# Patient Record
Sex: FEMALE | Race: OTHER | NOT HISPANIC OR LATINO | Employment: OTHER | URBAN - METROPOLITAN AREA
[De-identification: names, ages, dates, MRNs, and addresses within clinical notes are randomized per-mention and may not be internally consistent; named-entity substitution may affect disease eponyms.]

---

## 2018-06-26 ENCOUNTER — OFFICE VISIT (OUTPATIENT)
Dept: FAMILY MEDICINE | Facility: CLINIC | Age: 68
End: 2018-06-26
Payer: MEDICARE

## 2018-06-26 VITALS
HEART RATE: 79 BPM | SYSTOLIC BLOOD PRESSURE: 134 MMHG | BODY MASS INDEX: 28.07 KG/M2 | HEIGHT: 60 IN | DIASTOLIC BLOOD PRESSURE: 82 MMHG | WEIGHT: 143 LBS | OXYGEN SATURATION: 99 %

## 2018-06-26 DIAGNOSIS — Z78.0 POST-MENOPAUSAL: ICD-10-CM

## 2018-06-26 DIAGNOSIS — R53.83 FATIGUE, UNSPECIFIED TYPE: Primary | ICD-10-CM

## 2018-06-26 DIAGNOSIS — M79.10 MYALGIA: ICD-10-CM

## 2018-06-26 DIAGNOSIS — I10 ESSENTIAL HYPERTENSION: ICD-10-CM

## 2018-06-26 DIAGNOSIS — E11.9 TYPE 2 DIABETES MELLITUS WITHOUT COMPLICATION, WITHOUT LONG-TERM CURRENT USE OF INSULIN: ICD-10-CM

## 2018-06-26 DIAGNOSIS — E53.8 VITAMIN B 12 DEFICIENCY: ICD-10-CM

## 2018-06-26 DIAGNOSIS — Z12.31 SCREENING MAMMOGRAM, ENCOUNTER FOR: ICD-10-CM

## 2018-06-26 DIAGNOSIS — E78.5 HYPERLIPIDEMIA, UNSPECIFIED HYPERLIPIDEMIA TYPE: ICD-10-CM

## 2018-06-26 PROCEDURE — 96372 THER/PROPH/DIAG INJ SC/IM: CPT | Mod: ,,, | Performed by: NURSE PRACTITIONER

## 2018-06-26 PROCEDURE — 99204 OFFICE O/P NEW MOD 45 MIN: CPT | Mod: 25,,, | Performed by: NURSE PRACTITIONER

## 2018-06-26 RX ORDER — GLIPIZIDE 5 MG/1
5 TABLET, FILM COATED, EXTENDED RELEASE ORAL 2 TIMES DAILY
COMMUNITY
End: 2018-06-26 | Stop reason: SINTOL

## 2018-06-26 RX ORDER — LANOLIN ALCOHOL/MO/W.PET/CERES
100 CREAM (GRAM) TOPICAL DAILY
COMMUNITY
End: 2018-07-17

## 2018-06-26 RX ORDER — METFORMIN HYDROCHLORIDE 1000 MG/1
1000 TABLET ORAL 2 TIMES DAILY WITH MEALS
COMMUNITY
End: 2018-09-05 | Stop reason: SDUPTHER

## 2018-06-26 RX ORDER — LANCETS
1 EACH MISCELLANEOUS DAILY
Qty: 100 EACH | Refills: 0 | Status: SHIPPED | OUTPATIENT
Start: 2018-06-26 | End: 2019-02-25 | Stop reason: SDUPTHER

## 2018-06-26 RX ORDER — INSULIN PUMP SYRINGE, 3 ML
EACH MISCELLANEOUS
Qty: 1 EACH | Refills: 0 | Status: SHIPPED | OUTPATIENT
Start: 2018-06-26 | End: 2019-02-25 | Stop reason: SDUPTHER

## 2018-06-26 RX ORDER — IRBESARTAN AND HYDROCHLOROTHIAZIDE 300; 12.5 MG/1; MG/1
TABLET, FILM COATED ORAL DAILY
COMMUNITY
End: 2018-10-16 | Stop reason: SDUPTHER

## 2018-06-26 RX ORDER — ERGOCALCIFEROL 1.25 MG/1
50000 CAPSULE ORAL
COMMUNITY
End: 2019-02-25

## 2018-06-26 RX ORDER — CYANOCOBALAMIN 1000 UG/ML
1000 INJECTION, SOLUTION INTRAMUSCULAR; SUBCUTANEOUS
Status: COMPLETED | OUTPATIENT
Start: 2018-06-26 | End: 2018-06-26

## 2018-06-26 RX ORDER — IRBESARTAN 150 MG/1
TABLET ORAL NIGHTLY
COMMUNITY
End: 2018-06-26 | Stop reason: CLARIF

## 2018-06-26 RX ORDER — PRAVASTATIN SODIUM 40 MG/1
20 TABLET ORAL DAILY
COMMUNITY
End: 2018-10-16 | Stop reason: SDUPTHER

## 2018-06-26 RX ORDER — EPINEPHRINE 0.22MG
100 AEROSOL WITH ADAPTER (ML) INHALATION DAILY
Qty: 30 CAPSULE | Refills: 11 | COMMUNITY
Start: 2018-06-26 | End: 2019-02-25

## 2018-06-26 RX ADMIN — CYANOCOBALAMIN 1000 MCG: 1000 INJECTION, SOLUTION INTRAMUSCULAR; SUBCUTANEOUS at 04:06

## 2018-06-26 NOTE — PROGRESS NOTES
"     SUBJECTIVE:      Patient ID: Iesha West is a 68 y.o. female.    Chief Complaint: Fatigue and Muscle Pain    Ms West recently moved from [place] here to establish with a PCP, she is accompanied by her son today. She has a history of DM, HTN and hyperlipidemia. Here today complaining of fatigue and muscle aches. She was worked up for this by her PCP in [place], started on B12 injections. Not feeling any better. Her A1c at that time was 6.8. She says she has cut her glipizide in half a few months ago because it was making her feel "woozy" Lately when she is feeling tired she checks her blood sugar and it is 80-90.     Diabetes   She presents for her initial diabetic visit. She has type 2 diabetes mellitus. Her disease course has been stable. Pertinent negatives for hypoglycemia include no confusion, dizziness, headaches, pallor or speech difficulty. Associated symptoms include fatigue. Pertinent negatives for diabetes include no chest pain and no weakness. There are no hypoglycemic complications. Symptoms are stable. Risk factors for coronary artery disease include dyslipidemia and hypertension. Current diabetic treatment includes oral agent (dual therapy). She is compliant with treatment all of the time.   Hypertension   This is a chronic problem. The current episode started more than 1 year ago. The problem is controlled. Pertinent negatives include no chest pain, headaches, palpitations or shortness of breath. Risk factors for coronary artery disease include diabetes mellitus, dyslipidemia and post-menopausal state. Past treatments include angiotensin blockers and diuretics.   Hyperlipidemia   This is a chronic problem. The current episode started more than 1 year ago. Associated symptoms include myalgias. Pertinent negatives include no chest pain or shortness of breath. Current antihyperlipidemic treatment includes statins. Risk factors for coronary artery disease include diabetes mellitus, " dyslipidemia, hypertension and post-menopausal.   Fatigue   This is a new problem. The current episode started more than 1 month ago. The problem occurs daily. The problem has been unchanged. Associated symptoms include fatigue and myalgias. Pertinent negatives include no abdominal pain, arthralgias, change in bowel habit, chest pain, chills, congestion, diaphoresis, fever, headaches, joint swelling, rash, swollen glands, urinary symptoms, vomiting or weakness. She has tried nothing for the symptoms.       Past Surgical History:   Procedure Laterality Date    COLON SURGERY      JOINT REPLACEMENT       Family History   Problem Relation Age of Onset    Stroke Mother       Social History     Social History    Marital status:      Spouse name: N/A    Number of children: N/A    Years of education: N/A     Social History Main Topics    Smoking status: Never Smoker    Smokeless tobacco: Never Used    Alcohol use No    Drug use: No    Sexual activity: No     Other Topics Concern    None     Social History Narrative    None     Current Outpatient Prescriptions   Medication Sig Dispense Refill    cyanocobalamin (VITAMIN B-12) 1000 MCG tablet Take 100 mcg by mouth once daily.      cyanocobalamin, vitamin B-12, 1,000 mcg/mL Kit Inject 1,000 mg as directed every 30 days.      ergocalciferol (ERGOCALCIFEROL) 50,000 unit Cap Take 50,000 Units by mouth every 30 days.      irbesartan-hydrochlorothiazide (AVALIDE) 300-12.5 mg per tablet Take by mouth once daily.      metFORMIN (GLUCOPHAGE) 1000 MG tablet Take 1,000 mg by mouth 2 (two) times daily with meals.      pravastatin (PRAVACHOL) 40 MG tablet Take 20 mg by mouth once daily.      blood sugar diagnostic (BLOOD GLUCOSE TEST) Strp 1 strip by Misc.(Non-Drug; Combo Route) route once daily. 100 strip 0    blood-glucose meter kit Use as instructed 1 each 0    coenzyme Q10 (CO Q-10) 100 mg capsule Take 1 capsule (100 mg total) by mouth once daily. 30  capsule 11    lancets (LANCETS,ULTRA THIN) 26 gauge Misc 1 lancet by Misc.(Non-Drug; Combo Route) route once daily. 100 each 0     No current facility-administered medications for this visit.      Review of patient's allergies indicates:   Allergen Reactions    Aspirin Itching      Past Medical History:   Diagnosis Date    Cancer     Diabetes mellitus, type 2     Seasonal allergies      Past Surgical History:   Procedure Laterality Date    COLON SURGERY      JOINT REPLACEMENT         Review of Systems   Constitutional: Positive for fatigue. Negative for appetite change, chills, diaphoresis, fever and unexpected weight change.   HENT: Negative for congestion, ear discharge, hearing loss, trouble swallowing and voice change.    Eyes: Negative for photophobia and pain.   Respiratory: Negative for chest tightness, shortness of breath, wheezing and stridor.    Cardiovascular: Negative for chest pain and palpitations.   Gastrointestinal: Negative for abdominal pain, blood in stool, change in bowel habit and vomiting.   Endocrine: Negative for cold intolerance and heat intolerance.   Genitourinary: Negative for difficulty urinating and flank pain.   Musculoskeletal: Positive for myalgias. Negative for arthralgias, joint swelling and neck stiffness.   Skin: Negative for pallor and rash.   Neurological: Negative for dizziness, speech difficulty, weakness, light-headedness and headaches.   Hematological: Does not bruise/bleed easily.   Psychiatric/Behavioral: Negative for confusion.      OBJECTIVE:      Vitals:    06/26/18 1347 06/26/18 1516   BP: (!) 160/80 134/82   Pulse: 79    SpO2: 99%    Weight: 64.9 kg (143 lb)    Height: 5' (1.524 m)      Physical Exam   Constitutional: She is oriented to person, place, and time. She appears well-developed and well-nourished.   HENT:   Head: Atraumatic.   Nose: Nose normal.   Mouth/Throat: Oropharynx is clear and moist and mucous membranes are normal.   Eyes: Conjunctivae and  EOM are normal. Pupils are equal, round, and reactive to light.   Neck: Neck supple. No JVD present. Carotid bruit is not present. No thyromegaly present.   Cardiovascular: Normal rate, regular rhythm, normal heart sounds and intact distal pulses.    Pulmonary/Chest: Effort normal and breath sounds normal.   Abdominal: Soft. Bowel sounds are normal. She exhibits no distension. There is no hepatosplenomegaly. There is no tenderness.   Musculoskeletal: Normal range of motion.   Lymphadenopathy:     She has no cervical adenopathy.   Neurological: She is alert and oriented to person, place, and time.   Skin: Skin is warm and dry. No rash noted.   Psychiatric: She has a normal mood and affect. Her speech is normal and behavior is normal.   Nursing note and vitals reviewed.     Assessment:       1. Fatigue, unspecified type    2. Type 2 diabetes mellitus without complication, without long-term current use of insulin    3. Essential hypertension    4. Myalgia    5. Hyperlipidemia, unspecified hyperlipidemia type    6. Post-menopausal    7. Screening mammogram, encounter for    8. Vitamin B 12 deficiency        Plan:       Fatigue, unspecified type  -     CBC auto differential; Future; Expected date: 06/26/2018    Type 2 diabetes mellitus without complication, without long-term current use of insulin  -     Comprehensive metabolic panel; Future; Expected date: 06/26/2018  -     Microalbumin/creatinine urine ratio; Future; Expected date: 06/26/2018  -     blood-glucose meter kit; Use as instructed  Dispense: 1 each; Refill: 0  -     blood sugar diagnostic (BLOOD GLUCOSE TEST) Strp; 1 strip by Misc.(Non-Drug; Combo Route) route once daily.  Dispense: 100 strip; Refill: 0  -     lancets (LANCETS,ULTRA THIN) 26 gauge Misc; 1 lancet by Misc.(Non-Drug; Combo Route) route once daily.  Dispense: 100 each; Refill: 0  Stop Glipizide    Essential hypertension  -     EKG 12-lead                NSR  -     X-Ray Chest PA And  Lateral    Myalgia  -     coenzyme Q10 (CO Q-10) 100 mg capsule; Take 1 capsule (100 mg total) by mouth once daily.  Dispense: 30 capsule; Refill: 11  Muscle aches possibly related to statin. Will try Co Q10 to see if improves.     Hyperlipidemia, unspecified hyperlipidemia type  -     Lipid panel; Future; Expected date: 06/26/2018    Post-menopausal  -     DXA Bone Density Spine And Hip; Future; Expected date: 06/26/2018    Screening mammogram, encounter for  -     Mammo Digital Screening Bilat without CA    Vitamin B 12 deficiency  -     cyanocobalamin injection 1,000 mcg; Inject 1 mL (1,000 mcg total) into the muscle one time.        Follow-up in about 3 weeks (around 7/17/2018) for dm/fatigue.      6/26/2018 DAYANARA Sanabria, FNP

## 2018-06-26 NOTE — PATIENT INSTRUCTIONS
4 Steps for Eating Healthier  Changing the way you eat can improve your health. It can lower your cholesterol and blood pressure, and help you stay at a healthy weight. Your diet doesnt have to be bland and boring to be healthy. Just watch your calories and follow these steps:    1. Eat fewer unhealthy fats  · Choose more fish and lean meats instead of fatty cuts of meat.  · Skip butter and lard, and use less margarine.  · Pass on foods that have palm, coconut, or hydrogenated oils.  · Eat fewer high-fat dairy foods like cheese, ice cream, and whole milk.  · Get a heart-healthy cookbook and try some low-fat recipes.  2. Go light on salt  · Keep the saltshaker off the table.  · Limit high-salt ingredients, such as soy sauce, bouillon, and garlic salt.  · Instead of adding salt when cooking, season your food with herbs and flavorings. Try lemon, garlic, and onion.  · Limit convenience foods, such as boxed or canned foods and restaurant food.  · Read food labels and choose lower-sodium options.  3. Limit sugar  · Pause before you add sugars to pancakes, cereal, coffee, or tea. This includes white and brown table sugar, syrup, honey, and molasses. Cut your usual amount by half.  · Use non-sugar sweeteners. Stevia, aspartame, and sucralose can satisfy a sweet tooth without adding calories.  · Swap out sugar-filled soda and other drinks. Buy sugar-free or low-calorie beverages. Remember water is always the best choice.  · Read labels and choose foods with less added sugar. Keep in mind that dairy foods and foods with fruit will have some natural sugar.  · Cut the sugar in recipes by 1/3 to 1/2. Boost the flavor with extracts like almond, vanilla, or orange. Or add spices such as cinnamon or nutmeg.  4. Eat more fiber  · Eat fresh fruits and vegetables every day.  · Boost your diet with whole grains. Go for oats, whole-grain rice, and bran.  · Add beans and lentils to your meals.  · Drink more water to match your fiber  increase. This is to help prevent constipation.  Date Last Reviewed: 5/11/2015  © 4834-6640 [employer]. [address] [place] [address], [place] [address]. All rights reserved. This information is not intended as a substitute for professional medical care. Always follow your healthcare professional's instructions.

## 2018-06-27 LAB
ALBUMIN SERPL-MCNC: 4.2 G/DL (ref 3.1–4.7)
ALP SERPL-CCNC: 58 IU/L (ref 40–104)
ALT (SGPT): 22 IU/L (ref 3–33)
AST SERPL-CCNC: 22 IU/L (ref 10–40)
BASOPHILS NFR BLD: 0 K/UL (ref 0–0.2)
BASOPHILS NFR BLD: 0.3 %
BILIRUB SERPL-MCNC: 0.4 MG/DL (ref 0.3–1)
BUN SERPL-MCNC: 14 MG/DL (ref 8–20)
CALCIUM SERPL-MCNC: 9.1 MG/DL (ref 7.7–10.4)
CHLORIDE: 103 MMOL/L (ref 98–110)
CO2 SERPL-SCNC: 24 MMOL/L (ref 22.8–31.6)
CREATININE RANDOM URINE: 87 MG/DL
CREATININE: 0.87 MG/DL (ref 0.6–1.4)
EOSINOPHIL NFR BLD: 0.4 K/UL (ref 0–0.7)
EOSINOPHIL NFR BLD: 5.5 %
ERYTHROCYTE [DISTWIDTH] IN BLOOD BY AUTOMATED COUNT: 12.8 % (ref 11.7–14.9)
GLUCOSE: 162 MG/DL (ref 70–99)
GRAN #: 4.8 K/UL (ref 1.4–6.5)
GRAN%: 65.6 %
HCT VFR BLD AUTO: 36.5 % (ref 36–48)
HGB BLD-MCNC: 11.7 G/DL (ref 12–15)
IMMATURE GRANS (ABS): 0 K/UL (ref 0–1)
IMMATURE GRANULOCYTES: 0.4 %
LYMPH #: 1.6 K/UL (ref 1.2–3.4)
LYMPH%: 21.8 %
MCH RBC QN AUTO: 28.6 PG (ref 25–35)
MCHC RBC AUTO-ENTMCNC: 32.1 G/DL (ref 31–36)
MCV RBC AUTO: 89.2 FL (ref 79–98)
MICROALBUM.,U,RANDOM: 6.4 MCG/ML (ref 0–19.9)
MICROALBUMIN/CREATININE RATIO: 7 (ref 0–30)
MONO #: 0.5 K/UL (ref 0.1–0.6)
MONO%: 6.4 %
NUCLEATED RBCS: 0 %
PLATELET # BLD AUTO: 231 K/UL (ref 140–440)
PMV BLD AUTO: 10.7 FL (ref 8.8–12.7)
POTASSIUM SERPL-SCNC: 4.7 MMOL/L (ref 3.5–5)
PROT SERPL-MCNC: 7.5 G/DL (ref 6–8.2)
RBC # BLD AUTO: 4.09 M/UL (ref 3.5–5.5)
SODIUM: 133 MMOL/L (ref 134–144)
WBC # BLD AUTO: 7.3 K/UL (ref 5–10)

## 2018-06-29 ENCOUNTER — TELEPHONE (OUTPATIENT)
Dept: FAMILY MEDICINE | Facility: CLINIC | Age: 68
End: 2018-06-29

## 2018-06-29 NOTE — TELEPHONE ENCOUNTER
----- Message from Rylan Don NP sent at 6/28/2018  7:57 AM CDT -----  Will review results at upcoming OV. Please make sure her son is with her for the visit

## 2018-07-02 ENCOUNTER — TELEPHONE (OUTPATIENT)
Dept: FAMILY MEDICINE | Facility: CLINIC | Age: 68
End: 2018-07-02

## 2018-07-02 NOTE — TELEPHONE ENCOUNTER
----- Message from FALLON Del Rio MD sent at 7/2/2018 10:55 AM CDT -----  Osteopenia, stay active, calcium, magnesium, vit d

## 2018-07-17 ENCOUNTER — OFFICE VISIT (OUTPATIENT)
Dept: FAMILY MEDICINE | Facility: CLINIC | Age: 68
End: 2018-07-17
Payer: MEDICARE

## 2018-07-17 VITALS
BODY MASS INDEX: 27.68 KG/M2 | HEART RATE: 90 BPM | SYSTOLIC BLOOD PRESSURE: 120 MMHG | OXYGEN SATURATION: 99 % | HEIGHT: 60 IN | DIASTOLIC BLOOD PRESSURE: 70 MMHG | WEIGHT: 141 LBS

## 2018-07-17 DIAGNOSIS — E11.9 TYPE 2 DIABETES MELLITUS WITHOUT COMPLICATION, WITH LONG-TERM CURRENT USE OF INSULIN: Primary | ICD-10-CM

## 2018-07-17 DIAGNOSIS — Z79.4 TYPE 2 DIABETES MELLITUS WITHOUT COMPLICATION, WITH LONG-TERM CURRENT USE OF INSULIN: Primary | ICD-10-CM

## 2018-07-17 DIAGNOSIS — I10 ESSENTIAL HYPERTENSION: ICD-10-CM

## 2018-07-17 DIAGNOSIS — Z23 NEED FOR PNEUMOCOCCAL VACCINATION: ICD-10-CM

## 2018-07-17 DIAGNOSIS — E53.8 VITAMIN B 12 DEFICIENCY: ICD-10-CM

## 2018-07-17 DIAGNOSIS — M81.6 LOCALIZED OSTEOPOROSIS WITHOUT CURRENT PATHOLOGICAL FRACTURE: ICD-10-CM

## 2018-07-17 LAB — HBA1C MFR BLD: ABNORMAL %

## 2018-07-17 PROCEDURE — 83036 HEMOGLOBIN GLYCOSYLATED A1C: CPT | Mod: QW,,, | Performed by: NURSE PRACTITIONER

## 2018-07-17 PROCEDURE — 96372 THER/PROPH/DIAG INJ SC/IM: CPT | Mod: ,,, | Performed by: NURSE PRACTITIONER

## 2018-07-17 PROCEDURE — 99214 OFFICE O/P EST MOD 30 MIN: CPT | Mod: 25,,, | Performed by: NURSE PRACTITIONER

## 2018-07-17 RX ORDER — ALENDRONATE SODIUM 70 MG/1
70 TABLET ORAL
Qty: 4 TABLET | Refills: 5 | Status: SHIPPED | OUTPATIENT
Start: 2018-07-17 | End: 2018-10-16 | Stop reason: SDUPTHER

## 2018-07-17 RX ORDER — CYANOCOBALAMIN 1000 UG/ML
1000 INJECTION, SOLUTION INTRAMUSCULAR; SUBCUTANEOUS
Status: COMPLETED | OUTPATIENT
Start: 2018-07-17 | End: 2018-07-17

## 2018-07-17 RX ADMIN — CYANOCOBALAMIN 1000 MCG: 1000 INJECTION, SOLUTION INTRAMUSCULAR; SUBCUTANEOUS at 02:07

## 2018-07-17 NOTE — PROGRESS NOTES
SUBJECTIVE:      Patient ID: Iesha West is a 68 y.o. female.    Chief Complaint: Diabetes (discuss bone density test) and Fatigue    Patient here today to f/u on diabetes and change in medication. She is feeling much better since stopping her glipizide. No fatigue and no wooziness. A1c 7.2 today.   DEXA: Osteoporosis on lumbar spine and osteopenia on Hip. Will discuss treatment options with patient       Diabetes   She presents for her follow-up diabetic visit. She has type 2 diabetes mellitus. Her disease course has been stable. There are no hypoglycemic associated symptoms. Pertinent negatives for hypoglycemia include no confusion, pallor or speech difficulty. There are no diabetic associated symptoms. Pertinent negatives for diabetes include no chest pain. There are no hypoglycemic complications. Symptoms are stable. There are no diabetic complications. Risk factors for coronary artery disease include dyslipidemia, post-menopausal and hypertension. Current diabetic treatment includes oral agent (monotherapy).   Hypertension   This is a chronic problem. The current episode started more than 1 year ago. The problem is unchanged. The problem is controlled. Pertinent negatives include no chest pain, palpitations or shortness of breath. Risk factors for coronary artery disease include dyslipidemia, diabetes mellitus and post-menopausal state.       Past Surgical History:   Procedure Laterality Date    COLON SURGERY      JOINT REPLACEMENT       Family History   Problem Relation Age of Onset    Stroke Mother       Social History     Social History    Marital status:      Spouse name: N/A    Number of children: N/A    Years of education: N/A     Social History Main Topics    Smoking status: Never Smoker    Smokeless tobacco: Never Used    Alcohol use No    Drug use: No    Sexual activity: No     Other Topics Concern    None     Social History Narrative    None     Current Outpatient Prescriptions    Medication Sig Dispense Refill    blood sugar diagnostic (BLOOD GLUCOSE TEST) Strp 1 strip by Misc.(Non-Drug; Combo Route) route once daily. 100 strip 0    blood-glucose meter kit Use as instructed 1 each 0    coenzyme Q10 (CO Q-10) 100 mg capsule Take 1 capsule (100 mg total) by mouth once daily. 30 capsule 11    cyanocobalamin, vitamin B-12, 1,000 mcg/mL Kit Inject 1,000 mg as directed every 30 days.      ergocalciferol (ERGOCALCIFEROL) 50,000 unit Cap Take 50,000 Units by mouth every 30 days.      irbesartan-hydrochlorothiazide (AVALIDE) 300-12.5 mg per tablet Take by mouth once daily.      lancets (LANCETS,ULTRA THIN) 26 gauge Misc 1 lancet by Misc.(Non-Drug; Combo Route) route once daily. 100 each 0    metFORMIN (GLUCOPHAGE) 1000 MG tablet Take 1,000 mg by mouth 2 (two) times daily with meals.      pravastatin (PRAVACHOL) 40 MG tablet Take 20 mg by mouth once daily.      alendronate (FOSAMAX) 70 MG tablet Take 1 tablet (70 mg total) by mouth every 7 days. 4 tablet 5    blood sugar diagnostic Strp 1 strip by Misc.(Non-Drug; Combo Route) route once daily. 100 strip 2    linagliptin (TRADJENTA) 5 mg Tab tablet Take 1 tablet (5 mg total) by mouth once daily. 90 tablet 0    pneumoc 13-anh conj-dip cr,PF, 0.5 mL Syrg Inject 0.5 mLs into the muscle once. for 1 dose 0.5 mL 0     Current Facility-Administered Medications   Medication Dose Route Frequency Provider Last Rate Last Dose    cyanocobalamin injection 1,000 mcg  1,000 mcg Intramuscular 1 time in Clinic/HOD Rylan Don NP         Review of patient's allergies indicates:   Allergen Reactions    Aspirin Itching      Past Medical History:   Diagnosis Date    Cancer     Diabetes mellitus, type 2     Seasonal allergies      Past Surgical History:   Procedure Laterality Date    COLON SURGERY      JOINT REPLACEMENT         Review of Systems   Constitutional: Negative for appetite change, chills, diaphoresis and unexpected weight change.   HENT:  Negative for ear discharge, hearing loss, trouble swallowing and voice change.    Eyes: Negative for photophobia and pain.   Respiratory: Negative for chest tightness, shortness of breath and stridor.    Cardiovascular: Negative for chest pain and palpitations.   Gastrointestinal: Negative for abdominal pain, blood in stool and vomiting.   Endocrine: Negative for cold intolerance and heat intolerance.   Genitourinary: Negative for difficulty urinating and flank pain.   Musculoskeletal: Negative for joint swelling and neck stiffness.   Skin: Negative for pallor.   Neurological: Negative for speech difficulty.   Hematological: Does not bruise/bleed easily.   Psychiatric/Behavioral: Negative for confusion.      OBJECTIVE:      Vitals:    07/17/18 1306   BP: 120/70   Pulse: 90   SpO2: 99%   Weight: 64 kg (141 lb)   Height: 5' (1.524 m)     Physical Exam   Constitutional: She is oriented to person, place, and time. She appears well-developed and well-nourished.   HENT:   Head: Atraumatic.   Eyes: Conjunctivae are normal.   Neck: Neck supple.   Cardiovascular: Normal rate, regular rhythm, normal heart sounds and intact distal pulses.    Pulmonary/Chest: Effort normal and breath sounds normal.   Abdominal: Soft. Bowel sounds are normal. She exhibits no distension.   Musculoskeletal: Normal range of motion.        Right foot: There is no deformity.        Left foot: There is no deformity.   Feet:   Right Foot:   Protective Sensation: 10 sites tested. 10 sites sensed.   Skin Integrity: Negative for ulcer, blister or skin breakdown.   Left Foot:   Protective Sensation: 10 sites tested. 10 sites sensed.   Skin Integrity: Negative for ulcer, blister or skin breakdown.   Neurological: She is alert and oriented to person, place, and time.   Skin: Skin is warm and dry.   Psychiatric: She has a normal mood and affect.   Nursing note and vitals reviewed.     Assessment:       1. Type 2 diabetes mellitus without complication, with  long-term current use of insulin    2. Localized osteoporosis without current pathological fracture    3. Essential hypertension    4. Need for pneumococcal vaccination    5. Vitamin B 12 deficiency        Plan:       Type 2 diabetes mellitus without complication, with long-term current use of insulin  -     Hemoglobin A1C, POCT              7.2  -     blood sugar diagnostic Strp; 1 strip by Misc.(Non-Drug; Combo Route) route once daily.  Dispense: 100 strip; Refill: 2  -  start linagliptin (TRADJENTA) 5 mg Tab tablet; Take 1 tablet (5 mg total) by mouth once daily.  Dispense: 90 tablet; Refill: 0    Localized osteoporosis without current pathological fracture  -     alendronate (FOSAMAX) 70 MG tablet; Take 1 tablet (70 mg total) by mouth every 7 days.  Dispense: 4 tablet; Refill: 5  -     Ambulatory Referral to Dentistry  I recommend taking at least calcium 1200mg and Vitamin D of 2000 IU daily. In addition, I recommend light weight bearing exercises. We can repeat the bone density scan in 2 yrs.      Essential hypertension  Stable on current medication       Follow-up in about 3 months (around 10/17/2018) for dm.      7/17/2018 DAYANARA Sanabria, FNP

## 2018-07-17 NOTE — PATIENT INSTRUCTIONS
Preventing Osteoporosis: Avoiding Bone Loss  Certain factors can speed up bone loss or decrease bone growth. For example, alcohol, cigarettes, and certain medicines reduce bone mass. Some foods make it hard for your body to absorb calcium.    Things to avoid  Here are things to avoid to help prevent osteoporosis:  · Alcohol is toxic to bones. It is a major cause of bone loss. Heavy drinking can cause osteoporosis even if you have no other risk factors.  · Smoking reduces bone mass. Smoking may also interfere with estrogen levels and cause early menopause.  · Inactivity makes your bones lose strength and become thinner. Over time, thin bones may break. Women who aren't active are at a high risk for osteoporosis.  · Certain medicines, such as cortisone, increase bone loss. They also decrease bone growth. Ask your healthcare provider about any side effects of your medicines, and how to prevent them.  · Protein-rich or salty foods eaten in large amounts may deplete calcium.  · Caffeine increases calcium loss. People who drink a lot of coffee, tea, or queenie lose more calcium than those who don't.  Date Last Reviewed: 10/17/2015  © 1846-6396 [employer]. [address] [place] [address]. All rights reserved. This information is not intended as a substitute for professional medical care. Always follow your healthcare professional's instructions.

## 2018-07-26 ENCOUNTER — TELEPHONE (OUTPATIENT)
Dept: FAMILY MEDICINE | Facility: CLINIC | Age: 68
End: 2018-07-26

## 2018-07-26 NOTE — TELEPHONE ENCOUNTER
HERI Johnston LPN   Caller: Unspecified (Today,  4:11 PM)             Try to give it at least 30 days to work. Decrease sugar/carbs in the diet. Call with blood sugar >200.

## 2018-07-26 NOTE — TELEPHONE ENCOUNTER
Pt on tradjenta and blood sugars still running 150-160 in the morning. The son is asking if pt needs a medication adjustment.

## 2018-08-14 ENCOUNTER — CLINICAL SUPPORT (OUTPATIENT)
Dept: FAMILY MEDICINE | Facility: CLINIC | Age: 68
End: 2018-08-14
Payer: MEDICARE

## 2018-08-14 DIAGNOSIS — E53.8 B12 DEFICIENCY: Primary | ICD-10-CM

## 2018-08-14 DIAGNOSIS — E11.9 TYPE 2 DIABETES MELLITUS WITHOUT COMPLICATION, WITHOUT LONG-TERM CURRENT USE OF INSULIN: ICD-10-CM

## 2018-08-14 PROCEDURE — 96372 THER/PROPH/DIAG INJ SC/IM: CPT | Mod: ,,, | Performed by: NURSE PRACTITIONER

## 2018-08-14 RX ORDER — CYANOCOBALAMIN 1000 UG/ML
1000 INJECTION, SOLUTION INTRAMUSCULAR; SUBCUTANEOUS
Qty: 1 ML | Refills: 5 | Status: SHIPPED | OUTPATIENT
Start: 2018-08-14 | End: 2019-02-25 | Stop reason: SDUPTHER

## 2018-08-14 RX ORDER — CYANOCOBALAMIN 1000 UG/ML
100 INJECTION, SOLUTION INTRAMUSCULAR; SUBCUTANEOUS
Status: DISCONTINUED | OUTPATIENT
Start: 2018-08-14 | End: 2018-10-23

## 2018-08-14 RX ORDER — CYANOCOBALAMIN 1000 UG/ML
1000 INJECTION, SOLUTION INTRAMUSCULAR; SUBCUTANEOUS
Status: COMPLETED | OUTPATIENT
Start: 2018-08-14 | End: 2018-08-14

## 2018-08-14 RX ADMIN — CYANOCOBALAMIN 1000 MCG: 1000 INJECTION, SOLUTION INTRAMUSCULAR; SUBCUTANEOUS at 02:08

## 2018-08-14 NOTE — PROGRESS NOTES
Pt here for b-12 inj. Name and  verified. Med supplied by pt from pharmacy.  Inj given in left deltoid, pt raciel'd well.

## 2018-08-31 DIAGNOSIS — Z79.4 TYPE 2 DIABETES MELLITUS WITHOUT COMPLICATION, WITH LONG-TERM CURRENT USE OF INSULIN: ICD-10-CM

## 2018-08-31 DIAGNOSIS — E11.9 TYPE 2 DIABETES MELLITUS WITHOUT COMPLICATION, WITH LONG-TERM CURRENT USE OF INSULIN: ICD-10-CM

## 2018-09-04 RX ORDER — LINAGLIPTIN 5 MG/1
TABLET, FILM COATED ORAL
Qty: 90 TABLET | Refills: 0 | Status: SHIPPED | OUTPATIENT
Start: 2018-09-04 | End: 2018-09-05 | Stop reason: SDUPTHER

## 2018-09-05 DIAGNOSIS — E11.9 TYPE 2 DIABETES MELLITUS WITHOUT COMPLICATION, WITH LONG-TERM CURRENT USE OF INSULIN: ICD-10-CM

## 2018-09-05 DIAGNOSIS — Z79.4 TYPE 2 DIABETES MELLITUS WITHOUT COMPLICATION, WITH LONG-TERM CURRENT USE OF INSULIN: ICD-10-CM

## 2018-09-05 RX ORDER — METFORMIN HYDROCHLORIDE 1000 MG/1
1000 TABLET ORAL 2 TIMES DAILY WITH MEALS
Qty: 180 TABLET | Refills: 0 | Status: SHIPPED | OUTPATIENT
Start: 2018-09-05 | End: 2018-10-16 | Stop reason: SDUPTHER

## 2018-09-17 ENCOUNTER — CLINICAL SUPPORT (OUTPATIENT)
Dept: FAMILY MEDICINE | Facility: CLINIC | Age: 68
End: 2018-09-17
Payer: MEDICARE

## 2018-09-17 DIAGNOSIS — Z79.4 TYPE 2 DIABETES MELLITUS WITHOUT COMPLICATION, WITH LONG-TERM CURRENT USE OF INSULIN: ICD-10-CM

## 2018-09-17 DIAGNOSIS — E53.8 B12 DEFICIENCY: Primary | ICD-10-CM

## 2018-09-17 DIAGNOSIS — E11.9 TYPE 2 DIABETES MELLITUS WITHOUT COMPLICATION, WITH LONG-TERM CURRENT USE OF INSULIN: ICD-10-CM

## 2018-09-17 PROCEDURE — 96372 THER/PROPH/DIAG INJ SC/IM: CPT | Mod: ,,, | Performed by: NURSE PRACTITIONER

## 2018-09-17 RX ORDER — CYANOCOBALAMIN 1000 UG/ML
1000 INJECTION, SOLUTION INTRAMUSCULAR; SUBCUTANEOUS
Status: COMPLETED | OUTPATIENT
Start: 2018-09-17 | End: 2018-09-17

## 2018-09-17 RX ADMIN — CYANOCOBALAMIN 1000 MCG: 1000 INJECTION, SOLUTION INTRAMUSCULAR; SUBCUTANEOUS at 01:09

## 2018-09-17 NOTE — PROGRESS NOTES
Pt here for B12 inj. Name and  verified. Med supplied by pt from pharmacy.  Inj given in left deltoid, pt raciel'd well.

## 2018-10-16 ENCOUNTER — TELEPHONE (OUTPATIENT)
Dept: FAMILY MEDICINE | Facility: CLINIC | Age: 68
End: 2018-10-16

## 2018-10-16 ENCOUNTER — OFFICE VISIT (OUTPATIENT)
Dept: FAMILY MEDICINE | Facility: CLINIC | Age: 68
End: 2018-10-16
Payer: MEDICARE

## 2018-10-16 VITALS
WEIGHT: 137 LBS | HEART RATE: 88 BPM | HEIGHT: 60 IN | OXYGEN SATURATION: 99 % | BODY MASS INDEX: 26.9 KG/M2 | DIASTOLIC BLOOD PRESSURE: 84 MMHG | SYSTOLIC BLOOD PRESSURE: 134 MMHG

## 2018-10-16 DIAGNOSIS — E53.8 VITAMIN B 12 DEFICIENCY: ICD-10-CM

## 2018-10-16 DIAGNOSIS — E11.9 TYPE 2 DIABETES MELLITUS WITHOUT COMPLICATION, WITHOUT LONG-TERM CURRENT USE OF INSULIN: Primary | ICD-10-CM
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
## 2018-10-16 DIAGNOSIS — M81.6 LOCALIZED OSTEOPOROSIS WITHOUT CURRENT PATHOLOGICAL FRACTURE: ICD-10-CM

## 2018-10-16 DIAGNOSIS — I10 ESSENTIAL HYPERTENSION: ICD-10-CM

## 2018-10-16 DIAGNOSIS — E78.5 HYPERLIPIDEMIA, UNSPECIFIED HYPERLIPIDEMIA TYPE: ICD-10-CM

## 2018-10-16 DIAGNOSIS — M54.2 NECK PAIN: ICD-10-CM

## 2018-10-16 DIAGNOSIS — M53.3 SACRAL PAIN: ICD-10-CM

## 2018-10-16 LAB — HBA1C MFR BLD: 6.3 %

## 2018-10-16 PROCEDURE — 99214 OFFICE O/P EST MOD 30 MIN: CPT | Mod: 25,,, | Performed by: NURSE PRACTITIONER

## 2018-10-16 PROCEDURE — 96372 THER/PROPH/DIAG INJ SC/IM: CPT | Mod: ,,, | Performed by: NURSE PRACTITIONER

## 2018-10-16 PROCEDURE — 83036 HEMOGLOBIN GLYCOSYLATED A1C: CPT | Mod: QW,,, | Performed by: NURSE PRACTITIONER

## 2018-10-16 RX ORDER — ALENDRONATE SODIUM 70 MG/1
70 TABLET ORAL
Qty: 12 TABLET | Refills: 1 | Status: SHIPPED | OUTPATIENT
Start: 2018-10-16 | End: 2018-10-23 | Stop reason: SDUPTHER

## 2018-10-16 RX ORDER — PRAVASTATIN SODIUM 40 MG/1
20 TABLET ORAL DAILY
Qty: 90 TABLET | Refills: 1 | Status: SHIPPED | OUTPATIENT
Start: 2018-10-16 | End: 2019-02-25 | Stop reason: SDUPTHER

## 2018-10-16 RX ORDER — BACLOFEN 10 MG/1
10 TABLET ORAL 2 TIMES DAILY
Qty: 28 TABLET | Refills: 0 | Status: SHIPPED | OUTPATIENT
Start: 2018-10-16 | End: 2019-02-25

## 2018-10-16 RX ORDER — IRBESARTAN AND HYDROCHLOROTHIAZIDE 300; 12.5 MG/1; MG/1
1 TABLET, FILM COATED ORAL DAILY
Qty: 90 TABLET | Refills: 1 | Status: SHIPPED | OUTPATIENT
Start: 2018-10-16 | End: 2019-02-25 | Stop reason: SDUPTHER

## 2018-10-16 RX ORDER — BACLOFEN 10 MG/1
10 TABLET ORAL 2 TIMES DAILY
Qty: 90 TABLET | Refills: 0 | Status: SHIPPED | OUTPATIENT
Start: 2018-10-16 | End: 2018-10-16 | Stop reason: ALTCHOICE

## 2018-10-16 RX ORDER — METFORMIN HYDROCHLORIDE 1000 MG/1
1000 TABLET ORAL 2 TIMES DAILY WITH MEALS
Qty: 180 TABLET | Refills: 0 | Status: SHIPPED | OUTPATIENT
Start: 2018-10-16 | End: 2019-02-25 | Stop reason: SDUPTHER

## 2018-10-16 RX ORDER — CYANOCOBALAMIN 1000 UG/ML
1000 INJECTION, SOLUTION INTRAMUSCULAR; SUBCUTANEOUS
Status: COMPLETED | OUTPATIENT
Start: 2018-10-16 | End: 2018-10-16

## 2018-10-16 RX ADMIN — CYANOCOBALAMIN 1000 MCG: 1000 INJECTION, SOLUTION INTRAMUSCULAR; SUBCUTANEOUS at 02:10

## 2018-10-16 NOTE — PATIENT INSTRUCTIONS
Neck Pain    There are several possible causes of neck pain when there is no injury:  · You can get a minor ligament sprain or muscle strain from a sudden minor neck movement. Sleeping with your neck in an awkward position can also cause this.  · Some people respond to emotional stress by tensing the muscles of their neck, shoulders, and upper back. Chronic spasm in these muscles can cause neck pain and sometimes headaches.  · Gradual wear and tear of the joints in the spine can cause degenerative arthritis. This can be a source of occasional or chronic neck pain.  · The spinal disks may bulge and put pressure on a nearby spinal nerve. This can happen as a natural result of aging or repeated small injuries to the neck. The spinal disks are the cushions between each spinal bone. This causes tingling, pain, or numbness that spreads from the neck to the shoulder, arm, or hand on one side.  Acute neck pain usually gets better in 1 to 2 weeks. Neck pain related to disk disease, arthritis in the spinal joints, or spinal stenosis can become chronic and last for months or years. Spinal stenosis is narrowing of the spinal canal.  X-rays are usually not ordered for the initial evaluation of neck pain. However, X-rays may be done if you had a forceful physical injury, such as a car accident or fall. If pain continues and doesnt respond to medical treatment, X-rays and other tests may be done at a later time.  Home care  · Rest and relax the muscles. Use a comfortable pillow that supports the head. It should also help keep the spine in a neutral position. The position of the head should not be tilted forward or backward. A rolled up towel may help for a custom fit.  · Some people find relief with heat. Heat can be applied with either a warm shower or bath or a moist towel heated in the microwave and massage. Others prefer cold packs. You can make an ice pack by filling a plastic bag that seals at the top with ice cubes or  crushed ice and then wrapping it with a thin towel. Try both and use the method that feels best for 15 to 20 minutes, several times a day.  · Whether using ice or heat, be careful that you do not injure your skin. Never put ice directly on the skin. Always wrap the ice in a towel or other type of cloth.This is very important, especially in people with poor skin sensations.   · Try to reduce your stress level. Emotional stress can lead to neck muscle tension and get in the way of or delay the healing process.  · You may use over-the-counter pain medicine to control pain, unless another medicine was prescribed. If you have chronic liver or kidney disease or ever had a stomach ulcer or GI bleeding, talk with your healthcare provider before using these medicines.  Follow-up care  Follow up with your healthcare provider if your symptoms do not show signs of improvement after one week. Physical therapy or further tests may be needed.  If X-rays, CT scans, or MRI scans were taken, you will be told of any new findings that may affect your care.  Call 911  Call 911 if you have:  · Sudden weakness or numbness in one or both arms  · Neck swelling, difficulty or painful swallowing  · Difficulty breathing  · Chest pain  When to seek medical advice  Call your healthcare provider right away if any of these occur:  · Pain becomes worse or spreads into one or both arm  · Increasing headache  · Fever of 100.4°F (38°C) or above lasting for 24 to 48 hours  Date Last Reviewed: 7/1/2016  © 1565-4940 [employer]. [address] [place] [address], [place] [address]. All rights reserved. This information is not intended as a substitute for professional medical care. Always follow your healthcare professional's instructions.
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
## 2018-10-16 NOTE — TELEPHONE ENCOUNTER
MARCE Brumfield faxed over a request for pt to get a 90 day supply of alendronate 70mg to her local pharmacy

## 2018-10-23 DIAGNOSIS — E11.9 TYPE 2 DIABETES MELLITUS WITHOUT COMPLICATION, WITHOUT LONG-TERM CURRENT USE OF INSULIN: ICD-10-CM

## 2018-10-23 DIAGNOSIS — M81.6 LOCALIZED OSTEOPOROSIS WITHOUT CURRENT PATHOLOGICAL FRACTURE: ICD-10-CM

## 2018-10-23 RX ORDER — ALENDRONATE SODIUM 70 MG/1
70 TABLET ORAL
Qty: 13 TABLET | Refills: 0 | Status: SHIPPED | OUTPATIENT
Start: 2018-10-23 | End: 2019-02-18 | Stop reason: SDUPTHER

## 2018-10-23 NOTE — TELEPHONE ENCOUNTER
LM on pts son's vm notifying them of 90 days refills and that the prevnar vaccine can be done at the pharmacy.

## 2018-10-25 ENCOUNTER — TELEPHONE (OUTPATIENT)
Dept: FAMILY MEDICINE | Facility: CLINIC | Age: 68
End: 2018-10-25

## 2018-10-25 NOTE — TELEPHONE ENCOUNTER
Pt son called. Was told by pharmacy that pneumonia vaccine is not covered at pharmacy with pts insurance and she must get it in pcp's office.

## 2018-10-29 ENCOUNTER — TELEPHONE (OUTPATIENT)
Dept: FAMILY MEDICINE | Facility: CLINIC | Age: 68
End: 2018-10-29

## 2018-10-29 ENCOUNTER — CLINICAL SUPPORT (OUTPATIENT)
Dept: FAMILY MEDICINE | Facility: CLINIC | Age: 68
End: 2018-10-29
Payer: MEDICARE

## 2018-10-29 DIAGNOSIS — Z23 NEED FOR PNEUMOCOCCAL VACCINATION: Primary | ICD-10-CM

## 2018-10-29 DIAGNOSIS — M81.6 LOCALIZED OSTEOPOROSIS WITHOUT CURRENT PATHOLOGICAL FRACTURE: ICD-10-CM

## 2018-10-29 PROCEDURE — G0009 ADMIN PNEUMOCOCCAL VACCINE: HCPCS | Mod: ,,, | Performed by: NURSE PRACTITIONER

## 2018-10-29 PROCEDURE — 90670 PCV13 VACCINE IM: CPT | Mod: ,,, | Performed by: NURSE PRACTITIONER

## 2018-10-29 NOTE — PROGRESS NOTES
Pt here for prevnar vaccine. Pt identified by name and . Injection given in LD. Pt raciel'd well.

## 2018-11-06 DIAGNOSIS — E11.9 TYPE 2 DIABETES MELLITUS WITHOUT COMPLICATION, WITHOUT LONG-TERM CURRENT USE OF INSULIN: ICD-10-CM

## 2018-11-06 RX ORDER — CALCIUM CITRATE/VITAMIN D3 200MG-6.25
TABLET ORAL
Qty: 100 STRIP | Refills: 0 | Status: SHIPPED | OUTPATIENT
Start: 2018-11-06 | End: 2019-02-16 | Stop reason: SDUPTHER

## 2018-11-06 RX ORDER — METFORMIN HYDROCHLORIDE 1000 MG/1
TABLET ORAL
Qty: 180 TABLET | Refills: 0 | Status: SHIPPED | OUTPATIENT
Start: 2018-11-06 | End: 2019-02-16 | Stop reason: SDUPTHER

## 2018-11-09 ENCOUNTER — CLINICAL SUPPORT (OUTPATIENT)
Dept: FAMILY MEDICINE | Facility: CLINIC | Age: 68
End: 2018-11-09
Payer: MEDICARE

## 2018-11-09 ENCOUNTER — TELEPHONE (OUTPATIENT)
Dept: FAMILY MEDICINE | Facility: CLINIC | Age: 68
End: 2018-11-09

## 2018-11-09 DIAGNOSIS — Z23 NEED FOR INFLUENZA VACCINATION: ICD-10-CM

## 2018-11-09 DIAGNOSIS — E53.8 B12 DEFICIENCY: Primary | ICD-10-CM

## 2018-11-09 PROCEDURE — 90662 IIV NO PRSV INCREASED AG IM: CPT | Mod: ,,, | Performed by: NURSE PRACTITIONER

## 2018-11-09 PROCEDURE — 96372 THER/PROPH/DIAG INJ SC/IM: CPT | Mod: ,,, | Performed by: NURSE PRACTITIONER

## 2018-11-09 PROCEDURE — G0008 ADMIN INFLUENZA VIRUS VAC: HCPCS | Mod: 59,,, | Performed by: NURSE PRACTITIONER

## 2018-11-09 RX ORDER — CYANOCOBALAMIN 1000 UG/ML
100 INJECTION, SOLUTION INTRAMUSCULAR; SUBCUTANEOUS
Status: COMPLETED | OUTPATIENT
Start: 2018-11-09 | End: 2018-11-09

## 2018-11-09 RX ADMIN — CYANOCOBALAMIN 100 MCG: 1000 INJECTION, SOLUTION INTRAMUSCULAR; SUBCUTANEOUS at 09:11

## 2018-11-09 NOTE — PROGRESS NOTES
Pt here for Flu vaccine and B12 injection. Pt identified by name and . Injections given. Pt raciel'd well.

## 2018-11-09 NOTE — TELEPHONE ENCOUNTER
We need PA for pt to  these meds early. [place] and will needs a 90 day supply while she is gone.      alendronate 70 mg, 1 tablet po q7days    linagliptin 5mg, t tab po daily    Metformin 1000mg, 1 tab po BID with meals     If you need any more information please let me know.  Thank you

## 2018-11-12 NOTE — TELEPHONE ENCOUNTER
alendronate 70 mg, 1 tablet po q7days   Reauthorization? no  Pt currently stable on therapy? yes  Therapeutic alternatives tried and failed? Information not available, pt was stable on medications when becoming our pt.    linagliptin 5mg, t tab po daily   Reauthorization? no  Pt currently stable on therapy? yes  Therapeutic alternatives tried and failed?  Information not available, pt was stable on medications when becoming our pt.    Metformin 1000mg, 1 tab po BID with meals   Reauthorization? no  Pt currently stable on therapy? yes  Therapeutic alternatives tried and failed?  Information not available, pt was stable on medications when becoming our pt.

## 2018-12-04 ENCOUNTER — CLINICAL SUPPORT (OUTPATIENT)
Dept: FAMILY MEDICINE | Facility: CLINIC | Age: 68
End: 2018-12-04
Payer: MEDICARE

## 2018-12-04 DIAGNOSIS — E53.8 B12 DEFICIENCY: Primary | ICD-10-CM

## 2018-12-04 PROCEDURE — 96372 THER/PROPH/DIAG INJ SC/IM: CPT | Mod: ,,, | Performed by: NURSE PRACTITIONER

## 2018-12-04 RX ORDER — CYANOCOBALAMIN 1000 UG/ML
1000 INJECTION, SOLUTION INTRAMUSCULAR; SUBCUTANEOUS
Status: COMPLETED | OUTPATIENT
Start: 2018-12-04 | End: 2018-12-04

## 2018-12-04 RX ADMIN — CYANOCOBALAMIN 1000 MCG: 1000 INJECTION, SOLUTION INTRAMUSCULAR; SUBCUTANEOUS at 01:12

## 2019-02-04 ENCOUNTER — CLINICAL SUPPORT (OUTPATIENT)
Dept: FAMILY MEDICINE | Facility: CLINIC | Age: 69
End: 2019-02-04
Payer: COMMERCIAL

## 2019-02-04 DIAGNOSIS — E53.8 B12 DEFICIENCY: Primary | ICD-10-CM

## 2019-02-04 PROCEDURE — 96372 PR INJECTION,THERAP/PROPH/DIAG2ST, IM OR SUBCUT: ICD-10-PCS | Mod: ,,, | Performed by: NURSE PRACTITIONER

## 2019-02-04 PROCEDURE — 96372 THER/PROPH/DIAG INJ SC/IM: CPT | Mod: ,,, | Performed by: NURSE PRACTITIONER

## 2019-02-04 RX ORDER — CYANOCOBALAMIN 1000 UG/ML
1000 INJECTION, SOLUTION INTRAMUSCULAR; SUBCUTANEOUS
Status: COMPLETED | OUTPATIENT
Start: 2019-02-04 | End: 2019-02-04

## 2019-02-04 RX ADMIN — CYANOCOBALAMIN 1000 MCG: 1000 INJECTION, SOLUTION INTRAMUSCULAR; SUBCUTANEOUS at 01:02

## 2019-02-04 NOTE — PROGRESS NOTES
Pt here for b12 inj. Name and  verified. Med supplied by pt from pharmacy.  Inj given in R Deltoid, pt raciel'd well.

## 2019-02-16 DIAGNOSIS — E11.9 TYPE 2 DIABETES MELLITUS WITHOUT COMPLICATION, WITH LONG-TERM CURRENT USE OF INSULIN: ICD-10-CM

## 2019-02-16 DIAGNOSIS — E11.9 TYPE 2 DIABETES MELLITUS WITHOUT COMPLICATION, WITHOUT LONG-TERM CURRENT USE OF INSULIN: ICD-10-CM

## 2019-02-16 DIAGNOSIS — Z79.4 TYPE 2 DIABETES MELLITUS WITHOUT COMPLICATION, WITH LONG-TERM CURRENT USE OF INSULIN: ICD-10-CM

## 2019-02-18 DIAGNOSIS — M81.6 LOCALIZED OSTEOPOROSIS WITHOUT CURRENT PATHOLOGICAL FRACTURE: ICD-10-CM

## 2019-02-18 RX ORDER — LINAGLIPTIN 5 MG/1
TABLET, FILM COATED ORAL
Qty: 90 TABLET | Refills: 0 | Status: SHIPPED | OUTPATIENT
Start: 2019-02-18 | End: 2019-02-25 | Stop reason: SDUPTHER

## 2019-02-18 RX ORDER — CALCIUM CITRATE/VITAMIN D3 200MG-6.25
TABLET ORAL
Qty: 100 STRIP | Refills: 0 | Status: SHIPPED | OUTPATIENT
Start: 2019-02-18 | End: 2019-02-25 | Stop reason: SDUPTHER

## 2019-02-18 RX ORDER — METFORMIN HYDROCHLORIDE 1000 MG/1
TABLET ORAL
Qty: 180 TABLET | Refills: 0 | Status: SHIPPED | OUTPATIENT
Start: 2019-02-18 | End: 2019-02-25 | Stop reason: SDUPTHER

## 2019-02-18 RX ORDER — ALENDRONATE SODIUM 70 MG/1
70 TABLET ORAL
Qty: 13 TABLET | Refills: 0 | Status: SHIPPED | OUTPATIENT
Start: 2019-02-18 | End: 2019-02-25 | Stop reason: SDUPTHER

## 2019-02-25 ENCOUNTER — OFFICE VISIT (OUTPATIENT)
Dept: FAMILY MEDICINE | Facility: CLINIC | Age: 69
End: 2019-02-25
Payer: COMMERCIAL

## 2019-02-25 VITALS
SYSTOLIC BLOOD PRESSURE: 143 MMHG | BODY MASS INDEX: 26.31 KG/M2 | WEIGHT: 134 LBS | HEART RATE: 73 BPM | HEIGHT: 60 IN | DIASTOLIC BLOOD PRESSURE: 77 MMHG

## 2019-02-25 DIAGNOSIS — M81.6 LOCALIZED OSTEOPOROSIS WITHOUT CURRENT PATHOLOGICAL FRACTURE: ICD-10-CM

## 2019-02-25 DIAGNOSIS — Z79.4 TYPE 2 DIABETES MELLITUS WITHOUT COMPLICATION, WITH LONG-TERM CURRENT USE OF INSULIN: ICD-10-CM

## 2019-02-25 DIAGNOSIS — E11.9 TYPE 2 DIABETES MELLITUS WITHOUT COMPLICATION, WITHOUT LONG-TERM CURRENT USE OF INSULIN: Primary | ICD-10-CM

## 2019-02-25 DIAGNOSIS — E78.5 HYPERLIPIDEMIA, UNSPECIFIED HYPERLIPIDEMIA TYPE: ICD-10-CM

## 2019-02-25 DIAGNOSIS — E11.9 TYPE 2 DIABETES MELLITUS WITHOUT COMPLICATION, WITH LONG-TERM CURRENT USE OF INSULIN: ICD-10-CM

## 2019-02-25 DIAGNOSIS — I10 ESSENTIAL HYPERTENSION: ICD-10-CM

## 2019-02-25 DIAGNOSIS — M81.0 AGE-RELATED OSTEOPOROSIS WITHOUT CURRENT PATHOLOGICAL FRACTURE: ICD-10-CM

## 2019-02-25 DIAGNOSIS — Z85.038 H/O COLON CANCER, STAGE III: ICD-10-CM

## 2019-02-25 PROCEDURE — 99214 OFFICE O/P EST MOD 30 MIN: CPT | Mod: ,,, | Performed by: INTERNAL MEDICINE

## 2019-02-25 PROCEDURE — 99214 PR OFFICE/OUTPT VISIT, EST, LEVL IV, 30-39 MIN: ICD-10-PCS | Mod: ,,, | Performed by: INTERNAL MEDICINE

## 2019-02-25 RX ORDER — ALENDRONATE SODIUM 70 MG/1
70 TABLET ORAL
Qty: 13 TABLET | Refills: 0 | Status: SHIPPED | OUTPATIENT
Start: 2019-02-25 | End: 2019-04-08 | Stop reason: SDUPTHER

## 2019-02-25 RX ORDER — LANCETS
1 EACH MISCELLANEOUS DAILY
Qty: 100 EACH | Refills: 0 | Status: SHIPPED | OUTPATIENT
Start: 2019-02-25 | End: 2024-03-05 | Stop reason: SDUPTHER

## 2019-02-25 RX ORDER — METFORMIN HYDROCHLORIDE 1000 MG/1
1000 TABLET ORAL 2 TIMES DAILY WITH MEALS
Qty: 180 TABLET | Refills: 2 | Status: SHIPPED | OUTPATIENT
Start: 2019-02-25 | End: 2019-05-13 | Stop reason: SDUPTHER

## 2019-02-25 RX ORDER — IRBESARTAN AND HYDROCHLOROTHIAZIDE 300; 12.5 MG/1; MG/1
1 TABLET, FILM COATED ORAL DAILY
Qty: 90 TABLET | Refills: 1 | Status: SHIPPED | OUTPATIENT
Start: 2019-02-25 | End: 2019-08-09

## 2019-02-25 RX ORDER — PRAVASTATIN SODIUM 20 MG/1
20 TABLET ORAL NIGHTLY
Qty: 90 TABLET | Refills: 2 | Status: SHIPPED | OUTPATIENT
Start: 2019-02-25 | End: 2019-05-27 | Stop reason: SINTOL

## 2019-02-25 RX ORDER — INSULIN PUMP SYRINGE, 3 ML
EACH MISCELLANEOUS
Qty: 1 EACH | Refills: 0 | Status: SHIPPED | OUTPATIENT
Start: 2019-02-25 | End: 2023-11-01

## 2019-02-25 NOTE — PROGRESS NOTES
Subjective:       Patient ID: Preet West is a 69 y.o. female.    Chief Complaint: Diabetes (f/u); Hyperlipidemia; Osteoporosis; and Hypertension    Ms. Leta West changes her provider at this point. Underlying issues of diabetes mellitus type 2, hypertension or dyslipidemia have been noted. History of colon cancer status post surgical removal of colon and chemotherapy also has been noted.    Degree of blood pressure and blood sugar control is uncertain. Previous hemoglobin A1c's were within normal range. She also has osteoporosis and takes alendronate for the same. She requests refill on all medications and supplies.          Diabetes   She presents for her follow-up diabetic visit. She has type 2 diabetes mellitus. Her disease course has been stable. Pertinent negatives for hypoglycemia include no dizziness, headaches, mood changes or nervousness/anxiousness. Pertinent negatives for diabetes include no chest pain, no foot ulcerations, no polyphagia, no visual change and no weakness. Pertinent negatives for hypoglycemia complications include no required assistance. Symptoms are stable. Risk factors for coronary artery disease include sedentary lifestyle, hypertension, dyslipidemia and diabetes mellitus. Current diabetic treatment includes oral agent (dual therapy). She is compliant with treatment some of the time. Her weight is stable. She has not had a previous visit with a dietitian. She rarely participates in exercise. Her home blood glucose trend is fluctuating minimally. An ACE inhibitor/angiotensin II receptor blocker is being taken. She does not see a podiatrist.Eye exam is not current.   Hypertension   This is a chronic problem. The current episode started more than 1 year ago. The problem has been waxing and waning since onset. Pertinent negatives include no chest pain or headaches. Risk factors for coronary artery disease include sedentary lifestyle, dyslipidemia, diabetes mellitus and  post-menopausal state. Past treatments include alpha 1 blockers and diuretics. The current treatment provides mild improvement. Compliance problems include psychosocial issues.  There is no history of coarctation of the aorta, hyperaldosteronism, pheochromocytoma or renovascular disease.   Hyperlipidemia   This is a chronic problem. The current episode started more than 1 year ago. The problem is controlled. She has no history of obesity. Pertinent negatives include no chest pain. Risk factors for coronary artery disease include a sedentary lifestyle, hypertension, diabetes mellitus and dyslipidemia.     History of osteoporosis has been noted. This is since several years. She is taking alendronate. She will be also advised to take some calcium and vitamin D3 supplements. No fractures thus far.  Past Medical History:   Diagnosis Date    Cancer     Diabetes mellitus, type 2     H/O colon cancer, stage III 1/1/2013    Treated in [facility]    Osteoporosis     Seasonal allergies      Social History     Socioeconomic History    Marital status:      Spouse name: Not on file    Number of children: Not on file    Years of education: Not on file    Highest education level: Not on file   Social Needs    Financial resource strain: Not on file    Food insecurity - worry: Not on file    Food insecurity - inability: Not on file    Transportation needs - medical: Not on file    Transportation needs - non-medical: Not on file   Occupational History    Not on file   Tobacco Use    Smoking status: Never Smoker    Smokeless tobacco: Never Used   Substance and Sexual Activity    Alcohol use: No    Drug use: No    Sexual activity: No   Other Topics Concern    Not on file   Social History Narrative    Not on file     Past Surgical History:   Procedure Laterality Date    COLON SURGERY      JOINT REPLACEMENT       Family History   Problem Relation Age of Onset    Stroke Mother        Review of Systems    Cardiovascular: Negative for chest pain.   Endocrine: Negative for polyphagia.   Neurological: Negative for dizziness, weakness and headaches.   Psychiatric/Behavioral: The patient is not nervous/anxious.          Objective:      Blood pressure (!) 143/77, pulse 73, height 5' (1.524 m), weight 60.8 kg (134 lb). Body mass index is 26.17 kg/m².  Physical Exam      Assessment:       1. Type 2 diabetes mellitus without complication, without long-term current use of insulin    2. Hyperlipidemia, unspecified hyperlipidemia type    3. Essential hypertension    4. H/O colon cancer, stage III    5. Age-related osteoporosis without current pathological fracture    6. Localized osteoporosis without current pathological fracture    7. Type 2 diabetes mellitus without complication, with long-term current use of insulin           No visits with results within 3 Month(s) from this visit.   Latest known visit with results is:   Office Visit on 10/16/2018   Component Date Value Ref Range Status    Hemoglobin A1C 10/16/2018 6.3   Final         Plan:           Type 2 diabetes mellitus without complication, without long-term current use of insulin  -     Hemoglobin A1c; Future; Expected date: 02/25/2019  -     Microalbumin/creatinine urine ratio; Future; Expected date: 02/25/2019  -     blood-glucose meter kit; One touch meter and matching lancets and strips.  Dispense: 1 each; Refill: 0  -     lancets (LANCETS,ULTRA THIN) 26 gauge Misc; 1 lancet by Misc.(Non-Drug; Combo Route) route once daily.  Dispense: 100 each; Refill: 0  -     linagliptin (TRADJENTA) 5 mg Tab tablet; Take 1 tablet (5 mg total) by mouth once daily.  Dispense: 90 tablet; Refill: 0  -     metFORMIN (GLUCOPHAGE) 1000 MG tablet; Take 1 tablet (1,000 mg total) by mouth 2 (two) times daily with meals.  Dispense: 180 tablet; Refill: 2    Hyperlipidemia, unspecified hyperlipidemia type  -     Lipid panel; Future; Expected date: 02/25/2019  -     pravastatin (PRAVACHOL)  20 MG tablet; Take 1 tablet (20 mg total) by mouth every evening.  Dispense: 90 tablet; Refill: 2    Essential hypertension  -     Comprehensive metabolic panel; Future; Expected date: 02/25/2019  -     irbesartan-hydrochlorothiazide (AVALIDE) 300-12.5 mg per tablet; Take 1 tablet by mouth once daily.  Dispense: 90 tablet; Refill: 1    H/O colon cancer, stage III  -     Ambulatory referral to Oncology    Age-related osteoporosis without current pathological fracture    Localized osteoporosis without current pathological fracture  -     alendronate (FOSAMAX) 70 MG tablet; Take 1 tablet (70 mg total) by mouth every 7 days.  Dispense: 13 tablet; Refill: 0    Type 2 diabetes mellitus without complication, with long-term current use of insulin  -     blood sugar diagnostic Strp; 1 strip by Misc.(Non-Drug; Combo Route) route once daily.  Dispense: 100 strip; Refill: 2      Advised Ms. West to monitor Blood sugars at home and record them.  Exercise, watch diet and loose weight.  keep a close eye on feet and keep them clean. Annual eye examination. Annual influenza vaccine.  Monitor HgbA1c every 3 to 6 months. Monitor urine microalbumin every year.keep LDL less than 100. Monitor blood pressure and target blood pressure 120/70.        Patient has been advised to watch diet and exercise. Avoid fried and fatty food. Compliance to medications and follow up urged.     Fup 6 weeks LAbs    Refer to oncology          Current Outpatient Medications:     alendronate (FOSAMAX) 70 MG tablet, Take 1 tablet (70 mg total) by mouth every 7 days., Disp: 13 tablet, Rfl: 0    blood sugar diagnostic Strp, 1 strip by Misc.(Non-Drug; Combo Route) route once daily., Disp: 100 strip, Rfl: 2    blood-glucose meter kit, One touch meter and matching lancets and strips., Disp: 1 each, Rfl: 0    cyanocobalamin, vitamin B-12, 1,000 mcg/mL Kit, Inject 1,000 mg as directed every 30 days., Disp: 3 kit, Rfl: 0    irbesartan-hydrochlorothiazide  (AVALIDE) 300-12.5 mg per tablet, Take 1 tablet by mouth once daily., Disp: 90 tablet, Rfl: 1    lancets (LANCETS,ULTRA THIN) 26 gauge Misc, 1 lancet by Misc.(Non-Drug; Combo Route) route once daily., Disp: 100 each, Rfl: 0    linagliptin (TRADJENTA) 5 mg Tab tablet, Take 1 tablet (5 mg total) by mouth once daily., Disp: 90 tablet, Rfl: 0    metFORMIN (GLUCOPHAGE) 1000 MG tablet, Take 1 tablet (1,000 mg total) by mouth 2 (two) times daily with meals., Disp: 180 tablet, Rfl: 2    pravastatin (PRAVACHOL) 20 MG tablet, Take 1 tablet (20 mg total) by mouth every evening., Disp: 90 tablet, Rfl: 2

## 2019-02-25 NOTE — PATIENT INSTRUCTIONS
Using a Blood Sugar Log    You have diabetes. This means your body has trouble regulating a sugar called glucose. To help manage your diabetes, youll need to check your blood sugar level as directed by your healthcare provider. Keeping a log of your blood sugar levels will help you track your blood sugar readings. Its a simple and easy way to see how well you are controlling your diabetes.  Checking your blood sugar level  You can check your blood sugar level with a blood glucose meter. Youll first prick the side of your finger with a tiny lancet to draw a tiny drop of blood onto the test strip. Some glucose meters let you use another place on your body to test. But these other places should not be used in some cases as they may be inaccurate. Follow the instructions for your glucose meter. And talk with your healthcare provider before doing the test on other places.  The strip goes into the meter first, then a drop of blood is placed on the tip of the strip. The meter then shows a reading that tells you the level of your blood sugar. Your readings should be in your target range as often as possible. This means not too high or too low. Staying in this range helps lower your risk for complications. Your healthcare provider will help you figure out the target range that is best for you.  Tracking your readings  Every time you check your blood sugar, use your log to keep track of your readings. Your meter will also probably have a memory feature that your healthcare provider can check at your next visit. You may be advised by your healthcare provider to check your blood sugar in the morning, at bedtime, and before and after meals. Be sure to write down all of your numbers. Also use your log to record things that might have affected your blood sugar. Some examples include being sick, certain medicines, being physically active, feeling stressed, or skipping meals.   Lessons learned from your readings  Tracking your  blood sugar readings helps you see patterns. These patterns tell you how your actions affect your blood sugar. For instance, you may have higher numbers after eating certain foods or lower numbers after exercise. They just help you understand how to stay in your target range more often, so that your diabetes remains in good control.  Sharing your log with your healthcare team  Bring your blood sugar log and glucose meter with you to all of your healthcare appointments. This can help your healthcare team make changes to your treatment plan, if needed. This may involve making changes in what you eat, what medicines you take, or how much you exercise.  To learn more  The resources below can help you learn more:  · American Diabetes Association 610-714-2369 www.diabetes.org  · [employer] 205-189-5166 www.lighthouse.org  · [employer] [place] 592-926-7783 www.nei.nih.gov  · [facility] [facility] 125-702-4378 www.hormone.org  Date Last Reviewed: 5/1/2016  © 9325-0479 [employer] [place]. [address] [place] [address], [place] [address]. All rights reserved. This information is not intended as a substitute for professional medical care. Always follow your healthcare professional's instructions.

## 2019-03-12 ENCOUNTER — CLINICAL SUPPORT (OUTPATIENT)
Dept: FAMILY MEDICINE | Facility: CLINIC | Age: 69
End: 2019-03-12
Payer: COMMERCIAL

## 2019-03-12 DIAGNOSIS — E53.8 VITAMIN B12 DEFICIENCY: Primary | ICD-10-CM

## 2019-03-12 PROCEDURE — 96372 THER/PROPH/DIAG INJ SC/IM: CPT | Mod: ,,, | Performed by: NURSE PRACTITIONER

## 2019-03-12 PROCEDURE — 96372 PR INJECTION,THERAP/PROPH/DIAG2ST, IM OR SUBCUT: ICD-10-PCS | Mod: ,,, | Performed by: NURSE PRACTITIONER

## 2019-03-12 RX ORDER — CYANOCOBALAMIN 1000 UG/ML
1000 INJECTION, SOLUTION INTRAMUSCULAR; SUBCUTANEOUS
Status: COMPLETED | OUTPATIENT
Start: 2019-03-12 | End: 2019-03-12

## 2019-03-12 RX ADMIN — CYANOCOBALAMIN 1000 MCG: 1000 INJECTION, SOLUTION INTRAMUSCULAR; SUBCUTANEOUS at 10:03

## 2019-03-12 NOTE — PROGRESS NOTES
Patient came in today for a B12 injection.  Right deltoid.  Informed patient to keep appointment with Dr. Fong in April.  Pt verbalized understanding.

## 2019-04-04 PROBLEM — C18.4 MALIGNANT NEOPLASM OF TRANSVERSE COLON: Status: ACTIVE | Noted: 2019-04-04

## 2019-04-04 LAB
ALBUMIN SERPL-MCNC: 3.8 G/DL (ref 3.1–4.7)
ALP SERPL-CCNC: 62 IU/L (ref 40–104)
ALT (SGPT): 16 IU/L (ref 3–33)
AST SERPL-CCNC: 16 IU/L (ref 10–40)
BILIRUB SERPL-MCNC: 0.5 MG/DL (ref 0.3–1)
BUN SERPL-MCNC: 22 MG/DL (ref 8–20)
CALCIUM SERPL-MCNC: 8.7 MG/DL (ref 7.7–10.4)
CHLORIDE: 106 MMOL/L (ref 98–110)
CO2 SERPL-SCNC: 23.2 MMOL/L (ref 22.8–31.6)
CREATININE RANDOM URINE: 82 MG/DL
CREATININE: 1.11 MG/DL (ref 0.6–1.4)
GLUCOSE: 140 MG/DL (ref 70–99)
HBA1C MFR BLD: 6.9 % (ref 3.1–6.5)
MICROALBUM.,U,RANDOM: 6.3 MCG/ML (ref 0–19.9)
MICROALBUMIN/CREATININE RATIO: 8 (ref 0–30)
POTASSIUM SERPL-SCNC: 5 MMOL/L (ref 3.5–5)
PROT SERPL-MCNC: 6.9 G/DL (ref 6–8.2)
SODIUM: 136 MMOL/L (ref 134–144)

## 2019-04-05 ENCOUNTER — TELEPHONE (OUTPATIENT)
Dept: FAMILY MEDICINE | Facility: CLINIC | Age: 69
End: 2019-04-05

## 2019-04-05 ENCOUNTER — OFFICE VISIT (OUTPATIENT)
Dept: HEMATOLOGY/ONCOLOGY | Facility: CLINIC | Age: 69
End: 2019-04-05
Payer: COMMERCIAL

## 2019-04-05 VITALS
DIASTOLIC BLOOD PRESSURE: 81 MMHG | HEART RATE: 74 BPM | TEMPERATURE: 98 F | BODY MASS INDEX: 29.12 KG/M2 | SYSTOLIC BLOOD PRESSURE: 145 MMHG | HEIGHT: 57 IN | WEIGHT: 135 LBS | RESPIRATION RATE: 20 BRPM

## 2019-04-05 DIAGNOSIS — C18.4 MALIGNANT NEOPLASM OF TRANSVERSE COLON: ICD-10-CM

## 2019-04-05 DIAGNOSIS — Z90.49 S/P LAPAROSCOPIC COLECTOMY: ICD-10-CM

## 2019-04-05 DIAGNOSIS — Z85.038 H/O COLON CANCER, STAGE III: Primary | ICD-10-CM

## 2019-04-05 DIAGNOSIS — D64.9 NORMOCYTIC NORMOCHROMIC ANEMIA: ICD-10-CM

## 2019-04-05 PROCEDURE — 99203 OFFICE O/P NEW LOW 30 MIN: CPT | Mod: ,,, | Performed by: INTERNAL MEDICINE

## 2019-04-05 PROCEDURE — 99203 PR OFFICE/OUTPT VISIT, NEW, LEVL III, 30-44 MIN: ICD-10-PCS | Mod: ,,, | Performed by: INTERNAL MEDICINE

## 2019-04-05 NOTE — LETTER
April 5, 2019      Pal Fong MD  [address]  [address]  [place] [address]           [facility]  [address]  [address]  [place] [address]  Phone: [phone]  Fax: [phone]          Patient: Preet West   MR Number: [id number]   YOB: 1950   Date of Visit: 4/5/2019       Dear Dr. Pal Fong:    Thank you for referring Preet West to me for evaluation. Attached you will find relevant portions of my assessment and plan of care.    If you have questions, please do not hesitate to call me. I look forward to following Preet West along with you.    Sincerely,    Clemente Mi MD    Enclosure  CC:  No Recipients    If you would like to receive this communication electronically, please contact [email or web address] or [phone] to request more information on [employer] Link access.    For providers and/or their staff who would like to refer a patient to [facility], please contact us through our one-stop-shop provider referral line, [facility], at [phone].    If you feel you have received this communication in error or would no longer like to receive these types of communications, please e-mail [email or web address]

## 2019-04-05 NOTE — TELEPHONE ENCOUNTER
----- Message from Pal Fong MD sent at 4/5/2019  9:26 AM CDT -----  Results are somewhat abnormal. Please keep regular follow up.

## 2019-04-08 ENCOUNTER — OFFICE VISIT (OUTPATIENT)
Dept: FAMILY MEDICINE | Facility: CLINIC | Age: 69
End: 2019-04-08
Payer: COMMERCIAL

## 2019-04-08 VITALS
HEIGHT: 57 IN | WEIGHT: 134 LBS | TEMPERATURE: 98 F | HEART RATE: 79 BPM | SYSTOLIC BLOOD PRESSURE: 138 MMHG | DIASTOLIC BLOOD PRESSURE: 85 MMHG | BODY MASS INDEX: 28.91 KG/M2

## 2019-04-08 DIAGNOSIS — I10 ESSENTIAL HYPERTENSION: ICD-10-CM

## 2019-04-08 DIAGNOSIS — M81.6 LOCALIZED OSTEOPOROSIS WITHOUT CURRENT PATHOLOGICAL FRACTURE: ICD-10-CM

## 2019-04-08 DIAGNOSIS — Z79.4 TYPE 2 DIABETES MELLITUS WITHOUT COMPLICATION, WITH LONG-TERM CURRENT USE OF INSULIN: ICD-10-CM

## 2019-04-08 DIAGNOSIS — M79.604 LEG PAIN, POSTERIOR, RIGHT: ICD-10-CM

## 2019-04-08 DIAGNOSIS — R09.81 CONGESTION OF NASAL SINUS: Primary | ICD-10-CM

## 2019-04-08 DIAGNOSIS — E78.5 HYPERLIPIDEMIA, UNSPECIFIED HYPERLIPIDEMIA TYPE: ICD-10-CM

## 2019-04-08 DIAGNOSIS — E53.8 B12 DEFICIENCY: ICD-10-CM

## 2019-04-08 DIAGNOSIS — E11.9 TYPE 2 DIABETES MELLITUS WITHOUT COMPLICATION, WITH LONG-TERM CURRENT USE OF INSULIN: ICD-10-CM

## 2019-04-08 DIAGNOSIS — E11.9 TYPE 2 DIABETES MELLITUS WITHOUT COMPLICATION, WITHOUT LONG-TERM CURRENT USE OF INSULIN: ICD-10-CM

## 2019-04-08 PROCEDURE — 99214 OFFICE O/P EST MOD 30 MIN: CPT | Mod: ,,, | Performed by: INTERNAL MEDICINE

## 2019-04-08 PROCEDURE — 99214 PR OFFICE/OUTPT VISIT, EST, LEVL IV, 30-39 MIN: ICD-10-PCS | Mod: ,,, | Performed by: INTERNAL MEDICINE

## 2019-04-08 RX ORDER — ALENDRONATE SODIUM 70 MG/1
70 TABLET ORAL
Qty: 13 TABLET | Refills: 0 | Status: SHIPPED | OUTPATIENT
Start: 2019-04-08 | End: 2019-05-13 | Stop reason: SDUPTHER

## 2019-04-08 RX ORDER — FLUTICASONE PROPIONATE 50 MCG
1 SPRAY, SUSPENSION (ML) NASAL DAILY
Qty: 1 BOTTLE | Refills: 4 | Status: SHIPPED | OUTPATIENT
Start: 2019-04-08 | End: 2021-04-21

## 2019-04-08 NOTE — PATIENT INSTRUCTIONS
Understanding Chronic Venous Insufficiency  Problems with the veins in the legs may lead to chronic venous insufficiency (CVI). CVI means that there is a long-term problem with the veins not being able to pump blood back to your heart. When this happens, blood stays in the legs and causes swelling and aching.   Two problems that may lead to chronic venous insufficiency are:  · Damaged valves. Valves keep blood flowing from the legs through the blood vessels and back to the heart. When the valves are damaged, blood does not flow as well.   · Deep vein thrombosis (DVT). Blood clots may form in the deep veins of the legs. This may cause pain, redness, and swelling in the legs. It may also block the flow of blood back to the heart. Seek immediate medical care if you have these symptoms.  · A blood clot in the leg can also break off and travel to the lungs. This is called pulmonary embolism (PE). In the lungs, the clot can cut off the flow of blood. This may cause chest pain, trouble breathing, sweating, a fast heartbeat, coughing (may cough up blood), and fainting. It is a medical emergency and may cause death. Call 911 if you have these symptoms.  · Healthcare providers call the two conditions, DVT and PE, venous thromboembolism (VTE).  CVI cant be cured, but you can control leg swelling to reduce the likelihood of ulcers (sores).  Recognizing the symptoms  Be aware of the following:  · If you stand or sit with your feet down for long periods, your legs may ache or feel heavy.  · Swollen ankles are possibly the most common symptom of CVI.  · As swelling increases, the skin over your ankles may show red spots or a brownish tinge. The skin may feel leathery or scaly, and may start to itch.  · If swelling is not controlled, an ulcer (open wound) may form.  What you can do  Reduce your risk of developing ulcers by doing the following:  · Increase blood flow back to your heart by elevating your legs, exercising daily,  and wearing elastic stockings.  · Boost blood flow in your legs by losing excess weight.  · If you must stand or sit in one place for a period of time, keep your blood moving by wiggling your toes, shifting your body position, and rising up on the balls of your feet.    Date Last Reviewed: 5/1/2016  © 5480-0958 [employer]. [address] [place] [address], [place] [address]. All rights reserved. This information is not intended as a substitute for professional medical care. Always follow your healthcare professional's instructions.

## 2019-04-08 NOTE — PROGRESS NOTES
Subjective:       Patient ID: Preet West is a 69 y.o. female.    Chief Complaint: Sinus Problem; Leg Pain; Hypertension (lab review ); Hyperlipidemia; Diabetes; and Osteoporosis    Ms. Preet West is a 69-year-old [place] American female who comes for follow-up. She has underlying diabetes mellitus type 2, hypertension and dyslipidemia. She has been reporting sinus congestion symptoms and sneezing. She has pressure in between her eyes. Several years ago she had a sinus surgery which might have given her relief. Symptoms seem to get worse at night.    Family history has been reviewed again today.    She also complains of her calf and leg cramp and discomfort for approximately 1-1/2 year. She had a knee surgery on the right side with careful relief from chronic knee pain but she started having cramping and discomfort in the right calf muscles. There is no history of DVT or blood clots.    Patient also has osteoporosis for which she is taking alendronate.    Sinus Problem   This is a recurrent problem. The current episode started more than 1 month ago. The problem has been waxing and waning since onset. There has been no fever. The pain is mild. Associated symptoms include headaches, sinus pressure and sneezing. Pertinent negatives include no chills, congestion, coughing or shortness of breath. The treatment provided moderate relief.   Leg Pain    The incident occurred more than 1 week ago. There was no injury mechanism. The pain is present in the right leg. The quality of the pain is described as cramping and aching. The pain is at a severity of 4/10. The pain is moderate. The pain has been fluctuating since onset. Pertinent negatives include no inability to bear weight, loss of motion, loss of sensation, muscle weakness, numbness or tingling. She reports no foreign bodies present. The symptoms are aggravated by weight bearing.   Hypertension   This is a chronic problem. The current episode started more  than 1 year ago. The problem is controlled. Associated symptoms include headaches. Pertinent negatives include no chest pain, palpitations or shortness of breath. Risk factors for coronary artery disease include sedentary lifestyle, dyslipidemia, diabetes mellitus and post-menopausal state. Past treatments include angiotensin blockers and diuretics. There is no history of pheochromocytoma.   Hyperlipidemia   This is a chronic problem. The current episode started more than 1 year ago. The problem is controlled. She has no history of obesity. Associated symptoms include leg pain. Pertinent negatives include no chest pain or shortness of breath. Current antihyperlipidemic treatment includes statins. The current treatment provides moderate improvement of lipids. Risk factors for coronary artery disease include a sedentary lifestyle, hypertension, dyslipidemia, diabetes mellitus and post-menopausal.   Diabetes   She presents for her follow-up diabetic visit. She has type 2 diabetes mellitus. Her disease course has been stable. Hypoglycemia symptoms include headaches. Pertinent negatives for hypoglycemia include no confusion, dizziness, nervousness/anxiousness, pallor, seizures or tremors. Pertinent negatives for diabetes include no chest pain, no fatigue, no foot ulcerations, no polydipsia, no polyphagia and no weakness. Symptoms are stable. Risk factors for coronary artery disease include sedentary lifestyle, hypertension, diabetes mellitus and dyslipidemia. Current diabetic treatment includes oral agent (dual therapy). She is compliant with treatment most of the time. Meal planning includes avoidance of concentrated sweets. She has not had a previous visit with a dietitian. An ACE inhibitor/angiotensin II receptor blocker is being taken.       Past Medical History:   Diagnosis Date    Cancer     Diabetes mellitus, type 2     H/O colon cancer, stage III 1/1/2013    Treated in [facility]    Malignant neoplasm of  transverse colon (2013) - Stage III 4/4/2019    Osteoporosis     S/P laparoscopic colectomy (3/2013) 4/5/2019    Seasonal allergies      Social History     Socioeconomic History    Marital status:      Spouse name: Not on file    Number of children: 3    Years of education: Not on file    Highest education level: Not on file   Occupational History    Occupation:    Social Needs    Financial resource strain: Not on file    Food insecurity:     Worry: Not on file     Inability: Not on file    Transportation needs:     Medical: Not on file     Non-medical: Not on file   Tobacco Use    Smoking status: Never Smoker    Smokeless tobacco: Never Used   Substance and Sexual Activity    Alcohol use: No    Drug use: No    Sexual activity: Not Currently   Lifestyle    Physical activity:     Days per week: Not on file     Minutes per session: Not on file    Stress: Not on file   Relationships    Social connections:     Talks on phone: Not on file     Gets together: Not on file     Attends Sabianist service: Not on file     Active member of club or organization: Not on file     Attends meetings of clubs or organizations: Not on file     Relationship status: Not on file   Other Topics Concern    Not on file   Social History Narrative    Not on file     Past Surgical History:   Procedure Laterality Date    COLON SURGERY      JOINT REPLACEMENT       Family History   Problem Relation Age of Onset    Stroke Mother     Heart disease Mother     Cancer Father         [place]       Review of Systems   Constitutional: Negative for activity change, chills, fatigue, fever and unexpected weight change.   HENT: Positive for sinus pressure and sneezing. Negative for congestion and postnasal drip.    Eyes: Negative for pain, discharge and visual disturbance.   Respiratory: Negative for cough, chest tightness and shortness of breath.    Cardiovascular: Negative for chest pain, palpitations and leg  "swelling.   Gastrointestinal: Negative for abdominal distention, anal bleeding, constipation and diarrhea.   Endocrine: Negative for polydipsia and polyphagia.   Genitourinary: Negative for difficulty urinating, dysuria, flank pain and frequency.   Musculoskeletal: Negative for arthralgias and joint swelling.        Right calf and muscle ache. History of right knee surgery in past.   Skin: Negative for color change, pallor and rash.   Allergic/Immunologic: Negative for environmental allergies, food allergies and immunocompromised state.   Neurological: Positive for headaches. Negative for dizziness, tingling, tremors, seizures, syncope, weakness, light-headedness and numbness.   Hematological: Negative for adenopathy. Does not bruise/bleed easily.   Psychiatric/Behavioral: Negative for agitation, confusion and dysphoric mood. The patient is not nervous/anxious.          Objective:      Blood pressure 138/85, pulse 79, temperature 97.8 °F (36.6 °C), height 4' 9" (1.448 m), weight 60.8 kg (134 lb). Body mass index is 29 kg/m².  Physical Exam   Constitutional: She appears well-developed and well-nourished. She is cooperative. No distress.   HENT:   Head: Normocephalic and atraumatic.   Nose: No mucosal edema, sinus tenderness or nasal deformity. Right sinus exhibits no maxillary sinus tenderness and no frontal sinus tenderness. Left sinus exhibits no maxillary sinus tenderness and no frontal sinus tenderness.   Eyes: Pupils are equal, round, and reactive to light. Conjunctivae, EOM and lids are normal. Lids are everted and swept, no foreign bodies found. Right pupil is round and reactive. Left pupil is round and reactive.   Neck: Trachea normal and normal range of motion. Neck supple.   Cardiovascular: Normal rate, regular rhythm, S1 normal, S2 normal and normal heart sounds.   Pulmonary/Chest: Breath sounds normal.   Abdominal: Soft. Bowel sounds are normal. There is no rigidity and no guarding.   Musculoskeletal: She " exhibits tenderness. She exhibits no edema or deformity.        Left lower leg: She exhibits no tenderness.        Legs:  Lymphadenopathy:     She has no cervical adenopathy.     She has no axillary adenopathy.   Neurological: She is alert. Coordination abnormal.   Skin: Skin is warm and dry.   Psychiatric: Her behavior is normal.   Nursing note and vitals reviewed.        Assessment:       1. Congestion of nasal sinus    2. Hyperlipidemia, unspecified hyperlipidemia type    3. Essential hypertension    4. Leg pain, posterior, right    5. B12 deficiency    6. Localized osteoporosis without current pathological fracture    7. Type 2 diabetes mellitus without complication, with long-term current use of insulin    8. Type 2 diabetes mellitus without complication, without long-term current use of insulin           Orders Only on 04/04/2019   Component Date Value Ref Range Status    Microalbum.,U,Random 04/04/2019 6.3  0.0 - 19.9 mcg/ml Final    Creatinine, Random Ur 04/04/2019 82.00  mg/dl Final    Microalb Creat Ratio 04/04/2019 8  0 - 30 Final   Orders Only on 04/04/2019   Component Date Value Ref Range Status    Glucose 04/04/2019 140* 70 - 99 mg/dL Final    BUN, Bld 04/04/2019 22* 8 - 20 mg/dL Final    Creatinine 04/04/2019 1.11  0.60 - 1.40 mg/dL Final    Calcium 04/04/2019 8.7  7.7 - 10.4 mg/dL Final    Sodium 04/04/2019 136  134 - 144 mmol/L Final    Potassium 04/04/2019 5.0  3.5 - 5.0 mmol/L Final    Chloride 04/04/2019 106  98 - 110 mmol/L Final    CO2 04/04/2019 23.2  22.8 - 31.6 mmol/L Final    Albumin 04/04/2019 3.8  3.1 - 4.7 g/dL Final    Total Bilirubin 04/04/2019 0.5  0.3 - 1.0 mg/dL Final    Alkaline Phosphatase 04/04/2019 62  40 - 104 IU/L Final    Total Protein 04/04/2019 6.9  6.0 - 8.2 g/dL Final    ALT (SGPT) 04/04/2019 16  3 - 33 IU/L Final    AST 04/04/2019 16  10 - 40 IU/L Final    Hemoglobin A1C 04/04/2019 6.9* 3.1 - 6.5 % Final         Plan:           Congestion of nasal  sinus  -     fluticasone (FLONASE) 50 mcg/actuation nasal spray; 1 spray (50 mcg total) by Each Nare route once daily.  Dispense: 1 Bottle; Refill: 4    Hyperlipidemia, unspecified hyperlipidemia type    Essential hypertension    Leg pain, posterior, right    B12 deficiency  -     cyanocobalamin, vitamin B-12, 1,000 mcg/mL Kit; Inject 1,000 mg as directed every 30 days.  Dispense: 3 kit; Refill: 3    Localized osteoporosis without current pathological fracture  -     alendronate (FOSAMAX) 70 MG tablet; Take 1 tablet (70 mg total) by mouth every 7 days.  Dispense: 13 tablet; Refill: 0    Type 2 diabetes mellitus without complication, with long-term current use of insulin  -     blood sugar diagnostic Strp; 1 strip by Misc.(Non-Drug; Combo Route) route once daily.  Dispense: 100 strip; Refill: 2    Type 2 diabetes mellitus without complication, without long-term current use of insulin  -     linagliptin (TRADJENTA) 5 mg Tab tablet; Take 1 tablet (5 mg total) by mouth once daily.  Dispense: 90 tablet; Refill: 2      Patient does have some sinus congestion symptoms seems to be seasonal. She also reports some sneezing. She'll be given a trial of fluticasone nasal spray.    Advised Ms. West to monitor Blood sugars at home and record them.  Exercise, watch diet and loose weight.  keep a close eye on feet and keep them clean. Annual eye examination. Annual influenza vaccine.  Monitor HgbA1c every 3 to 6 months. Monitor urine microalbumin every year.keep LDL less than 100. Monitor blood pressure and target blood pressure 120/70.            Patient blood pressures are fairly under control. She'll continue with her medications. She'll continue to watch her diet.    She does have some posterior right calf pain for last 1-1/2-2 years. She is also on statin medications. She has a history of right knee surgery and after that her pain started for approximately 1 1/2 years.    Knee has been doing well after surgery.    Trial of  stopping pravastatin. Follow-up in 4-6 weeks. If no better try compression stockings.    Patient has been advised to watch diet and exercise. Avoid fried and fatty food. Compliance to medications and follow up urged.    Advised Ms. West about age and season appropriate immunizations/ cancer screenings.  Also seasonal influenza vaccine, update on tetanus diphtheria vaccination every 10 years.      Fup 6 weeks- Check leg pain and sinus status                Current Outpatient Medications:     alendronate (FOSAMAX) 70 MG tablet, Take 1 tablet (70 mg total) by mouth every 7 days., Disp: 13 tablet, Rfl: 0    blood sugar diagnostic Strp, 1 strip by Misc.(Non-Drug; Combo Route) route once daily., Disp: 100 strip, Rfl: 2    blood-glucose meter kit, One touch meter and matching lancets and strips., Disp: 1 each, Rfl: 0    cyanocobalamin, vitamin B-12, 1,000 mcg/mL Kit, Inject 1,000 mg as directed every 30 days., Disp: 3 kit, Rfl: 3    irbesartan-hydrochlorothiazide (AVALIDE) 300-12.5 mg per tablet, Take 1 tablet by mouth once daily., Disp: 90 tablet, Rfl: 1    lancets (LANCETS,ULTRA THIN) 26 gauge Misc, 1 lancet by Misc.(Non-Drug; Combo Route) route once daily., Disp: 100 each, Rfl: 0    linagliptin (TRADJENTA) 5 mg Tab tablet, Take 1 tablet (5 mg total) by mouth once daily., Disp: 90 tablet, Rfl: 2    metFORMIN (GLUCOPHAGE) 1000 MG tablet, Take 1 tablet (1,000 mg total) by mouth 2 (two) times daily with meals., Disp: 180 tablet, Rfl: 2    pravastatin (PRAVACHOL) 20 MG tablet, Take 1 tablet (20 mg total) by mouth every evening., Disp: 90 tablet, Rfl: 2    fluticasone (FLONASE) 50 mcg/actuation nasal spray, 1 spray (50 mcg total) by Each Nare route once daily., Disp: 1 Bottle, Rfl: 4

## 2019-04-11 ENCOUNTER — CLINICAL SUPPORT (OUTPATIENT)
Dept: FAMILY MEDICINE | Facility: CLINIC | Age: 69
End: 2019-04-11
Payer: COMMERCIAL

## 2019-04-11 DIAGNOSIS — E53.8 B12 DEFICIENCY: Primary | ICD-10-CM

## 2019-04-11 PROCEDURE — 96372 PR INJECTION,THERAP/PROPH/DIAG2ST, IM OR SUBCUT: ICD-10-PCS | Mod: ,,, | Performed by: INTERNAL MEDICINE

## 2019-04-11 PROCEDURE — 96372 THER/PROPH/DIAG INJ SC/IM: CPT | Mod: ,,, | Performed by: INTERNAL MEDICINE

## 2019-04-11 PROCEDURE — 99499 NO LOS: ICD-10-PCS | Mod: ,,, | Performed by: INTERNAL MEDICINE

## 2019-04-11 PROCEDURE — 99499 UNLISTED E&M SERVICE: CPT | Mod: ,,, | Performed by: INTERNAL MEDICINE

## 2019-04-11 RX ORDER — CYANOCOBALAMIN 1000 UG/ML
1000 INJECTION, SOLUTION INTRAMUSCULAR; SUBCUTANEOUS
Status: DISCONTINUED | OUTPATIENT
Start: 2019-04-11 | End: 2019-08-01

## 2019-04-11 RX ORDER — CYANOCOBALAMIN 1000 UG/ML
100 INJECTION, SOLUTION INTRAMUSCULAR; SUBCUTANEOUS
Status: DISCONTINUED | OUTPATIENT
Start: 2019-04-11 | End: 2019-04-11

## 2019-04-11 RX ADMIN — CYANOCOBALAMIN 1000 MCG: 1000 INJECTION, SOLUTION INTRAMUSCULAR; SUBCUTANEOUS at 10:04

## 2019-04-13 NOTE — PATIENT INSTRUCTIONS
Vitamin B-12  Does this test have other names?  VB12, serum cobalamin  What is this test?  This test measures the level of vitamin B-12 in your blood. You need this vitamin to make red blood cells and for your nervous system to function as it should.  You get vitamin B-12 from eating foods that come from animals, such as meat, eggs, and dairy products. Vitamin B-12 is also added to some cereals. You can also take this vitamin as a supplement in pill form.  Why do I need this test?  You may need this test if your healthcare provider suspects that your vitamin B-12 level is low. A low level of vitamin B-12 is called vitamin B-12 deficiency. You are more likely to have vitamin deficiency if you are an older adult, have a digestive disorder called malabsorption, have had gastrointestinal surgery, or eat a vegan diet. Women who are pregnant or breastfeeding and eat a vegetarian-type diet are at high risk for this deficiency.  You may also need this test if you've been diagnosed with or your healthcare provider suspects a disease called pernicious anemia. Pernicious anemia affects the lining of your stomach and makes it hard to absorb vitamin B-12.  These are common symptoms of vitamin B-12 deficiency:  · Fatigue  · Weakness  · Weight loss  · Tingling or numbness of the hands and feet  · Constipation  · Poor balance  · Confusion  · Depression  · Memory loss  · Soreness of the mouth or tongue  What other tests might I have along with this test?  Your healthcare provider may order other tests to help find out the cause of your vitamin B-12 deficiency. These tests may include:  · Complete blood count  · Peripheral blood smear, which involves looking at your blood cells under a microscope  · Folic acid level. This vitamin is also important for red blood cell production.  What do my test results mean?  Many things may affect your lab test results. These include the method each lab uses to do the test. Even if your test  results are different from the normal value, you may not have a problem. To learn what the results mean for you, talk with your healthcare provider.  Vitamin B-12 is measured in picograms per milliliter (pg/mL). Normal results are:  · 200 to 835 pg/mL for adults  · 160 to 1,300 pg/mL for newborns  If your results are low, you may have:  · Pernicious anemia  · Malabsorption from inflammatory bowel disease or other causes  · Poor absorption because of surgery  · Tapeworm infection  · Too little intake of animal protein  · Folic acid deficiency  · Iron deficiency  If your levels are high, you may have:  · Liver or kidney disease  · Diabetes  · Obesity  · White blood cell cancer  High levels may also mean that you have chronic obstructive pulmonary disease , congestive heart failure, or a thickening of the blood called polycythemia vera.  How is this test done?  The test requires a blood sample, which is drawn through a needle from a vein in your arm.  Does this test pose any risks?  Taking a blood sample with a needle carries risks that include bleeding, infection, bruising, or feeling dizzy. When the needle pricks your arm, you may feel a slight stinging sensation or pain. Afterward, the site may be slightly sore.  What might affect my test results?  Certain conditions may affect your test results. These include:  · Pregnancy  · Recent blood transfusions  · Smoking  Medicines in general may also affect your results. Specific medicines include supplements of vitamin A or C and birth control pills.  How do I get ready for this test?  You must fast before this test. You may be able to drink water, but you should not eat or drink anything else after midnight on the night before the test. If you are not having the test in the morning, ask your healthcare provider how long you need to fast before the test. You should not have a vitamin B-12 injection before the test. Also be sure your provider knows about all medicines,  herbs, vitamins, and supplements you are taking. This includes medicines that don't need a prescription and any illicit drugs you may use.  © 7868-3302 [employer], [email or web address]. [address] [place] [address], [place] [address]. All rights reserved. This information is not intended as a substitute for professional medical care. Always follow your healthcare professional's instructions.

## 2019-04-25 ENCOUNTER — TELEPHONE (OUTPATIENT)
Dept: HEMATOLOGY/ONCOLOGY | Facility: CLINIC | Age: 69
End: 2019-04-25

## 2019-05-09 LAB
% SATURATION: 15 %
CEA: 2.4 NG/ML
CREATININE: 0.93 MG/DL (ref 0.6–1.4)
FERRITIN SERPL-MCNC: 170 NG/ML (ref 24–162)
IRON: 45 MCG/DL (ref 24–162)
ISTAT CREATININE: 1 MG/DL (ref 0.6–1.4)
TOTAL IRON BINDING CAPACITY: 299 MCG/DL (ref 177–435)

## 2019-05-13 ENCOUNTER — OFFICE VISIT (OUTPATIENT)
Dept: HEMATOLOGY/ONCOLOGY | Facility: CLINIC | Age: 69
End: 2019-05-13
Payer: MEDICARE

## 2019-05-13 ENCOUNTER — CLINICAL SUPPORT (OUTPATIENT)
Dept: FAMILY MEDICINE | Facility: CLINIC | Age: 69
End: 2019-05-13
Payer: MEDICARE

## 2019-05-13 VITALS
SYSTOLIC BLOOD PRESSURE: 130 MMHG | BODY MASS INDEX: 29.75 KG/M2 | TEMPERATURE: 98 F | DIASTOLIC BLOOD PRESSURE: 77 MMHG | RESPIRATION RATE: 20 BRPM | HEART RATE: 74 BPM | WEIGHT: 137.5 LBS

## 2019-05-13 DIAGNOSIS — C18.4 MALIGNANT NEOPLASM OF TRANSVERSE COLON: Primary | ICD-10-CM

## 2019-05-13 DIAGNOSIS — E53.8 B12 DEFICIENCY: ICD-10-CM

## 2019-05-13 DIAGNOSIS — M81.6 LOCALIZED OSTEOPOROSIS WITHOUT CURRENT PATHOLOGICAL FRACTURE: ICD-10-CM

## 2019-05-13 DIAGNOSIS — Z85.038 H/O COLON CANCER, STAGE III: ICD-10-CM

## 2019-05-13 DIAGNOSIS — E53.8 B12 DEFICIENCY: Primary | ICD-10-CM

## 2019-05-13 DIAGNOSIS — E11.9 TYPE 2 DIABETES MELLITUS WITHOUT COMPLICATION, WITH LONG-TERM CURRENT USE OF INSULIN: ICD-10-CM

## 2019-05-13 DIAGNOSIS — Z90.49 S/P LAPAROSCOPIC COLECTOMY: ICD-10-CM

## 2019-05-13 DIAGNOSIS — Z79.4 TYPE 2 DIABETES MELLITUS WITHOUT COMPLICATION, WITH LONG-TERM CURRENT USE OF INSULIN: ICD-10-CM

## 2019-05-13 PROCEDURE — 99215 OFFICE O/P EST HI 40 MIN: CPT | Mod: ,,, | Performed by: INTERNAL MEDICINE

## 2019-05-13 PROCEDURE — 96372 PR INJECTION,THERAP/PROPH/DIAG2ST, IM OR SUBCUT: ICD-10-PCS | Mod: ,,, | Performed by: INTERNAL MEDICINE

## 2019-05-13 PROCEDURE — 3075F SYST BP GE 130 - 139MM HG: CPT | Mod: ,,, | Performed by: INTERNAL MEDICINE

## 2019-05-13 PROCEDURE — 3075F PR MOST RECENT SYSTOLIC BLOOD PRESS GE 130-139MM HG: ICD-10-PCS | Mod: ,,, | Performed by: INTERNAL MEDICINE

## 2019-05-13 PROCEDURE — 96372 THER/PROPH/DIAG INJ SC/IM: CPT | Mod: ,,, | Performed by: INTERNAL MEDICINE

## 2019-05-13 PROCEDURE — 99215 PR OFFICE/OUTPT VISIT, EST, LEVL V, 40-54 MIN: ICD-10-PCS | Mod: ,,, | Performed by: INTERNAL MEDICINE

## 2019-05-13 PROCEDURE — 1101F PR PT FALLS ASSESS DOC 0-1 FALLS W/OUT INJ PAST YR: ICD-10-PCS | Mod: ,,, | Performed by: INTERNAL MEDICINE

## 2019-05-13 PROCEDURE — 3078F PR MOST RECENT DIASTOLIC BLOOD PRESSURE < 80 MM HG: ICD-10-PCS | Mod: ,,, | Performed by: INTERNAL MEDICINE

## 2019-05-13 PROCEDURE — 3078F DIAST BP <80 MM HG: CPT | Mod: ,,, | Performed by: INTERNAL MEDICINE

## 2019-05-13 PROCEDURE — 1101F PT FALLS ASSESS-DOCD LE1/YR: CPT | Mod: ,,, | Performed by: INTERNAL MEDICINE

## 2019-05-13 RX ORDER — METFORMIN HYDROCHLORIDE 1000 MG/1
TABLET ORAL
Qty: 180 TABLET | Refills: 2 | Status: SHIPPED | OUTPATIENT
Start: 2019-05-13 | End: 2019-11-20 | Stop reason: SDUPTHER

## 2019-05-13 RX ORDER — LINAGLIPTIN 5 MG/1
TABLET, FILM COATED ORAL
Qty: 90 TABLET | Refills: 2 | Status: SHIPPED | OUTPATIENT
Start: 2019-05-13 | End: 2019-11-20 | Stop reason: SDUPTHER

## 2019-05-13 RX ORDER — CYANOCOBALAMIN 1000 UG/ML
100 INJECTION, SOLUTION INTRAMUSCULAR; SUBCUTANEOUS
Status: DISCONTINUED | OUTPATIENT
Start: 2019-05-13 | End: 2019-08-01

## 2019-05-13 RX ORDER — ALENDRONATE SODIUM 70 MG/1
TABLET ORAL
Qty: 13 TABLET | Refills: 2 | Status: SHIPPED | OUTPATIENT
Start: 2019-05-13 | End: 2019-11-20 | Stop reason: SDUPTHER

## 2019-05-13 RX ADMIN — CYANOCOBALAMIN 100 MCG: 1000 INJECTION, SOLUTION INTRAMUSCULAR; SUBCUTANEOUS at 10:05

## 2019-05-13 NOTE — PROGRESS NOTES
[facility]  PROGRESS NOTE - 2nd Follow-up Visit      Subjective:       Patient ID:   NAME: Preet West : 1950     69 y.o. female    Referring Doc: Hetal  Other Physicians:    Chief Complaint:  Colon ca f/u    History of Present Illness:     Patient returns today for a 2nd regularly scheduled follow-up visit.  The patient is here today to go over the results of the recently ordered labs, tests and studies. She is here with her  and son. She was referred to GI but has not gone to see them. She cancelled her CT scans twice and just had it done last Thursday and the report is still pending. She denies any CP, SOB, HA's ir N/V. Her bowels are moving adequately.             ROS:   GEN: normal without any fever, night sweats or weight loss  HEENT: normal with no HA's, sore throat, stiff neck, changes in vision  CV: normal with no CP, SOB, PND, FISHER or orthopnea  PULM: normal with no SOB, cough, hemoptysis, sputum or pleuritic pain  GI: normal with no abdominal pain, nausea, vomiting, constipation, diarrhea, melanotic stools, BRBPR, or hematemesis  : normal with no hematuria, dysuria  BREAST: normal with no mass, discharge, pain  SKIN: normal with no rash, erythema, bruising, or swelling    Allergies:  Review of patient's allergies indicates:   Allergen Reactions    Aspirin Itching       Medications:    Current Outpatient Medications:     alendronate (FOSAMAX) 70 MG tablet, Take 1 tablet (70 mg total) by mouth every 7 days., Disp: 13 tablet, Rfl: 0    blood sugar diagnostic Strp, 1 strip by Misc.(Non-Drug; Combo Route) route once daily., Disp: 100 strip, Rfl: 2    blood-glucose meter kit, One touch meter and matching lancets and strips., Disp: 1 each, Rfl: 0    cyanocobalamin, vitamin B-12, 1,000 mcg/mL Kit, Inject 1,000 mg as directed every 30 days., Disp: 3 kit, Rfl: 3    fluticasone (FLONASE) 50 mcg/actuation nasal spray, 1 spray (50 mcg total) by Each Nare route once daily.,  Disp: 1 Bottle, Rfl: 4    irbesartan-hydrochlorothiazide (AVALIDE) 300-12.5 mg per tablet, Take 1 tablet by mouth once daily., Disp: 90 tablet, Rfl: 1    lancets (LANCETS,ULTRA THIN) 26 gauge Misc, 1 lancet by Misc.(Non-Drug; Combo Route) route once daily., Disp: 100 each, Rfl: 0    linagliptin (TRADJENTA) 5 mg Tab tablet, Take 1 tablet (5 mg total) by mouth once daily., Disp: 90 tablet, Rfl: 2    metFORMIN (GLUCOPHAGE) 1000 MG tablet, Take 1 tablet (1,000 mg total) by mouth 2 (two) times daily with meals., Disp: 180 tablet, Rfl: 2    pravastatin (PRAVACHOL) 20 MG tablet, Take 1 tablet (20 mg total) by mouth every evening., Disp: 90 tablet, Rfl: 2    Current Facility-Administered Medications:     cyanocobalamin injection 1,000 mcg, 1,000 mcg, Intramuscular, Q30 Days, Pal Fong MD, 1,000 mcg at 04/11/19 1054    PMHx/PSHx Updates:  See patient's last visit with me on 4/5/2019.  See H&P on         Pathology:  Cancer Staging    Colectomy -  3/12/2013:  - low grade adenocarcinoma with invasion of the pericolonic adipose tissue and johnny metastasis  - 5.3cm size tumor  - T3 N1a (1 out of 12 LN's were positive)  - histologic freatures of MSI present                Objective:     Vitals:  Blood pressure 130/77, pulse 74, temperature 98 °F (36.7 °C), temperature source Oral, resp. rate 20, weight 62.4 kg (137 lb 8 oz).    Physical Examination:   GEN: no apparent distress, comfortable; AAOx3  HEAD: atraumatic and normocephalic  EYES: no pallor, no icterus, PERRLA  ENT: OMM, no pharyngeal erythema, external ears WNL; no nasal discharge; no thrush  NECK: no masses, thyroid normal, trachea midline, no LAD/LN's, supple  CV: RRR with no murmur; normal pulse; normal S1 and S2; no pedal edema  CHEST: Normal respiratory effort; CTAB; normal breath sounds; no wheeze or crackles  ABDOM: nontender and nondistended; soft; normal bowel sounds; no rebound/guarding  MUSC/Skeletal:  prior right knee surgery; arthropathy  EXTREM: no  clubbing, cyanosis, inflammation or swelling  SKIN: no rashes, lesions, ulcers, petechiae or subcutaneous nodules  : no rocha  NEURO: grossly intact; motor/sensory WNL; AAOx3; no tremors  PSYCH: normal mood, affect and behavior  LYMPH: normal cervical, supraclavicular, axillary and groin LN's            Labs:     5/9/2019:  Lab Results   Component Value Date    IRON 45 05/09/2019    TIBC 299 05/09/2019    FERRITIN 170 (H) 05/09/2019       Lab Results   Component Value Date    CEA 2.4 05/09/2019           Radiology/Diagnostic Studies:    CT Abdom/pelvis: (ordered)        X-ray Chest Pa And Lateral    Result Date: 5/9/2019  MDI CHEST, 2 VIEWS XRAY Indication: Personal history of other malignant neoplasm of large intestine. Comparison: June 27, 2018 Findings: Cardiomediastinal silhouette is within normal limits. There is no confluent airspace disease. There is no pleural effusion or pneumothorax. No acute osseous abnormality.     IMPRESSION: No acute pulmonary process. Read and electronically signed by: Richard Frost MD on 5/9/2019 9:23 AM CDT RICHARD FROST MD      I have reviewed all available lab results and radiology reports.    Assessment/Plan:   (1) 69 y.o. female with diagnosis of colon cancer who has been referred by Pal Fong MD for evaluation by medical hematology/oncology.   - diagnosed in Jan 2013  - s/p transverse colon resection 3/12/2013 followed by adjuvant chemotherapy with FOLFOX for 6 months in [place], [place]  - T3 N1a - 1 LN was positive  - Dr Sofia West was her oncologist in [place]  - CEA latest was 2.4  - she had a follow-up colonoscopy in Apr 2017  - latest CXR was negative; she was supposed to have Ct scan of abdom and pelvis but cancelled it twice and finally had it this past Thursday but the report is still unavalaible  - she has yet to see GI about getting a follow-up colonoscopy     (2) HTN and hypercholesterolemia     (3) DM II     (4) Osteoporosis     (5) OA          VISIT  DIAGNOSES:      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 Await CT reports  2. Encouraged compliance with referrals, studies and labs  3. Proceed with GI evaluation and colonoscopy  4. Check labs incl CEA again in 6 months  RTC in 6 months  Fax note to   Pal Fong MD,      Discussion:       I spent over 25 mins of time with the patient. Reviewed results of the recently ordered labs, tests and studies; made directives with regards to the results. Over half of this time was spent couseling and coordinating care.    I have explained all of the above in detail and the patient understands all of the current recommendation(s). I have answered all of their questions to the best of my ability and to their complete satisfaction.   The patient is to continue with the current management plan.            Electronically signed by Clemente Mi MD

## 2019-05-27 ENCOUNTER — OFFICE VISIT (OUTPATIENT)
Dept: FAMILY MEDICINE | Facility: CLINIC | Age: 69
End: 2019-05-27
Payer: MEDICARE

## 2019-05-27 DIAGNOSIS — E78.5 HYPERLIPIDEMIA, UNSPECIFIED HYPERLIPIDEMIA TYPE: Primary | ICD-10-CM

## 2019-05-27 DIAGNOSIS — Z11.59 NEED FOR HEPATITIS C SCREENING TEST: ICD-10-CM

## 2019-05-27 DIAGNOSIS — M79.671 RIGHT FOOT PAIN: ICD-10-CM

## 2019-05-27 DIAGNOSIS — K59.01 SLOW TRANSIT CONSTIPATION: ICD-10-CM

## 2019-05-27 DIAGNOSIS — E11.9 TYPE 2 DIABETES MELLITUS WITHOUT COMPLICATION, WITHOUT LONG-TERM CURRENT USE OF INSULIN: ICD-10-CM

## 2019-05-27 PROCEDURE — 1170F FXNL STATUS ASSESSED: CPT | Mod: ,,, | Performed by: INTERNAL MEDICINE

## 2019-05-27 PROCEDURE — 3044F PR MOST RECENT HEMOGLOBIN A1C LEVEL <7.0%: ICD-10-PCS | Mod: ,,, | Performed by: INTERNAL MEDICINE

## 2019-05-27 PROCEDURE — 1159F PR MEDICATION LIST DOCUMENTED IN MEDICAL RECORD: ICD-10-PCS | Mod: ,,, | Performed by: INTERNAL MEDICINE

## 2019-05-27 PROCEDURE — 1125F PR PAIN SEVERITY QUANTIFIED, PAIN PRESENT: ICD-10-PCS | Mod: ,,, | Performed by: INTERNAL MEDICINE

## 2019-05-27 PROCEDURE — 1160F PR REVIEW ALL MEDS BY PRESCRIBER/CLIN PHARMACIST DOCUMENTED: ICD-10-PCS | Mod: ,,, | Performed by: INTERNAL MEDICINE

## 2019-05-27 PROCEDURE — 3044F HG A1C LEVEL LT 7.0%: CPT | Mod: ,,, | Performed by: INTERNAL MEDICINE

## 2019-05-27 PROCEDURE — 3078F PR MOST RECENT DIASTOLIC BLOOD PRESSURE < 80 MM HG: ICD-10-PCS | Mod: ,,, | Performed by: INTERNAL MEDICINE

## 2019-05-27 PROCEDURE — 3074F SYST BP LT 130 MM HG: CPT | Mod: ,,, | Performed by: INTERNAL MEDICINE

## 2019-05-27 PROCEDURE — 99214 OFFICE O/P EST MOD 30 MIN: CPT | Mod: ,,, | Performed by: INTERNAL MEDICINE

## 2019-05-27 PROCEDURE — 1160F RVW MEDS BY RX/DR IN RCRD: CPT | Mod: ,,, | Performed by: INTERNAL MEDICINE

## 2019-05-27 PROCEDURE — 99214 PR OFFICE/OUTPT VISIT, EST, LEVL IV, 30-39 MIN: ICD-10-PCS | Mod: ,,, | Performed by: INTERNAL MEDICINE

## 2019-05-27 PROCEDURE — 1159F MED LIST DOCD IN RCRD: CPT | Mod: ,,, | Performed by: INTERNAL MEDICINE

## 2019-05-27 PROCEDURE — 1101F PT FALLS ASSESS-DOCD LE1/YR: CPT | Mod: ,,, | Performed by: INTERNAL MEDICINE

## 2019-05-27 PROCEDURE — 3078F DIAST BP <80 MM HG: CPT | Mod: ,,, | Performed by: INTERNAL MEDICINE

## 2019-05-27 PROCEDURE — 1101F PR PT FALLS ASSESS DOC 0-1 FALLS W/OUT INJ PAST YR: ICD-10-PCS | Mod: ,,, | Performed by: INTERNAL MEDICINE

## 2019-05-27 PROCEDURE — 3074F PR MOST RECENT SYSTOLIC BLOOD PRESSURE < 130 MM HG: ICD-10-PCS | Mod: ,,, | Performed by: INTERNAL MEDICINE

## 2019-05-27 PROCEDURE — 1125F AMNT PAIN NOTED PAIN PRSNT: CPT | Mod: ,,, | Performed by: INTERNAL MEDICINE

## 2019-05-27 PROCEDURE — 1170F PR FUNCTIONAL STATUS ASSESSED: ICD-10-PCS | Mod: ,,, | Performed by: INTERNAL MEDICINE

## 2019-05-27 RX ORDER — POLYETHYLENE GLYCOL 3350 17 G/17G
17 POWDER, FOR SOLUTION ORAL DAILY
Qty: 510 G | Refills: 5 | Status: SHIPPED | OUTPATIENT
Start: 2019-05-27 | End: 2019-11-20

## 2019-05-27 RX ORDER — ROSUVASTATIN CALCIUM 5 MG/1
5 TABLET, COATED ORAL
Qty: 36 TABLET | Refills: 3 | Status: SHIPPED | OUTPATIENT
Start: 2019-05-27 | End: 2020-04-26

## 2019-05-27 NOTE — PROGRESS NOTES
Subjective:       Patient ID: Arlethmicassy West is a 69 y.o. female.    Chief Complaint: Leg Pain; Diabetes; Hyperlipidemia; and Constipation    Brett is a 69-year-old   female who comes for follow-up on her medical conditions and is accompanied with her  who also acts partly is an . (Effectiveness of interpretation is fair)    Patient comes here today for multiple medical issues and usually non specific complaints. Underlying medical issues include type 2 diabetes mellitus currently on metformin and Tradgenta. Is also on irbesartan and hydrochlorothiazide for hypertension and Pravastatin 20 mg for dyslipidemia. She complains of muscle aches and leg pains for which there is a attribution to statin medications. Stopping the statin medications has helped her with her discomfort.    Hyperlipidemia   This is a chronic problem. The current episode started more than 1 year ago. The problem is controlled. Exacerbating diseases include diabetes. She has no history of hypothyroidism, liver disease, obesity or nephrotic syndrome. Associated symptoms include leg pain. Pertinent negatives include no chest pain or shortness of breath. Current antihyperlipidemic treatment includes statins. The current treatment provides moderate improvement of lipids. Compliance problems include medication side effects and psychosocial issues (Communication and other social barriers.).  Risk factors for coronary artery disease include post-menopausal, a sedentary lifestyle, dyslipidemia, diabetes mellitus and hypertension.   Diabetes   She presents for her follow-up diabetic visit. She has type 2 diabetes mellitus. Her disease course has been stable. Pertinent negatives for hypoglycemia include no confusion, dizziness, headaches, nervousness/anxiousness, pallor, seizures, speech difficulty or tremors. Associated symptoms include foot paresthesias. Pertinent negatives for diabetes include no chest pain, no fatigue,  no foot ulcerations, no polydipsia, no polyphagia, no polyuria, no visual change, no weakness and no weight loss. Symptoms are stable. There are no diabetic complications. Pertinent negatives for diabetic complications include no CVA, PVD or retinopathy. Risk factors for coronary artery disease include hypertension, sedentary lifestyle, post-menopausal, dyslipidemia and diabetes mellitus. Current diabetic treatment includes oral agent (dual therapy). She is compliant with treatment some of the time. She has not had a previous visit with a dietitian. She rarely participates in exercise. Her home blood glucose trend is fluctuating minimally. An ACE inhibitor/angiotensin II receptor blocker is being taken. She does not see a podiatrist.Eye exam is current.   Leg Pain    The incident occurred more than 1 week ago. There was no injury mechanism. The pain is present in the right leg and left leg. The pain has been fluctuating since onset. Pertinent negatives include no inability to bear weight, loss of sensation, muscle weakness or numbness.   Constipation   This is a chronic problem. The current episode started more than 1 year ago. The problem has been waxing and waning since onset. Pertinent negatives include no abdominal pain, back pain, diarrhea, difficulty urinating, fever, hematochezia, hemorrhoids or weight loss. She has tried laxatives (Miralax) for the symptoms. Her past medical history is significant for endocrine disease (DM). There is no history of abdominal surgery.   Hypertension   This is a chronic problem. The current episode started more than 1 year ago. The problem is controlled. Pertinent negatives include no chest pain, headaches, neck pain, palpitations, peripheral edema or shortness of breath. There are no associated agents to hypertension. Risk factors for coronary artery disease include diabetes mellitus, dyslipidemia and sedentary lifestyle. Past treatments include angiotensin blockers and  diuretics. The current treatment provides moderate improvement. There is no history of angina, kidney disease, CVA, heart failure, PVD or retinopathy. There is no history of hyperaldosteronism, hypercortisolism or pheochromocytoma.       Past Medical History:   Diagnosis Date    Cancer     Diabetes mellitus, type 2     H/O colon cancer, stage III 1/1/2013    Treated in [facility]    Malignant neoplasm of transverse colon (2013) - Stage III 4/4/2019    Osteoporosis     S/P laparoscopic colectomy (3/2013) 4/5/2019    Seasonal allergies      Social History     Socioeconomic History    Marital status:      Spouse name: Not on file    Number of children: 3    Years of education: Not on file    Highest education level: Not on file   Occupational History    Occupation:    Social Needs    Financial resource strain: Not on file    Food insecurity:     Worry: Not on file     Inability: Not on file    Transportation needs:     Medical: Not on file     Non-medical: Not on file   Tobacco Use    Smoking status: Never Smoker    Smokeless tobacco: Never Used   Substance and Sexual Activity    Alcohol use: No    Drug use: No    Sexual activity: Not Currently   Lifestyle    Physical activity:     Days per week: Not on file     Minutes per session: Not on file    Stress: Not on file   Relationships    Social connections:     Talks on phone: Not on file     Gets together: Not on file     Attends Restorationism service: Not on file     Active member of club or organization: Not on file     Attends meetings of clubs or organizations: Not on file     Relationship status: Not on file   Other Topics Concern    Not on file   Social History Narrative    Not on file     Past Surgical History:   Procedure Laterality Date    COLON SURGERY      JOINT REPLACEMENT       Family History   Problem Relation Age of Onset    Stroke Mother     Heart disease Mother     Cancer Father         Soraya       Review of  "Systems   Constitutional: Negative for activity change, chills, fatigue, fever, unexpected weight change and weight loss.   HENT: Negative for congestion, postnasal drip, sinus pressure and sneezing.    Eyes: Negative for pain, discharge and visual disturbance.   Respiratory: Negative for cough, chest tightness and shortness of breath.    Cardiovascular: Negative for chest pain, palpitations and leg swelling.   Gastrointestinal: Positive for constipation. Negative for abdominal distention, abdominal pain, anal bleeding, diarrhea, hematochezia and hemorrhoids.   Endocrine: Negative for polydipsia, polyphagia and polyuria.        Type 2 diabetes mellitus on combination of metformin and Tradjenta. Also has osteoporosis.   Genitourinary: Negative for difficulty urinating, dysuria, flank pain and frequency.   Musculoskeletal: Negative for arthralgias, back pain, joint swelling and neck pain.        Right calf and muscle ache. History of right knee surgery in past.   Skin: Negative for color change, pallor and rash.   Allergic/Immunologic: Negative for environmental allergies, food allergies and immunocompromised state.   Neurological: Negative for dizziness, tremors, seizures, syncope, speech difficulty, weakness, light-headedness, numbness and headaches.   Hematological: Negative for adenopathy. Does not bruise/bleed easily.   Psychiatric/Behavioral: Negative for agitation, confusion and dysphoric mood. The patient is not nervous/anxious.         Somewhat anxious about her health issues and tends to over utilize. (Every ill needs a pill)         Objective:      Blood pressure 116/72, pulse 83, height 4' 9" (1.448 m), weight 61.7 kg (136 lb). Body mass index is 29.43 kg/m².  Physical Exam   Constitutional: She appears well-developed and well-nourished. She is cooperative. No distress.   HENT:   Head: Normocephalic and atraumatic.   Nose: No mucosal edema, sinus tenderness or nasal deformity. Right sinus exhibits no " maxillary sinus tenderness and no frontal sinus tenderness. Left sinus exhibits no maxillary sinus tenderness and no frontal sinus tenderness.   Eyes: Pupils are equal, round, and reactive to light. Conjunctivae, EOM and lids are normal. Lids are everted and swept, no foreign bodies found. Right pupil is round and reactive. Left pupil is round and reactive.   Neck: Trachea normal and normal range of motion. Neck supple.   Cardiovascular: Normal rate, regular rhythm, S1 normal, S2 normal and normal heart sounds.   Pulmonary/Chest: Breath sounds normal.   Abdominal: Soft. Bowel sounds are normal. There is no rigidity and no guarding.   Musculoskeletal: She exhibits tenderness. She exhibits no edema or deformity.        Left lower leg: She exhibits no tenderness.        Legs:  Lymphadenopathy:     She has no cervical adenopathy.     She has no axillary adenopathy.   Neurological: She is alert. Coordination abnormal.   Skin: Skin is warm and dry.   Psychiatric: Her behavior is normal.   Nursing note and vitals reviewed.        Assessment:       1. Hyperlipidemia, unspecified hyperlipidemia type    2. Type 2 diabetes mellitus without complication, without long-term current use of insulin    3. Slow transit constipation    4. Right foot pain    5. Need for hepatitis C screening test           Orders Only on 05/09/2019   Component Date Value Ref Range Status    Iron 05/09/2019 45  24 - 162 mcg/dL Final    TIBC 05/09/2019 299  177 - 435 mcg/dL Final    % Saturation 05/09/2019 15  % Final    Creatinine 05/09/2019 0.93  0.60 - 1.40 mg/dL Final    CEA 05/09/2019 2.4  ng/mL Final    Ferritin 05/09/2019 170* 24 - 162 ng/mL Final   Orders Only on 05/09/2019   Component Date Value Ref Range Status    ISTAT CREATININE 05/09/2019 1.00  0.60 - 1.40 mg/dL Final   Orders Only on 04/04/2019   Component Date Value Ref Range Status    Microalbum.,U,Random 04/04/2019 6.3  0.0 - 19.9 mcg/ml Final    Creatinine, Random Ur 04/04/2019  "82.00  mg/dl Final    Microalb Creat Ratio 04/04/2019 8  0 - 30 Final   Orders Only on 04/04/2019   Component Date Value Ref Range Status    Glucose 04/04/2019 140* 70 - 99 mg/dL Final    BUN, Bld 04/04/2019 22* 8 - 20 mg/dL Final    Creatinine 04/04/2019 1.11  0.60 - 1.40 mg/dL Final    Calcium 04/04/2019 8.7  7.7 - 10.4 mg/dL Final    Sodium 04/04/2019 136  134 - 144 mmol/L Final    Potassium 04/04/2019 5.0  3.5 - 5.0 mmol/L Final    Chloride 04/04/2019 106  98 - 110 mmol/L Final    CO2 04/04/2019 23.2  22.8 - 31.6 mmol/L Final    Albumin 04/04/2019 3.8  3.1 - 4.7 g/dL Final    Total Bilirubin 04/04/2019 0.5  0.3 - 1.0 mg/dL Final    Alkaline Phosphatase 04/04/2019 62  40 - 104 IU/L Final    Total Protein 04/04/2019 6.9  6.0 - 8.2 g/dL Final    ALT (SGPT) 04/04/2019 16  3 - 33 IU/L Final    AST 04/04/2019 16  10 - 40 IU/L Final    Hemoglobin A1C 04/04/2019 6.9* 3.1 - 6.5 % Final         Plan:           Hyperlipidemia, unspecified hyperlipidemia type  -     rosuvastatin (CRESTOR) 5 MG tablet; Take 1 tablet (5 mg total) by mouth every Mon, Wed, Fri.  Dispense: 36 tablet; Refill: 3  -     Lipid panel; Future; Expected date: 05/27/2019  -     Basic metabolic panel; Future; Expected date: 05/27/2019  -     ALT (SGPT); Future; Expected date: 05/27/2019    Type 2 diabetes mellitus without complication, without long-term current use of insulin  -     Hemoglobin A1c; Future; Expected date: 05/27/2019  -     Ambulatory referral to Ophthalmology    Slow transit constipation  -     polyethylene glycol (GLYCOLAX) 17 gram/dose powder; Take 17 g by mouth once daily.  Dispense: 510 g; Refill: 5    Right foot pain    Need for hepatitis C screening test  -     Hepatitis C antibody; Future; Expected date: 06/27/2019    Patient tends to be over utilizer for minor health issues also.    She needs "a pill for every ill". The fact that sometimes constipation can be treated with natural methods and dietary management " seems to be a very foreign and difficult to appreciate concept for her and her family.    New prescription has been given for MiraLAX.    I will give a trial for Crestor 5 mg and 3 times a week instead of pravastatin 20 mg daily which seemed to cause her leg aches and pains.    Labs have been  ordered for future.    Patient is independent with activities of daily living but needs help from her  for negotiations and understanding of her health issues.    Patient's minimal pain and cramp has been noted in the right lower extremity which is a residual of her right knee surgery and to be some contribution from statins which has improved thus far.    Patient's medication list has been reviewed.    Follow-up in 3 months.      Current Outpatient Medications:     alendronate (FOSAMAX) 70 MG tablet, TAKE 1 TABLET BY MOUTH EVERY 7 DAYS, Disp: 13 tablet, Rfl: 2    blood sugar diagnostic Strp, 1 strip by Misc.(Non-Drug; Combo Route) route once daily., Disp: 100 strip, Rfl: 2    blood-glucose meter kit, One touch meter and matching lancets and strips., Disp: 1 each, Rfl: 0    cyanocobalamin, vitamin B-12, 1,000 mcg/mL Kit, Inject 1,000 mg as directed every 30 days., Disp: 3 kit, Rfl: 3    fluticasone (FLONASE) 50 mcg/actuation nasal spray, 1 spray (50 mcg total) by Each Nare route once daily., Disp: 1 Bottle, Rfl: 4    irbesartan-hydrochlorothiazide (AVALIDE) 300-12.5 mg per tablet, Take 1 tablet by mouth once daily., Disp: 90 tablet, Rfl: 1    lancets (LANCETS,ULTRA THIN) 26 gauge Misc, 1 lancet by Misc.(Non-Drug; Combo Route) route once daily., Disp: 100 each, Rfl: 0    metFORMIN (GLUCOPHAGE) 1000 MG tablet, TAKE 1 TABLET BY MOUTH TWICE DAILY WITH MEALS, Disp: 180 tablet, Rfl: 2    TRADJENTA 5 mg Tab tablet, TAKE 1 TABLET BY MOUTH ONCE DAILY, Disp: 90 tablet, Rfl: 2    polyethylene glycol (GLYCOLAX) 17 gram/dose powder, Take 17 g by mouth once daily., Disp: 510 g, Rfl: 5    rosuvastatin (CRESTOR) 5 MG tablet,  Take 1 tablet (5 mg total) by mouth every Mon, Wed, Fri., Disp: 36 tablet, Rfl: 3    Current Facility-Administered Medications:     cyanocobalamin injection 1,000 mcg, 1,000 mcg, Intramuscular, Q30 Days, Pal Fong MD, 1,000 mcg at 04/11/19 1054    cyanocobalamin injection 100 mcg, 100 mcg, Intramuscular, Q30 Days, Pal Fong MD, 100 mcg at 05/13/19 1026

## 2019-05-28 VITALS
SYSTOLIC BLOOD PRESSURE: 116 MMHG | HEART RATE: 83 BPM | DIASTOLIC BLOOD PRESSURE: 72 MMHG | WEIGHT: 136 LBS | HEIGHT: 57 IN | BODY MASS INDEX: 29.34 KG/M2

## 2019-05-28 NOTE — PATIENT INSTRUCTIONS
Using a Blood Sugar Log    You have diabetes. This means your body has trouble regulating a sugar called glucose. To help manage your diabetes, youll need to check your blood sugar level as directed by your healthcare provider. Keeping a log of your blood sugar levels will help you track your blood sugar readings. Its a simple and easy way to see how well you are controlling your diabetes.  Checking your blood sugar level  You can check your blood sugar level with a blood glucose meter. Youll first prick the side of your finger with a tiny lancet to draw a tiny drop of blood onto the test strip. Some glucose meters let you use another place on your body to test. But these other places should not be used in some cases as they may be inaccurate. Follow the instructions for your glucose meter. And talk with your healthcare provider before doing the test on other places.  The strip goes into the meter first, then a drop of blood is placed on the tip of the strip. The meter then shows a reading that tells you the level of your blood sugar. Your readings should be in your target range as often as possible. This means not too high or too low. Staying in this range helps lower your risk for complications. Your healthcare provider will help you figure out the target range that is best for you.  Tracking your readings  Every time you check your blood sugar, use your log to keep track of your readings. Your meter will also probably have a memory feature that your healthcare provider can check at your next visit. You may be advised by your healthcare provider to check your blood sugar in the morning, at bedtime, and before and after meals. Be sure to write down all of your numbers. Also use your log to record things that might have affected your blood sugar. Some examples include being sick, certain medicines, being physically active, feeling stressed, or skipping meals.   Lessons learned from your readings  Tracking your  "blood sugar readings helps you see patterns. These patterns tell you how your actions affect your blood sugar. For instance, you may have higher numbers after eating certain foods or lower numbers after exercise. They just help you understand how to stay in your target range more often, so that your diabetes remains in good control.  Sharing your log with your healthcare team  Bring your blood sugar log and glucose meter with you to all of your healthcare appointments. This can help your healthcare team make changes to your treatment plan, if needed. This may involve making changes in what you eat, what medicines you take, or how much you exercise.  To learn more  The resources below can help you learn more:  · American Diabetes Association 915-755-0598 www.diabetes.org  · [employer] 880-646-8480 www.lighthouse.org  · [employer] [place] 834-187-9816 www.nei.nih.gov  · [employer] 427-680-8800 www.hormone.org  Date Last Reviewed: 5/1/2016 © 2000-2017 [employer]. [address] [place] [address]. All rights reserved. This information is not intended as a substitute for professional medical care. Always follow your healthcare professional's instructions.        Controlling Your Cholesterol  Cholesterol is a waxy substance. It travels in your blood through the blood vessels. When you have high cholesterol, it builds up in the walls of the blood vessels. This makes the vessels narrower. Blood flow decreases. You are then at greater risk for having a heart attack or a stroke.  Good and bad cholesterol  Lipids are fats. Blood is mostly water. Fat and water don't mix. So our bodies need lipoproteins (lipids inside a protein shell) to carry the lipids. The protein shell carries its lipids through the bloodstream. There are two main kinds of lipoproteins:  · LDL (low-density lipoprotein) is known as "bad cholesterol." It mainly carries cholesterol. It delivers this " "cholesterol to body cells. Excess LDL cholesterol will build up in artery walls. This increases your risk for heart disease and stroke.  · HDL (high-density lipoprotein) is known as "good cholesterol." This protein shell collects excess cholesterol that LDLs have left behind on blood vessel walls. That's why high levels of HDL cholesterol can decrease your risk of heart disease and stroke.  Controlling cholesterol levels  Total cholesterol includes LDL and HDL cholesterol, as well as other fats in the bloodstream. If your total cholesterol is high, follow the steps below to help lower your total cholesterol level:  · Eat less unhealthy fat:  ¨ Cut back on saturated fats and trans (also called hydrogenated) fats by selecting lean cuts of meat, low-fat dairy, and using oils instead of solid fats. Limit baked goods, processed meats, and fried foods. A diet thats high in these fats increases your bad cholesterol. It's not enough to just cut back on foods containing cholesterol.  ¨ Eat about 2 servings of fish per week. Most fish contain omega-3 fatty acids. These help lower blood cholesterol.  ¨ Eat more whole grains and soluble fiber (such as oat bran). These lower overall cholesterol.  · Be active:  ¨ Choose an activity you enjoy. Walking, swimming, and riding a bike are some good ways to be active.  ¨ Start at a level where you feel comfortable. Increase your time and pace a little each week.  ¨ Work up to 40 minutes of moderate to high intensity physical activity at least 3 to 4 days per week.  ¨ Remember, some activity is better than none.  ¨ If you haven't been exercising regularly, start slowly. Check with your doctor to make sure the exercise plan is right for you.  · Quit smoking. Quitting smoking can improve your lipid levels. It also lowers your risk for heart disease and stroke.  · Weight management. If you are overweight or obese, your health care provider will work with you to lose weight and lower your " BMI (body mass index) to a normal or near-normal level. Making diet changes and increasing physical activity can help.  · Take medication as directed. Many people need medication to get their LDL levels to a safe level. Medication to lower cholesterol levels is effective and safe. (But taking medication is not a substitute for exercise or watching your diet!) Your doctor can tell you whether you might benefit from a cholesterol-lowering medication.  Date Last Reviewed: 5/11/2015  © 9015-1977 [employer]. [address] [place] [address], [place] [address]. All rights reserved. This information is not intended as a substitute for professional medical care. Always follow your healthcare professional's instructions.

## 2019-06-07 DIAGNOSIS — E11.9 TYPE 2 DIABETES MELLITUS WITHOUT COMPLICATION, WITHOUT LONG-TERM CURRENT USE OF INSULIN: ICD-10-CM

## 2019-06-10 RX ORDER — CALCIUM CITRATE/VITAMIN D3 200MG-6.25
TABLET ORAL
Qty: 100 STRIP | Refills: 0 | Status: SHIPPED | OUTPATIENT
Start: 2019-06-10 | End: 2019-09-20 | Stop reason: SDUPTHER

## 2019-06-12 ENCOUNTER — OFFICE VISIT (OUTPATIENT)
Dept: FAMILY MEDICINE | Facility: CLINIC | Age: 69
End: 2019-06-12
Payer: MEDICARE

## 2019-06-12 VITALS
DIASTOLIC BLOOD PRESSURE: 78 MMHG | HEIGHT: 57 IN | HEART RATE: 75 BPM | SYSTOLIC BLOOD PRESSURE: 139 MMHG | WEIGHT: 137 LBS | BODY MASS INDEX: 29.56 KG/M2

## 2019-06-12 DIAGNOSIS — E53.8 B12 DEFICIENCY: ICD-10-CM

## 2019-06-12 DIAGNOSIS — D64.9 MILD ANEMIA: ICD-10-CM

## 2019-06-12 DIAGNOSIS — I10 ESSENTIAL HYPERTENSION: ICD-10-CM

## 2019-06-12 DIAGNOSIS — Z01.818 PREOP GENERAL PHYSICAL EXAM: Primary | ICD-10-CM

## 2019-06-12 DIAGNOSIS — E11.9 TYPE 2 DIABETES MELLITUS WITHOUT COMPLICATION, WITHOUT LONG-TERM CURRENT USE OF INSULIN: ICD-10-CM

## 2019-06-12 DIAGNOSIS — E78.2 MIXED HYPERLIPIDEMIA: ICD-10-CM

## 2019-06-12 LAB
BASOPHILS NFR BLD: 0 K/UL (ref 0–0.2)
BASOPHILS NFR BLD: 0.6 %
BUN SERPL-MCNC: 19 MG/DL (ref 8–20)
CALCIUM SERPL-MCNC: 8.6 MG/DL (ref 7.7–10.4)
CHLORIDE: 103 MMOL/L (ref 98–110)
CO2 SERPL-SCNC: 22.7 MMOL/L (ref 22.8–31.6)
CREATININE: 0.95 MG/DL (ref 0.6–1.4)
EOSINOPHIL NFR BLD: 0.2 K/UL (ref 0–0.7)
EOSINOPHIL NFR BLD: 2.6 %
ERYTHROCYTE [DISTWIDTH] IN BLOOD BY AUTOMATED COUNT: 13.2 % (ref 11.7–14.9)
GLUCOSE: 171 MG/DL (ref 70–99)
GRAN #: 4.7 K/UL (ref 1.4–6.5)
GRAN%: 65.1 %
HCT VFR BLD AUTO: 33.2 % (ref 36–48)
HGB BLD-MCNC: 10.3 G/DL (ref 12–15)
IMMATURE GRANS (ABS): 0 K/UL (ref 0–1)
IMMATURE GRANULOCYTES: 0.6 %
LYMPH #: 1.7 K/UL (ref 1.2–3.4)
LYMPH%: 23.8 %
MCH RBC QN AUTO: 27.9 PG (ref 25–35)
MCHC RBC AUTO-ENTMCNC: 31 G/DL (ref 31–36)
MCV RBC AUTO: 90 FL (ref 79–98)
MONO #: 0.5 K/UL (ref 0.1–0.6)
MONO%: 7.3 %
NUCLEATED RBCS: 0 %
PLATELET # BLD AUTO: 214 K/UL (ref 140–440)
PMV BLD AUTO: 11 FL (ref 8.8–12.7)
POTASSIUM SERPL-SCNC: 4.7 MMOL/L (ref 3.5–5)
RBC # BLD AUTO: 3.69 M/UL (ref 3.5–5.5)
SODIUM: 133 MMOL/L (ref 134–144)
WBC # BLD AUTO: 7.2 K/UL (ref 5–10)

## 2019-06-12 PROCEDURE — 3044F PR MOST RECENT HEMOGLOBIN A1C LEVEL <7.0%: ICD-10-PCS | Mod: ,,, | Performed by: INTERNAL MEDICINE

## 2019-06-12 PROCEDURE — 3078F DIAST BP <80 MM HG: CPT | Mod: ,,, | Performed by: INTERNAL MEDICINE

## 2019-06-12 PROCEDURE — 99214 PR OFFICE/OUTPT VISIT, EST, LEVL IV, 30-39 MIN: ICD-10-PCS | Mod: 25,,, | Performed by: INTERNAL MEDICINE

## 2019-06-12 PROCEDURE — 3075F PR MOST RECENT SYSTOLIC BLOOD PRESS GE 130-139MM HG: ICD-10-PCS | Mod: ,,, | Performed by: INTERNAL MEDICINE

## 2019-06-12 PROCEDURE — 3078F PR MOST RECENT DIASTOLIC BLOOD PRESSURE < 80 MM HG: ICD-10-PCS | Mod: ,,, | Performed by: INTERNAL MEDICINE

## 2019-06-12 PROCEDURE — 96372 PR INJECTION,THERAP/PROPH/DIAG2ST, IM OR SUBCUT: ICD-10-PCS | Mod: ,,, | Performed by: INTERNAL MEDICINE

## 2019-06-12 PROCEDURE — 3075F SYST BP GE 130 - 139MM HG: CPT | Mod: ,,, | Performed by: INTERNAL MEDICINE

## 2019-06-12 PROCEDURE — 1101F PT FALLS ASSESS-DOCD LE1/YR: CPT | Mod: ,,, | Performed by: INTERNAL MEDICINE

## 2019-06-12 PROCEDURE — 3044F HG A1C LEVEL LT 7.0%: CPT | Mod: ,,, | Performed by: INTERNAL MEDICINE

## 2019-06-12 PROCEDURE — 1101F PR PT FALLS ASSESS DOC 0-1 FALLS W/OUT INJ PAST YR: ICD-10-PCS | Mod: ,,, | Performed by: INTERNAL MEDICINE

## 2019-06-12 PROCEDURE — 96372 THER/PROPH/DIAG INJ SC/IM: CPT | Mod: ,,, | Performed by: INTERNAL MEDICINE

## 2019-06-12 PROCEDURE — 99214 OFFICE O/P EST MOD 30 MIN: CPT | Mod: 25,,, | Performed by: INTERNAL MEDICINE

## 2019-06-12 RX ORDER — CYANOCOBALAMIN 1000 UG/ML
1000 INJECTION, SOLUTION INTRAMUSCULAR; SUBCUTANEOUS
Status: DISCONTINUED | OUTPATIENT
Start: 2019-06-12 | End: 2019-08-01

## 2019-06-12 RX ADMIN — CYANOCOBALAMIN 1000 MCG: 1000 INJECTION, SOLUTION INTRAMUSCULAR; SUBCUTANEOUS at 09:06

## 2019-06-12 NOTE — Clinical Note
Patient is medically clear for proposed cataract surgery. This clearance is valid for approximately one month. We will have to repeat labs after that.Dr. Hetal SON.

## 2019-06-12 NOTE — PATIENT INSTRUCTIONS
After Cataract Surgery: Long-Term Eye Care    After cataract surgery, it is important to have regular eye exams. This is the best way to check the health of your eyes. It will help you maintain good vision. Schedule an eye exam at least once a year or as directed by your eye care professional.  New eyeglasses  Your vision will be better after surgery. But you may need new eyeglasses to fine-tune your vision. Your eye doctor will test your vision while youre healing. When youre fully healed, you will get a new eyeglass prescription if needed.  Treating a secondary cataract  Months or years after surgery, a secondary cataract may form. It is caused by cells that grow between your new lens and the capsule that holds it. This cataract is not painful. But it may cause vision problems similar to the first cataract. In most cases, this cataract can be treated in your eye doctors office or an outpatient surgical center.  Laser treatment (YAG capsulotomy) can be used for this. It is a painless procedure. It only takes a few minutes. A laser beam is used to make a small opening in the eyes capsule. This allows more light to enter. It will improve your vision right away.  Date Last Reviewed: 6/14/2015  © 3393-3382 [employer]. [address] [place] [address], [place] [address]. All rights reserved. This information is not intended as a substitute for professional medical care. Always follow your healthcare professional's instructions.

## 2019-06-12 NOTE — LETTER
June 12, 2019        Oleksandr VALDOVINOS MD  [address] [facility] [address]             [facility] / Internal Medicine  [address] [place] [id number]  Phone: [phone]  Fax: [phone]   Patient: Preet West   MR Number: [id number]   YOB: 1950   Date of Visit: 6/12/2019     Dear Dr. Swan,     I have evaluated Ms. Preet West for medical clearance for proposed cataract surgery. She has underlying hypertension, mild dyslipidemia, mild anemia and type 2 diabetes mellitus. Her vital conditions and examination are generally stable. Mild hyponatremia has been noted in the count and mild anemia also has been noted. The should not preclude her surgery.    I've advised her to hold off the diabetes medications on the day of surgery. She should take her blood pressure medications with a sip of water on the day of surgery. She can resume her medications once she starts feeding.    Kind regards    Sincerely,      Pal Fong MD            CC  No Recipients    Enclosure

## 2019-06-12 NOTE — PROGRESS NOTES
Subjective:       Patient ID: Preet West is a 69 y.o. female.    Chief Complaint: Pre-op Exam (cateract surg  needs b12 shot ); Diabetes; Hypertension; and Hyperlipidemia    Diabetes   She presents for her follow-up diabetic visit. She has type 2 diabetes mellitus. No MedicAlert identification noted. Her disease course has been stable. Pertinent negatives for hypoglycemia include no confusion, dizziness, headaches, nervousness/anxiousness, pallor, seizures, speech difficulty or tremors. Pertinent negatives for diabetes include no chest pain, no fatigue, no foot ulcerations, no polydipsia, no polyphagia, no polyuria and no weakness. Symptoms are stable. Risk factors for coronary artery disease include sedentary lifestyle, hypertension, dyslipidemia and diabetes mellitus. Current diabetic treatment includes oral agent (dual therapy) (Tradjenta Metformin). She is compliant with treatment most of the time. Her weight is stable. Meal planning includes avoidance of concentrated sweets. She has not had a previous visit with a dietitian. She never participates in exercise. Her home blood glucose trend is fluctuating minimally. Her breakfast blood glucose range is generally 110-130 mg/dl. Her dinner blood glucose range is generally 130-140 mg/dl. An ACE inhibitor/angiotensin II receptor blocker is being taken. She does not see a podiatrist.Eye exam is current.   Hypertension   This is a chronic problem. The current episode started more than 1 year ago. The problem is unchanged. The problem is controlled. Associated symptoms include malaise/fatigue. Pertinent negatives include no chest pain, headaches, neck pain, palpitations or shortness of breath. Risk factors for coronary artery disease include sedentary lifestyle, dyslipidemia, diabetes mellitus and post-menopausal state. Past treatments include angiotensin blockers and diuretics. The current treatment provides moderate improvement. Compliance problems include  psychosocial issues.  There is no history of hyperaldosteronism, hypercortisolism or sleep apnea.   Hyperlipidemia   This is a chronic problem. The current episode started more than 1 year ago. The problem is controlled. She has no history of hypothyroidism, liver disease or obesity. Pertinent negatives include no chest pain or shortness of breath. Current antihyperlipidemic treatment includes statins. Risk factors for coronary artery disease include hypertension, dyslipidemia, diabetes mellitus, post-menopausal and a sedentary lifestyle.       Past Medical History:   Diagnosis Date    Cancer     Diabetes mellitus, type 2     H/O colon cancer, stage III 1/1/2013    Treated in [facility]    Malignant neoplasm of transverse colon (2013) - Stage III 4/4/2019    Osteoporosis     S/P laparoscopic colectomy (3/2013) 4/5/2019    Seasonal allergies      Social History     Socioeconomic History    Marital status:      Spouse name: Not on file    Number of children: 3    Years of education: Not on file    Highest education level: Not on file   Occupational History    Occupation:    Social Needs    Financial resource strain: Not on file    Food insecurity:     Worry: Not on file     Inability: Not on file    Transportation needs:     Medical: Not on file     Non-medical: Not on file   Tobacco Use    Smoking status: Never Smoker    Smokeless tobacco: Never Used   Substance and Sexual Activity    Alcohol use: No    Drug use: No    Sexual activity: Not Currently   Lifestyle    Physical activity:     Days per week: Not on file     Minutes per session: Not on file    Stress: Not at all   Relationships    Social connections:     Talks on phone: Not on file     Gets together: Not on file     Attends [employer]orationist service: Not on file     Active member of club or organization: Not on file     Attends meetings of clubs or organizations: Not on file     Relationship status: Not on file   Other Topics  "Concern    Not on file   Social History Narrative    Speaks [place] only     Past Surgical History:   Procedure Laterality Date    COLON SURGERY      JOINT REPLACEMENT       Family History   Problem Relation Age of Onset    Stroke Mother     Heart disease Mother     Cancer Father         [place]       Review of Systems   Constitutional: Positive for malaise/fatigue. Negative for activity change, chills, fatigue, fever and unexpected weight change.   HENT: Negative for congestion, postnasal drip, sinus pressure and sneezing.    Eyes: Negative for pain, discharge and visual disturbance.   Respiratory: Negative for cough, chest tightness and shortness of breath.    Cardiovascular: Negative for chest pain, palpitations and leg swelling.   Gastrointestinal: Negative for abdominal distention, abdominal pain, anal bleeding, constipation and diarrhea.   Endocrine: Negative for polydipsia, polyphagia and polyuria.        Type 2 diabetes mellitus on combination of metformin and Tradjenta. Also has osteoporosis.   Genitourinary: Negative for difficulty urinating, dysuria, flank pain, frequency and vaginal pain.   Musculoskeletal: Negative for arthralgias, back pain, joint swelling and neck pain.   Skin: Negative for color change, pallor and rash.   Allergic/Immunologic: Negative for environmental allergies, food allergies and immunocompromised state.   Neurological: Negative for dizziness, tremors, seizures, syncope, speech difficulty, weakness, light-headedness, numbness and headaches.   Hematological: Negative for adenopathy. Does not bruise/bleed easily.   Psychiatric/Behavioral: Negative for agitation, confusion and dysphoric mood. The patient is not nervous/anxious.         Somewhat anxious about her health issues and tends to over utilize. (Every ill needs a pill)         Objective:      Blood pressure 139/78, pulse 75, height 4' 9" (1.448 m), weight 62.1 kg (137 lb). Body mass index is 29.65 kg/m².  Physical " Exam   Constitutional: She appears well-developed and well-nourished. She is cooperative. No distress.   HENT:   Head: Normocephalic and atraumatic.   Nose: No mucosal edema, sinus tenderness or nasal deformity. Right sinus exhibits no maxillary sinus tenderness and no frontal sinus tenderness. Left sinus exhibits no maxillary sinus tenderness and no frontal sinus tenderness.   Eyes: Pupils are equal, round, and reactive to light. Conjunctivae, EOM and lids are normal. Lids are everted and swept, no foreign bodies found. Right pupil is round and reactive. Left pupil is round and reactive.   Neck: Trachea normal and normal range of motion. Neck supple.   Cardiovascular: Normal rate, regular rhythm, S1 normal, S2 normal and normal heart sounds.   Pulmonary/Chest: Breath sounds normal.   Abdominal: Soft. Bowel sounds are normal. There is no rigidity and no guarding.   Musculoskeletal: She exhibits no edema, tenderness or deformity.   Lymphadenopathy:     She has no cervical adenopathy.     She has no axillary adenopathy.   Neurological: She is alert. Coordination normal.   Skin: Skin is warm and dry.   Psychiatric: Her behavior is normal.   Nursing note and vitals reviewed.        Assessment:       1. Preop general physical exam    2. Type 2 diabetes mellitus without complication, without long-term current use of insulin    3. Essential hypertension    4. Mixed hyperlipidemia    5. Mild anemia    6. B12 deficiency           K/uL 7.2     RBC 3.50 - 5.50 M/uL 3.69    Hemoglobin 12.0 - 15.0 g/dL 10.3Low     Hematocrit 36.0 - 48.0 % 33.2Low     Mean Corpuscular Volume 79.0 - 98.0 fL 90.0      Glucose 70 - 99 mg/dL 171High     BUN, Bld 8 - 20 mg/dL 19    Creatinine 0.60 - 1.40 mg/dL 0.95    Calcium 7.7 - 10.4 mg/dL 8.6    Sodium 134 - 144 mmol/L 133Low     Potassium 3.5 - 5.0 mmol/L 4.7    Chloride 98 - 110 mmol/L 103    CO2 22.8 - 31.6 mmol/L 22.7Low             Plan:           Preop general physical exam    Type 2  diabetes mellitus without complication, without long-term current use of insulin    Essential hypertension  -     Basic metabolic panel; Future; Expected date: 06/12/2019    Mixed hyperlipidemia    Mild anemia  -     CBC auto differential; Future; Expected date: 06/12/2019    B12 deficiency  -     cyanocobalamin injection 1,000 mcg    Other orders  -     Estimated Glomerular Filtration Rate    Patient is medically clear for proposed cataract surgery for the right eye under monitored anesthesia care.    I will order a BMP and CBC today.    She has been advised to take her blood pressure medication irbesartan/hydrochlorothiazide with a sip of water on the day of surgery.    She has been advised not to take her diabetes medication which include metformin and Tradjenta in the morning of surgery. She can start taking these medications once she starts feeding of the surgery.    She'll keep her regular appointment with me. I will update this progress note and medical clearance after I get the labs.        Current Outpatient Medications:     alendronate (FOSAMAX) 70 MG tablet, TAKE 1 TABLET BY MOUTH EVERY 7 DAYS, Disp: 13 tablet, Rfl: 2    blood sugar diagnostic Strp, 1 strip by Misc.(Non-Drug; Combo Route) route once daily., Disp: 100 strip, Rfl: 2    blood-glucose meter kit, One touch meter and matching lancets and strips., Disp: 1 each, Rfl: 0    cyanocobalamin, vitamin B-12, 1,000 mcg/mL Kit, Inject 1,000 mg as directed every 30 days., Disp: 3 kit, Rfl: 3    fluticasone (FLONASE) 50 mcg/actuation nasal spray, 1 spray (50 mcg total) by Each Nare route once daily., Disp: 1 Bottle, Rfl: 4    irbesartan-hydrochlorothiazide (AVALIDE) 300-12.5 mg per tablet, Take 1 tablet by mouth once daily., Disp: 90 tablet, Rfl: 1    lancets (LANCETS,ULTRA THIN) 26 gauge Misc, 1 lancet by Misc.(Non-Drug; Combo Route) route once daily., Disp: 100 each, Rfl: 0    metFORMIN (GLUCOPHAGE) 1000 MG tablet, TAKE 1 TABLET BY MOUTH TWICE  DAILY WITH MEALS, Disp: 180 tablet, Rfl: 2    polyethylene glycol (GLYCOLAX) 17 gram/dose powder, Take 17 g by mouth once daily., Disp: 510 g, Rfl: 5    rosuvastatin (CRESTOR) 5 MG tablet, Take 1 tablet (5 mg total) by mouth every Mon, Wed, Fri., Disp: 36 tablet, Rfl: 3    TRADJENTA 5 mg Tab tablet, TAKE 1 TABLET BY MOUTH ONCE DAILY, Disp: 90 tablet, Rfl: 2    TRUE METRIX GLUCOSE TEST STRIP Strp, USE 1 STRIP TO CHECK GLUCOSE TWICE DAILY, Disp: 100 strip, Rfl: 0    Current Facility-Administered Medications:     cyanocobalamin injection 1,000 mcg, 1,000 mcg, Intramuscular, Q30 Days, Pal Fong MD, 1,000 mcg at 04/11/19 1054    cyanocobalamin injection 1,000 mcg, 1,000 mcg, Intramuscular, Q30 Days, Pal Fong MD, 1,000 mcg at 06/12/19 0945    cyanocobalamin injection 100 mcg, 100 mcg, Intramuscular, Q30 Days, Pal Fong MD, 100 mcg at 05/13/19 1026

## 2019-07-11 ENCOUNTER — CLINICAL SUPPORT (OUTPATIENT)
Dept: FAMILY MEDICINE | Facility: CLINIC | Age: 69
End: 2019-07-11
Payer: MEDICARE

## 2019-07-11 DIAGNOSIS — E53.8 B12 DEFICIENCY: Primary | ICD-10-CM

## 2019-07-11 PROCEDURE — 96372 PR INJECTION,THERAP/PROPH/DIAG2ST, IM OR SUBCUT: ICD-10-PCS | Mod: ,,, | Performed by: NURSE PRACTITIONER

## 2019-07-11 PROCEDURE — 96372 THER/PROPH/DIAG INJ SC/IM: CPT | Mod: ,,, | Performed by: NURSE PRACTITIONER

## 2019-07-11 RX ORDER — CYANOCOBALAMIN 1000 UG/ML
100 INJECTION, SOLUTION INTRAMUSCULAR; SUBCUTANEOUS
Status: CANCELLED | OUTPATIENT
Start: 2019-07-11

## 2019-07-11 RX ORDER — CYANOCOBALAMIN 1000 UG/ML
1000 INJECTION, SOLUTION INTRAMUSCULAR; SUBCUTANEOUS
Status: DISCONTINUED | OUTPATIENT
Start: 2019-07-11 | End: 2019-08-01

## 2019-07-11 RX ADMIN — CYANOCOBALAMIN 1000 MCG: 1000 INJECTION, SOLUTION INTRAMUSCULAR; SUBCUTANEOUS at 12:07

## 2019-07-26 ENCOUNTER — TELEPHONE (OUTPATIENT)
Dept: FAMILY MEDICINE | Facility: CLINIC | Age: 69
End: 2019-07-26

## 2019-07-26 NOTE — TELEPHONE ENCOUNTER
----- Message from Pal Fong MD sent at 7/25/2019  6:01 PM CDT -----  The results are within acceptable range.  Please keep regular follow up.

## 2019-08-01 ENCOUNTER — OFFICE VISIT (OUTPATIENT)
Dept: FAMILY MEDICINE | Facility: CLINIC | Age: 69
End: 2019-08-01
Payer: MEDICARE

## 2019-08-01 VITALS
BODY MASS INDEX: 29.99 KG/M2 | WEIGHT: 139 LBS | HEART RATE: 73 BPM | DIASTOLIC BLOOD PRESSURE: 88 MMHG | SYSTOLIC BLOOD PRESSURE: 139 MMHG | HEIGHT: 57 IN

## 2019-08-01 DIAGNOSIS — E78.2 MIXED HYPERLIPIDEMIA: ICD-10-CM

## 2019-08-01 DIAGNOSIS — E11.9 TYPE 2 DIABETES MELLITUS WITHOUT COMPLICATION, WITHOUT LONG-TERM CURRENT USE OF INSULIN: Chronic | ICD-10-CM

## 2019-08-01 DIAGNOSIS — R53.83 FATIGUE, UNSPECIFIED TYPE: Chronic | ICD-10-CM

## 2019-08-01 DIAGNOSIS — Z01.818 PREOP GENERAL PHYSICAL EXAM: Primary | ICD-10-CM

## 2019-08-01 DIAGNOSIS — I10 ESSENTIAL HYPERTENSION: Chronic | ICD-10-CM

## 2019-08-01 PROCEDURE — 99214 PR OFFICE/OUTPT VISIT, EST, LEVL IV, 30-39 MIN: ICD-10-PCS | Mod: 25,S$GLB,, | Performed by: INTERNAL MEDICINE

## 2019-08-01 PROCEDURE — 96372 PR INJECTION,THERAP/PROPH/DIAG2ST, IM OR SUBCUT: ICD-10-PCS | Mod: S$GLB,,, | Performed by: INTERNAL MEDICINE

## 2019-08-01 PROCEDURE — 3044F PR MOST RECENT HEMOGLOBIN A1C LEVEL <7.0%: ICD-10-PCS | Mod: S$GLB,,, | Performed by: INTERNAL MEDICINE

## 2019-08-01 PROCEDURE — 99214 OFFICE O/P EST MOD 30 MIN: CPT | Mod: 25,S$GLB,, | Performed by: INTERNAL MEDICINE

## 2019-08-01 PROCEDURE — 1101F PT FALLS ASSESS-DOCD LE1/YR: CPT | Mod: S$GLB,,, | Performed by: INTERNAL MEDICINE

## 2019-08-01 PROCEDURE — 3079F PR MOST RECENT DIASTOLIC BLOOD PRESSURE 80-89 MM HG: ICD-10-PCS | Mod: S$GLB,,, | Performed by: INTERNAL MEDICINE

## 2019-08-01 PROCEDURE — 3075F SYST BP GE 130 - 139MM HG: CPT | Mod: S$GLB,,, | Performed by: INTERNAL MEDICINE

## 2019-08-01 PROCEDURE — 3044F HG A1C LEVEL LT 7.0%: CPT | Mod: S$GLB,,, | Performed by: INTERNAL MEDICINE

## 2019-08-01 PROCEDURE — 1101F PR PT FALLS ASSESS DOC 0-1 FALLS W/OUT INJ PAST YR: ICD-10-PCS | Mod: S$GLB,,, | Performed by: INTERNAL MEDICINE

## 2019-08-01 PROCEDURE — 3075F PR MOST RECENT SYSTOLIC BLOOD PRESS GE 130-139MM HG: ICD-10-PCS | Mod: S$GLB,,, | Performed by: INTERNAL MEDICINE

## 2019-08-01 PROCEDURE — 3079F DIAST BP 80-89 MM HG: CPT | Mod: S$GLB,,, | Performed by: INTERNAL MEDICINE

## 2019-08-01 PROCEDURE — 96372 THER/PROPH/DIAG INJ SC/IM: CPT | Mod: S$GLB,,, | Performed by: INTERNAL MEDICINE

## 2019-08-01 PROCEDURE — 99999 PR PBB SHADOW E&M-EST. PATIENT-LVL IV: CPT | Mod: PBBFAC,,, | Performed by: INTERNAL MEDICINE

## 2019-08-01 PROCEDURE — 99999 PR PBB SHADOW E&M-EST. PATIENT-LVL IV: ICD-10-PCS | Mod: PBBFAC,,, | Performed by: INTERNAL MEDICINE

## 2019-08-01 RX ORDER — CYANOCOBALAMIN 1000 UG/ML
1000 INJECTION, SOLUTION INTRAMUSCULAR; SUBCUTANEOUS
Status: COMPLETED | OUTPATIENT
Start: 2019-08-01 | End: 2019-08-01

## 2019-08-01 RX ORDER — CYANOCOBALAMIN 1000 UG/ML
100 INJECTION, SOLUTION INTRAMUSCULAR; SUBCUTANEOUS ONCE
Status: DISCONTINUED | OUTPATIENT
Start: 2019-08-01 | End: 2019-08-01

## 2019-08-01 RX ADMIN — CYANOCOBALAMIN 1000 MCG: 1000 INJECTION, SOLUTION INTRAMUSCULAR; SUBCUTANEOUS at 04:08

## 2019-08-01 NOTE — LETTER
August 1, 2019        Oleksandr VALDOVINOS MD  [address] [facility] [address]             [facility] Family / Internal Medicine  [address] [place] [id number]  Phone: [phone]  Fax: [phone]   Patient: Preet West   MR Number: [id number]   YOB: 1950   Date of Visit: 8/1/2019     Dear Dr. Swan,     Patient is medically cleared for the proposed cataract surgery under monitored anesthesia care.  Patient has been advised to avoid taking diabetic medications on the day of surgery.  She should take her blood pressure medications as prescribed on the day of surgery with a sip of water.  I do not find need to do any new labs and please forward me any new labs if you consider so.    Sincerely,      Pal Fong MD            CC  No Recipients    Enclosure

## 2019-08-01 NOTE — PATIENT INSTRUCTIONS
Using a Blood Sugar Log    You have diabetes. This means your body has trouble regulating a sugar called glucose. To help manage your diabetes, youll need to check your blood sugar level as directed by your healthcare provider. Keeping a log of your blood sugar levels will help you track your blood sugar readings. Its a simple and easy way to see how well you are controlling your diabetes.  Checking your blood sugar level  You can check your blood sugar level with a blood glucose meter. Youll first prick the side of your finger with a tiny lancet to draw a tiny drop of blood onto the test strip. Some glucose meters let you use another place on your body to test. But these other places should not be used in some cases as they may be inaccurate. Follow the instructions for your glucose meter. And talk with your healthcare provider before doing the test on other places.  The strip goes into the meter first, then a drop of blood is placed on the tip of the strip. The meter then shows a reading that tells you the level of your blood sugar. Your readings should be in your target range as often as possible. This means not too high or too low. Staying in this range helps lower your risk for complications. Your healthcare provider will help you figure out the target range that is best for you.  Tracking your readings  Every time you check your blood sugar, use your log to keep track of your readings. Your meter will also probably have a memory feature that your healthcare provider can check at your next visit. You may be advised by your healthcare provider to check your blood sugar in the morning, at bedtime, and before and after meals. Be sure to write down all of your numbers. Also use your log to record things that might have affected your blood sugar. Some examples include being sick, certain medicines, being physically active, feeling stressed, or skipping meals.   Lessons learned from your readings  Tracking your  blood sugar readings helps you see patterns. These patterns tell you how your actions affect your blood sugar. For instance, you may have higher numbers after eating certain foods or lower numbers after exercise. They just help you understand how to stay in your target range more often, so that your diabetes remains in good control.  Sharing your log with your healthcare team  Bring your blood sugar log and glucose meter with you to all of your healthcare appointments. This can help your healthcare team make changes to your treatment plan, if needed. This may involve making changes in what you eat, what medicines you take, or how much you exercise.  To learn more  The resources below can help you learn more:  · American Diabetes Association 524-600-1528 www.diabetes.org  · [employer] 617-339-3664 www.lighthouse.org  · National [facility] [place] 307-215-9692 www.nei.nih.gov  · [employer] 398-576-2352 www.hormone.org  Date Last Reviewed: 5/1/2016  © 6021-0074 [employer]. [address] [place] [address]. All rights reserved. This information is not intended as a substitute for professional medical care. Always follow your healthcare professional's instructions.        Diabetes: Activity Tips    Being more active can help you manage your diabetes. The tips on this sheet can help you get the most from your exercise. They can also help you stay safe.  Staying Active  Its important for adults to spend less time sitting and being inactive. This is especially true if you have type 2 diabetes. When you are sitting for long periods of time, get up for short sessions of light activity every 30 minutes.  You should aim for at least 150 minutes a week of exercise or physical activity. Dont let more than 2 days go by without being active.  Benefit from briskness  Brisk activity gets your heart beating faster. This can help you increase your fitness, lose extra weight, and manage  your blood sugar level. Try brisk walking. Or, if you have foot or leg problems, you can try swimming or bike riding. You can break up your exercise into chunks throughout the day. Work up to at least 30 minutes of steady, brisk exercise on most days.  Warm up and cool down  Warming up and cooling down reduce your risk of injury. They also help limit muscle soreness. Do a mild version of your activity for 5 minutes before and after your routine. You can also learn stretches that will help keep your muscles loose. Your healthcare provider may show you good ways to warm up and stretch.  Do the talk-sing test  The talk-sing test is a simple way to tell how hard youre exercising. If you can talk while exercising, youre in a safe range. If youre out of breath, slow down. If you can carry a tune, its time to  the pace. Walk up a hill. Increase the resistance on your stationary bike. Or swim faster.  What about eating?  You may be told to plan your exercise for 1 to 2 hours after a meal. In most cases, you dont need to eat while being active. If you take insulin or medicine that can cause low blood sugar, test your blood sugar before exercising. And carry a fast-acting sugar that will raise your blood sugar level quickly. This includes glucose tablets or hard candy. Use it if you feel low blood sugar symptoms.  Safety tips  These tips can help you stay safe as you become fit:  · Exercise with a friend or carry a cell phone if you have one.  · Carry or wear identification, such as a necklace or bracelet, that says you have diabetes.  · Use the proper footwear and safety equipment for your activity.  · Drink water before, during, and after exercise.  · Dress properly for the weather.  · Dont exercise in very hot or very cold weather.  · Dont exercise if you are sick.  · If you are instructed to do so, test your blood sugar before and after you exercise. Have a small carbohydrate snack if your blood sugar is low  before you start exercising.   When to stop exercising and call your healthcare provider  Stop exercising and call your healthcare provider right away if you notice any of the following:  · Pain, pressure, tightness, or heaviness in the chest  · Pain or heaviness in the neck, shoulders, back, arms, legs, or feet  · Unusual shortness of breath  · Dizziness or lightheadedness  · Unusually rapid or slow pulse  · Increased joint or muscle pain  · Nausea or vomiting  Date Last Reviewed: 5/1/2016 © 2000-2017 [employer]. [address] [place] [address]. All rights reserved. This information is not intended as a substitute for professional medical care. Always follow your healthcare professional's instructions.

## 2019-08-01 NOTE — Clinical Note
Dear Dr Espino you consider doing a Pap smear for this patient of mine.  I have scheduled her in about 2 months with you for the same.  If any problems or issues let me know so that I may reassign her.Kind regardsS Hetal MEJIA

## 2019-08-01 NOTE — PROGRESS NOTES
Subjective:       Patient ID: Preet Perez is a 69 y.o. female.    Chief Complaint: Pre-op Exam (eye surgery); Hypertension; Hyperlipidemia; Diabetes; and Fatigue    Ms.Laxmi Christian perez is a 69-year-old [place] American female who needs cataract surgery for her left eye.  She recently had a cataract surgery for the right eye and the postop care was essentially unremarkable except for minor complications.  Surgeon is Dr. Swan.    Underlying medical issues of type 2 diabetes mellitus, hypertension and dyslipidemia have been noted. Blood sugars are mild-to-moderately elevated.  Patient is not a very astute  of her healthcare.    She also requests a premature injection of vitamin B12.  She has nonspecific fatigue and possibly due to some placebo effect she finds benefit and strength from getting injection B12.    Hypertension   This is a chronic problem. The current episode started more than 1 year ago. The problem is controlled (Borderline control). Pertinent negatives include no chest pain, headaches, neck pain, palpitations or shortness of breath. Risk factors for coronary artery disease include sedentary lifestyle. Past treatments include angiotensin blockers and diuretics. The current treatment provides moderate improvement. There is no history of heart failure. There is no history of coarctation of the aorta, hyperaldosteronism, pheochromocytoma or renovascular disease.   Hyperlipidemia   This is a chronic problem. The current episode started more than 1 year ago. She has no history of hypothyroidism, liver disease or obesity. Pertinent negatives include no chest pain or shortness of breath. Current antihyperlipidemic treatment includes statins. The current treatment provides moderate improvement of lipids. Compliance problems include psychosocial issues.  Risk factors for coronary artery disease include hypertension, obesity, dyslipidemia, diabetes mellitus, a sedentary lifestyle and  post-menopausal.   Diabetes   She presents for her follow-up diabetic visit. She has type 2 diabetes mellitus. Her disease course has been stable. Pertinent negatives for hypoglycemia include no confusion, dizziness, headaches, pallor, seizures or tremors. Associated symptoms include fatigue and visual change (Bilateral cataracts). Pertinent negatives for diabetes include no chest pain, no polydipsia, no polyphagia, no polyuria and no weight loss. Symptoms are stable. She is compliant with treatment most of the time. She rarely participates in exercise. Her home blood glucose trend is fluctuating minimally. An ACE inhibitor/angiotensin II receptor blocker is being taken. She does not see a podiatrist.Eye exam is current.   Fatigue   This is a chronic problem. The current episode started more than 1 year ago (History of colon cancer treated in past.). The problem occurs intermittently. The problem has been waxing and waning. Associated symptoms include fatigue and a visual change (Bilateral cataracts). Pertinent negatives include no abdominal pain, arthralgias, chest pain, chills, congestion, coughing, fever, headaches, joint swelling, neck pain or rash. The treatment provided moderate (Empirical treatment with vitamin B12.) relief.       Past Medical History:   Diagnosis Date    Cancer     Diabetes mellitus, type 2     H/O colon cancer, stage III 1/1/2013    Treated in [facility]    Malignant neoplasm of transverse colon (2013) - Stage III 4/4/2019    Osteoporosis     S/P laparoscopic colectomy (3/2013) 4/5/2019    Seasonal allergies      Social History     Socioeconomic History    Marital status:      Spouse name: Not on file    Number of children: 3    Years of education: Not on file    Highest education level: Not on file   Occupational History    Occupation:    Social Needs    Financial resource strain: Not on file    Food insecurity:     Worry: Not on file     Inability: Not on  file    Transportation needs:     Medical: Not on file     Non-medical: Not on file   Tobacco Use    Smoking status: Never Smoker    Smokeless tobacco: Never Used   Substance and Sexual Activity    Alcohol use: No    Drug use: No    Sexual activity: Not Currently   Lifestyle    Physical activity:     Days per week: Not on file     Minutes per session: Not on file    Stress: Not at all   Relationships    Social connections:     Talks on phone: Not on file     Gets together: Not on file     Attends [employer] service: Not on file     Active member of club or organization: Not on file     Attends meetings of clubs or organizations: Not on file     Relationship status: Not on file   Other Topics Concern    Not on file   Social History Narrative    Speaks Vianney only     Past Surgical History:   Procedure Laterality Date    COLON SURGERY      JOINT REPLACEMENT       Family History   Problem Relation Age of Onset    Stroke Mother     Heart disease Mother     Cancer Father         [place]       Review of Systems   Constitutional: Positive for fatigue. Negative for activity change, chills, fever, unexpected weight change and weight loss.   HENT: Negative for congestion, postnasal drip, sinus pressure and sneezing.    Eyes: Negative for pain, discharge and visual disturbance (Cataract).   Respiratory: Negative for cough, chest tightness and shortness of breath.    Cardiovascular: Negative for chest pain, palpitations and leg swelling.   Gastrointestinal: Negative for abdominal distention, abdominal pain, anal bleeding, constipation and diarrhea.   Endocrine: Negative for polydipsia, polyphagia and polyuria.        Type 2 diabetes mellitus on combination of metformin and Tradjenta. Also has osteoporosis.   Genitourinary: Negative for difficulty urinating, dysuria, flank pain, frequency and vaginal pain.   Musculoskeletal: Negative for arthralgias, back pain, joint swelling and neck pain.   Skin: Negative for  "color change, pallor and rash.   Allergic/Immunologic: Negative for environmental allergies, food allergies and immunocompromised state.   Neurological: Negative for dizziness, tremors, seizures, syncope, light-headedness and headaches.   Hematological: Negative for adenopathy. Does not bruise/bleed easily.   Psychiatric/Behavioral: Negative for agitation, confusion and dysphoric mood.        Somewhat anxious about her health issues and tends to over utilize. (Every ill needs a pill)         Objective:      Blood pressure 139/88, pulse 73, height 4' 9" (1.448 m), weight 63 kg (139 lb). Body mass index is 30.08 kg/m².  Physical Exam   Constitutional: She appears well-developed and well-nourished. She is cooperative. No distress.   HENT:   Head: Normocephalic and atraumatic.   Nose: No mucosal edema, sinus tenderness or nasal deformity. Right sinus exhibits no maxillary sinus tenderness and no frontal sinus tenderness. Left sinus exhibits no maxillary sinus tenderness and no frontal sinus tenderness.   Eyes: Pupils are equal, round, and reactive to light. Conjunctivae, EOM and lids are normal. Lids are everted and swept, no foreign bodies found. Right pupil is round and reactive. Left pupil is round and reactive.   Neck: Trachea normal and normal range of motion. Neck supple.   Cardiovascular: Normal rate, regular rhythm, S1 normal, S2 normal and normal heart sounds.   Pulmonary/Chest: Breath sounds normal.   Abdominal: Soft. Bowel sounds are normal. There is no rigidity and no guarding.   Musculoskeletal: She exhibits no edema, tenderness or deformity.        Right foot: There is normal range of motion and no deformity.        Left foot: There is normal range of motion and no deformity.   Feet:   Right Foot:   Protective Sensation: 4 sites tested. 4 sites sensed.   Skin Integrity: Negative for ulcer, blister or skin breakdown.   Left Foot:   Protective Sensation: 4 sites tested. 4 sites sensed.   Skin Integrity: " Negative for ulcer, blister or skin breakdown.   Lymphadenopathy:     She has no cervical adenopathy.     She has no axillary adenopathy.   Neurological: She is alert. Coordination normal.   Skin: Skin is warm and dry.   Psychiatric: Her behavior is normal.   Nursing note and vitals reviewed.        Assessment:       1. Preop general physical exam    2. Fatigue, unspecified type    3. Type 2 diabetes mellitus without complication, without long-term current use of insulin    4. Essential hypertension    5. Mixed hyperlipidemia           Office Visit on 06/12/2019   Component Date Value Ref Range Status    Glucose 06/12/2019 171* 70 - 99 mg/dL Final    BUN, Bld 06/12/2019 19  8 - 20 mg/dL Final    Creatinine 06/12/2019 0.95  0.60 - 1.40 mg/dL Final    Calcium 06/12/2019 8.6  7.7 - 10.4 mg/dL Final    Sodium 06/12/2019 133* 134 - 144 mmol/L Final    Potassium 06/12/2019 4.7  3.5 - 5.0 mmol/L Final    Chloride 06/12/2019 103  98 - 110 mmol/L Final    CO2 06/12/2019 22.7* 22.8 - 31.6 mmol/L Final    WBC 06/12/2019 7.2  5.0 - 10.0 K/uL Final    RBC 06/12/2019 3.69  3.50 - 5.50 M/uL Final    Hemoglobin 06/12/2019 10.3* 12.0 - 15.0 g/dL Final    Hematocrit 06/12/2019 33.2* 36.0 - 48.0 % Final    Mean Corpuscular Volume 06/12/2019 90.0  79.0 - 98.0 fL Final    Mean Corpuscular Hemoglobin 06/12/2019 27.9  25.0 - 35.0 pg Final    Mean Corpuscular Hemoglobin Conc 06/12/2019 31.0  31.0 - 36.0 g/dL Final    RDW 06/12/2019 13.2  11.7 - 14.9 % Final    Platelets 06/12/2019 214  140 - 440 K/uL Final    MPV 06/12/2019 11.0  8.8 - 12.7 fL Final    Gran% 06/12/2019 65.1  % Final    Lymph% 06/12/2019 23.8  % Final    Mono% 06/12/2019 7.3  % Final    Eosinophil% 06/12/2019 2.6  % Final    Basophil% 06/12/2019 0.6  % Final    Gran # (ANC) 06/12/2019 4.7  1.4 - 6.5 K/uL Final    Lymph # 06/12/2019 1.7  1.2 - 3.4 K/uL Final    Mono # 06/12/2019 0.5  0.1 - 0.6 K/uL Final    Eos # 06/12/2019 0.2  0.0 - 0.7 K/uL  Final    Baso # 06/12/2019 0.0  0.0 - 0.2 K/uL Final    Immature Grans (Abs) 06/12/2019 0.0  0.0 - 1.0 K/uL Final    Immature Granulocytes 06/12/2019 0.6  % Final    nRBC% 06/12/2019 0  % Final   Orders Only on 05/09/2019   Component Date Value Ref Range Status    Iron 05/09/2019 45  24 - 162 mcg/dL Final    TIBC 05/09/2019 299  177 - 435 mcg/dL Final    % Saturation 05/09/2019 15  % Final    Creatinine 05/09/2019 0.93  0.60 - 1.40 mg/dL Final    CEA 05/09/2019 2.4  ng/mL Final    Ferritin 05/09/2019 170* 24 - 162 ng/mL Final   Orders Only on 05/09/2019   Component Date Value Ref Range Status    ISTAT CREATININE 05/09/2019 1.00  0.60 - 1.40 mg/dL Final         Plan:           Preop general physical exam  Comments:  Patient is medically cleared for proposed cataract surgery under monitored anesthesia care.  Avoid taking diabetes medication on the day of surgery.    Fatigue, unspecified type  Comments:  Patient will get her injection vitamin B12.  Cause of fatigue is multifactorial.  Orders:  -     Discontinue: cyanocobalamin injection 100 mcg  -     cyanocobalamin injection 1,000 mcg    Type 2 diabetes mellitus without complication, without long-term current use of insulin  Comments:  Diabetes control is fair.  Avoid taking diabetes medication on the day of surgery.    Essential hypertension  Comments:  Blood pressures are borderline controlled.    Mixed hyperlipidemia     Patient is medically cleared for proposed cataract surgery left eye under anesthesia monitor care.  She should avoid taking diabetes medication on the day of surgery.  She should take her blood pressure medication with a sip of water.  After resumption of normal diet she can go back to her diabetic medication.    She has been given injection B12 as desired by her earlier.  Probably placebo effect from this medication helps her with fatigue.    After conversing with her daughter Ms January West on telephone, I could understand finally  that she requests a Pap smear and I will arrange this with Dr. Laura Estrada    - Diet and exercise education.  - Serial glucose monitoring  - Continue current therapy    Patient has been advised to watch diet and exercise. Avoid fried and fatty food. Compliance to medications and follow up urged.              Current Outpatient Medications:     alendronate (FOSAMAX) 70 MG tablet, TAKE 1 TABLET BY MOUTH EVERY 7 DAYS, Disp: 13 tablet, Rfl: 2    blood sugar diagnostic Strp, 1 strip by Misc.(Non-Drug; Combo Route) route once daily., Disp: 100 strip, Rfl: 2    blood-glucose meter kit, One touch meter and matching lancets and strips., Disp: 1 each, Rfl: 0    fluticasone (FLONASE) 50 mcg/actuation nasal spray, 1 spray (50 mcg total) by Each Nare route once daily., Disp: 1 Bottle, Rfl: 4    irbesartan-hydrochlorothiazide (AVALIDE) 300-12.5 mg per tablet, Take 1 tablet by mouth once daily., Disp: 90 tablet, Rfl: 1    lancets (LANCETS,ULTRA THIN) 26 gauge Misc, 1 lancet by Misc.(Non-Drug; Combo Route) route once daily., Disp: 100 each, Rfl: 0    metFORMIN (GLUCOPHAGE) 1000 MG tablet, TAKE 1 TABLET BY MOUTH TWICE DAILY WITH MEALS, Disp: 180 tablet, Rfl: 2    polyethylene glycol (GLYCOLAX) 17 gram/dose powder, Take 17 g by mouth once daily., Disp: 510 g, Rfl: 5    rosuvastatin (CRESTOR) 5 MG tablet, Take 1 tablet (5 mg total) by mouth every Mon, Wed, Fri., Disp: 36 tablet, Rfl: 3    TRADJENTA 5 mg Tab tablet, TAKE 1 TABLET BY MOUTH ONCE DAILY, Disp: 90 tablet, Rfl: 2    TRUE METRIX GLUCOSE TEST STRIP Strp, USE 1 STRIP TO CHECK GLUCOSE TWICE DAILY, Disp: 100 strip, Rfl: 0  No current facility-administered medications for this visit.

## 2019-08-09 ENCOUNTER — OFFICE VISIT (OUTPATIENT)
Dept: FAMILY MEDICINE | Facility: CLINIC | Age: 69
End: 2019-08-09
Payer: MEDICARE

## 2019-08-09 VITALS
DIASTOLIC BLOOD PRESSURE: 70 MMHG | SYSTOLIC BLOOD PRESSURE: 110 MMHG | HEIGHT: 57 IN | OXYGEN SATURATION: 99 % | WEIGHT: 136.38 LBS | TEMPERATURE: 98 F | HEART RATE: 88 BPM | RESPIRATION RATE: 18 BRPM | BODY MASS INDEX: 29.42 KG/M2

## 2019-08-09 DIAGNOSIS — J06.9 UPPER RESPIRATORY TRACT INFECTION, UNSPECIFIED TYPE: Primary | ICD-10-CM

## 2019-08-09 PROCEDURE — 3078F PR MOST RECENT DIASTOLIC BLOOD PRESSURE < 80 MM HG: ICD-10-PCS | Mod: S$GLB,,, | Performed by: FAMILY MEDICINE

## 2019-08-09 PROCEDURE — 3074F SYST BP LT 130 MM HG: CPT | Mod: S$GLB,,, | Performed by: FAMILY MEDICINE

## 2019-08-09 PROCEDURE — 3078F DIAST BP <80 MM HG: CPT | Mod: S$GLB,,, | Performed by: FAMILY MEDICINE

## 2019-08-09 PROCEDURE — 99213 OFFICE O/P EST LOW 20 MIN: CPT | Mod: S$GLB,,, | Performed by: FAMILY MEDICINE

## 2019-08-09 PROCEDURE — 99213 PR OFFICE/OUTPT VISIT, EST, LEVL III, 20-29 MIN: ICD-10-PCS | Mod: S$GLB,,, | Performed by: FAMILY MEDICINE

## 2019-08-09 PROCEDURE — 1101F PR PT FALLS ASSESS DOC 0-1 FALLS W/OUT INJ PAST YR: ICD-10-PCS | Mod: S$GLB,,, | Performed by: FAMILY MEDICINE

## 2019-08-09 PROCEDURE — 99999 PR PBB SHADOW E&M-EST. PATIENT-LVL IV: CPT | Mod: PBBFAC,,, | Performed by: FAMILY MEDICINE

## 2019-08-09 PROCEDURE — 1101F PT FALLS ASSESS-DOCD LE1/YR: CPT | Mod: S$GLB,,, | Performed by: FAMILY MEDICINE

## 2019-08-09 PROCEDURE — 3074F PR MOST RECENT SYSTOLIC BLOOD PRESSURE < 130 MM HG: ICD-10-PCS | Mod: S$GLB,,, | Performed by: FAMILY MEDICINE

## 2019-08-09 PROCEDURE — 99999 PR PBB SHADOW E&M-EST. PATIENT-LVL IV: ICD-10-PCS | Mod: PBBFAC,,, | Performed by: FAMILY MEDICINE

## 2019-08-09 RX ORDER — BENZONATATE 100 MG/1
100 CAPSULE ORAL 3 TIMES DAILY PRN
Qty: 21 CAPSULE | Refills: 0 | Status: SHIPPED | OUTPATIENT
Start: 2019-08-09 | End: 2019-08-19

## 2019-08-09 NOTE — PROGRESS NOTES
SUBJECTIVE:    Patient ID: Preet West is a 69 y.o. female.    Chief Complaint: Cough and Fever    70 yo female patient of Dr Fong pt presents today complaining of 2 days of runny nose and dry cough, pt denies fever any N/V or diarrhea. No sinus pain no purulent nasal discharge. Her  and  has accompanied her is also sick. She has only taken robitussen for her symptoms, she is afebrile in clinic    URI    This is a new problem. Episode onset: 2 days ago. The problem has been unchanged. There has been no fever. Associated symptoms include congestion, coughing and rhinorrhea. Pertinent negatives include no diarrhea, ear pain, headaches, nausea, sinus pain, sore throat, swollen glands, vomiting or wheezing. Treatments tried: guaifenasin. The treatment provided mild relief.       Past Medical History:   Diagnosis Date    Cancer     Diabetes mellitus, type 2     H/O colon cancer, stage III 1/1/2013    Treated in [facility]    Malignant neoplasm of transverse colon (2013) - Stage III 4/4/2019    Osteoporosis     S/P laparoscopic colectomy (3/2013) 4/5/2019    Seasonal allergies      Social History     Socioeconomic History    Marital status:      Spouse name: Luis West    Number of children: 3    Years of education: Not on file    Highest education level: Not on file   Occupational History    Occupation:    Social Needs    Financial resource strain: Not on file    Food insecurity:     Worry: Not on file     Inability: Not on file    Transportation needs:     Medical: Not on file     Non-medical: Not on file   Tobacco Use    Smoking status: Never Smoker    Smokeless tobacco: Never Used   Substance and Sexual Activity    Alcohol use: No    Drug use: No    Sexual activity: Not Currently   Lifestyle    Physical activity:     Days per week: Not on file     Minutes per session: Not on file    Stress: Not at all   Relationships    Social connections:      Talks on phone: Not on file     Gets together: Not on file     Attends [employer] service: Not on file     Active member of club or organization: Not on file     Attends meetings of clubs or organizations: Not on file     Relationship status: Not on file   Other Topics Concern    Not on file   Social History Narrative    Speaks Vianney only     Past Surgical History:   Procedure Laterality Date    COLON SURGERY      JOINT REPLACEMENT       Family History   Problem Relation Age of Onset    Stroke Mother     Heart disease Mother     Cancer Father         [place]     Current Outpatient Medications   Medication Sig Dispense Refill    alendronate (FOSAMAX) 70 MG tablet TAKE 1 TABLET BY MOUTH EVERY 7 DAYS 13 tablet 2    blood sugar diagnostic Strp 1 strip by Misc.(Non-Drug; Combo Route) route once daily. 100 strip 2    blood-glucose meter kit One touch meter and matching lancets and strips. 1 each 0    fluticasone (FLONASE) 50 mcg/actuation nasal spray 1 spray (50 mcg total) by Each Nare route once daily. 1 Bottle 4    lancets (LANCETS,ULTRA THIN) 26 gauge Misc 1 lancet by Misc.(Non-Drug; Combo Route) route once daily. 100 each 0    metFORMIN (GLUCOPHAGE) 1000 MG tablet TAKE 1 TABLET BY MOUTH TWICE DAILY WITH MEALS 180 tablet 2    polyethylene glycol (GLYCOLAX) 17 gram/dose powder Take 17 g by mouth once daily. 510 g 5    rosuvastatin (CRESTOR) 5 MG tablet Take 1 tablet (5 mg total) by mouth every Mon, Wed, Fri. 36 tablet 3    TRADJENTA 5 mg Tab tablet TAKE 1 TABLET BY MOUTH ONCE DAILY 90 tablet 2    TRUE METRIX GLUCOSE TEST STRIP Strp USE 1 STRIP TO CHECK GLUCOSE TWICE DAILY 100 strip 0    benzonatate (TESSALON) 100 MG capsule Take 1 capsule (100 mg total) by mouth 3 (three) times daily as needed for Cough. 21 capsule 0     No current facility-administered medications for this visit.      Review of patient's allergies indicates:   Allergen Reactions    Aspirin Itching       Review of Systems  "  Constitutional: Negative for activity change, diaphoresis and fatigue.   HENT: Positive for congestion and rhinorrhea. Negative for ear discharge, ear pain, facial swelling, sinus pressure, sinus pain and sore throat.    Respiratory: Positive for cough. Negative for wheezing.    Gastrointestinal: Negative for constipation, diarrhea, nausea and vomiting.   Neurological: Negative for headaches.          Blood pressure 110/70, pulse 88, temperature 98.2 °F (36.8 °C), temperature source Oral, resp. rate 18, height 4' 9" (1.448 m), weight 61.9 kg (136 lb 6.4 oz), SpO2 99 %. Body mass index is 29.52 kg/m².   Objective:      Physical Exam   Constitutional: She appears well-developed and well-nourished. No distress.   HENT:   Head: Normocephalic and atraumatic.   Right Ear: External ear normal.   Left Ear: External ear normal.   Eyes: Pupils are equal, round, and reactive to light. Conjunctivae and EOM are normal. Right eye exhibits no discharge. Left eye exhibits no discharge.   Cardiovascular: Normal rate, regular rhythm and normal heart sounds.   No murmur heard.  Pulmonary/Chest: Effort normal and breath sounds normal. No respiratory distress. She has no wheezes.   Skin: Skin is warm and dry. Capillary refill takes less than 2 seconds. No rash noted. She is not diaphoretic.   Vitals reviewed.          Assessment:       1. Upper respiratory tract infection, unspecified type         Plan:           Upper respiratory tract infection, unspecified type  -     benzonatate (TESSALON) 100 MG capsule; Take 1 capsule (100 mg total) by mouth 3 (three) times daily as needed for Cough.  Dispense: 21 capsule; Refill: 0    Pt currently afebrile not on any antipyretics, I suspect that her and her  both have a viral URI, will have pt use the corcidin HBP because of her hx of HTN, and continue using robitussin for the cough along with the tessalon, will have the drink plenty of water, use tylenol for pain or fever, and salt " water gargles if she develops a sore throat. She should return to clinic if her symptoms are not resolving in 5-7 days or if her symptoms worsen.

## 2019-09-05 ENCOUNTER — CLINICAL SUPPORT (OUTPATIENT)
Dept: FAMILY MEDICINE | Facility: CLINIC | Age: 69
End: 2019-09-05
Payer: MEDICARE

## 2019-09-05 DIAGNOSIS — E53.8 B12 DEFICIENCY: Primary | ICD-10-CM

## 2019-09-05 PROCEDURE — 96372 PR INJECTION,THERAP/PROPH/DIAG2ST, IM OR SUBCUT: ICD-10-PCS | Mod: S$GLB,,, | Performed by: INTERNAL MEDICINE

## 2019-09-05 PROCEDURE — 99999 PR PBB SHADOW E&M-EST. PATIENT-LVL I: ICD-10-PCS | Mod: PBBFAC,,,

## 2019-09-05 PROCEDURE — 96372 THER/PROPH/DIAG INJ SC/IM: CPT | Mod: S$GLB,,, | Performed by: INTERNAL MEDICINE

## 2019-09-05 PROCEDURE — 99999 PR PBB SHADOW E&M-EST. PATIENT-LVL I: CPT | Mod: PBBFAC,,,

## 2019-09-05 RX ORDER — CYANOCOBALAMIN 1000 UG/ML
100 INJECTION, SOLUTION INTRAMUSCULAR; SUBCUTANEOUS
Status: COMPLETED | OUTPATIENT
Start: 2019-09-05 | End: 2019-09-05

## 2019-09-05 RX ADMIN — CYANOCOBALAMIN 100 MCG: 1000 INJECTION, SOLUTION INTRAMUSCULAR; SUBCUTANEOUS at 01:09

## 2019-09-20 DIAGNOSIS — E11.9 TYPE 2 DIABETES MELLITUS WITHOUT COMPLICATION, WITHOUT LONG-TERM CURRENT USE OF INSULIN: ICD-10-CM

## 2019-09-20 RX ORDER — CALCIUM CITRATE/VITAMIN D3 200MG-6.25
TABLET ORAL
Qty: 100 STRIP | Refills: 0 | Status: SHIPPED | OUTPATIENT
Start: 2019-09-20 | End: 2019-11-20 | Stop reason: SDUPTHER

## 2019-09-24 NOTE — PROGRESS NOTES
Well Woman Pap Smear    Chief Complaint   Patient presents with    Gynecologic Exam     pap       69-year-old woman here for routine Pap.  She has no complaints today.  She reports her last Pap smear was done 4-5 years ago and was within normal limits.  She has no vaginal bleeding, discharge, pelvic pain.    Gynecologic Exam   The patient's pertinent negatives include no genital itching, genital lesions, genital odor, genital rash, missed menses, pelvic pain, vaginal bleeding or vaginal discharge. Pertinent negatives include no abdominal pain, anorexia, back pain, chills, discolored urine, dysuria, fever, frequency, hematuria, joint swelling, rash or urgency.       Patient denies significant menstrual problems., There is no history of significant gyn problems or procedures., Denies, dysuria, hematuria, urinary incontinence, vaginal discharge, abnormal vaginal bleeding, pelvic pain, dyspareunia, any genitourinary problems    Past medical, social and family history reviewed and there are no pertinent changes.       Current Outpatient Medications:     alendronate (FOSAMAX) 70 MG tablet, TAKE 1 TABLET BY MOUTH EVERY 7 DAYS, Disp: 13 tablet, Rfl: 2    blood-glucose meter kit, One touch meter and matching lancets and strips., Disp: 1 each, Rfl: 0    cyanocobalamin, vitamin B-12, (B-12 COMPLIANCE) 1,000 mcg/mL Kit, Inject as directed., Disp: , Rfl:     fluticasone (FLONASE) 50 mcg/actuation nasal spray, 1 spray (50 mcg total) by Each Nare route once daily., Disp: 1 Bottle, Rfl: 4    lancets (LANCETS,ULTRA THIN) 26 gauge Misc, 1 lancet by Misc.(Non-Drug; Combo Route) route once daily., Disp: 100 each, Rfl: 0    metFORMIN (GLUCOPHAGE) 1000 MG tablet, TAKE 1 TABLET BY MOUTH TWICE DAILY WITH MEALS, Disp: 180 tablet, Rfl: 2    rosuvastatin (CRESTOR) 5 MG tablet, Take 1 tablet (5 mg total) by mouth every Mon, Wed, Fri., Disp: 36 tablet, Rfl: 3    TRADJENTA 5 mg Tab tablet, TAKE 1 TABLET BY MOUTH ONCE  "DAILY, Disp: 90 tablet, Rfl: 2    TRUE METRIX GLUCOSE TEST STRIP Strp, USE 1 STRIP TO CHECK GLUCOSE TWICE DAILY, Disp: 100 strip, Rfl: 0    blood sugar diagnostic, disc Strp, blood sugar diagnostic strips, Disp: , Rfl:     polyethylene glycol (GLYCOLAX) 17 gram/dose powder, Take 17 g by mouth once daily., Disp: 510 g, Rfl: 5    Vitals:    10/01/19 0951   BP: 129/87   Pulse: 81   Resp: 16   Temp: 98.1 °F (36.7 °C)   TempSrc: Oral   SpO2: 99%   Weight: 62.6 kg (138 lb 1.6 oz)   Height: 4' 9" (1.448 m)       Physical Exam   Constitutional: She appears well-developed and well-nourished. No distress.   Cardiovascular: Normal rate, regular rhythm and normal heart sounds.   Pulmonary/Chest: Effort normal and breath sounds normal. No respiratory distress.   Genitourinary: Rectum normal and uterus normal. There is no rash, tenderness, lesion or injury on the right labia. There is no rash, tenderness, lesion or injury on the left labia. Cervix exhibits friability. Cervix exhibits no motion tenderness and no discharge. Right adnexum displays no mass, no tenderness and no fullness. Left adnexum displays no mass, no tenderness and no fullness. There is erythema in the vagina.       Asessment/Plan:  Iesha is a 69 y.o. female here for pap smear.     Problem List Items Addressed This Visit     None      Visit Diagnoses     Pap smear for cervical cancer screening    -  Primary    Relevant Orders    Pap Test - Chlamydia/Gonococcus/Trichomonas, MILAN & HPV          "

## 2019-10-01 ENCOUNTER — OFFICE VISIT (OUTPATIENT)
Dept: FAMILY MEDICINE | Facility: CLINIC | Age: 69
End: 2019-10-01
Payer: MEDICARE

## 2019-10-01 VITALS
RESPIRATION RATE: 16 BRPM | TEMPERATURE: 98 F | BODY MASS INDEX: 29.8 KG/M2 | HEART RATE: 81 BPM | SYSTOLIC BLOOD PRESSURE: 129 MMHG | DIASTOLIC BLOOD PRESSURE: 87 MMHG | WEIGHT: 138.13 LBS | HEIGHT: 57 IN | OXYGEN SATURATION: 99 %

## 2019-10-01 DIAGNOSIS — Z12.4 PAP SMEAR FOR CERVICAL CANCER SCREENING: Primary | ICD-10-CM

## 2019-10-01 PROCEDURE — 3079F PR MOST RECENT DIASTOLIC BLOOD PRESSURE 80-89 MM HG: ICD-10-PCS | Mod: S$GLB,,, | Performed by: INTERNAL MEDICINE

## 2019-10-01 PROCEDURE — 3074F SYST BP LT 130 MM HG: CPT | Mod: S$GLB,,, | Performed by: INTERNAL MEDICINE

## 2019-10-01 PROCEDURE — 1101F PR PT FALLS ASSESS DOC 0-1 FALLS W/OUT INJ PAST YR: ICD-10-PCS | Mod: S$GLB,,, | Performed by: INTERNAL MEDICINE

## 2019-10-01 PROCEDURE — 3079F DIAST BP 80-89 MM HG: CPT | Mod: S$GLB,,, | Performed by: INTERNAL MEDICINE

## 2019-10-01 PROCEDURE — 1101F PT FALLS ASSESS-DOCD LE1/YR: CPT | Mod: S$GLB,,, | Performed by: INTERNAL MEDICINE

## 2019-10-01 PROCEDURE — 99213 PR OFFICE/OUTPT VISIT, EST, LEVL III, 20-29 MIN: ICD-10-PCS | Mod: S$GLB,,, | Performed by: INTERNAL MEDICINE

## 2019-10-01 PROCEDURE — 3074F PR MOST RECENT SYSTOLIC BLOOD PRESSURE < 130 MM HG: ICD-10-PCS | Mod: S$GLB,,, | Performed by: INTERNAL MEDICINE

## 2019-10-01 PROCEDURE — 99213 OFFICE O/P EST LOW 20 MIN: CPT | Mod: S$GLB,,, | Performed by: INTERNAL MEDICINE

## 2019-10-03 ENCOUNTER — CLINICAL SUPPORT (OUTPATIENT)
Dept: FAMILY MEDICINE | Facility: CLINIC | Age: 69
End: 2019-10-03
Payer: MEDICARE

## 2019-10-03 DIAGNOSIS — E53.8 B12 DEFICIENCY: Primary | ICD-10-CM

## 2019-10-03 PROCEDURE — 96372 PR INJECTION,THERAP/PROPH/DIAG2ST, IM OR SUBCUT: ICD-10-PCS | Mod: S$GLB,,, | Performed by: INTERNAL MEDICINE

## 2019-10-03 PROCEDURE — 96372 THER/PROPH/DIAG INJ SC/IM: CPT | Mod: S$GLB,,, | Performed by: INTERNAL MEDICINE

## 2019-10-03 RX ORDER — CYANOCOBALAMIN 1000 UG/ML
100 INJECTION, SOLUTION INTRAMUSCULAR; SUBCUTANEOUS
Status: COMPLETED | OUTPATIENT
Start: 2019-10-03 | End: 2019-10-03

## 2019-10-03 RX ADMIN — CYANOCOBALAMIN 100 MCG: 1000 INJECTION, SOLUTION INTRAMUSCULAR; SUBCUTANEOUS at 01:10

## 2019-10-07 ENCOUNTER — TELEPHONE (OUTPATIENT)
Dept: FAMILY MEDICINE | Facility: CLINIC | Age: 69
End: 2019-10-07

## 2019-10-07 LAB
C TRACH RRNA CVX QL NAA+PROBE: NEGATIVE
CYTOLOGIST CVX/VAG CYTO: NORMAL
CYTOLOGY CVX/VAG DOC CYTO: NORMAL
DX ICD CODE: NORMAL
HPV I/H RISK 1 DNA CVX QL PROBE+SIG AMP: NEGATIVE
Lab: NORMAL
Lab: NORMAL
N GONORRHOEA RRNA CVX QL NAA+PROBE: NEGATIVE
OTHER STN SPEC: NORMAL
STAT OF ADQ CVX/VAG CYTO-IMP: NORMAL
T VAGINALIS RRNA SPEC QL NAA+PROBE: NEGATIVE

## 2019-10-07 NOTE — TELEPHONE ENCOUNTER
Called and spoke with patient's son re PAP results.  Verified he is on the communication consent.  He verbalized understanding.

## 2019-10-07 NOTE — TELEPHONE ENCOUNTER
----- Message from Laura Estrada MD sent at 10/7/2019 11:15 AM CDT -----  Please call patient with normal results.

## 2019-10-14 NOTE — PROGRESS NOTES
[facility]  PROGRESS NOTE - Follow-up Visit      Subjective:       Patient ID:   NAME: Preet West : 1950     69 y.o. female    Referring Doc: Hetal  Other Physicians: aaron    Chief Complaint:  Colon ca f/u    History of Present Illness:     Patient returns today for a regularly scheduled follow-up visit.  The patient is here today to go over the results of the recently ordered labs, tests and studies. She is here with her . She reports that she saw a GI specialist Dr Adkins and had scopes in May 2019 which they report were negative.      She denies any CP, SOB, HA's ir N/V. Her bowels are moving adequately.             ROS:   GEN: normal without any fever, night sweats or weight loss  HEENT: normal with no HA's, sore throat, stiff neck, changes in vision  CV: normal with no CP, SOB, PND, FISHER or orthopnea  PULM: normal with no SOB, cough, hemoptysis, sputum or pleuritic pain  GI: normal with no abdominal pain, nausea, vomiting, constipation, diarrhea, melanotic stools, BRBPR, or hematemesis  : normal with no hematuria, dysuria  BREAST: normal with no mass, discharge, pain  SKIN: normal with no rash, erythema, bruising, or swelling    Allergies:  Review of patient's allergies indicates:   Allergen Reactions    Aspirin Itching       Medications:    Current Outpatient Medications:     alendronate (FOSAMAX) 70 MG tablet, TAKE 1 TABLET BY MOUTH EVERY 7 DAYS, Disp: 13 tablet, Rfl: 2    blood sugar diagnostic, disc Strp, blood sugar diagnostic strips, Disp: , Rfl:     blood-glucose meter kit, One touch meter and matching lancets and strips., Disp: 1 each, Rfl: 0    fluticasone (FLONASE) 50 mcg/actuation nasal spray, 1 spray (50 mcg total) by Each Nare route once daily., Disp: 1 Bottle, Rfl: 4    lancets (LANCETS,ULTRA THIN) 26 gauge Misc, 1 lancet by Misc.(Non-Drug; Combo Route) route once daily., Disp: 100 each, Rfl: 0    metFORMIN (GLUCOPHAGE) 1000 MG tablet, TAKE 1 TABLET  BY MOUTH TWICE DAILY WITH MEALS, Disp: 180 tablet, Rfl: 2    polyethylene glycol (GLYCOLAX) 17 gram/dose powder, Take 17 g by mouth once daily., Disp: 510 g, Rfl: 5    rosuvastatin (CRESTOR) 5 MG tablet, Take 1 tablet (5 mg total) by mouth every Mon, Wed, Fri., Disp: 36 tablet, Rfl: 3    TRADJENTA 5 mg Tab tablet, TAKE 1 TABLET BY MOUTH ONCE DAILY, Disp: 90 tablet, Rfl: 2    TRUE METRIX GLUCOSE TEST STRIP Strp, USE 1 STRIP TO CHECK GLUCOSE TWICE DAILY, Disp: 100 strip, Rfl: 0    PMHx/PSHx Updates:  See patient's last visit with me on 4/5/2019.  See H&P on 5/12/2019        Pathology:  Cancer Staging    Colectomy -  3/12/2013:  - low grade adenocarcinoma with invasion of the pericolonic adipose tissue and johnny metastasis  - 5.3cm size tumor  - T3 N1a (1 out of 12 LN's were positive)  - histologic freatures of MSI present                Objective:     Vitals:  Blood pressure 130/79, pulse 77, temperature 97.8 °F (36.6 °C), resp. rate 18, weight 63.4 kg (139 lb 12.8 oz).    Physical Examination:   GEN: no apparent distress, comfortable; AAOx3  HEAD: atraumatic and normocephalic  EYES: no pallor, no icterus, PERRLA  ENT: OMM, no pharyngeal erythema, external ears WNL; no nasal discharge; no thrush  NECK: no masses, thyroid normal, trachea midline, no LAD/LN's, supple  CV: RRR with no murmur; normal pulse; normal S1 and S2; no pedal edema  CHEST: Normal respiratory effort; CTAB; normal breath sounds; no wheeze or crackles  ABDOM: nontender and nondistended; soft; normal bowel sounds; no rebound/guarding  MUSC/Skeletal:  prior right knee surgery; arthropathy  EXTREM: no clubbing, cyanosis, inflammation or swelling  SKIN: no rashes, lesions, ulcers, petechiae or subcutaneous nodules  : no rocha  NEURO: grossly intact; motor/sensory WNL; AAOx3; no tremors  PSYCH: normal mood, affect and behavior  LYMPH: normal cervical, supraclavicular, axillary and groin LN's            Labs:   6/12/2019  Lab Results   Component  Value Date    WBC 7.2 06/12/2019    HGB 10.3 (L) 06/12/2019    HCT 33.2 (L) 06/12/2019    MCV 90.0 06/12/2019     06/12/2019     BMP  Lab Results   Component Value Date     (L) 06/12/2019    K 4.7 06/12/2019     06/12/2019    CO2 22.7 (L) 06/12/2019    BUN 19 06/12/2019    CREATININE 0.95 06/12/2019    CALCIUM 8.6 06/12/2019 5/9/2019:  Lab Results   Component Value Date    IRON 45 05/09/2019    TIBC 299 05/09/2019    FERRITIN 170 (H) 05/09/2019       Lab Results   Component Value Date    CEA 2.4 05/09/2019           Radiology/Diagnostic Studies:    CT Abdom/pelvis: 5/9/2019  IMPRESSION:  Postsurgical changes of the mid transverse colon without evidence of recurrent  or metastatic disease    Prior hysterectomy        X-ray Chest Pa And Lateral    Result Date: 5/9/2019  MDI CHEST, 2 VIEWS XRAY Indication: Personal history of other malignant neoplasm of large intestine. Comparison: June 27, 2018 Findings: Cardiomediastinal silhouette is within normal limits. There is no confluent airspace disease. There is no pleural effusion or pneumothorax. No acute osseous abnormality.     IMPRESSION: No acute pulmonary process. Read and electronically signed by: Richard Frost MD on 5/9/2019 9:23 AM CDT RICHARD FROST MD      I have reviewed all available lab results and radiology reports.    Assessment/Plan:   (1) 69 y.o. female with diagnosis of colon cancer who has been referred by Pal Fong MD for evaluation by medical hematology/oncology.   - diagnosed in Jan 2013  - s/p transverse colon resection 3/12/2013 followed by adjuvant chemotherapy with FOLFOX for 6 months in [place], [place]  - T3 N1a - 1 LN was positive  - Dr Sofia West was her oncologist in [place]  - CEA latest was 2.4  - she had a follow-up colonoscopy in Apr 2017  - latest CXR was negative; - CT scans in may 2019 were stable  - she saw Dr Adkins with GI since last visit and had scopes in MAy 2019     (2) HTN and  hypercholesterolemia     (3) DM II     (4) Osteoporosis     (5) OA          VISIT DIAGNOSES:      Malignant neoplasm of transverse colon (2013) - Stage III    H/O colon cancer, stage III    Mild anemia    B12 deficiency    S/P laparoscopic colectomy (3/2013)          PLAN:  1. Check up to date labs incl. CEA  2. Encouraged compliance with referrals, studies and labs  3. F/u with GI as directed by them  4. F/u with PCP  RTC in 6 months  Fax note to   Pal Fong MD,  [place]    Discussion:       I spent over 25 mins of time with the patient. Reviewed results of the recently ordered labs, tests and studies; made directives with regards to the results. Over half of this time was spent couseling and coordinating care.    I have explained all of the above in detail and the patient understands all of the current recommendation(s). I have answered all of their questions to the best of my ability and to their complete satisfaction.   The patient is to continue with the current management plan.            Electronically signed by Clemente Mi MD

## 2019-10-15 ENCOUNTER — OFFICE VISIT (OUTPATIENT)
Dept: HEMATOLOGY/ONCOLOGY | Facility: CLINIC | Age: 69
End: 2019-10-15
Payer: MEDICARE

## 2019-10-15 VITALS
HEART RATE: 77 BPM | WEIGHT: 139.81 LBS | TEMPERATURE: 98 F | RESPIRATION RATE: 18 BRPM | BODY MASS INDEX: 30.25 KG/M2 | DIASTOLIC BLOOD PRESSURE: 79 MMHG | SYSTOLIC BLOOD PRESSURE: 130 MMHG

## 2019-10-15 DIAGNOSIS — D64.9 MILD ANEMIA: ICD-10-CM

## 2019-10-15 DIAGNOSIS — C18.4 MALIGNANT NEOPLASM OF TRANSVERSE COLON: Primary | ICD-10-CM

## 2019-10-15 DIAGNOSIS — Z85.038 H/O COLON CANCER, STAGE III: ICD-10-CM

## 2019-10-15 DIAGNOSIS — Z90.49 S/P LAPAROSCOPIC COLECTOMY: ICD-10-CM

## 2019-10-15 DIAGNOSIS — E53.8 B12 DEFICIENCY: ICD-10-CM

## 2019-10-15 PROCEDURE — 3075F SYST BP GE 130 - 139MM HG: CPT | Mod: S$GLB,,, | Performed by: INTERNAL MEDICINE

## 2019-10-15 PROCEDURE — 1101F PT FALLS ASSESS-DOCD LE1/YR: CPT | Mod: S$GLB,,, | Performed by: INTERNAL MEDICINE

## 2019-10-15 PROCEDURE — 99214 PR OFFICE/OUTPT VISIT, EST, LEVL IV, 30-39 MIN: ICD-10-PCS | Mod: S$GLB,,, | Performed by: INTERNAL MEDICINE

## 2019-10-15 PROCEDURE — 3078F DIAST BP <80 MM HG: CPT | Mod: S$GLB,,, | Performed by: INTERNAL MEDICINE

## 2019-10-15 PROCEDURE — 3075F PR MOST RECENT SYSTOLIC BLOOD PRESS GE 130-139MM HG: ICD-10-PCS | Mod: S$GLB,,, | Performed by: INTERNAL MEDICINE

## 2019-10-15 PROCEDURE — 99214 OFFICE O/P EST MOD 30 MIN: CPT | Mod: S$GLB,,, | Performed by: INTERNAL MEDICINE

## 2019-10-15 PROCEDURE — 3078F PR MOST RECENT DIASTOLIC BLOOD PRESSURE < 80 MM HG: ICD-10-PCS | Mod: S$GLB,,, | Performed by: INTERNAL MEDICINE

## 2019-10-15 PROCEDURE — 1101F PR PT FALLS ASSESS DOC 0-1 FALLS W/OUT INJ PAST YR: ICD-10-PCS | Mod: S$GLB,,, | Performed by: INTERNAL MEDICINE

## 2019-11-04 ENCOUNTER — CLINICAL SUPPORT (OUTPATIENT)
Dept: FAMILY MEDICINE | Facility: CLINIC | Age: 69
End: 2019-11-04
Payer: MEDICARE

## 2019-11-04 DIAGNOSIS — E53.8 B12 DEFICIENCY: Primary | ICD-10-CM

## 2019-11-04 PROCEDURE — 96372 THER/PROPH/DIAG INJ SC/IM: CPT | Mod: S$GLB,,, | Performed by: INTERNAL MEDICINE

## 2019-11-04 PROCEDURE — 96372 PR INJECTION,THERAP/PROPH/DIAG2ST, IM OR SUBCUT: ICD-10-PCS | Mod: S$GLB,,, | Performed by: INTERNAL MEDICINE

## 2019-11-04 RX ORDER — CYANOCOBALAMIN 1000 UG/ML
1000 INJECTION, SOLUTION INTRAMUSCULAR; SUBCUTANEOUS
Status: COMPLETED | OUTPATIENT
Start: 2019-11-04 | End: 2019-11-04

## 2019-11-04 RX ADMIN — CYANOCOBALAMIN 1000 MCG: 1000 INJECTION, SOLUTION INTRAMUSCULAR; SUBCUTANEOUS at 03:11

## 2019-11-09 DIAGNOSIS — I10 ESSENTIAL HYPERTENSION: ICD-10-CM

## 2019-11-10 RX ORDER — IRBESARTAN AND HYDROCHLOROTHIAZIDE 300; 12.5 MG/1; MG/1
1 TABLET, FILM COATED ORAL DAILY
Qty: 90 TABLET | Refills: 1 | Status: SHIPPED | OUTPATIENT
Start: 2019-11-10 | End: 2019-11-20 | Stop reason: SDUPTHER

## 2019-11-11 ENCOUNTER — LAB VISIT (OUTPATIENT)
Dept: LAB | Facility: HOSPITAL | Age: 69
End: 2019-11-11
Attending: INTERNAL MEDICINE
Payer: MEDICARE

## 2019-11-11 DIAGNOSIS — E11.9 DIABETES MELLITUS WITHOUT COMPLICATION: ICD-10-CM

## 2019-11-11 DIAGNOSIS — Z11.59 SCREENING EXAMINATION FOR POLIOMYELITIS: ICD-10-CM

## 2019-11-11 DIAGNOSIS — E78.5 HYPERLIPEMIA: Primary | ICD-10-CM

## 2019-11-11 DIAGNOSIS — E78.5 HYPERLIPEMIA: ICD-10-CM

## 2019-11-11 LAB
ALT SERPL W/O P-5'-P-CCNC: 19 U/L (ref 10–44)
ANION GAP SERPL CALC-SCNC: 6 MMOL/L (ref 8–16)
BUN SERPL-MCNC: 15 MG/DL (ref 8–23)
CALCIUM SERPL-MCNC: 8.9 MG/DL (ref 8.7–10.5)
CHLORIDE SERPL-SCNC: 106 MMOL/L (ref 95–110)
CHOLEST SERPL-MCNC: 112 MG/DL (ref 120–199)
CHOLEST/HDLC SERPL: 2.5 {RATIO} (ref 2–5)
CO2 SERPL-SCNC: 25 MMOL/L (ref 23–29)
CREAT SERPL-MCNC: 1 MG/DL (ref 0.5–1.4)
EST. GFR  (AFRICAN AMERICAN): >60 ML/MIN/1.73 M^2
EST. GFR  (NON AFRICAN AMERICAN): 58.4 ML/MIN/1.73 M^2
ESTIMATED AVG GLUCOSE: 169 MG/DL (ref 68–131)
GLUCOSE SERPL-MCNC: 181 MG/DL (ref 70–110)
HBA1C MFR BLD HPLC: 7.5 % (ref 4.5–6.2)
HDLC SERPL-MCNC: 45 MG/DL (ref 40–75)
HDLC SERPL: 40.2 % (ref 20–50)
LDLC SERPL CALC-MCNC: 45.8 MG/DL (ref 63–159)
NONHDLC SERPL-MCNC: 67 MG/DL
POTASSIUM SERPL-SCNC: 4.7 MMOL/L (ref 3.5–5.1)
SODIUM SERPL-SCNC: 137 MMOL/L (ref 136–145)
TRIGL SERPL-MCNC: 106 MG/DL (ref 30–150)

## 2019-11-11 PROCEDURE — 36415 COLL VENOUS BLD VENIPUNCTURE: CPT

## 2019-11-11 PROCEDURE — 80061 LIPID PANEL: CPT

## 2019-11-11 PROCEDURE — 83036 HEMOGLOBIN GLYCOSYLATED A1C: CPT

## 2019-11-11 PROCEDURE — 80048 BASIC METABOLIC PNL TOTAL CA: CPT

## 2019-11-11 PROCEDURE — 86803 HEPATITIS C AB TEST: CPT

## 2019-11-11 PROCEDURE — 84460 ALANINE AMINO (ALT) (SGPT): CPT

## 2019-11-12 NOTE — PROGRESS NOTES
Please review your blood test for hemoglobin A1c which is now 7.5 indicated high blood sugars.  Will discuss at follow-up.    Dr. Hetal MEJIA

## 2019-11-13 LAB — HCV AB S/CO SERPL IA: <0.1 S/CO RATIO (ref 0–0.9)

## 2019-11-20 ENCOUNTER — OFFICE VISIT (OUTPATIENT)
Dept: FAMILY MEDICINE | Facility: CLINIC | Age: 69
End: 2019-11-20
Payer: MEDICARE

## 2019-11-20 VITALS
WEIGHT: 139 LBS | HEART RATE: 80 BPM | DIASTOLIC BLOOD PRESSURE: 73 MMHG | HEIGHT: 57 IN | SYSTOLIC BLOOD PRESSURE: 125 MMHG | BODY MASS INDEX: 29.99 KG/M2

## 2019-11-20 DIAGNOSIS — E11.9 TYPE 2 DIABETES MELLITUS WITHOUT COMPLICATION, WITHOUT LONG-TERM CURRENT USE OF INSULIN: Primary | Chronic | ICD-10-CM

## 2019-11-20 DIAGNOSIS — K21.9 GASTROESOPHAGEAL REFLUX DISEASE WITHOUT ESOPHAGITIS: Chronic | ICD-10-CM

## 2019-11-20 DIAGNOSIS — R10.13 EPIGASTRIC PAIN: ICD-10-CM

## 2019-11-20 DIAGNOSIS — M81.6 LOCALIZED OSTEOPOROSIS WITHOUT CURRENT PATHOLOGICAL FRACTURE: Chronic | ICD-10-CM

## 2019-11-20 DIAGNOSIS — I10 ESSENTIAL HYPERTENSION: Chronic | ICD-10-CM

## 2019-11-20 PROCEDURE — 3078F PR MOST RECENT DIASTOLIC BLOOD PRESSURE < 80 MM HG: ICD-10-PCS | Mod: S$GLB,,, | Performed by: INTERNAL MEDICINE

## 2019-11-20 PROCEDURE — 1126F AMNT PAIN NOTED NONE PRSNT: CPT | Mod: S$GLB,,, | Performed by: INTERNAL MEDICINE

## 2019-11-20 PROCEDURE — 1101F PT FALLS ASSESS-DOCD LE1/YR: CPT | Mod: S$GLB,,, | Performed by: INTERNAL MEDICINE

## 2019-11-20 PROCEDURE — 99214 OFFICE O/P EST MOD 30 MIN: CPT | Mod: S$GLB,,, | Performed by: INTERNAL MEDICINE

## 2019-11-20 PROCEDURE — 3074F SYST BP LT 130 MM HG: CPT | Mod: S$GLB,,, | Performed by: INTERNAL MEDICINE

## 2019-11-20 PROCEDURE — 1159F MED LIST DOCD IN RCRD: CPT | Mod: S$GLB,,, | Performed by: INTERNAL MEDICINE

## 2019-11-20 PROCEDURE — 3078F DIAST BP <80 MM HG: CPT | Mod: S$GLB,,, | Performed by: INTERNAL MEDICINE

## 2019-11-20 PROCEDURE — 1101F PR PT FALLS ASSESS DOC 0-1 FALLS W/OUT INJ PAST YR: ICD-10-PCS | Mod: S$GLB,,, | Performed by: INTERNAL MEDICINE

## 2019-11-20 PROCEDURE — 1126F PR PAIN SEVERITY QUANTIFIED, NO PAIN PRESENT: ICD-10-PCS | Mod: S$GLB,,, | Performed by: INTERNAL MEDICINE

## 2019-11-20 PROCEDURE — 3074F PR MOST RECENT SYSTOLIC BLOOD PRESSURE < 130 MM HG: ICD-10-PCS | Mod: S$GLB,,, | Performed by: INTERNAL MEDICINE

## 2019-11-20 PROCEDURE — 1159F PR MEDICATION LIST DOCUMENTED IN MEDICAL RECORD: ICD-10-PCS | Mod: S$GLB,,, | Performed by: INTERNAL MEDICINE

## 2019-11-20 PROCEDURE — 99214 PR OFFICE/OUTPT VISIT, EST, LEVL IV, 30-39 MIN: ICD-10-PCS | Mod: S$GLB,,, | Performed by: INTERNAL MEDICINE

## 2019-11-20 RX ORDER — IRBESARTAN AND HYDROCHLOROTHIAZIDE 300; 12.5 MG/1; MG/1
1 TABLET, FILM COATED ORAL DAILY
Qty: 90 TABLET | Refills: 3 | Status: SHIPPED | OUTPATIENT
Start: 2019-11-20 | End: 2020-07-28

## 2019-11-20 RX ORDER — ALENDRONATE SODIUM 70 MG/1
70 TABLET ORAL
Qty: 13 TABLET | Refills: 2 | Status: SHIPPED | OUTPATIENT
Start: 2019-11-20 | End: 2020-10-11

## 2019-11-20 RX ORDER — PANTOPRAZOLE SODIUM 40 MG/1
40 TABLET, DELAYED RELEASE ORAL DAILY
Qty: 90 TABLET | Refills: 2 | Status: SHIPPED | OUTPATIENT
Start: 2019-11-20 | End: 2022-09-20 | Stop reason: SDUPTHER

## 2019-11-20 RX ORDER — METFORMIN HYDROCHLORIDE 1000 MG/1
1000 TABLET ORAL 2 TIMES DAILY WITH MEALS
Qty: 180 TABLET | Refills: 2 | Status: SHIPPED | OUTPATIENT
Start: 2019-11-20 | End: 2020-01-20

## 2019-11-20 NOTE — PROGRESS NOTES
Subjective:       Patient ID: Preet Perez is a 69 y.o. female.    Chief Complaint: Diabetes (lab review ); Hypertension; Hyperlipidemia; and Fatigue    Ms.Laxmi Christian perez is a 69-year-old   female who comes for follow-up.  Underlying medical issues of hypertension, type 2 diabetes mellitus, dyslipidemia, osteoporosis, chronic fatigue assumed to be secondary to vitamin B12 deficiency have been noted. She plans to go to [facility] for a 3 month trip next week and.  She has been reporting increasing reflux symptoms and some vague epigastric and mid back pain. No nausea or vomiting.  No blood loss.  She was on Zantac thus far which has been recalled and she now wants a prescription medication for reflux as well as gas.    Patient's communication is somewhat sub par.  Is unclear if medications like metformin is causing her some bloating sensation.  But she has been taking metformin for several years.    She also takes vitamin B12 injection as mentioned before and next week  is the last prescription.    Diabetes   She presents for her follow-up diabetic visit. She has type 2 diabetes mellitus. Her disease course has been stable. Pertinent negatives for hypoglycemia include no confusion, dizziness, headaches, pallor, seizures or tremors. Associated symptoms include fatigue and visual change (Bilateral cataracts). Pertinent negatives for diabetes include no chest pain, no polydipsia, no polyphagia, no polyuria and no weight loss. Symptoms are stable. She is compliant with treatment most of the time. She rarely participates in exercise. Her home blood glucose trend is fluctuating minimally. An ACE inhibitor/angiotensin II receptor blocker is being taken. She does not see a podiatrist.Eye exam is current.   Hypertension   This is a chronic problem. The current episode started more than 1 year ago. The problem is controlled (Borderline control). Pertinent negatives include no chest pain, headaches, neck pain,  palpitations or shortness of breath. Risk factors for coronary artery disease include sedentary lifestyle. Past treatments include angiotensin blockers and diuretics. The current treatment provides moderate improvement. There is no history of heart failure. There is no history of coarctation of the aorta, hyperaldosteronism, pheochromocytoma or renovascular disease.   Hyperlipidemia   This is a chronic problem. The current episode started more than 1 year ago. She has no history of hypothyroidism, liver disease or obesity. Pertinent negatives include no chest pain or shortness of breath. Current antihyperlipidemic treatment includes statins. The current treatment provides moderate improvement of lipids. Compliance problems include psychosocial issues.  Risk factors for coronary artery disease include hypertension, obesity, dyslipidemia, diabetes mellitus, a sedentary lifestyle and post-menopausal.   Fatigue   This is a chronic problem. The current episode started more than 1 year ago (History of colon cancer treated in past.). The problem occurs intermittently. The problem has been waxing and waning. Associated symptoms include abdominal pain, fatigue and a visual change (Bilateral cataracts). Pertinent negatives include no arthralgias, chest pain, chills, congestion, coughing, fever, headaches, joint swelling, neck pain or rash. The treatment provided moderate (Empirical treatment with vitamin B12.) relief.       Past Medical History:   Diagnosis Date    Allergy     aspirin itching    Cancer     Diabetes mellitus, type 2     H/O colon cancer, stage III 1/1/2013    T[facility] in [facility]    Malignant neoplasm of transverse colon (2013) - Stage III 4/4/2019    Osteoporosis     S/P laparoscopic colectomy (3/2013) 4/5/2019    Seasonal allergies      Social History     Socioeconomic History    Marital status:      Spouse name: Luis West    Number of children: 3    Years of education: Not on file     Highest education level: Not on file   Occupational History    Occupation:    Social Needs    Financial resource strain: Not on file    Food insecurity:     Worry: Not on file     Inability: Not on file    Transportation needs:     Medical: Not on file     Non-medical: Not on file   Tobacco Use    Smoking status: Never Smoker    Smokeless tobacco: Never Used   Substance and Sexual Activity    Alcohol use: No    Drug use: No    Sexual activity: Not Currently   Lifestyle    Physical activity:     Days per week: Not on file     Minutes per session: Not on file    Stress: Not at all   Relationships    Social connections:     Talks on phone: Not on file     Gets together: Not on file     Attends Worship service: Not on file     Active member of club or organization: Not on file     Attends meetings of clubs or organizations: Not on file     Relationship status: Not on file   Other Topics Concern    Not on file   Social History Narrative    Speaks [place] only     Past Surgical History:   Procedure Laterality Date    COLON SURGERY      JOINT REPLACEMENT       Family History   Problem Relation Age of Onset    Stroke Mother     Heart disease Mother     Cancer Father         [place]       Review of Systems   Constitutional: Positive for fatigue. Negative for activity change, chills, fever, unexpected weight change (gained 1 lb) and weight loss.   HENT: Negative for congestion, postnasal drip, sinus pressure and sneezing.    Eyes: Negative for pain, discharge and visual disturbance (Cataract).   Respiratory: Negative for cough, chest tightness and shortness of breath.    Cardiovascular: Negative for chest pain, palpitations and leg swelling.   Gastrointestinal: Positive for abdominal pain. Negative for abdominal distention, anal bleeding, constipation and diarrhea.        GERD off Zantac   Endocrine: Negative for polydipsia, polyphagia and polyuria.        Type 2 diabetes mellitus on  "combination of metformin and Tradjenta. Also has osteoporosis.    Low sugar and eats extra food at night   Genitourinary: Negative for difficulty urinating, dysuria, flank pain, frequency and vaginal pain.   Musculoskeletal: Positive for back pain. Negative for arthralgias, joint swelling and neck pain.   Skin: Negative for color change, pallor and rash.   Allergic/Immunologic: Negative for environmental allergies, food allergies and immunocompromised state.   Neurological: Negative for dizziness, tremors, seizures, syncope, light-headedness and headaches.   Hematological: Negative for adenopathy. Does not bruise/bleed easily.   Psychiatric/Behavioral: Negative for agitation, confusion and dysphoric mood.        Somewhat anxious about her health issues and tends to over utilize. (Every ill needs a pill)         Objective:      Blood pressure 125/73, pulse 80, height 4' 9" (1.448 m), weight 63 kg (139 lb). Body mass index is 30.08 kg/m².  Physical Exam   Constitutional: She appears well-developed and well-nourished. She is cooperative. No distress.   HENT:   Head: Normocephalic and atraumatic.   Nose: No mucosal edema, sinus tenderness or nasal deformity. Right sinus exhibits no maxillary sinus tenderness and no frontal sinus tenderness. Left sinus exhibits no maxillary sinus tenderness and no frontal sinus tenderness.   Eyes: Pupils are equal, round, and reactive to light. Conjunctivae, EOM and lids are normal. Lids are everted and swept, no foreign bodies found. Right pupil is round and reactive. Left pupil is round and reactive.   Neck: Trachea normal and normal range of motion. Neck supple.   Cardiovascular: Normal rate, regular rhythm, S1 normal, S2 normal and normal heart sounds.   Pulmonary/Chest: Breath sounds normal.   Abdominal: Soft. Bowel sounds are normal. There is no rigidity and no guarding.   Musculoskeletal: She exhibits no edema, tenderness or deformity.        Right foot: There is normal range of " motion and no deformity.        Left foot: There is normal range of motion and no deformity.   Feet:   Right Foot:   Protective Sensation: 4 sites tested. 4 sites sensed.   Skin Integrity: Negative for ulcer, blister or skin breakdown.   Left Foot:   Protective Sensation: 4 sites tested. 4 sites sensed.   Skin Integrity: Negative for ulcer, blister or skin breakdown.   Lymphadenopathy:     She has no cervical adenopathy.     She has no axillary adenopathy.   Neurological: She is alert. Coordination normal.   Skin: Skin is warm and dry.   Psychiatric: Her behavior is normal.   Nursing note and vitals reviewed.        Assessment:       1. Type 2 diabetes mellitus without complication, without long-term current use of insulin    2. Localized osteoporosis without current pathological fracture    3. Essential hypertension    4. Gastroesophageal reflux disease without esophagitis    5. Epigastric pain           Lab Visit on 11/11/2019   Component Date Value Ref Range Status    Hepatitis C Ab 11/11/2019 <0.1  0.0 - 0.9 s/co ratio Final    Cholesterol 11/11/2019 112* 120 - 199 mg/dL Final    Triglycerides 11/11/2019 106  30 - 150 mg/dL Final    HDL 11/11/2019 45  40 - 75 mg/dL Final    LDL Cholesterol 11/11/2019 45.8* 63.0 - 159.0 mg/dL Final    Hdl/Cholesterol Ratio 11/11/2019 40.2  20.0 - 50.0 % Final    Total Cholesterol/HDL Ratio 11/11/2019 2.5  2.0 - 5.0 Final    Non-HDL Cholesterol 11/11/2019 67  mg/dL Final    Sodium 11/11/2019 137  136 - 145 mmol/L Final    Potassium 11/11/2019 4.7  3.5 - 5.1 mmol/L Final    Chloride 11/11/2019 106  95 - 110 mmol/L Final    CO2 11/11/2019 25  23 - 29 mmol/L Final    Glucose 11/11/2019 181* 70 - 110 mg/dL Final    BUN, Bld 11/11/2019 15  8 - 23 mg/dL Final    Creatinine 11/11/2019 1.0  0.5 - 1.4 mg/dL Final    Calcium 11/11/2019 8.9  8.7 - 10.5 mg/dL Final    Anion Gap 11/11/2019 6* 8 - 16 mmol/L Final    eGFR if African American 11/11/2019 >60.0  >60 mL/min/1.73  m^2 Final    eGFR if non African American 11/11/2019 58.4* >60 mL/min/1.73 m^2 Final    Hemoglobin A1C 11/11/2019 7.5* 4.5 - 6.2 % Final    Estimated Avg Glucose 11/11/2019 169* 68 - 131 mg/dL Final    ALT 11/11/2019 19  10 - 44 U/L Final   Office Visit on 10/01/2019   Component Date Value Ref Range Status    DIAGNOSIS: 10/01/2019 Comment   Final    Specimen adequacy: 10/01/2019 Comment   Final    Clinician Provided ICD10 10/01/2019 Comment   Final    Performed by: 10/01/2019 Comment   Final    QC reviewed by: 10/01/2019 Comment   Final    . 10/01/2019 .   Final    Note: 10/01/2019 Comment   Final    HPV, high-risk 10/01/2019 Negative  Negative Final    Chlamydia, Nuc. Acid Amp 10/01/2019 Negative  Negative Final    Gonococcus, Nuc. Acid Amp 10/01/2019 Negative  Negative Final    Trich vag by MILAN 10/01/2019 Negative  Negative Final         Plan:           Type 2 diabetes mellitus without complication, without long-term current use of insulin  -     blood sugar diagnostic (TRUE METRIX GLUCOSE TEST STRIP) Strp; USE 1 STRIP TO CHECK GLUCOSE TWICE DAILY  Dispense: 200 strip; Refill: 3  -     metFORMIN (GLUCOPHAGE) 1000 MG tablet; Take 1 tablet (1,000 mg total) by mouth 2 (two) times daily with meals.  Dispense: 180 tablet; Refill: 2  -     linaGLIPtin (TRADJENTA) 5 mg Tab tablet; Take 1 tablet (5 mg total) by mouth once daily.  Dispense: 90 tablet; Refill: 2    Localized osteoporosis without current pathological fracture  -     alendronate (FOSAMAX) 70 MG tablet; Take 1 tablet (70 mg total) by mouth every 7 days.  Dispense: 13 tablet; Refill: 2    Essential hypertension  -     irbesartan-hydrochlorothiazide (AVALIDE) 300-12.5 mg per tablet; Take 1 tablet by mouth once daily.  Dispense: 90 tablet; Refill: 3    Gastroesophageal reflux disease without esophagitis  -     pantoprazole (PROTONIX) 40 MG tablet; Take 1 tablet (40 mg total) by mouth once daily.  Dispense: 90 tablet; Refill: 2    Epigastric pain  -      pantoprazole (PROTONIX) 40 MG tablet; Take 1 tablet (40 mg total) by mouth once daily.  Dispense: 90 tablet; Refill: 2     Patient's medical conditions have been reviewed again.  Medications have been refilled accordingly.    Communication is not completely adequate and the nuances of her medical issues are somewhat still unclear.  Her  is the .    I am concerned about her epigastric discomfort with radiation to the mid back.  She has taken Zantac for quite some time and I am not sure if she really found relief or not.  Then tacked was discontinued secondary to recall.  In that case perhaps she could have taken Pepcid and I am not sure why she is asking me for a prescription strength medication and other medication for gas when the gas medications at typically over-the-counter.    I do suspect that her understanding of the local medical system in [facility] is somewhat patchy and historically she has believed that every ill has a definite pill.    Unfortunately she is making a 3 month trip to [facility] at this point by next week and it is difficult to consider a deeper  workup for conditions like pancreatitis versus hepatobiliary disease or any other intra-abdominal pathology within 1 week prior to departure.    She continues on injection vitamin B12 for fatigue symptoms which does help her.  Next week is the last injection and I will leave it to her if he wants to continue or stop and see how she does.  She usually comes to the office to get the injection.  She or her  or family members do not feel comfortable giving the injection.    I have advised the  to get a checked in [place] in case her symptoms persist.    Follow-up in 3 months or earlier.    Spent charmaine 25 minutes with patient which involved review of pts medical conditions, labs, medications and with 50% of time face-to-face discussion about medical problems, management and any applicable  changes.            Current Outpatient Medications:     alendronate (FOSAMAX) 70 MG tablet, Take 1 tablet (70 mg total) by mouth every 7 days., Disp: 13 tablet, Rfl: 2    blood-glucose meter kit, One touch meter and matching lancets and strips., Disp: 1 each, Rfl: 0    fluticasone (FLONASE) 50 mcg/actuation nasal spray, 1 spray (50 mcg total) by Each Nare route once daily., Disp: 1 Bottle, Rfl: 4    irbesartan-hydrochlorothiazide (AVALIDE) 300-12.5 mg per tablet, Take 1 tablet by mouth once daily., Disp: 90 tablet, Rfl: 3    lancets (LANCETS,ULTRA THIN) 26 gauge Misc, 1 lancet by Misc.(Non-Drug; Combo Route) route once daily., Disp: 100 each, Rfl: 0    linaGLIPtin (TRADJENTA) 5 mg Tab tablet, Take 1 tablet (5 mg total) by mouth once daily., Disp: 90 tablet, Rfl: 2    metFORMIN (GLUCOPHAGE) 1000 MG tablet, Take 1 tablet (1,000 mg total) by mouth 2 (two) times daily with meals., Disp: 180 tablet, Rfl: 2    rosuvastatin (CRESTOR) 5 MG tablet, Take 1 tablet (5 mg total) by mouth every Mon, Wed, Fri., Disp: 36 tablet, Rfl: 3    blood sugar diagnostic (TRUE METRIX GLUCOSE TEST STRIP) Strp, USE 1 STRIP TO CHECK GLUCOSE TWICE DAILY, Disp: 200 strip, Rfl: 3    pantoprazole (PROTONIX) 40 MG tablet, Take 1 tablet (40 mg total) by mouth once daily., Disp: 90 tablet, Rfl: 2

## 2019-11-20 NOTE — PATIENT INSTRUCTIONS
Diabetes: Activity Tips    Being more active can help you manage your diabetes. The tips on this sheet can help you get the most from your exercise. They can also help you stay safe.  Staying Active  Its important for adults to spend less time sitting and being inactive. This is especially true if you have type 2 diabetes. When you are sitting for long periods of time, get up for short sessions of light activity every 30 minutes.  You should aim for at least 150 minutes a week of exercise or physical activity. Dont let more than 2 days go by without being active.  Benefit from briskness  Brisk activity gets your heart beating faster. This can help you increase your fitness, lose extra weight, and manage your blood sugar level. Try brisk walking. Or, if you have foot or leg problems, you can try swimming or bike riding. You can break up your exercise into chunks throughout the day. Work up to at least 30 minutes of steady, brisk exercise on most days.  Warm up and cool down  Warming up and cooling down reduce your risk of injury. They also help limit muscle soreness. Do a mild version of your activity for 5 minutes before and after your routine. You can also learn stretches that will help keep your muscles loose. Your healthcare provider may show you good ways to warm up and stretch.  Do the talk-sing test  The talk-sing test is a simple way to tell how hard youre exercising. If you can talk while exercising, youre in a safe range. If youre out of breath, slow down. If you can carry a tune, its time to  the pace. Walk up a hill. Increase the resistance on your stationary bike. Or swim faster.  What about eating?  You may be told to plan your exercise for 1 to 2 hours after a meal. In most cases, you dont need to eat while being active. If you take insulin or medicine that can cause low blood sugar, test your blood sugar before exercising. And carry a fast-acting sugar that will raise your blood sugar  level quickly. This includes glucose tablets or hard candy. Use it if you feel low blood sugar symptoms.  Safety tips  These tips can help you stay safe as you become fit:  · Exercise with a friend or carry a cell phone if you have one.  · Carry or wear identification, such as a necklace or bracelet, that says you have diabetes.  · Use the proper footwear and safety equipment for your activity.  · Drink water before, during, and after exercise.  · Dress properly for the weather.  · Dont exercise in very hot or very cold weather.  · Dont exercise if you are sick.  · If you are instructed to do so, test your blood sugar before and after you exercise. Have a small carbohydrate snack if your blood sugar is low before you start exercising.   When to stop exercising and call your healthcare provider  Stop exercising and call your healthcare provider right away if you notice any of the following:  · Pain, pressure, tightness, or heaviness in the chest  · Pain or heaviness in the neck, shoulders, back, arms, legs, or feet  · Unusual shortness of breath  · Dizziness or lightheadedness  · Unusually rapid or slow pulse  · Increased joint or muscle pain  · Nausea or vomiting  Date Last Reviewed: 5/1/2016  © 4644-8150 [employer]. [address] [place] [address], [place] [id number]. All rights reserved. This information is not intended as a substitute for professional medical care. Always follow your healthcare professional's instructions.        Diabetes and Heart Disease     Take your medicines as directed each day, even if you feel fine.   If you have diabetes, you are two to four times more likely to have heart disease than someone without diabetes. This higher risk is due to diabetes, but it is also due to other risk factors for heart disease that happen in people with diabetes. But theres good news. You can help control your health risks by making some changes in your life. You can take steps to reduce your risk of  heart disease by half--similar to the risk in people who don't have diabetes.  Your main risk factors  Three major risk factors for heart disease are high blood sugar, high blood pressure, and high levels of lipids. By keeping risk factors under control, you can help keep your heart and arteries healthy. This may reduce your chances of a heart attack.  · Blood sugar. High blood sugar can make artery walls tough and rough. Plaque (waxy material in the blood) can then build up along the artery walls, making it harder for blood to flow through the arteries. Having high blood sugar increases the chances of having high blood pressure and high cholesterol.  · Blood pressure. When blood pressure is high all the time it causes your heart to work harder to pump blood. Artery walls become damaged. This increases the risk for plaque build up.  · Lipids. The body needs some lipids in the blood to stay healthy. But lipid levels that are too high can damage the artery walls. Lipids include cholesterol and triglycerides. There are two kinds of cholesterol. LDL (bad) cholesterol can damage the arteries. But HDL (good) cholesterol helps clear LDL cholesterol from the blood vessels. This helps keep the arteries healthy. When blood sugar is high, the level of triglycerides in the blood may also be high. High blood triglyceride levels can cause plaque to form.   Other risk factors  Certain lifestyle factors can increase levels of your blood sugar, blood pressure, and lipids. Such increases raise your risk of heart disease:  · Smoking damages the lining of your arteries. This allows plaque to build up in the artery walls. Smoking also constricts (narrows) the arteries. This can raise blood pressure and cause chest pain or angina. Smoking also increases your risk of getting type 2 diabetes.  · Not being active makes it harder for your heart to do its work. Inactivity is linked to many other risk factors, such as high blood pressure  and poor cholesterol levels. Inactivity also increases your risk of getting type 2 diabetes.  · Being overweight makes it harder for your body to use insulin. It also makes your heart work too hard. Being overweight is also the main contributor to the development of type 2 diabetes,   Changes you can make  Following a few simple steps can help keep your risk factors under control. Work with your healthcare team to reach your goals.  · Quitting smoking could save your life. Smoking damages the lining of the blood vessels and raises blood pressure. Smoking also affects how your body uses insulin. This makes it harder to keep blood sugar under control. If you smoke and need help quitting, talk to your healthcare team.   · Testing your blood sugar is the only way to know whether it is under control. Be sure to test your blood sugar yourself. Also get your blood tested in the lab, as directed.  · Monitoring your blood pressure and lipid levels can help you achieve safe levels. Visit your healthcare team as scheduled.  · Taking medicines as directed can help control blood sugar, blood pressure, blood clotting, and/or cholesterol levels.  · Eating right can reduce your risk factors and help you lose weight. Try to limit the amount of processed or refined carbohydrates you eat at one time. Cut back on your total calorie intake. Eat foods low in saturated fat and cholesterol. Eat fiber, including vegetables and whole grains, and cut down on salt. A dietitian or diabetes educator can help form a meal plan that works for you--even if you are on a low budget.   · Being active can help reduce your weight, strengthen your heart, and lower your lipid levels and blood pressure. Exercise and activity are good for your whole body. Talk to your healthcare team about increasing your activity safely over time.  · Keeping your appointments with your healthcare provider helps you stay healthy. Go in for checkups and lab tests as  scheduled.  Date Last Reviewed: 5/19/2016  © 2439-8714 [employer]. [address] [place] [address], [place] [address]. All rights reserved. This information is not intended as a substitute for professional medical care. Always follow your healthcare professional's instructions.

## 2019-11-25 ENCOUNTER — CLINICAL SUPPORT (OUTPATIENT)
Dept: FAMILY MEDICINE | Facility: CLINIC | Age: 69
End: 2019-11-25
Payer: MEDICARE

## 2019-11-25 DIAGNOSIS — E53.8 B12 DEFICIENCY: Primary | ICD-10-CM

## 2019-11-25 PROCEDURE — 96372 PR INJECTION,THERAP/PROPH/DIAG2ST, IM OR SUBCUT: ICD-10-PCS | Mod: S$GLB,,, | Performed by: INTERNAL MEDICINE

## 2019-11-25 PROCEDURE — 96372 THER/PROPH/DIAG INJ SC/IM: CPT | Mod: S$GLB,,, | Performed by: INTERNAL MEDICINE

## 2019-11-25 RX ORDER — CYANOCOBALAMIN 1000 UG/ML
1000 INJECTION, SOLUTION INTRAMUSCULAR; SUBCUTANEOUS ONCE
Status: COMPLETED | OUTPATIENT
Start: 2019-11-25 | End: 2019-11-25

## 2019-11-25 RX ADMIN — CYANOCOBALAMIN 1000 MCG: 1000 INJECTION, SOLUTION INTRAMUSCULAR; SUBCUTANEOUS at 11:11

## 2020-01-20 DIAGNOSIS — E11.9 TYPE 2 DIABETES MELLITUS WITHOUT COMPLICATION, WITHOUT LONG-TERM CURRENT USE OF INSULIN: Chronic | ICD-10-CM

## 2020-01-20 RX ORDER — METFORMIN HYDROCHLORIDE 1000 MG/1
TABLET ORAL
Qty: 180 TABLET | Refills: 1 | Status: SHIPPED | OUTPATIENT
Start: 2020-01-20 | End: 2021-02-17

## 2020-01-22 RX ORDER — CYANOCOBALAMIN 1000 UG/ML
1000 INJECTION, SOLUTION INTRAMUSCULAR; SUBCUTANEOUS
Qty: 10 ML | Refills: 1 | Status: SHIPPED | OUTPATIENT
Start: 2020-01-22 | End: 2020-02-20 | Stop reason: SDUPTHER

## 2020-01-31 DIAGNOSIS — E11.9 TYPE 2 DIABETES MELLITUS WITHOUT COMPLICATION, WITHOUT LONG-TERM CURRENT USE OF INSULIN: Chronic | ICD-10-CM

## 2020-01-31 RX ORDER — LINAGLIPTIN 5 MG/1
TABLET, FILM COATED ORAL
Qty: 90 TABLET | Refills: 2 | Status: SHIPPED | OUTPATIENT
Start: 2020-01-31 | End: 2021-02-08

## 2020-02-20 ENCOUNTER — DOCUMENTATION ONLY (OUTPATIENT)
Dept: PHARMACY | Facility: CLINIC | Age: 70
End: 2020-02-20

## 2020-02-20 ENCOUNTER — OFFICE VISIT (OUTPATIENT)
Dept: FAMILY MEDICINE | Facility: CLINIC | Age: 70
End: 2020-02-20
Payer: MEDICARE

## 2020-02-20 VITALS
DIASTOLIC BLOOD PRESSURE: 76 MMHG | HEART RATE: 85 BPM | WEIGHT: 137 LBS | BODY MASS INDEX: 29.56 KG/M2 | SYSTOLIC BLOOD PRESSURE: 133 MMHG | HEIGHT: 57 IN

## 2020-02-20 DIAGNOSIS — K21.9 GASTROESOPHAGEAL REFLUX DISEASE WITHOUT ESOPHAGITIS: Chronic | ICD-10-CM

## 2020-02-20 DIAGNOSIS — E11.9 TYPE 2 DIABETES MELLITUS WITHOUT COMPLICATION, WITHOUT LONG-TERM CURRENT USE OF INSULIN: Primary | Chronic | ICD-10-CM

## 2020-02-20 DIAGNOSIS — E53.8 VITAMIN B 12 DEFICIENCY: Chronic | ICD-10-CM

## 2020-02-20 DIAGNOSIS — I10 ESSENTIAL HYPERTENSION: Chronic | ICD-10-CM

## 2020-02-20 DIAGNOSIS — E78.2 MIXED HYPERLIPIDEMIA: Chronic | ICD-10-CM

## 2020-02-20 PROBLEM — M79.671 RIGHT FOOT PAIN: Status: RESOLVED | Noted: 2019-04-08 | Resolved: 2020-02-20

## 2020-02-20 PROBLEM — R10.13 EPIGASTRIC PAIN: Status: RESOLVED | Noted: 2019-11-20 | Resolved: 2020-02-20

## 2020-02-20 PROBLEM — R09.81 CONGESTION OF NASAL SINUS: Status: RESOLVED | Noted: 2019-04-08 | Resolved: 2020-02-20

## 2020-02-20 PROBLEM — Z01.818 PREOP GENERAL PHYSICAL EXAM: Status: RESOLVED | Noted: 2019-06-12 | Resolved: 2020-02-20

## 2020-02-20 PROCEDURE — 99214 PR OFFICE/OUTPT VISIT, EST, LEVL IV, 30-39 MIN: ICD-10-PCS | Mod: S$GLB,,, | Performed by: INTERNAL MEDICINE

## 2020-02-20 PROCEDURE — 3051F PR MOST RECENT HEMOGLOBIN A1C LEVEL 7.0 - < 8.0%: ICD-10-PCS | Mod: S$GLB,,, | Performed by: INTERNAL MEDICINE

## 2020-02-20 PROCEDURE — 3078F PR MOST RECENT DIASTOLIC BLOOD PRESSURE < 80 MM HG: ICD-10-PCS | Mod: S$GLB,,, | Performed by: INTERNAL MEDICINE

## 2020-02-20 PROCEDURE — 3078F DIAST BP <80 MM HG: CPT | Mod: S$GLB,,, | Performed by: INTERNAL MEDICINE

## 2020-02-20 PROCEDURE — 99214 OFFICE O/P EST MOD 30 MIN: CPT | Mod: S$GLB,,, | Performed by: INTERNAL MEDICINE

## 2020-02-20 PROCEDURE — 1126F AMNT PAIN NOTED NONE PRSNT: CPT | Mod: S$GLB,,, | Performed by: INTERNAL MEDICINE

## 2020-02-20 PROCEDURE — 1101F PT FALLS ASSESS-DOCD LE1/YR: CPT | Mod: S$GLB,,, | Performed by: INTERNAL MEDICINE

## 2020-02-20 PROCEDURE — 3075F SYST BP GE 130 - 139MM HG: CPT | Mod: S$GLB,,, | Performed by: INTERNAL MEDICINE

## 2020-02-20 PROCEDURE — 1159F MED LIST DOCD IN RCRD: CPT | Mod: S$GLB,,, | Performed by: INTERNAL MEDICINE

## 2020-02-20 PROCEDURE — 1159F PR MEDICATION LIST DOCUMENTED IN MEDICAL RECORD: ICD-10-PCS | Mod: S$GLB,,, | Performed by: INTERNAL MEDICINE

## 2020-02-20 PROCEDURE — 1170F PR FUNCTIONAL STATUS ASSESSED: ICD-10-PCS | Mod: S$GLB,,, | Performed by: INTERNAL MEDICINE

## 2020-02-20 PROCEDURE — 1126F PR PAIN SEVERITY QUANTIFIED, NO PAIN PRESENT: ICD-10-PCS | Mod: S$GLB,,, | Performed by: INTERNAL MEDICINE

## 2020-02-20 PROCEDURE — 3051F HG A1C>EQUAL 7.0%<8.0%: CPT | Mod: S$GLB,,, | Performed by: INTERNAL MEDICINE

## 2020-02-20 PROCEDURE — 1101F PR PT FALLS ASSESS DOC 0-1 FALLS W/OUT INJ PAST YR: ICD-10-PCS | Mod: S$GLB,,, | Performed by: INTERNAL MEDICINE

## 2020-02-20 PROCEDURE — 3075F PR MOST RECENT SYSTOLIC BLOOD PRESS GE 130-139MM HG: ICD-10-PCS | Mod: S$GLB,,, | Performed by: INTERNAL MEDICINE

## 2020-02-20 PROCEDURE — 1170F FXNL STATUS ASSESSED: CPT | Mod: S$GLB,,, | Performed by: INTERNAL MEDICINE

## 2020-02-20 RX ORDER — CYANOCOBALAMIN 1000 UG/ML
1000 INJECTION, SOLUTION INTRAMUSCULAR; SUBCUTANEOUS
Qty: 1 ML | Refills: 11 | Status: SHIPPED | OUTPATIENT
Start: 2020-02-20 | End: 2021-03-05 | Stop reason: SDUPTHER

## 2020-02-20 NOTE — PROGRESS NOTES
Patient received cyanocobalamin 1,000 mcg/ml injection on 2/20/2020 in Left deltoid  LOT [id number]  EXP 04/2022  Patient advised to receive next injection in 1 month

## 2020-02-20 NOTE — PATIENT INSTRUCTIONS
Tips to Control Acid Reflux    To control acid reflux, youll need to make some basic diet and lifestyle changes. The simple steps outlined below may be all youll need to ease discomfort.  Watch what you eat  · Avoid fatty foods and spicy foods.  · Eat fewer acidic foods, such as citrus and tomato-based foods. These can increase symptoms.  · Limit drinking alcohol, caffeine, and fizzy beverages. All increase acid reflux.  · Try limiting chocolate, peppermint, and spearmint. These can worsen acid reflux in some people.  Watch when you eat  · Avoid lying down for 3 hours after eating.  · Do not snack before going to bed.  Raise your head  Raising your head and upper body by 4 to 6 inches helps limit reflux when youre lying down. Put blocks under the head of your bed frame to raise it.  Other changes  · Lose weight, if you need to  · Dont exercise near bedtime  · Avoid tight-fitting clothes  · Limit aspirin and ibuprofen  · Stop smoking   Date Last Reviewed: 7/1/2016 © 2000-2017 [employer]. [address] [place] [address]. All rights reserved. This information is not intended as a substitute for professional medical care. Always follow your healthcare professional's instructions.        Diabetes: Activity Tips    Being more active can help you manage your diabetes. The tips on this sheet can help you get the most from your exercise. They can also help you stay safe.  Staying Active  Its important for adults to spend less time sitting and being inactive. This is especially true if you have type 2 diabetes. When you are sitting for long periods of time, get up for short sessions of light activity every 30 minutes.  You should aim for at least 150 minutes a week of exercise or physical activity. Dont let more than 2 days go by without being active.  Benefit from briskness  Brisk activity gets your heart beating faster. This can help you increase your fitness, lose extra weight, and manage  your blood sugar level. Try brisk walking. Or, if you have foot or leg problems, you can try swimming or bike riding. You can break up your exercise into chunks throughout the day. Work up to at least 30 minutes of steady, brisk exercise on most days.  Warm up and cool down  Warming up and cooling down reduce your risk of injury. They also help limit muscle soreness. Do a mild version of your activity for 5 minutes before and after your routine. You can also learn stretches that will help keep your muscles loose. Your healthcare provider may show you good ways to warm up and stretch.  Do the talk-sing test  The talk-sing test is a simple way to tell how hard youre exercising. If you can talk while exercising, youre in a safe range. If youre out of breath, slow down. If you can carry a tune, its time to  the pace. Walk up a hill. Increase the resistance on your stationary bike. Or swim faster.  What about eating?  You may be told to plan your exercise for 1 to 2 hours after a meal. In most cases, you dont need to eat while being active. If you take insulin or medicine that can cause low blood sugar, test your blood sugar before exercising. And carry a fast-acting sugar that will raise your blood sugar level quickly. This includes glucose tablets or hard candy. Use it if you feel low blood sugar symptoms.  Safety tips  These tips can help you stay safe as you become fit:  · Exercise with a friend or carry a cell phone if you have one.  · Carry or wear identification, such as a necklace or bracelet, that says you have diabetes.  · Use the proper footwear and safety equipment for your activity.  · Drink water before, during, and after exercise.  · Dress properly for the weather.  · Dont exercise in very hot or very cold weather.  · Dont exercise if you are sick.  · If you are instructed to do so, test your blood sugar before and after you exercise. Have a small carbohydrate snack if your blood sugar is low  before you start exercising.   When to stop exercising and call your healthcare provider  Stop exercising and call your healthcare provider right away if you notice any of the following:  · Pain, pressure, tightness, or heaviness in the chest  · Pain or heaviness in the neck, shoulders, back, arms, legs, or feet  · Unusual shortness of breath  · Dizziness or lightheadedness  · Unusually rapid or slow pulse  · Increased joint or muscle pain  · Nausea or vomiting  Date Last Reviewed: 5/1/2016 © 2000-2017 [employer]. [address] [place] [id number]. All rights reserved. This information is not intended as a substitute for professional medical care. Always follow your healthcare professional's instructions.

## 2020-02-20 NOTE — PROGRESS NOTES
Subjective:       Patient ID: Preet West is a 70 y.o. female.    Chief Complaint: Diabetes; Hypertension; Hyperlipidemia; Vit B12  deficiency; Osteoporosis; and Sinus Problem    Patient comes for follow-up.  Underlying medical issues of type 2 diabetes mellitus, hypertension, dyslipidemia have been noted.  She is compliant to her medications.  She is accompanied with her  Mr. Luis West.  Patient speaks only Gujarathi language and her  is able to reasonably interpret accordingly.  Underlying osteoporosis has been noted for which she is taking alendronate.    Her abdominal pain has resolved.    Underlying issues of type 2 diabetes mellitus have been noted. Blood sugars are stable.  While her last hemoglobin A1c was greater than 7, she had recently gone to [facility] and recheck her hemoglobin A1c at a local lab and informs me that it was 6.6.  Surprisingly enough and even on a vacation mode in [facility], her blood sugars under better control.  (She has not bought any sugar logs)    She has been taking injection vitamin B12 every month for several years even prior to joining this clinic.  This seems to give her energy.  Apparently several years ago she was diagnosed with vitamin B12 deficiency.  Thus far my office staff has been giving her vitamin B12 injections.  Some of the patient and her  are both leery of giving injections them cells.  There have been changes in my office rules and regulations at this point and she has been asked to get this injection from the local pharmacy ([facility] at [facility]).    Diabetes   She presents for her follow-up diabetic visit. She has type 2 diabetes mellitus. Her disease course has been stable. Pertinent negatives for hypoglycemia include no confusion, dizziness, headaches, mood changes, nervousness/anxiousness, pallor, seizures or tremors. Pertinent negatives for diabetes include no chest pain, no fatigue, no foot ulcerations,  no polydipsia, no polyphagia and no polyuria. There are no hypoglycemic complications. Symptoms are stable. There are no diabetic complications. Risk factors for coronary artery disease include hypertension, sedentary lifestyle, diabetes mellitus and dyslipidemia. She is compliant with treatment most of the time. Meal planning includes avoidance of concentrated sweets. An ACE inhibitor/angiotensin II receptor blocker is being taken. She does not see a podiatrist.Eye exam is not current.   Hypertension   This is a chronic problem. The current episode started more than 1 year ago. The problem is controlled. Pertinent negatives include no chest pain, headaches, neck pain, palpitations or shortness of breath. There are no associated agents to hypertension. Risk factors for coronary artery disease include sedentary lifestyle and dyslipidemia. Past treatments include angiotensin blockers and diuretics.   Hyperlipidemia   This is a chronic problem. The current episode started more than 1 year ago. Recent lipid tests were reviewed and are normal. Pertinent negatives include no chest pain or shortness of breath. Current antihyperlipidemic treatment includes statins. The current treatment provides moderate improvement of lipids. Risk factors for coronary artery disease include a sedentary lifestyle, post-menopausal, dyslipidemia and diabetes mellitus.   Gastroesophageal Reflux   She complains of heartburn. She reports no abdominal pain, no chest pain or no coughing. This is a recurrent problem. The current episode started more than 1 year ago. The problem occurs occasionally. Pertinent negatives include no fatigue. She has tried a PPI for the symptoms. The treatment provided moderate relief.   Sinus Problem   This is a chronic problem. The current episode started more than 1 year ago. The problem has been gradually improving since onset. There has been no fever. Pertinent negatives include no chills, congestion, coughing,  headaches, neck pain, shortness of breath, sinus pressure or sneezing. Treatments tried: flonase-p.r.n.  Use. The treatment provided moderate relief.       Past Medical History:   Diagnosis Date    Allergy     aspirin itching    Cancer     Diabetes mellitus, type 2     H/O colon cancer, stage III 1/1/2013    Treated in [facility]    Malignant neoplasm of transverse colon (2013) - Stage III 4/4/2019    Osteoporosis     S/P laparoscopic colectomy (3/2013) 4/5/2019    Seasonal allergies      Social History     Socioeconomic History    Marital status:      Spouse name: Luis West    Number of children: 3    Years of education: Not on file    Highest education level: Not on file   Occupational History    Occupation:    Social Needs    Financial resource strain: Not on file    Food insecurity:     Worry: Not on file     Inability: Not on file    Transportation needs:     Medical: Not on file     Non-medical: Not on file   Tobacco Use    Smoking status: Never Smoker    Smokeless tobacco: Never Used   Substance and Sexual Activity    Alcohol use: No    Drug use: No    Sexual activity: Not Currently   Lifestyle    Physical activity:     Days per week: Not on file     Minutes per session: Not on file    Stress: Not at all   Relationships    Social connections:     Talks on phone: Not on file     Gets together: Not on file     Attends [employer] service: Not on file     Active member of club or organization: Not on file     Attends meetings of clubs or organizations: Not on file     Relationship status: Not on file   Other Topics Concern    Not on file   Social History Narrative    Speaks Vian[place] only     Past Surgical History:   Procedure Laterality Date    COLON SURGERY      JOINT REPLACEMENT       Family History   Problem Relation Age of Onset    Stroke Mother     Heart disease Mother     Cancer Father         [place]       Review of Systems   Constitutional: Negative  "for activity change, chills, fatigue, fever and unexpected weight change (lost 2 lbs).   HENT: Negative for congestion, postnasal drip, sinus pressure and sneezing.         Uses Flonase for sinuses.   Eyes: Negative for pain, discharge and visual disturbance (Cataract).   Respiratory: Negative for cough, chest tightness and shortness of breath.    Cardiovascular: Negative for chest pain, palpitations and leg swelling.   Gastrointestinal: Positive for heartburn. Negative for abdominal distention, abdominal pain, anal bleeding, constipation and diarrhea.        GERD occasional use of pantoprazole.   Endocrine: Negative for polydipsia, polyphagia and polyuria.        Type 2 diabetes mellitus on combination of metformin and Tradjenta. Also has osteoporosis.    Low sugar and eats extra food at night   Genitourinary: Negative for difficulty urinating, dysuria, flank pain, frequency and vaginal pain.   Musculoskeletal: Negative for arthralgias, back pain, joint swelling and neck pain.   Skin: Negative for color change, pallor and rash.   Allergic/Immunologic: Negative for environmental allergies, food allergies and immunocompromised state.   Neurological: Negative for dizziness, tremors, seizures, syncope, light-headedness and headaches.   Hematological: Negative for adenopathy. Does not bruise/bleed easily.   Psychiatric/Behavioral: Negative for agitation, confusion and dysphoric mood. The patient is not nervous/anxious.         Somewhat anxious about her health issues and tends to over utilize. (Every ill needs a pill)         Objective:      Blood pressure 133/76, pulse 85, height 4' 9" (1.448 m), weight 62.1 kg (137 lb). Body mass index is 29.65 kg/m².  Physical Exam   Constitutional: She appears well-developed and well-nourished. She is cooperative. No distress.   HENT:   Head: Normocephalic and atraumatic.   Nose: No mucosal edema, sinus tenderness or nasal deformity. Right sinus exhibits no maxillary sinus " tenderness and no frontal sinus tenderness. Left sinus exhibits no maxillary sinus tenderness and no frontal sinus tenderness.   Eyes: Pupils are equal, round, and reactive to light. Conjunctivae, EOM and lids are normal. Lids are everted and swept, no foreign bodies found. Right pupil is round and reactive. Left pupil is round and reactive.   Neck: Trachea normal and normal range of motion. Neck supple.   Cardiovascular: Normal rate, regular rhythm, S1 normal, S2 normal and normal heart sounds.   Pulmonary/Chest: Breath sounds normal.   Abdominal: Soft. Bowel sounds are normal. There is no rigidity and no guarding.   Musculoskeletal: She exhibits no edema, tenderness or deformity.        Right foot: There is normal range of motion and no deformity.        Left foot: There is normal range of motion and no deformity.   Feet:   Right Foot:   Protective Sensation: 4 sites tested. 4 sites sensed.   Skin Integrity: Negative for ulcer, blister or skin breakdown.   Left Foot:   Protective Sensation: 4 sites tested. 4 sites sensed.   Skin Integrity: Negative for ulcer, blister or skin breakdown.   Lymphadenopathy:     She has no cervical adenopathy.     She has no axillary adenopathy.   Neurological: She is alert. Coordination normal.   Skin: Skin is warm and dry.   Psychiatric: Her behavior is normal.   Nursing note and vitals reviewed.        Assessment:       1. Type 2 diabetes mellitus without complication, without long-term current use of insulin    2. Essential hypertension    3. Gastroesophageal reflux disease without esophagitis    4. Mixed hyperlipidemia    5. Vitamin B 12 deficiency           No visits with results within 3 Month(s) from this visit.   Latest known visit with results is:   Lab Visit on 11/11/2019   Component Date Value Ref Range Status    Hepatitis C Ab 11/11/2019 <0.1  0.0 - 0.9 s/co ratio Final    Cholesterol 11/11/2019 112* 120 - 199 mg/dL Final    Triglycerides 11/11/2019 106  30 - 150 mg/dL  Final    HDL 11/11/2019 45  40 - 75 mg/dL Final    LDL Cholesterol 11/11/2019 45.8* 63.0 - 159.0 mg/dL Final    Hdl/Cholesterol Ratio 11/11/2019 40.2  20.0 - 50.0 % Final    Total Cholesterol/HDL Ratio 11/11/2019 2.5  2.0 - 5.0 Final    Non-HDL Cholesterol 11/11/2019 67  mg/dL Final    Sodium 11/11/2019 137  136 - 145 mmol/L Final    Potassium 11/11/2019 4.7  3.5 - 5.1 mmol/L Final    Chloride 11/11/2019 106  95 - 110 mmol/L Final    CO2 11/11/2019 25  23 - 29 mmol/L Final    Glucose 11/11/2019 181* 70 - 110 mg/dL Final    BUN, Bld 11/11/2019 15  8 - 23 mg/dL Final    Creatinine 11/11/2019 1.0  0.5 - 1.4 mg/dL Final    Calcium 11/11/2019 8.9  8.7 - 10.5 mg/dL Final    Anion Gap 11/11/2019 6* 8 - 16 mmol/L Final    eGFR if African American 11/11/2019 >60.0  >60 mL/min/1.73 m^2 Final    eGFR if non African American 11/11/2019 58.4* >60 mL/min/1.73 m^2 Final    Hemoglobin A1C 11/11/2019 7.5* 4.5 - 6.2 % Final    Estimated Avg Glucose 11/11/2019 169* 68 - 131 mg/dL Final    ALT 11/11/2019 19  10 - 44 U/L Final         Plan:           Type 2 diabetes mellitus without complication, without long-term current use of insulin  Comments:  Patient states Hemoglobin A1c was 6.6 in [place].  Check labs for next visit.  Orders:  -     Hemoglobin A1c; Future; Expected date: 05/04/2020  -     Microalbumin/creatinine urine ratio; Future; Expected date: 05/04/2020    Essential hypertension  Comments:  Continue to watch diet and avoid fried and fatty food.  Continue regular exercise efforts.  Orders:  -     Comprehensive metabolic panel; Future; Expected date: 05/04/2020    Gastroesophageal reflux disease without esophagitis  Comments:  Reflux symptoms are stable.    Mixed hyperlipidemia  Comments:  Stable.  Tolerating medications without any problems.  Orders:  -     Lipid panel; Future; Expected date: 05/04/2020    Vitamin B 12 deficiency  Comments:  Continues with injection vitamin B12.  [place]  [place]  Records available.  Orders:  -     cyanocobalamin 1,000 mcg/mL injection; Inject 1 mL (1,000 mcg total) into the muscle every 30 days.  Dispense: 1 mL; Refill: 11      Patient's blood sugars are stable.  Reflux symptoms are stable.      She likes to get her vitamin B12 for energy levels.  I have again, as before offered her  or family members to take up this chore of a  small and simple injection but they continue to exhibit anxiety and fear of touching needles.   I have written a prescription for injection B12 to be given at a local pharmacy ([facility]) every month for 12 months.    Patient has been advised to watch diet and exercise. Avoid fried and fatty food. Compliance to medications and follow up urged.    Advised Ms. West about age and season appropriate immunizations/ cancer screenings.  Also seasonal influenza vaccine, update on tetanus diphtheria vaccination every 10 years.        Advised Ms. West to monitor Blood sugars at home and record them.  Exercise, watch diet and loose weight.  keep a close eye on feet and keep them clean. Annual eye examination. Annual influenza vaccine.  Monitor HgbA1c every 3 to 6 months. Monitor urine microalbumin every year.keep LDL less than 100. Monitor blood pressure and target blood pressure 120/70.      Advised Ms. West for Anti reflux measures like small feequent meals, avoid spicy and greasy food. Head end up at night.    Pneumonia vaccination next visit.    Spent [place] 25 minutes with patient which involved review of pts medical conditions, labs, medications and with 50% of time face-to-face discussion about medical problems, management and any applicable changes.          Current Outpatient Medications:     alendronate (FOSAMAX) 70 MG tablet, Take 1 tablet (70 mg total) by mouth every 7 days., Disp: 13 tablet, Rfl: 2    blood sugar diagnostic (TRUE METRIX GLUCOSE TEST STRIP) Strp, USE 1 STRIP  TO CHECK GLUCOSE TWICE DAILY, Disp: 200 strip, Rfl: 3    blood-glucose meter kit, One touch meter and matching lancets and strips., Disp: 1 each, Rfl: 0    cyanocobalamin 1,000 mcg/mL injection, Inject 1 mL (1,000 mcg total) into the muscle every 30 days., Disp: 1 mL, Rfl: 11    fluticasone (FLONASE) 50 mcg/actuation nasal spray, 1 spray (50 mcg total) by Each Nare route once daily., Disp: 1 Bottle, Rfl: 4    irbesartan-hydrochlorothiazide (AVALIDE) 300-12.5 mg per tablet, Take 1 tablet by mouth once daily., Disp: 90 tablet, Rfl: 3    lancets (LANCETS,ULTRA THIN) 26 gauge Misc, 1 lancet by Misc.(Non-Drug; Combo Route) route once daily., Disp: 100 each, Rfl: 0    metFORMIN (GLUCOPHAGE) 1000 MG tablet, TAKE 1 TABLET BY MOUTH TWICE DAILY WITH MEALS, Disp: 180 tablet, Rfl: 1    pantoprazole (PROTONIX) 40 MG tablet, Take 1 tablet (40 mg total) by mouth once daily., Disp: 90 tablet, Rfl: 2    rosuvastatin (CRESTOR) 5 MG tablet, Take 1 tablet (5 mg total) by mouth every Mon, Wed, Fri., Disp: 36 tablet, Rfl: 3    TRADJENTA 5 mg Tab tablet, TAKE 1 TABLET BY MOUTH ONCE DAILY, Disp: 90 tablet, Rfl: 2

## 2020-03-20 ENCOUNTER — DOCUMENTATION ONLY (OUTPATIENT)
Dept: ADMINISTRATIVE | Facility: HOSPITAL | Age: 70
End: 2020-03-20

## 2020-03-20 NOTE — PROGRESS NOTES
Administered Cyanocobalamin 1000mcg IM into right Deltoid  NDC  [address]-31  Lot [id number]  Exp 04/2022

## 2020-04-20 ENCOUNTER — DOCUMENTATION ONLY (OUTPATIENT)
Dept: PHARMACY | Facility: CLINIC | Age: 70
End: 2020-04-20

## 2020-04-26 DIAGNOSIS — E78.5 HYPERLIPIDEMIA, UNSPECIFIED HYPERLIPIDEMIA TYPE: ICD-10-CM

## 2020-04-26 RX ORDER — ROSUVASTATIN CALCIUM 5 MG/1
TABLET, COATED ORAL
Qty: 36 TABLET | Refills: 0 | Status: SHIPPED | OUTPATIENT
Start: 2020-04-26 | End: 2020-08-24

## 2020-05-19 ENCOUNTER — LAB VISIT (OUTPATIENT)
Dept: LAB | Facility: HOSPITAL | Age: 70
End: 2020-05-19
Attending: INTERNAL MEDICINE
Payer: MEDICARE

## 2020-05-19 DIAGNOSIS — E11.9 TYPE 2 DIABETES MELLITUS WITHOUT COMPLICATION, WITHOUT LONG-TERM CURRENT USE OF INSULIN: Chronic | ICD-10-CM

## 2020-05-19 DIAGNOSIS — I10 ESSENTIAL HYPERTENSION: Chronic | ICD-10-CM

## 2020-05-19 DIAGNOSIS — E78.2 MIXED HYPERLIPIDEMIA: Chronic | ICD-10-CM

## 2020-05-19 LAB
ALBUMIN SERPL BCP-MCNC: 3.9 G/DL (ref 3.5–5.2)
ALBUMIN/CREAT UR: 3.8 UG/MG (ref 0–30)
ALP SERPL-CCNC: 57 U/L (ref 55–135)
ALT SERPL W/O P-5'-P-CCNC: 19 U/L (ref 10–44)
ANION GAP SERPL CALC-SCNC: 8 MMOL/L (ref 8–16)
AST SERPL-CCNC: 17 U/L (ref 10–40)
BILIRUB SERPL-MCNC: 0.7 MG/DL (ref 0.1–1)
BUN SERPL-MCNC: 18 MG/DL (ref 8–23)
CALCIUM SERPL-MCNC: 9.2 MG/DL (ref 8.7–10.5)
CHLORIDE SERPL-SCNC: 105 MMOL/L (ref 95–110)
CHOLEST SERPL-MCNC: 111 MG/DL (ref 120–199)
CHOLEST/HDLC SERPL: 2.6 {RATIO} (ref 2–5)
CO2 SERPL-SCNC: 23 MMOL/L (ref 23–29)
CREAT SERPL-MCNC: 1.1 MG/DL (ref 0.5–1.4)
CREAT UR-MCNC: 65 MG/DL (ref 15–325)
EST. GFR  (AFRICAN AMERICAN): 59.6 ML/MIN/1.73 M^2
EST. GFR  (NON AFRICAN AMERICAN): 51.7 ML/MIN/1.73 M^2
ESTIMATED AVG GLUCOSE: 148 MG/DL (ref 68–131)
GLUCOSE SERPL-MCNC: 158 MG/DL (ref 70–110)
HBA1C MFR BLD HPLC: 6.8 % (ref 4.5–6.2)
HDLC SERPL-MCNC: 43 MG/DL (ref 40–75)
HDLC SERPL: 38.7 % (ref 20–50)
LDLC SERPL CALC-MCNC: 45.2 MG/DL (ref 63–159)
MICROALBUMIN UR DL<=1MG/L-MCNC: 2.5 UG/ML
NONHDLC SERPL-MCNC: 68 MG/DL
POTASSIUM SERPL-SCNC: 4.6 MMOL/L (ref 3.5–5.1)
PROT SERPL-MCNC: 7.1 G/DL (ref 6–8.4)
SODIUM SERPL-SCNC: 136 MMOL/L (ref 136–145)
TRIGL SERPL-MCNC: 114 MG/DL (ref 30–150)

## 2020-05-19 PROCEDURE — 82043 UR ALBUMIN QUANTITATIVE: CPT

## 2020-05-19 PROCEDURE — 80053 COMPREHEN METABOLIC PANEL: CPT

## 2020-05-19 PROCEDURE — 36415 COLL VENOUS BLD VENIPUNCTURE: CPT

## 2020-05-19 PROCEDURE — 80061 LIPID PANEL: CPT

## 2020-05-19 PROCEDURE — 83036 HEMOGLOBIN GLYCOSYLATED A1C: CPT

## 2020-05-20 ENCOUNTER — OFFICE VISIT (OUTPATIENT)
Dept: FAMILY MEDICINE | Facility: CLINIC | Age: 70
End: 2020-05-20
Payer: MEDICARE

## 2020-05-20 ENCOUNTER — DOCUMENTATION ONLY (OUTPATIENT)
Dept: PHARMACY | Facility: CLINIC | Age: 70
End: 2020-05-20

## 2020-05-20 VITALS
HEART RATE: 83 BPM | BODY MASS INDEX: 29.34 KG/M2 | DIASTOLIC BLOOD PRESSURE: 80 MMHG | SYSTOLIC BLOOD PRESSURE: 139 MMHG | TEMPERATURE: 98 F | HEIGHT: 57 IN | WEIGHT: 136 LBS

## 2020-05-20 DIAGNOSIS — E11.9 TYPE 2 DIABETES MELLITUS WITHOUT COMPLICATION, WITHOUT LONG-TERM CURRENT USE OF INSULIN: Primary | ICD-10-CM

## 2020-05-20 DIAGNOSIS — I10 ESSENTIAL HYPERTENSION: ICD-10-CM

## 2020-05-20 DIAGNOSIS — N89.8 VAGINAL ITCHING: ICD-10-CM

## 2020-05-20 DIAGNOSIS — E78.2 MIXED HYPERLIPIDEMIA: ICD-10-CM

## 2020-05-20 DIAGNOSIS — K21.9 GASTROESOPHAGEAL REFLUX DISEASE WITHOUT ESOPHAGITIS: ICD-10-CM

## 2020-05-20 PROCEDURE — 3079F PR MOST RECENT DIASTOLIC BLOOD PRESSURE 80-89 MM HG: ICD-10-PCS | Mod: S$GLB,,, | Performed by: INTERNAL MEDICINE

## 2020-05-20 PROCEDURE — 1159F MED LIST DOCD IN RCRD: CPT | Mod: S$GLB,,, | Performed by: INTERNAL MEDICINE

## 2020-05-20 PROCEDURE — 3044F HG A1C LEVEL LT 7.0%: CPT | Mod: S$GLB,,, | Performed by: INTERNAL MEDICINE

## 2020-05-20 PROCEDURE — 1159F PR MEDICATION LIST DOCUMENTED IN MEDICAL RECORD: ICD-10-PCS | Mod: S$GLB,,, | Performed by: INTERNAL MEDICINE

## 2020-05-20 PROCEDURE — 1126F AMNT PAIN NOTED NONE PRSNT: CPT | Mod: S$GLB,,, | Performed by: INTERNAL MEDICINE

## 2020-05-20 PROCEDURE — 99214 PR OFFICE/OUTPT VISIT, EST, LEVL IV, 30-39 MIN: ICD-10-PCS | Mod: S$GLB,,, | Performed by: INTERNAL MEDICINE

## 2020-05-20 PROCEDURE — 3075F SYST BP GE 130 - 139MM HG: CPT | Mod: S$GLB,,, | Performed by: INTERNAL MEDICINE

## 2020-05-20 PROCEDURE — 1126F PR PAIN SEVERITY QUANTIFIED, NO PAIN PRESENT: ICD-10-PCS | Mod: S$GLB,,, | Performed by: INTERNAL MEDICINE

## 2020-05-20 PROCEDURE — 99214 OFFICE O/P EST MOD 30 MIN: CPT | Mod: S$GLB,,, | Performed by: INTERNAL MEDICINE

## 2020-05-20 PROCEDURE — 1101F PR PT FALLS ASSESS DOC 0-1 FALLS W/OUT INJ PAST YR: ICD-10-PCS | Mod: S$GLB,,, | Performed by: INTERNAL MEDICINE

## 2020-05-20 PROCEDURE — 3044F PR MOST RECENT HEMOGLOBIN A1C LEVEL <7.0%: ICD-10-PCS | Mod: S$GLB,,, | Performed by: INTERNAL MEDICINE

## 2020-05-20 PROCEDURE — 3079F DIAST BP 80-89 MM HG: CPT | Mod: S$GLB,,, | Performed by: INTERNAL MEDICINE

## 2020-05-20 PROCEDURE — 1101F PT FALLS ASSESS-DOCD LE1/YR: CPT | Mod: S$GLB,,, | Performed by: INTERNAL MEDICINE

## 2020-05-20 PROCEDURE — 3075F PR MOST RECENT SYSTOLIC BLOOD PRESS GE 130-139MM HG: ICD-10-PCS | Mod: S$GLB,,, | Performed by: INTERNAL MEDICINE

## 2020-05-20 RX ORDER — CLOTRIMAZOLE AND BETAMETHASONE DIPROPIONATE 10; .64 MG/G; MG/G
CREAM TOPICAL 2 TIMES DAILY
Qty: 15 G | Refills: 1 | Status: SHIPPED | OUTPATIENT
Start: 2020-05-20 | End: 2020-07-07

## 2020-05-20 NOTE — PROGRESS NOTES
Subjective:       Patient ID: Preet West is a 70 y.o. female.    Chief Complaint: Diabetes (lab review ); Hypertension; Hyperlipidemia; Generalized Body Aches; and Vaginal Itching    Patient is a 70-year-old female who comes for follow-up.  Multiple complains which keep on multiplying each time she comes.  Now today's complain is that she is hurting all over the body.  She points are to her thigh, legs, feet, arms, hands.  Everything hurts.    She has been on statin medications for quite some time.  It is unclear if the ache and pains have increased after the statins or not.    She also complains of itching in her private areas.  She denies any discharge.  Patient has limited insight as to why she should be itching.  She did have a gynecological checkup last year which was unremarkable.    Blood sugars are good and her hemoglobin A1c has come down.  Lipid panel is super low.    Diabetes   She presents for her follow-up diabetic visit. She has type 2 diabetes mellitus. Her disease course has been stable. Pertinent negatives for hypoglycemia include no confusion, dizziness, headaches, nervousness/anxiousness, pallor, seizures or tremors. Associated symptoms include fatigue. Pertinent negatives for diabetes include no chest pain, no foot ulcerations, no polydipsia, no polyphagia and no polyuria. There are no hypoglycemic complications. Symptoms are stable. Risk factors for coronary artery disease include post-menopausal and sedentary lifestyle. Current diabetic treatment includes oral agent (dual therapy). She is compliant with treatment most of the time. Her weight is stable. Meal planning includes avoidance of concentrated sweets. She rarely participates in exercise. An ACE inhibitor/angiotensin II receptor blocker is being taken. She does not see a podiatrist.Eye exam is current.   Hypertension   This is a chronic problem. The current episode started more than 1 year ago. The problem is controlled.  Pertinent negatives include no chest pain, headaches, neck pain, palpitations or shortness of breath. Risk factors for coronary artery disease include sedentary lifestyle, dyslipidemia and diabetes mellitus. Past treatments include angiotensin blockers and diuretics. The current treatment provides moderate improvement. Compliance problems include psychosocial issues.    Hyperlipidemia   This is a chronic problem. The current episode started more than 1 year ago. She has no history of obesity. Associated symptoms include myalgias. Pertinent negatives include no chest pain or shortness of breath. Current antihyperlipidemic treatment includes statins. The current treatment provides significant improvement of lipids. Compliance problems include psychosocial issues.  Risk factors for coronary artery disease include hypertension, a sedentary lifestyle, post-menopausal, diabetes mellitus and dyslipidemia.   Muscle Pain   This is a new problem. The current episode started more than 1 month ago. The problem occurs constantly. The problem has been unchanged. Associated symptoms include a change in bowel habit (constipation), fatigue and myalgias. Pertinent negatives include no abdominal pain, anorexia, arthralgias, chest pain, chills, congestion, coughing, fever, headaches, joint swelling, neck pain or rash. Exacerbated by: ?? statins. She has tried rest for the symptoms. The treatment provided no relief.   Vaginal Itching   The patient's primary symptoms include genital itching. The patient's pertinent negatives include no genital lesions, genital odor, genital rash, vaginal bleeding or vaginal discharge. This is a new problem. The current episode started in the past 7 days. The problem occurs constantly. The problem has been unchanged. The patient is experiencing no pain. She is not pregnant. Pertinent negatives include no abdominal pain, anorexia, back pain, chills, constipation, diarrhea, dysuria, fever, flank pain,  frequency, headaches or rash. Nothing aggravates the symptoms. She has tried nothing for the symptoms. The treatment provided no relief. She is not sexually active. No, her partner does not have an STD. She is postmenopausal.       Past Medical History:   Diagnosis Date    Allergy     aspirin itching    Cancer     Diabetes mellitus, type 2     H/O colon cancer, stage III 1/1/2013    [facility] in [facility]    Malignant neoplasm of transverse colon (2013) - Stage III 4/4/2019    Osteoporosis     S/P laparoscopic colectomy (3/2013) 4/5/2019    Seasonal allergies      Social History     Socioeconomic History    Marital status:      Spouse name: Luis West    Number of children: 3    Years of education: Not on file    Highest education level: Not on file   Occupational History    Occupation:    Social Needs    Financial resource strain: Not on file    Food insecurity:     Worry: Not on file     Inability: Not on file    Transportation needs:     Medical: Not on file     Non-medical: Not on file   Tobacco Use    Smoking status: Never Smoker    Smokeless tobacco: Never Used   Substance and Sexual Activity    Alcohol use: No    Drug use: No    Sexual activity: Not Currently   Lifestyle    Physical activity:     Days per week: Not on file     Minutes per session: Not on file    Stress: Not at all   Relationships    Social connections:     Talks on phone: Not on file     Gets together: Not on file     Attends Church service: Not on file     Active member of club or organization: Not on file     Attends meetings of clubs or organizations: Not on file     Relationship status: Not on file   Other Topics Concern    Not on file   Social History Narrative    Speaks [place] only     Past Surgical History:   Procedure Laterality Date    COLON SURGERY      JOINT REPLACEMENT       Family History   Problem Relation Age of Onset    Stroke Mother     Heart disease Mother     Cancer  "Father         [place]       Review of Systems   Constitutional: Positive for fatigue. Negative for activity change, chills, fever and unexpected weight change (lost 1 lb).   HENT: Negative for congestion, postnasal drip, sinus pressure and sneezing.         Uses Flonase for sinuses.   Eyes: Negative for pain, discharge and visual disturbance (Cataract).   Respiratory: Negative for cough, chest tightness, shortness of breath, wheezing and stridor.    Cardiovascular: Negative for chest pain, palpitations and leg swelling.   Gastrointestinal: Positive for change in bowel habit (constipation). Negative for abdominal distention, abdominal pain, anal bleeding, anorexia, constipation and diarrhea.        GERD occasional use of pantoprazole.   Endocrine: Negative for polydipsia, polyphagia and polyuria.        Type 2 diabetes mellitus on combination of metformin and Tradjenta. Also has osteoporosis.    Low sugar and eats extra food at night   Genitourinary: Negative for decreased urine volume, difficulty urinating, dysuria, flank pain, frequency, vaginal bleeding, vaginal discharge and vaginal pain.        Vaginal /genital itching   Musculoskeletal: Positive for myalgias. Negative for arthralgias, back pain, joint swelling and neck pain.        Muscle aches, thighs, calfs, arms   Skin: Negative for color change, pallor and rash.   Allergic/Immunologic: Negative for environmental allergies, food allergies and immunocompromised state.   Neurological: Negative for dizziness, tremors, seizures, syncope, light-headedness and headaches.   Hematological: Negative for adenopathy. Does not bruise/bleed easily.   Psychiatric/Behavioral: Negative for agitation, confusion and dysphoric mood. The patient is not nervous/anxious.         Somewhat anxious about her health issues and tends to over utilize. (Every ill needs a pill)         Objective:      Blood pressure 139/80, pulse 83, temperature 98.2 °F (36.8 °C), height 4' 9" (1.448 " m), weight 61.7 kg (136 lb). Body mass index is 29.43 kg/m².  Physical Exam   Constitutional: She appears well-developed and well-nourished. She is cooperative. No distress.   HENT:   Head: Normocephalic and atraumatic.   Nose: No mucosal edema, sinus tenderness or nasal deformity. Right sinus exhibits no maxillary sinus tenderness and no frontal sinus tenderness. Left sinus exhibits no maxillary sinus tenderness and no frontal sinus tenderness.   Eyes: Pupils are equal, round, and reactive to light. Conjunctivae, EOM and lids are normal. Lids are everted and swept, no foreign bodies found. Right pupil is round and reactive. Left pupil is round and reactive.   Neck: Trachea normal and normal range of motion. Neck supple.   Cardiovascular: Normal rate, regular rhythm, S1 normal, S2 normal and normal heart sounds.   Pulmonary/Chest: Breath sounds normal.   Abdominal: Soft. Bowel sounds are normal. There is no rigidity and no guarding.   Musculoskeletal: She exhibits no edema, tenderness or deformity.        Right foot: There is normal range of motion and no deformity.        Left foot: There is normal range of motion and no deformity.   Feet:   Right Foot:   Protective Sensation: 4 sites tested. 4 sites sensed.   Skin Integrity: Negative for ulcer, blister or skin breakdown.   Left Foot:   Protective Sensation: 4 sites tested. 4 sites sensed.   Skin Integrity: Negative for ulcer, blister or skin breakdown.   Lymphadenopathy:     She has no cervical adenopathy.     She has no axillary adenopathy.   Neurological: She is alert. Coordination normal.   Skin: Skin is warm and dry.   Psychiatric: Her behavior is normal.   Nursing note and vitals reviewed.        Assessment:       1. Type 2 diabetes mellitus without complication, without long-term current use of insulin    2. Essential hypertension    3. Gastroesophageal reflux disease without esophagitis    4. Mixed hyperlipidemia    5. Vaginal itching           Lab Visit on  05/19/2020   Component Date Value Ref Range Status    Hemoglobin A1C 05/19/2020 6.8* 4.5 - 6.2 % Final    Estimated Avg Glucose 05/19/2020 148* 68 - 131 mg/dL Final    Cholesterol 05/19/2020 111* 120 - 199 mg/dL Final    Triglycerides 05/19/2020 114  30 - 150 mg/dL Final    HDL 05/19/2020 43  40 - 75 mg/dL Final    LDL Cholesterol 05/19/2020 45.2* 63.0 - 159.0 mg/dL Final    Hdl/Cholesterol Ratio 05/19/2020 38.7  20.0 - 50.0 % Final    Total Cholesterol/HDL Ratio 05/19/2020 2.6  2.0 - 5.0 Final    Non-HDL Cholesterol 05/19/2020 68  mg/dL Final    Sodium 05/19/2020 136  136 - 145 mmol/L Final    Potassium 05/19/2020 4.6  3.5 - 5.1 mmol/L Final    Chloride 05/19/2020 105  95 - 110 mmol/L Final    CO2 05/19/2020 23  23 - 29 mmol/L Final    Glucose 05/19/2020 158* 70 - 110 mg/dL Final    BUN, Bld 05/19/2020 18  8 - 23 mg/dL Final    Creatinine 05/19/2020 1.1  0.5 - 1.4 mg/dL Final    Calcium 05/19/2020 9.2  8.7 - 10.5 mg/dL Final    Total Protein 05/19/2020 7.1  6.0 - 8.4 g/dL Final    Albumin 05/19/2020 3.9  3.5 - 5.2 g/dL Final    Total Bilirubin 05/19/2020 0.7  0.1 - 1.0 mg/dL Final    Alkaline Phosphatase 05/19/2020 57  55 - 135 U/L Final    AST 05/19/2020 17  10 - 40 U/L Final    ALT 05/19/2020 19  10 - 44 U/L Final    Anion Gap 05/19/2020 8  8 - 16 mmol/L Final    eGFR if  05/19/2020 59.6* >60 mL/min/1.73 m^2 Final    eGFR if non African American 05/19/2020 51.7* >60 mL/min/1.73 m^2 Final    Microalbum.,U,Random 05/19/2020 2.5  ug/mL Final    Creatinine, Random Ur 05/19/2020 65.0  15.0 - 325.0 mg/dL Final    Microalb Creat Ratio 05/19/2020 3.8  0.0 - 30.0 ug/mg Final         Plan:           Type 2 diabetes mellitus without complication, without long-term current use of insulin  -     Hemoglobin A1C; Future; Expected date: 08/20/2020    Essential hypertension    Gastroesophageal reflux disease without esophagitis    Mixed hyperlipidemia    Vaginal itching  -      clotrimazole-betamethasone 1-0.05% (LOTRISONE) cream; Apply topically 2 (two) times daily.  Dispense: 15 g; Refill: 1     Patient's multiple medical issues chronic and new have been reviewed.  Myalgias and muscle aches seems to be the main issue.  Stop   crestor for 2 months/ statin holiday.   will let me know in few weeks time as to how she is doing.    OTC products like Vagisil not working    Lotrsione for Vaginal itching.    Advised Ms. West to monitor Blood sugars at home and record them.  Exercise, watch diet and loose weight.  keep a close eye on feet and keep them clean. Annual eye examination. Annual influenza vaccine.  Monitor HgbA1c every 3 to 6 months. Monitor urine microalbumin every year.keep LDL less than 100. Monitor blood pressure and target blood pressure 120/70.    Please utilize precautions for current COVID-19 pandemic.  Try to avoid crowds or close contact with multiple people.  Minimize outside interaction.  Wash hands with soap for  frequently upon contact.Use face mask or cover.  Advised Ms. West about age and season appropriate immunizations/ cancer screenings.  Also seasonal influenza vaccine, update on       Fup 3 months    Spent charmaine 25 minutes with patient which involved review of pts medical conditions, labs, medications and with 50% of time face-to-face discussion about medical problems, management and any applicable changes.    Current Outpatient Medications:     alendronate (FOSAMAX) 70 MG tablet, Take 1 tablet (70 mg total) by mouth every 7 days., Disp: 13 tablet, Rfl: 2    blood sugar diagnostic (TRUE METRIX GLUCOSE TEST STRIP) Strp, USE 1 STRIP TO CHECK GLUCOSE TWICE DAILY, Disp: 200 strip, Rfl: 3    blood-glucose meter kit, One touch meter and matching lancets and strips., Disp: 1 each, Rfl: 0    cyanocobalamin 1,000 mcg/mL injection, Inject 1 mL (1,000 mcg total) into the muscle every 30 days., Disp: 1 mL, Rfl: 11    fluticasone (FLONASE) 50 mcg/actuation  nasal spray, 1 spray (50 mcg total) by Each Nare route once daily., Disp: 1 Bottle, Rfl: 4    irbesartan-hydrochlorothiazide (AVALIDE) 300-12.5 mg per tablet, Take 1 tablet by mouth once daily., Disp: 90 tablet, Rfl: 3    lancets (LANCETS,ULTRA THIN) 26 gauge Misc, 1 lancet by Misc.(Non-Drug; Combo Route) route once daily., Disp: 100 each, Rfl: 0    metFORMIN (GLUCOPHAGE) 1000 MG tablet, TAKE 1 TABLET BY MOUTH TWICE DAILY WITH MEALS, Disp: 180 tablet, Rfl: 1    pantoprazole (PROTONIX) 40 MG tablet, Take 1 tablet (40 mg total) by mouth once daily., Disp: 90 tablet, Rfl: 2    rosuvastatin (CRESTOR) 5 MG tablet, TAKE ONE TABLET BY MOUTH ON MONDAY, WEDNESDAY, AND FRIDAY, Disp: 36 tablet, Rfl: 0    TRADJENTA 5 mg Tab tablet, TAKE 1 TABLET BY MOUTH ONCE DAILY, Disp: 90 tablet, Rfl: 2    clotrimazole-betamethasone 1-0.05% (LOTRISONE) cream, Apply topically 2 (two) times daily., Disp: 15 g, Rfl: 1

## 2020-05-20 NOTE — PATIENT INSTRUCTIONS
Diabetes: Activity Tips    Being more active can help you manage your diabetes. The tips on this sheet can help you get the most from your exercise. They can also help you stay safe.  Staying Active  Its important for adults to spend less time sitting and being inactive. This is especially true if you have type 2 diabetes. When you are sitting for long periods of time, get up for short sessions of light activity every 30 minutes.  You should aim for at least 150 minutes a week of exercise or physical activity. Dont let more than 2 days go by without being active.  Benefit from briskness  Brisk activity gets your heart beating faster. This can help you increase your fitness, lose extra weight, and manage your blood sugar level. Try brisk walking. Or, if you have foot or leg problems, you can try swimming or bike riding. You can break up your exercise into chunks throughout the day. Work up to at least 30 minutes of steady, brisk exercise on most days.  Warm up and cool down  Warming up and cooling down reduce your risk of injury. They also help limit muscle soreness. Do a mild version of your activity for 5 minutes before and after your routine. You can also learn stretches that will help keep your muscles loose. Your healthcare provider may show you good ways to warm up and stretch.  Do the talk-sing test  The talk-sing test is a simple way to tell how hard youre exercising. If you can talk while exercising, youre in a safe range. If youre out of breath, slow down. If you can carry a tune, its time to  the pace. Walk up a hill. Increase the resistance on your stationary bike. Or swim faster.  What about eating?  You may be told to plan your exercise for 1 to 2 hours after a meal. In most cases, you dont need to eat while being active. If you take insulin or medicine that can cause low blood sugar, test your blood sugar before exercising. And carry a fast-acting sugar that will raise your blood sugar  level quickly. This includes glucose tablets or hard candy. Use it if you feel low blood sugar symptoms.  Safety tips  These tips can help you stay safe as you become fit:  · Exercise with a friend or carry a cell phone if you have one.  · Carry or wear identification, such as a necklace or bracelet, that says you have diabetes.  · Use the proper footwear and safety equipment for your activity.  · Drink water before, during, and after exercise.  · Dress properly for the weather.  · Dont exercise in very hot or very cold weather.  · Dont exercise if you are sick.  · If you are instructed to do so, test your blood sugar before and after you exercise. Have a small carbohydrate snack if your blood sugar is low before you start exercising.   When to stop exercising and call your healthcare provider  Stop exercising and call your healthcare provider right away if you notice any of the following:  · Pain, pressure, tightness, or heaviness in the chest  · Pain or heaviness in the neck, shoulders, back, arms, legs, or feet  · Unusual shortness of breath  · Dizziness or lightheadedness  · Unusually rapid or slow pulse  · Increased joint or muscle pain  · Nausea or vomiting  Date Last Reviewed: 5/1/2016  © 1259-4633 [employer]. [address] [place] [address], [place] [address]. All rights reserved. This information is not intended as a substitute for professional medical care. Always follow your healthcare professional's instructions.        Constipation (Adult)  Constipation means that you have bowel movements that are less frequent than usual. Stools often become very hard and difficult to pass.  Constipation is very common. At some point in life it affects almost everyone. Since everyone's bowel habits are different, what is constipation to one person may not be to another. Your healthcare provider may do tests to diagnose constipation. It depends on what he or she finds when evaluating you.    Symptoms of  constipation include:  · Abdominal pain  · Bloating  · Vomiting  · Painful bowel movements  · Itching, swelling, bleeding, or pain around the anus  Causes  Constipation can have many causes. These include:  · Diet low in fiber  · Too much dairy  · Not drinking enough liquids  · Lack of exercise or physical activity. This is especially true for older adults.  · Changes in lifestyle or daily routine, including pregnancy, aging, work, and travel  · Frequent use or misuse of laxatives  · Ignoring the urge to have a bowel movement or delaying it until later  · Medicines, such as certain prescription pain medicines, iron supplements, antacids, certain antidepressants, and calcium supplements  · Diseases like irritable bowel syndrome, bowel obstructions, stroke, diabetes, thyroid disease, Parkinson disease, hemorrhoids, and colon cancer  Complications  Potential complications of constipation can include:  · Hemorrhoids  · Rectal bleeding from hemorrhoids or anal fissures (skin tears)  · Hernias  · Dependency on laxatives  · Chronic constipation  · Fecal impaction  · Bowel obstruction or perforation  Home care  All treatment should be done after talking with your healthcare provider. This is especially true if you have another medical problems, are taking prescription medicines, or are an older adult. Treatment most often involves lifestyle changes. You may also need medicines. Your healthcare provider will tell you which will work best for you. Follow the advice below to help avoid this problem in the future.  Lifestyle changes  These lifestyle changes can help prevent constipation:  · Diet. Eat a high-fiber diet, with fresh fruit and vegetables, and reduce dairy intake, meats, and processed foods  · Fluids. It's important to get enough fluids each day. Drink plenty of water when you eat more fiber. If you are on diet that limits the amount of fluid you can have, talk about this with your healthcare provider.  · Regular  exercise. Check with your healthcare provider first.  Medications  Take any medicines as directed. Some laxatives are safe to use only every now and then. Others can be taken on a regular basis. Talk with your doctor or pharmacist if you have questions.  Prescription pain medicines can cause constipation. If you are taking this kind of medicine, ask your healthcare provider if you should also take a stool softener.  Medicines you may take to treat constipation include:  · Fiber supplements  · Stool softeners  · Laxatives  · Enemas  · Rectal suppositories  Follow-up care  Follow up with your healthcare provider if symptoms don't get better in the next few days. You may need to have more tests or see a specialist.  Call 911  Call 911 if any of these occur:  · Trouble breathing  · Stiff, rigid abdomen that is severely painful to touch  · Confusion  · Fainting or loss of consciousness  · Rapid heart rate  · Chest pain  When to seek medical advice  Call your healthcare provider right away if any of these occur:  · Fever over 100.4°F (38°C)  · Failure to resume normal bowel movements  · Pain in your abdomen or back gets worse  · Nausea or vomiting  · Swelling in your abdomen  · Blood in the stool  · Black, tarry stool  · Involuntary weight loss  · Weakness  Date Last Reviewed: 12/30/2015  © 6709-3171 [employer]. [address] [place] [address], [place] [address]. All rights reserved. This information is not intended as a substitute for professional medical care. Always follow your healthcare professional's instructions.        Constipation (Adult)  Constipation means that you have bowel movements that are less frequent than usual. Stools often become very hard and difficult to pass.  Constipation is very common. At some point in life it affects almost everyone. Since everyone's bowel habits are different, what is constipation to one person may not be to another. Your healthcare provider may do tests to diagnose  constipation. It depends on what he or she finds when evaluating you.    Symptoms of constipation include:  · Abdominal pain  · Bloating  · Vomiting  · Painful bowel movements  · Itching, swelling, bleeding, or pain around the anus  Causes  Constipation can have many causes. These include:  · Diet low in fiber  · Too much dairy  · Not drinking enough liquids  · Lack of exercise or physical activity. This is especially true for older adults.  · Changes in lifestyle or daily routine, including pregnancy, aging, work, and travel  · Frequent use or misuse of laxatives  · Ignoring the urge to have a bowel movement or delaying it until later  · Medicines, such as certain prescription pain medicines, iron supplements, antacids, certain antidepressants, and calcium supplements  · Diseases like irritable bowel syndrome, bowel obstructions, stroke, diabetes, thyroid disease, Parkinson disease, hemorrhoids, and colon cancer  Complications  Potential complications of constipation can include:  · Hemorrhoids  · Rectal bleeding from hemorrhoids or anal fissures (skin tears)  · Hernias  · Dependency on laxatives  · Chronic constipation  · Fecal impaction  · Bowel obstruction or perforation  Home care  All treatment should be done after talking with your healthcare provider. This is especially true if you have another medical problems, are taking prescription medicines, or are an older adult. Treatment most often involves lifestyle changes. You may also need medicines. Your healthcare provider will tell you which will work best for you. Follow the advice below to help avoid this problem in the future.  Lifestyle changes  These lifestyle changes can help prevent constipation:  · Diet. Eat a high-fiber diet, with fresh fruit and vegetables, and reduce dairy intake, meats, and processed foods  · Fluids. It's important to get enough fluids each day. Drink plenty of water when you eat more fiber. If you are on diet that limits the  amount of fluid you can have, talk about this with your healthcare provider.  · Regular exercise. Check with your healthcare provider first.  Medications  Take any medicines as directed. Some laxatives are safe to use only every now and then. Others can be taken on a regular basis. Talk with your doctor or pharmacist if you have questions.  Prescription pain medicines can cause constipation. If you are taking this kind of medicine, ask your healthcare provider if you should also take a stool softener.  Medicines you may take to treat constipation include:  · Fiber supplements  · Stool softeners  · Laxatives  · Enemas  · Rectal suppositories  Follow-up care  Follow up with your healthcare provider if symptoms don't get better in the next few days. You may need to have more tests or see a specialist.  Call 911  Call 911 if any of these occur:  · Trouble breathing  · Stiff, rigid abdomen that is severely painful to touch  · Confusion  · Fainting or loss of consciousness  · Rapid heart rate  · Chest pain  When to seek medical advice  Call your healthcare provider right away if any of these occur:  · Fever over 100.4°F (38°C)  · Failure to resume normal bowel movements  · Pain in your abdomen or back gets worse  · Nausea or vomiting  · Swelling in your abdomen  · Blood in the stool  · Black, tarry stool  · Involuntary weight loss  · Weakness  Date Last Reviewed: 12/30/2015  © 6286-5122 [employer]. [address] [place] [address], [place] [address]. All rights reserved. This information is not intended as a substitute for professional medical care. Always follow your healthcare professional's instructions.

## 2020-06-22 ENCOUNTER — DOCUMENTATION ONLY (OUTPATIENT)
Dept: PHARMACY | Facility: CLINIC | Age: 70
End: 2020-06-22

## 2020-06-22 NOTE — PROGRESS NOTES
Administered 1,000 mcg of B12 into the right deltoid muscle on 6/22/2020 at 12pm. Patient tolerated injection well.     LOT [id number]  EXP 2/2022    Administered by: Gaviota PatiñoD

## 2020-06-24 NOTE — PROGRESS NOTES
[facility] Hematology/Oncology  PROGRESS NOTE - Follow-up Visit      Subjective:       Patient ID:   NAME: Preet West : 1950     70 y.o. female    Referring Doc: Hetal  Other Physicians: aaron    Chief Complaint:  Colon ca f/u    History of Present Illness:     Patient returns today for a regularly scheduled follow-up visit.  The patient is here today to go over the results of the recently ordered labs, tests and studies. She is here with her . She reports that she saw a GI specialist Dr Adkins and had scopes in May 2019 which they report were negative.      She denies any CP, SOB, HA's ir N/V. Her bowels are moving adequately.   She has a lot of fatigue. She has problems with sleeping at night. She denies having a snoring problem    She saw Dr Fong on 2020    Discussed covid19 precautions            ROS:   GEN: normal without any fever, night sweats or weight loss; positive fatigue  HEENT: normal with no HA's, sore throat, stiff neck, changes in vision  CV: normal with no CP, SOB, PND, FISHER or orthopnea  PULM: normal with no SOB, cough, hemoptysis, sputum or pleuritic pain  GI: normal with no abdominal pain, nausea, vomiting, constipation, diarrhea, melanotic stools, BRBPR, or hematemesis  : normal with no hematuria, dysuria  BREAST: normal with no mass, discharge, pain  SKIN: normal with no rash, erythema, bruising, or swelling    Allergies:  Review of patient's allergies indicates:   Allergen Reactions    Aspirin Itching       Medications:    Current Outpatient Medications:     alendronate (FOSAMAX) 70 MG tablet, Take 1 tablet (70 mg total) by mouth every 7 days., Disp: 13 tablet, Rfl: 2    blood sugar diagnostic (TRUE METRIX GLUCOSE TEST STRIP) Strp, USE 1 STRIP TO CHECK GLUCOSE TWICE DAILY, Disp: 200 strip, Rfl: 3    cyanocobalamin 1,000 mcg/mL injection, Inject 1 mL (1,000 mcg total) into the muscle every 30 days., Disp: 1 mL, Rfl: 11    fluticasone (FLONASE) 50 mcg/actuation  nasal spray, 1 spray (50 mcg total) by Each Nare route once daily., Disp: 1 Bottle, Rfl: 4    irbesartan-hydrochlorothiazide (AVALIDE) 300-12.5 mg per tablet, Take 1 tablet by mouth once daily., Disp: 90 tablet, Rfl: 3    lancets (LANCETS,ULTRA THIN) 26 gauge Misc, 1 lancet by Misc.(Non-Drug; Combo Route) route once daily., Disp: 100 each, Rfl: 0    metFORMIN (GLUCOPHAGE) 1000 MG tablet, TAKE 1 TABLET BY MOUTH TWICE DAILY WITH MEALS, Disp: 180 tablet, Rfl: 1    rosuvastatin (CRESTOR) 5 MG tablet, TAKE ONE TABLET BY MOUTH ON MONDAY, WEDNESDAY, AND FRIDAY, Disp: 36 tablet, Rfl: 0    TRADJENTA 5 mg Tab tablet, TAKE 1 TABLET BY MOUTH ONCE DAILY, Disp: 90 tablet, Rfl: 2    blood-glucose meter kit, One touch meter and matching lancets and strips., Disp: 1 each, Rfl: 0    clotrimazole-betamethasone 1-0.05% (LOTRISONE) cream, Apply topically 2 (two) times daily. (Patient not taking: Reported on 6/25/2020), Disp: 15 g, Rfl: 1    pantoprazole (PROTONIX) 40 MG tablet, Take 1 tablet (40 mg total) by mouth once daily. (Patient not taking: Reported on 6/25/2020), Disp: 90 tablet, Rfl: 2    PMHx/PSHx Updates:  See patient's last visit with me on 10/15/2019.  See H&P on 5/12/2019        Pathology:  Cancer Staging    Colectomy -  3/12/2013:  - low grade adenocarcinoma with invasion of the pericolonic adipose tissue and johnny metastasis  - 5.3cm size tumor  - T3 N1a (1 out of 12 LN's were positive)  - histologic freatures of MSI present                Objective:     Vitals:  Blood pressure 133/80, pulse 84, temperature 98.2 °F (36.8 °C), resp. rate 18, weight 61.8 kg (136 lb 4.8 oz).    Physical Examination:   GEN: no apparent distress, comfortable; AAOx3  HEAD: atraumatic and normocephalic  EYES: no pallor, no icterus, PERRLA  ENT: OMM, no pharyngeal erythema, external ears WNL; no nasal discharge; no thrush  NECK: no masses, thyroid normal, trachea midline, no LAD/LN's, supple  CV: RRR with no murmur; normal pulse; normal  S1 and S2; no pedal edema  CHEST: Normal respiratory effort; CTAB; normal breath sounds; no wheeze or crackles  ABDOM: nontender and nondistended; soft; normal bowel sounds; no rebound/guarding  MUSC/Skeletal:  prior right knee surgery; arthropathy  EXTREM: no clubbing, cyanosis, inflammation or swelling  SKIN: no rashes, lesions, ulcers, petechiae or subcutaneous nodules  : no rocha  NEURO: grossly intact; motor/sensory WNL; AAOx3; no tremors  PSYCH: normal mood, affect and behavior  LYMPH: normal cervical, supraclavicular, axillary and groin LN's            Labs:     No recent labs    6/12/2019  Lab Results   Component Value Date    WBC 7.2 06/12/2019    HGB 10.3 (L) 06/12/2019    HCT 33.2 (L) 06/12/2019    MCV 90.0 06/12/2019     06/12/2019     BMP  Lab Results   Component Value Date     05/19/2020    K 4.6 05/19/2020     05/19/2020    CO2 23 05/19/2020    BUN 18 05/19/2020    CREATININE 1.1 05/19/2020    CALCIUM 9.2 05/19/2020    ANIONGAP 8 05/19/2020    ESTGFRAFRICA 59.6 (A) 05/19/2020    EGFRNONAA 51.7 (A) 05/19/2020 5/9/2019:  Lab Results   Component Value Date    IRON 45 05/09/2019    TIBC 299 05/09/2019    FERRITIN 170 (H) 05/09/2019       Lab Results   Component Value Date    CEA 2.4 05/09/2019           Radiology/Diagnostic Studies:    CT Abdom/pelvis: 5/9/2019  IMPRESSION:  Postsurgical changes of the mid transverse colon without evidence of recurrent  or metastatic disease    Prior hysterectomy        X-ray Chest Pa And Lateral    Result Date: 5/9/2019  MDI CHEST, 2 VIEWS XRAY Indication: Personal history of other malignant neoplasm of large intestine. Comparison: June 27, 2018 Findings: Cardiomediastinal silhouette is within normal limits. There is no confluent airspace disease. There is no pleural effusion or pneumothorax. No acute osseous abnormality.     IMPRESSION: No acute pulmonary process. Read and electronically signed by: Richard Frost MD on 5/9/2019 9:23 AM CDT  RENETTA MCMULLEN MD      I have reviewed all available lab results and radiology reports.    Assessment/Plan:   (1) 70 y.o. female with diagnosis of colon cancer who has been referred by Pal Fong MD for evaluation by medical hematology/oncology.   - diagnosed in Jan 2013  - s/p transverse colon resection 3/12/2013 followed by adjuvant chemotherapy with FOLFOX for 6 months in [place], [place]  - T3 N1a - 1 LN was positive  - Dr Sofia West was her oncologist in [place]  - CEA latest was 2.4  - she had a follow-up colonoscopy in Apr 2017  - latest CXR was negative; - CT scans in may 2019 were stable  - she saw Dr Adkins with GI since last visit and had scopes in MAy 2019     (2) HTN and hypercholesterolemia     (3) DM II     (4) Osteoporosis     (5) OA    (6) B12 deficiency          VISIT DIAGNOSES:      Malignant neoplasm of transverse colon (2013) - Stage III    Mild anemia    Vitamin B 12 deficiency    S/P laparoscopic colectomy (3/2013)          PLAN:  1. Check up to date labs incl. CEA  2. Encouraged compliance with referrals, studies and labs  3. F/u with GI as directed by them  4. F/u with PCP  RTC in 6 months  Fax note to   Pal Fong MD,  [place]    Discussion:     COVID-19 Discussion:    I had long discussion with patient and any applicable family about the COVID-19 coronavirus epidemic and the recommended precautions with regard to cancer and/or hematology patients. I have re-iterated the CDC recommendations for adequate hand washing, use of hand -like products, and coughing into elbow, etc. In addition, especially for our patients who are on chemotherapy and/or our otherwise immunocompromised patients, I have recommended avoidance of crowds, including movie theaters, restaurants, churches, etc. I have recommended avoidance of any sick or symptomatic family members and/or friends. I have also recommended avoidance of any raw and unwashed food products, and general avoidance of food items  that have not been prepared by themselves. The patient has been asked to call us immediately with any symptom developments, issues, questions or other general concerns.       I spent over 25 mins of time with the patient. Reviewed results of the recently ordered labs, tests and studies; made directives with regards to the results. Over half of this time was spent couseling and coordinating care.    I have explained all of the above in detail and the patient understands all of the current recommendation(s). I have answered all of their questions to the best of my ability and to their complete satisfaction.   The patient is to continue with the current management plan.            Electronically signed by Clemente Mi MD          Answers for HPI/ROS submitted by the patient on 6/23/2020   appetite change : No  unexpected weight change: No  visual disturbance: No  cough: No  shortness of breath: No  chest pain: No  abdominal pain: No  diarrhea: No  frequency: No  back pain: No  rash: No  headaches: No  adenopathy: No  nervous/ anxious: No

## 2020-06-25 ENCOUNTER — LAB VISIT (OUTPATIENT)
Dept: LAB | Facility: HOSPITAL | Age: 70
End: 2020-06-25
Attending: INTERNAL MEDICINE
Payer: MEDICARE

## 2020-06-25 ENCOUNTER — OFFICE VISIT (OUTPATIENT)
Dept: HEMATOLOGY/ONCOLOGY | Facility: CLINIC | Age: 70
End: 2020-06-25
Payer: MEDICARE

## 2020-06-25 VITALS
SYSTOLIC BLOOD PRESSURE: 133 MMHG | BODY MASS INDEX: 29.5 KG/M2 | DIASTOLIC BLOOD PRESSURE: 80 MMHG | HEART RATE: 84 BPM | WEIGHT: 136.31 LBS | TEMPERATURE: 98 F | RESPIRATION RATE: 18 BRPM

## 2020-06-25 DIAGNOSIS — Z90.49 S/P LAPAROSCOPIC COLECTOMY: ICD-10-CM

## 2020-06-25 DIAGNOSIS — C18.4 MALIGNANT NEOPLASM OF TRANSVERSE COLON: ICD-10-CM

## 2020-06-25 DIAGNOSIS — E53.8 VITAMIN B 12 DEFICIENCY: ICD-10-CM

## 2020-06-25 DIAGNOSIS — D50.9 IRON DEFICIENCY ANEMIA, UNSPECIFIED IRON DEFICIENCY ANEMIA TYPE: Primary | ICD-10-CM

## 2020-06-25 DIAGNOSIS — D64.9 MILD ANEMIA: ICD-10-CM

## 2020-06-25 DIAGNOSIS — C18.4 MALIGNANT NEOPLASM OF TRANSVERSE COLON: Primary | ICD-10-CM

## 2020-06-25 LAB
ALBUMIN SERPL BCP-MCNC: 4.1 G/DL (ref 3.5–5.2)
ALP SERPL-CCNC: 67 U/L (ref 55–135)
ALT SERPL W/O P-5'-P-CCNC: 20 U/L (ref 10–44)
ANION GAP SERPL CALC-SCNC: 12 MMOL/L (ref 8–16)
AST SERPL-CCNC: 19 U/L (ref 10–40)
BASOPHILS # BLD AUTO: 0.03 K/UL (ref 0–0.2)
BASOPHILS NFR BLD: 0.4 % (ref 0–1.9)
BILIRUB SERPL-MCNC: 0.6 MG/DL (ref 0.1–1)
BUN SERPL-MCNC: 18 MG/DL (ref 8–23)
CALCIUM SERPL-MCNC: 9.3 MG/DL (ref 8.7–10.5)
CEA SERPL-MCNC: 2.3 NG/ML (ref 0–5)
CHLORIDE SERPL-SCNC: 100 MMOL/L (ref 95–110)
CO2 SERPL-SCNC: 23 MMOL/L (ref 23–29)
CREAT SERPL-MCNC: 0.9 MG/DL (ref 0.5–1.4)
DIFFERENTIAL METHOD: ABNORMAL
EOSINOPHIL # BLD AUTO: 0.3 K/UL (ref 0–0.5)
EOSINOPHIL NFR BLD: 3.4 % (ref 0–8)
ERYTHROCYTE [DISTWIDTH] IN BLOOD BY AUTOMATED COUNT: 13.7 % (ref 11.5–14.5)
EST. GFR  (AFRICAN AMERICAN): >60 ML/MIN/1.73 M^2
EST. GFR  (NON AFRICAN AMERICAN): >60 ML/MIN/1.73 M^2
FERRITIN SERPL-MCNC: 116 NG/ML (ref 20–300)
FOLATE SERPL-MCNC: 12.4 NG/ML (ref 4–24)
GLUCOSE SERPL-MCNC: 143 MG/DL (ref 70–110)
HCT VFR BLD AUTO: 33.6 % (ref 37–48.5)
HGB BLD-MCNC: 10.8 G/DL (ref 12–16)
IMM GRANULOCYTES # BLD AUTO: 0.04 K/UL (ref 0–0.04)
IMM GRANULOCYTES NFR BLD AUTO: 0.5 % (ref 0–0.5)
IRON SERPL-MCNC: 39 UG/DL (ref 30–160)
LYMPHOCYTES # BLD AUTO: 1.9 K/UL (ref 1–4.8)
LYMPHOCYTES NFR BLD: 23.5 % (ref 18–48)
MCH RBC QN AUTO: 27.6 PG (ref 27–31)
MCHC RBC AUTO-ENTMCNC: 32.1 G/DL (ref 32–36)
MCV RBC AUTO: 86 FL (ref 82–98)
MONOCYTES # BLD AUTO: 0.6 K/UL (ref 0.3–1)
MONOCYTES NFR BLD: 7.5 % (ref 4–15)
NEUTROPHILS # BLD AUTO: 5.2 K/UL (ref 1.8–7.7)
NEUTROPHILS NFR BLD: 64.7 % (ref 38–73)
NRBC BLD-RTO: 0 /100 WBC
PLATELET # BLD AUTO: 216 K/UL (ref 150–350)
PMV BLD AUTO: 9.6 FL (ref 9.2–12.9)
POTASSIUM SERPL-SCNC: 4.5 MMOL/L (ref 3.5–5.1)
PROT SERPL-MCNC: 7.5 G/DL (ref 6–8.4)
RBC # BLD AUTO: 3.91 M/UL (ref 4–5.4)
SATURATED IRON: 12 % (ref 20–50)
SODIUM SERPL-SCNC: 135 MMOL/L (ref 136–145)
TOTAL IRON BINDING CAPACITY: 322 UG/DL (ref 250–450)
TRANSFERRIN SERPL-MCNC: 230 MG/DL (ref 200–375)
VIT B12 SERPL-MCNC: >1500 PG/ML (ref 210–950)
WBC # BLD AUTO: 7.96 K/UL (ref 3.9–12.7)

## 2020-06-25 PROCEDURE — 1125F AMNT PAIN NOTED PAIN PRSNT: CPT | Mod: S$GLB,,, | Performed by: INTERNAL MEDICINE

## 2020-06-25 PROCEDURE — 1101F PT FALLS ASSESS-DOCD LE1/YR: CPT | Mod: S$GLB,,, | Performed by: INTERNAL MEDICINE

## 2020-06-25 PROCEDURE — 85025 COMPLETE CBC W/AUTO DIFF WBC: CPT

## 2020-06-25 PROCEDURE — 1125F PR PAIN SEVERITY QUANTIFIED, PAIN PRESENT: ICD-10-PCS | Mod: S$GLB,,, | Performed by: INTERNAL MEDICINE

## 2020-06-25 PROCEDURE — 36415 COLL VENOUS BLD VENIPUNCTURE: CPT

## 2020-06-25 PROCEDURE — 3008F BODY MASS INDEX DOCD: CPT | Mod: S$GLB,,, | Performed by: INTERNAL MEDICINE

## 2020-06-25 PROCEDURE — 1159F PR MEDICATION LIST DOCUMENTED IN MEDICAL RECORD: ICD-10-PCS | Mod: S$GLB,,, | Performed by: INTERNAL MEDICINE

## 2020-06-25 PROCEDURE — 3079F PR MOST RECENT DIASTOLIC BLOOD PRESSURE 80-89 MM HG: ICD-10-PCS | Mod: S$GLB,,, | Performed by: INTERNAL MEDICINE

## 2020-06-25 PROCEDURE — 99214 PR OFFICE/OUTPT VISIT, EST, LEVL IV, 30-39 MIN: ICD-10-PCS | Mod: S$GLB,,, | Performed by: INTERNAL MEDICINE

## 2020-06-25 PROCEDURE — 83540 ASSAY OF IRON: CPT

## 2020-06-25 PROCEDURE — 3075F SYST BP GE 130 - 139MM HG: CPT | Mod: S$GLB,,, | Performed by: INTERNAL MEDICINE

## 2020-06-25 PROCEDURE — 80053 COMPREHEN METABOLIC PANEL: CPT

## 2020-06-25 PROCEDURE — 82746 ASSAY OF FOLIC ACID SERUM: CPT

## 2020-06-25 PROCEDURE — 82607 VITAMIN B-12: CPT

## 2020-06-25 PROCEDURE — 1101F PR PT FALLS ASSESS DOC 0-1 FALLS W/OUT INJ PAST YR: ICD-10-PCS | Mod: S$GLB,,, | Performed by: INTERNAL MEDICINE

## 2020-06-25 PROCEDURE — 82378 CARCINOEMBRYONIC ANTIGEN: CPT

## 2020-06-25 PROCEDURE — 1159F MED LIST DOCD IN RCRD: CPT | Mod: S$GLB,,, | Performed by: INTERNAL MEDICINE

## 2020-06-25 PROCEDURE — 3079F DIAST BP 80-89 MM HG: CPT | Mod: S$GLB,,, | Performed by: INTERNAL MEDICINE

## 2020-06-25 PROCEDURE — 82728 ASSAY OF FERRITIN: CPT

## 2020-06-25 PROCEDURE — 3008F PR BODY MASS INDEX (BMI) DOCUMENTED: ICD-10-PCS | Mod: S$GLB,,, | Performed by: INTERNAL MEDICINE

## 2020-06-25 PROCEDURE — 3075F PR MOST RECENT SYSTOLIC BLOOD PRESS GE 130-139MM HG: ICD-10-PCS | Mod: S$GLB,,, | Performed by: INTERNAL MEDICINE

## 2020-06-25 PROCEDURE — 99214 OFFICE O/P EST MOD 30 MIN: CPT | Mod: S$GLB,,, | Performed by: INTERNAL MEDICINE

## 2020-06-29 ENCOUNTER — TELEPHONE (OUTPATIENT)
Dept: FAMILY MEDICINE | Facility: CLINIC | Age: 70
End: 2020-06-29

## 2020-06-29 RX ORDER — ASCORBIC ACID 500 MG
500 TABLET ORAL
Qty: 37 TABLET | Refills: 1 | Status: SHIPPED | OUTPATIENT
Start: 2020-06-29 | End: 2020-07-07 | Stop reason: SINTOL

## 2020-06-29 RX ORDER — QUINIDINE GLUCONATE 324 MG
240 TABLET, EXTENDED RELEASE ORAL
Qty: 37 TABLET | Refills: 1 | Status: SHIPPED | OUTPATIENT
Start: 2020-06-29 | End: 2020-07-07 | Stop reason: SINTOL

## 2020-06-29 NOTE — TELEPHONE ENCOUNTER
Discussed slightly low normal serum iron and saturated iron levels.  Mild anemia noted.  Will give a trial of Fergon 3 times a week with vitamin-C 500 mg.  To be taken for approximately 6 months.  I notified patient's  who will communicate to the patient.  Sent a prescription to the pharmacy.

## 2020-07-07 ENCOUNTER — PATIENT MESSAGE (OUTPATIENT)
Dept: FAMILY MEDICINE | Facility: CLINIC | Age: 70
End: 2020-07-07

## 2020-07-07 ENCOUNTER — OFFICE VISIT (OUTPATIENT)
Dept: FAMILY MEDICINE | Facility: CLINIC | Age: 70
End: 2020-07-07
Payer: MEDICARE

## 2020-07-07 VITALS
WEIGHT: 140 LBS | HEIGHT: 57 IN | SYSTOLIC BLOOD PRESSURE: 139 MMHG | HEART RATE: 76 BPM | RESPIRATION RATE: 16 BRPM | DIASTOLIC BLOOD PRESSURE: 77 MMHG | BODY MASS INDEX: 30.2 KG/M2

## 2020-07-07 DIAGNOSIS — R07.89 CHEST HEAVINESS: Primary | ICD-10-CM

## 2020-07-07 DIAGNOSIS — D64.9 MILD ANEMIA: ICD-10-CM

## 2020-07-07 DIAGNOSIS — E11.9 TYPE 2 DIABETES MELLITUS WITHOUT COMPLICATION, WITHOUT LONG-TERM CURRENT USE OF INSULIN: Chronic | ICD-10-CM

## 2020-07-07 LAB — EKG 12-LEAD: NORMAL

## 2020-07-07 PROCEDURE — 1101F PT FALLS ASSESS-DOCD LE1/YR: CPT | Mod: S$GLB,,, | Performed by: INTERNAL MEDICINE

## 2020-07-07 PROCEDURE — 93000 POCT EKG 12-LEAD: ICD-10-PCS | Mod: S$GLB,,, | Performed by: INTERNAL MEDICINE

## 2020-07-07 PROCEDURE — 1159F PR MEDICATION LIST DOCUMENTED IN MEDICAL RECORD: ICD-10-PCS | Mod: S$GLB,,, | Performed by: INTERNAL MEDICINE

## 2020-07-07 PROCEDURE — 1125F PR PAIN SEVERITY QUANTIFIED, PAIN PRESENT: ICD-10-PCS | Mod: S$GLB,,, | Performed by: INTERNAL MEDICINE

## 2020-07-07 PROCEDURE — 3078F PR MOST RECENT DIASTOLIC BLOOD PRESSURE < 80 MM HG: ICD-10-PCS | Mod: S$GLB,,, | Performed by: INTERNAL MEDICINE

## 2020-07-07 PROCEDURE — 3008F PR BODY MASS INDEX (BMI) DOCUMENTED: ICD-10-PCS | Mod: S$GLB,,, | Performed by: INTERNAL MEDICINE

## 2020-07-07 PROCEDURE — 3008F BODY MASS INDEX DOCD: CPT | Mod: S$GLB,,, | Performed by: INTERNAL MEDICINE

## 2020-07-07 PROCEDURE — 3075F SYST BP GE 130 - 139MM HG: CPT | Mod: S$GLB,,, | Performed by: INTERNAL MEDICINE

## 2020-07-07 PROCEDURE — 99214 PR OFFICE/OUTPT VISIT, EST, LEVL IV, 30-39 MIN: ICD-10-PCS | Mod: 25,S$GLB,, | Performed by: INTERNAL MEDICINE

## 2020-07-07 PROCEDURE — 3044F PR MOST RECENT HEMOGLOBIN A1C LEVEL <7.0%: ICD-10-PCS | Mod: S$GLB,,, | Performed by: INTERNAL MEDICINE

## 2020-07-07 PROCEDURE — 1101F PR PT FALLS ASSESS DOC 0-1 FALLS W/OUT INJ PAST YR: ICD-10-PCS | Mod: S$GLB,,, | Performed by: INTERNAL MEDICINE

## 2020-07-07 PROCEDURE — 1125F AMNT PAIN NOTED PAIN PRSNT: CPT | Mod: S$GLB,,, | Performed by: INTERNAL MEDICINE

## 2020-07-07 PROCEDURE — 3044F HG A1C LEVEL LT 7.0%: CPT | Mod: S$GLB,,, | Performed by: INTERNAL MEDICINE

## 2020-07-07 PROCEDURE — 3075F PR MOST RECENT SYSTOLIC BLOOD PRESS GE 130-139MM HG: ICD-10-PCS | Mod: S$GLB,,, | Performed by: INTERNAL MEDICINE

## 2020-07-07 PROCEDURE — 99214 OFFICE O/P EST MOD 30 MIN: CPT | Mod: 25,S$GLB,, | Performed by: INTERNAL MEDICINE

## 2020-07-07 PROCEDURE — 3078F DIAST BP <80 MM HG: CPT | Mod: S$GLB,,, | Performed by: INTERNAL MEDICINE

## 2020-07-07 PROCEDURE — 1159F MED LIST DOCD IN RCRD: CPT | Mod: S$GLB,,, | Performed by: INTERNAL MEDICINE

## 2020-07-07 PROCEDURE — 93000 ELECTROCARDIOGRAM COMPLETE: CPT | Mod: S$GLB,,, | Performed by: INTERNAL MEDICINE

## 2020-07-07 NOTE — PATIENT INSTRUCTIONS
Noncardiac Chest Pain    Based on your visit today, the healthcare provider doesnt know what is causing your chest pain. In most cases, people who come to the emergency department with chest pain dont have a problem with their heart. Instead, the pain is caused by other conditions. It's important for the healthcare team to be sure you are not having a life threatening cause for chest pain such as a heart attack, blood clot in the lungs, collapsed lung, ruptured esophagus, or tearing of the aorta. Once these major causes have been ruled out, you may have further evaluation for non-heart causes of chest pain. These may be problems with the lungs, muscles, bones, digestive tract, nerves, or mental health.  Lung problems  · Inflammation around the lungs (pleurisy)  · Collapsed lung (pneumothorax)  · Fluid around the lungs (pleural effusion)  · Lung cancer (a rare cause of chest pain)  Muscle or bone problems  · Inflamed cartilage between the ribs (costochondritis)  · Fibromyalgia  · Rheumatoid arthritis  · Chest wall strain  Digestive system problems  · Reflux  · Stomach ulcer  · Spasms of the esophagus  · Gall stones  · Gallbladder inflammation  Mental health conditions  · Panic or anxiety attacks  · Emotional distress  Your condition doesnt seem serious and your pain doesnt appear to be coming from your heart. But sometimes the signs of a serious problem take more time to appear. Watch for the warning signs listed below.  Home care  Follow these guidelines when caring for yourself at home:  · Rest today and avoid strenuous activity.  · Take any prescribed medicine as directed.  Follow-up care  Follow up with your healthcare provider, or as advised, if you dont start to feel better within 24 hours.  When to seek medical advice  Call your healthcare provider right away if any of these occur:  · A change in the type of pain. Call if it feels different, becomes more serious, lasts longer, or begins to spread into  your shoulder, arm, neck, jaw, or back.  · Shortness of breath  · You feel more pain when you breathe  · Cough with dark-colored mucus or blood  · Weakness, dizziness, or fainting  · Fever of 100.4ºF (38ºC) or higher, or as directed by your healthcare provider  · Swelling, pain, or redness in one leg  Date Last Reviewed: 12/1/2016  © 9526-5018 [employer]. [address] [place] [address], [place] [address]. All rights reserved. This information is not intended as a substitute for professional medical care. Always follow your healthcare professional's instructions.

## 2020-07-07 NOTE — PROGRESS NOTES
cbcSubjective:       Patient ID: Laxmibegregoria West is a 70 y.o. female.    Chief Complaint: Chest Pain (pressure thinks caused by iron pills ), Generalized Body Aches, Intolerance to several medications, and Anemia with low iron    Mrs. West is a 70-year-old female who comes for follow-up.  She is accompanied with her son today.  She has been complaining of feeling heartburns and chest pressure and unusual feelings.  This is attributed to taking iron supplements with vitamin-C.  Iron supplements were prescribed because of iron deficiency anemia.    She took the pill for the last couple of days and each time she would land up with significant discomfort to the point that she started crying and med her some set up an urgent appointment with me.    Partly because of language barrier and also underlying psychosocial reasons she is a rather poor historian and poor  of her health.    Apparently these episodes that happened in past either with or without medications but she had no cardiac workup done thus far.    Exercise tolerance seems to be modest and she walks 1 or 2 blocks or more than that without any chest pain or shortness of breath.    Pantoprazole also causes her discomfort of similar nature and she has stopped taking his medications.    Last visit she had complained of generalized body aches and discomforts.  I had advised her to stop the statin medication.  It is unclear if this has benefited her or not.  Her son asked me if he can go back on statin medications.    It is interesting to note that she can not tolerate alendronate without any problems and this medication usually causes more heartburns .  Chest Pain   This is a new problem. The current episode started in the past 7 days. The onset quality is sudden. The problem occurs intermittently. The problem has been waxing and waning. The pain is present in the substernal region and lateral region (Shoulders). The pain is moderate. The quality of  the pain is described as dull and pressure. The pain radiates to the left neck, right neck and upper back (Bilateral shoulders.). Pertinent negatives include no abdominal pain, cough, diaphoresis, dizziness, fever, palpitations or shortness of breath. Associated with: Swallowing pills. She has tried rest (Avoidance of medications) for the symptoms. The treatment provided no relief. Risk factors include post-menopausal and sedentary lifestyle.   Her past medical history is significant for diabetes.   Her family medical history is significant for diabetes. Prior workup: Thus far no diagnostic workup.   Anemia  Presents for follow-up visit. There has been no abdominal pain, bruising/bleeding easily, confusion, fever, light-headedness or palpitations. Compliance problems include medication side effects and psychosocial issues.  Compliance with medications is 0-25%. Treatment side effects: Swallowing difficulty and chest pressure.       Past Medical History:   Diagnosis Date    Allergy     aspirin itching    Anemia     Cancer     Diabetes mellitus, type 2     H/O colon cancer, stage III 1/1/2013    [facility] in [facility]    Malignant neoplasm of transverse colon (2013) - Stage III 4/4/2019    Osteoporosis     S/P laparoscopic colectomy (3/2013) 4/5/2019    Seasonal allergies      Social History     Socioeconomic History    Marital status:      Spouse name: Luis West    Number of children: 3    Years of education: Not on file    Highest education level: Not on file   Occupational History    Occupation:    Social Needs    Financial resource strain: Not on file    Food insecurity     Worry: Not on file     Inability: Not on file    Transportation needs     Medical: Not on file     Non-medical: Not on file   Tobacco Use    Smoking status: Never Smoker    Smokeless tobacco: Never Used   Substance and Sexual Activity    Alcohol use: No    Drug use: No    Sexual activity: Not  Currently   Lifestyle    Physical activity     Days per week: Not on file     Minutes per session: Not on file    Stress: Not at all   Relationships    Social connections     Talks on phone: Not on file     Gets together: Not on file     Attends Worship service: Not on file     Active member of club or organization: Not on file     Attends meetings of clubs or organizations: Not on file     Relationship status: Not on file   Other Topics Concern    Not on file   Social History Narrative    Speaks Vianney only     Past Surgical History:   Procedure Laterality Date    COLON SURGERY      JOINT REPLACEMENT       Family History   Problem Relation Age of Onset    Stroke Mother     Heart disease Mother     Cancer Father         [place]       Review of Systems   Constitutional: Positive for fatigue. Negative for activity change, appetite change, chills, diaphoresis, fever and unexpected weight change.   HENT: Negative for congestion, facial swelling and nosebleeds.    Eyes: Negative for redness and itching.   Respiratory: Positive for chest tightness. Negative for cough, choking, shortness of breath, wheezing and stridor.    Cardiovascular: Positive for chest pain. Negative for palpitations and leg swelling.        Exercise tolerance seems to be reasonable to walking.   Gastrointestinal: Negative for abdominal pain, blood in stool and diarrhea.        Unclear if she has any reflux like symptoms or dysphagia.   Endocrine: Negative for cold intolerance, polydipsia and polyuria.   Genitourinary: Negative for dysuria, flank pain and hematuria.   Musculoskeletal: Negative for arthralgias, gait problem and myalgias.   Neurological: Negative for dizziness, speech difficulty and light-headedness.   Hematological: Negative for adenopathy. Does not bruise/bleed easily.        Iron deficiency anemia   Psychiatric/Behavioral: Negative for agitation, confusion and dysphoric mood. The patient is nervous/anxious.         Son  "corroborates the fact that she has been chronically anxious.         Objective:      Blood pressure 139/77, pulse 76, resp. rate 16, height 4' 9" (1.448 m), weight 63.5 kg (140 lb). Body mass index is 30.3 kg/m².  Physical Exam  Constitutional:       General: She is not in acute distress.     Appearance: She is not ill-appearing, toxic-appearing or diaphoretic.   Eyes:      General: No scleral icterus.  Neck:      Musculoskeletal: No neck rigidity or muscular tenderness.      Vascular: No carotid bruit.   Cardiovascular:      Rate and Rhythm: Normal rate. Rhythm irregular.   Pulmonary:      Effort: Pulmonary effort is normal.      Breath sounds: Normal breath sounds.   Abdominal:      General: Abdomen is flat.   Skin:     General: Skin is warm.      Coloration: Skin is not jaundiced.      Findings: No bruising or rash.   Neurological:      Mental Status: She is alert.   Psychiatric:         Mood and Affect: Mood is anxious.      Comments: Terse and grave affect           Assessment:       1. Chest heaviness    2. Type 2 diabetes mellitus without complication, without long-term current use of insulin    3. Mild anemia           Lab Visit on 06/25/2020   Component Date Value Ref Range Status    WBC 06/25/2020 7.96  3.90 - 12.70 K/uL Final    RBC 06/25/2020 3.91* 4.00 - 5.40 M/uL Final    Hemoglobin 06/25/2020 10.8* 12.0 - 16.0 g/dL Final    Hematocrit 06/25/2020 33.6* 37.0 - 48.5 % Final    Mean Corpuscular Volume 06/25/2020 86  82 - 98 fL Final    Mean Corpuscular Hemoglobin 06/25/2020 27.6  27.0 - 31.0 pg Final    Mean Corpuscular Hemoglobin Conc 06/25/2020 32.1  32.0 - 36.0 g/dL Final    RDW 06/25/2020 13.7  11.5 - 14.5 % Final    Platelets 06/25/2020 216  150 - 350 K/uL Final    MPV 06/25/2020 9.6  9.2 - 12.9 fL Final    Immature Granulocytes 06/25/2020 0.5  0.0 - 0.5 % Final    Gran # (ANC) 06/25/2020 5.2  1.8 - 7.7 K/uL Final    Immature Grans (Abs) 06/25/2020 0.04  0.00 - 0.04 K/uL Final    Lymph " # 06/25/2020 1.9  1.0 - 4.8 K/uL Final    Mono # 06/25/2020 0.6  0.3 - 1.0 K/uL Final    Eos # 06/25/2020 0.3  0.0 - 0.5 K/uL Final    Baso # 06/25/2020 0.03  0.00 - 0.20 K/uL Final    nRBC 06/25/2020 0  0 /100 WBC Final    Gran% 06/25/2020 64.7  38.0 - 73.0 % Final    Lymph% 06/25/2020 23.5  18.0 - 48.0 % Final    Mono% 06/25/2020 7.5  4.0 - 15.0 % Final    Eosinophil% 06/25/2020 3.4  0.0 - 8.0 % Final    Basophil% 06/25/2020 0.4  0.0 - 1.9 % Final    Differential Method 06/25/2020 Automated   Final    Sodium 06/25/2020 135* 136 - 145 mmol/L Final    Potassium 06/25/2020 4.5  3.5 - 5.1 mmol/L Final    Chloride 06/25/2020 100  95 - 110 mmol/L Final    CO2 06/25/2020 23  23 - 29 mmol/L Final    Glucose 06/25/2020 143* 70 - 110 mg/dL Final    BUN, Bld 06/25/2020 18  8 - 23 mg/dL Final    Creatinine 06/25/2020 0.9  0.5 - 1.4 mg/dL Final    Calcium 06/25/2020 9.3  8.7 - 10.5 mg/dL Final    Total Protein 06/25/2020 7.5  6.0 - 8.4 g/dL Final    Albumin 06/25/2020 4.1  3.5 - 5.2 g/dL Final    Total Bilirubin 06/25/2020 0.6  0.1 - 1.0 mg/dL Final    Alkaline Phosphatase 06/25/2020 67  55 - 135 U/L Final    AST 06/25/2020 19  10 - 40 U/L Final    ALT 06/25/2020 20  10 - 44 U/L Final    Anion Gap 06/25/2020 12  8 - 16 mmol/L Final    eGFR if African American 06/25/2020 >60.0  >60 mL/min/1.73 m^2 Final    eGFR if non African American 06/25/2020 >60.0  >60 mL/min/1.73 m^2 Final    CEA 06/25/2020 2.3  0.0 - 5.0 ng/mL Final    Iron 06/25/2020 39  30 - 160 ug/dL Final    Transferrin 06/25/2020 230  200 - 375 mg/dL Final    TIBC 06/25/2020 322  250 - 450 ug/dL Final    Saturated Iron 06/25/2020 12* 20 - 50 % Final    Ferritin 06/25/2020 116  20.0 - 300.0 ng/mL Final    Vitamin B-12 06/25/2020 >1500* 210 - 950 pg/mL Final    Folate 06/25/2020 12.4  4.0 - 24.0 ng/mL Final   Lab Visit on 05/19/2020   Component Date Value Ref Range Status    Hemoglobin A1C 05/19/2020 6.8* 4.5 - 6.2 % Final     Estimated Avg Glucose 05/19/2020 148* 68 - 131 mg/dL Final    Cholesterol 05/19/2020 111* 120 - 199 mg/dL Final    Triglycerides 05/19/2020 114  30 - 150 mg/dL Final    HDL 05/19/2020 43  40 - 75 mg/dL Final    LDL Cholesterol 05/19/2020 45.2* 63.0 - 159.0 mg/dL Final    Hdl/Cholesterol Ratio 05/19/2020 38.7  20.0 - 50.0 % Final    Total Cholesterol/HDL Ratio 05/19/2020 2.6  2.0 - 5.0 Final    Non-HDL Cholesterol 05/19/2020 68  mg/dL Final    Sodium 05/19/2020 136  136 - 145 mmol/L Final    Potassium 05/19/2020 4.6  3.5 - 5.1 mmol/L Final    Chloride 05/19/2020 105  95 - 110 mmol/L Final    CO2 05/19/2020 23  23 - 29 mmol/L Final    Glucose 05/19/2020 158* 70 - 110 mg/dL Final    BUN, Bld 05/19/2020 18  8 - 23 mg/dL Final    Creatinine 05/19/2020 1.1  0.5 - 1.4 mg/dL Final    Calcium 05/19/2020 9.2  8.7 - 10.5 mg/dL Final    Total Protein 05/19/2020 7.1  6.0 - 8.4 g/dL Final    Albumin 05/19/2020 3.9  3.5 - 5.2 g/dL Final    Total Bilirubin 05/19/2020 0.7  0.1 - 1.0 mg/dL Final    Alkaline Phosphatase 05/19/2020 57  55 - 135 U/L Final    AST 05/19/2020 17  10 - 40 U/L Final    ALT 05/19/2020 19  10 - 44 U/L Final    Anion Gap 05/19/2020 8  8 - 16 mmol/L Final    eGFR if  05/19/2020 59.6* >60 mL/min/1.73 m^2 Final    eGFR if non African American 05/19/2020 51.7* >60 mL/min/1.73 m^2 Final    Microalbum.,U,Random 05/19/2020 2.5  ug/mL Final    Creatinine, Random Ur 05/19/2020 65.0  15.0 - 325.0 mg/dL Final    Microalb Creat Ratio 05/19/2020 3.8  0.0 - 30.0 ug/mg Final         Nonspecific T-wave changes.    Plan:           Chest heaviness  -     CBC auto differential; Future; Expected date: 07/07/2020  -     Comprehensive metabolic panel; Future; Expected date: 07/07/2020  -     Troponin I; Future; Expected date: 07/07/2020  -     CK; Future; Expected date: 07/07/2020  -     POCT EKG 12-LEAD (NOT FOR [facility] USE)  -     Sedimentation rate; Future; Expected date: 07/07/2020  -      Ambulatory referral/consult to Cardiology; Future; Expected date: 07/14/2020    Type 2 diabetes mellitus without complication, without long-term current use of insulin  Comments:  Mild rise in hemoglobin A1c has been noted. Medications have been noted.  Compliance to diet has been urged again.  Orders:  -     blood sugar diagnostic (TRUE METRIX GLUCOSE TEST STRIP) Strp; USE 1 STRIP TO CHECK GLUCOSE TWICE DAILY  Dispense: 200 strip; Refill: 3    Mild anemia     Given the limitations of accuracy and credibilty of patient's history, I have advised her to stop the iron step supplements.  Patient's son request intravenous iron which apparently had been given her in past.  This I will defer to oncologist.    As far as chest discomfort and tightness is concerned, EKG in the office shows nonspecific T-wave inversions.  Low-voltage complexes.  As far as diabetes is concerned, this seems to be stable.  These do not support appear to be suggestive of pericarditis.  Given her underlying diabetes, I am concerned about coronary artery disease and would like her to be evaluated for the same.    The son will update me as to her status in the next few days and if she finds completely from her symptoms.  She can take Mylanta or Maalox for heartburns.  Apparently she cannot tolerate pantoprazole also.  It is surprising that she can tolerate alendronate.    Check labs including CPK and troponin.  Referral to cardiology also given.    Keep pre-existing appointment on 08/25/2020.  Earlier if needed.    Patient will continue to be a challenge to evaluate and treat.    Spent charmaine 25 minutes with patient which involved review of pts medical conditions, labs, medications and with 50% of time face-to-face discussion about medical problems, management and any applicable changes.    Current Outpatient Medications:     alendronate (FOSAMAX) 70 MG tablet, Take 1 tablet (70 mg total) by mouth every 7 days., Disp: 13 tablet, Rfl: 2    blood  sugar diagnostic (TRUE METRIX GLUCOSE TEST STRIP) Strp, USE 1 STRIP TO CHECK GLUCOSE TWICE DAILY, Disp: 200 strip, Rfl: 3    cyanocobalamin 1,000 mcg/mL injection, Inject 1 mL (1,000 mcg total) into the muscle every 30 days., Disp: 1 mL, Rfl: 11    fluticasone (FLONASE) 50 mcg/actuation nasal spray, 1 spray (50 mcg total) by Each Nare route once daily., Disp: 1 Bottle, Rfl: 4    irbesartan-hydrochlorothiazide (AVALIDE) 300-12.5 mg per tablet, Take 1 tablet by mouth once daily., Disp: 90 tablet, Rfl: 3    lancets (LANCETS,ULTRA THIN) 26 gauge Misc, 1 lancet by Misc.(Non-Drug; Combo Route) route once daily., Disp: 100 each, Rfl: 0    metFORMIN (GLUCOPHAGE) 1000 MG tablet, TAKE 1 TABLET BY MOUTH TWICE DAILY WITH MEALS, Disp: 180 tablet, Rfl: 1    rosuvastatin (CRESTOR) 5 MG tablet, TAKE ONE TABLET BY MOUTH ON MONDAY, WEDNESDAY, AND FRIDAY, Disp: 36 tablet, Rfl: 0    TRADJENTA 5 mg Tab tablet, TAKE 1 TABLET BY MOUTH ONCE DAILY, Disp: 90 tablet, Rfl: 2    blood-glucose meter kit, One touch meter and matching lancets and strips., Disp: 1 each, Rfl: 0

## 2020-08-03 ENCOUNTER — DOCUMENTATION ONLY (OUTPATIENT)
Dept: PHARMACY | Facility: CLINIC | Age: 70
End: 2020-08-03

## 2020-08-03 NOTE — PROGRESS NOTES
Administered 1,000 mcg of B12 into the right deltoid muscle on 08/03/2020 at 12pm. Patient tolerated injection well.      LOT [id number]  EXP 2/2022     Administered by: Gaviota PatiñoD

## 2020-08-21 ENCOUNTER — LAB VISIT (OUTPATIENT)
Dept: LAB | Facility: HOSPITAL | Age: 70
End: 2020-08-21
Attending: INTERNAL MEDICINE
Payer: MEDICARE

## 2020-08-21 DIAGNOSIS — R07.89 CHEST HEAVINESS: ICD-10-CM

## 2020-08-21 LAB
ALBUMIN SERPL BCP-MCNC: 4.1 G/DL (ref 3.5–5.2)
ALP SERPL-CCNC: 52 U/L (ref 55–135)
ALT SERPL W/O P-5'-P-CCNC: 21 U/L (ref 10–44)
ANION GAP SERPL CALC-SCNC: 8 MMOL/L (ref 8–16)
AST SERPL-CCNC: 19 U/L (ref 10–40)
BASOPHILS # BLD AUTO: 0.04 K/UL (ref 0–0.2)
BASOPHILS NFR BLD: 0.5 % (ref 0–1.9)
BILIRUB SERPL-MCNC: 0.8 MG/DL (ref 0.1–1)
BUN SERPL-MCNC: 16 MG/DL (ref 8–23)
CALCIUM SERPL-MCNC: 8.7 MG/DL (ref 8.7–10.5)
CHLORIDE SERPL-SCNC: 105 MMOL/L (ref 95–110)
CK SERPL-CCNC: 52 U/L (ref 20–180)
CO2 SERPL-SCNC: 23 MMOL/L (ref 23–29)
CREAT SERPL-MCNC: 1 MG/DL (ref 0.5–1.4)
DIFFERENTIAL METHOD: ABNORMAL
EOSINOPHIL # BLD AUTO: 0.3 K/UL (ref 0–0.5)
EOSINOPHIL NFR BLD: 3.2 % (ref 0–8)
ERYTHROCYTE [DISTWIDTH] IN BLOOD BY AUTOMATED COUNT: 14.1 % (ref 11.5–14.5)
ERYTHROCYTE [SEDIMENTATION RATE] IN BLOOD BY WESTERGREN METHOD: 23 MM/HR (ref 0–20)
EST. GFR  (AFRICAN AMERICAN): >60 ML/MIN/1.73 M^2
EST. GFR  (NON AFRICAN AMERICAN): 58 ML/MIN/1.73 M^2
GLUCOSE SERPL-MCNC: 168 MG/DL (ref 70–110)
HCT VFR BLD AUTO: 35.3 % (ref 37–48.5)
HGB BLD-MCNC: 10.8 G/DL (ref 12–16)
IMM GRANULOCYTES # BLD AUTO: 0.04 K/UL (ref 0–0.04)
IMM GRANULOCYTES NFR BLD AUTO: 0.5 % (ref 0–0.5)
LYMPHOCYTES # BLD AUTO: 1.7 K/UL (ref 1–4.8)
LYMPHOCYTES NFR BLD: 19.5 % (ref 18–48)
MCH RBC QN AUTO: 27.1 PG (ref 27–31)
MCHC RBC AUTO-ENTMCNC: 30.6 G/DL (ref 32–36)
MCV RBC AUTO: 89 FL (ref 82–98)
MONOCYTES # BLD AUTO: 0.6 K/UL (ref 0.3–1)
MONOCYTES NFR BLD: 6.8 % (ref 4–15)
NEUTROPHILS # BLD AUTO: 5.9 K/UL (ref 1.8–7.7)
NEUTROPHILS NFR BLD: 69.5 % (ref 38–73)
NRBC BLD-RTO: 0 /100 WBC
PLATELET # BLD AUTO: 231 K/UL (ref 150–350)
PMV BLD AUTO: 10.6 FL (ref 9.2–12.9)
POTASSIUM SERPL-SCNC: 4.5 MMOL/L (ref 3.5–5.1)
PROT SERPL-MCNC: 7.2 G/DL (ref 6–8.4)
RBC # BLD AUTO: 3.99 M/UL (ref 4–5.4)
SODIUM SERPL-SCNC: 136 MMOL/L (ref 136–145)
TROPONIN I SERPL DL<=0.01 NG/ML-MCNC: <0.03 NG/ML
WBC # BLD AUTO: 8.44 K/UL (ref 3.9–12.7)

## 2020-08-21 PROCEDURE — 85651 RBC SED RATE NONAUTOMATED: CPT

## 2020-08-21 PROCEDURE — 82550 ASSAY OF CK (CPK): CPT

## 2020-08-21 PROCEDURE — 36415 COLL VENOUS BLD VENIPUNCTURE: CPT

## 2020-08-21 PROCEDURE — 80053 COMPREHEN METABOLIC PANEL: CPT

## 2020-08-21 PROCEDURE — 84484 ASSAY OF TROPONIN QUANT: CPT

## 2020-08-21 PROCEDURE — 85025 COMPLETE CBC W/AUTO DIFF WBC: CPT

## 2020-08-25 ENCOUNTER — OFFICE VISIT (OUTPATIENT)
Dept: FAMILY MEDICINE | Facility: CLINIC | Age: 70
End: 2020-08-25
Payer: MEDICARE

## 2020-08-25 VITALS
SYSTOLIC BLOOD PRESSURE: 139 MMHG | HEIGHT: 57 IN | WEIGHT: 138 LBS | DIASTOLIC BLOOD PRESSURE: 79 MMHG | BODY MASS INDEX: 29.77 KG/M2 | TEMPERATURE: 98 F | HEART RATE: 70 BPM

## 2020-08-25 DIAGNOSIS — I10 ESSENTIAL HYPERTENSION: ICD-10-CM

## 2020-08-25 DIAGNOSIS — Z23 NEEDS FLU SHOT: ICD-10-CM

## 2020-08-25 DIAGNOSIS — E78.2 MIXED HYPERLIPIDEMIA: ICD-10-CM

## 2020-08-25 DIAGNOSIS — E11.9 TYPE 2 DIABETES MELLITUS WITHOUT COMPLICATION, WITHOUT LONG-TERM CURRENT USE OF INSULIN: Primary | ICD-10-CM

## 2020-08-25 DIAGNOSIS — D64.9 MILD ANEMIA: ICD-10-CM

## 2020-08-25 PROCEDURE — 1126F AMNT PAIN NOTED NONE PRSNT: CPT | Mod: S$GLB,,, | Performed by: INTERNAL MEDICINE

## 2020-08-25 PROCEDURE — 99213 OFFICE O/P EST LOW 20 MIN: CPT | Mod: 25,S$GLB,, | Performed by: INTERNAL MEDICINE

## 2020-08-25 PROCEDURE — 3078F PR MOST RECENT DIASTOLIC BLOOD PRESSURE < 80 MM HG: ICD-10-PCS | Mod: S$GLB,,, | Performed by: INTERNAL MEDICINE

## 2020-08-25 PROCEDURE — 3075F PR MOST RECENT SYSTOLIC BLOOD PRESS GE 130-139MM HG: ICD-10-PCS | Mod: S$GLB,,, | Performed by: INTERNAL MEDICINE

## 2020-08-25 PROCEDURE — G0008 ADMIN INFLUENZA VIRUS VAC: HCPCS | Mod: S$GLB,,, | Performed by: INTERNAL MEDICINE

## 2020-08-25 PROCEDURE — 3008F BODY MASS INDEX DOCD: CPT | Mod: S$GLB,,, | Performed by: INTERNAL MEDICINE

## 2020-08-25 PROCEDURE — 90662 IIV NO PRSV INCREASED AG IM: CPT | Mod: S$GLB,,, | Performed by: INTERNAL MEDICINE

## 2020-08-25 PROCEDURE — G0008 FLU VACCINE - QUADRIVALENT - HIGH DOSE (65+) PRESERVATIVE FREE IM: ICD-10-PCS | Mod: S$GLB,,, | Performed by: INTERNAL MEDICINE

## 2020-08-25 PROCEDURE — 1159F PR MEDICATION LIST DOCUMENTED IN MEDICAL RECORD: ICD-10-PCS | Mod: S$GLB,,, | Performed by: INTERNAL MEDICINE

## 2020-08-25 PROCEDURE — 90662 FLU VACCINE - QUADRIVALENT - HIGH DOSE (65+) PRESERVATIVE FREE IM: ICD-10-PCS | Mod: S$GLB,,, | Performed by: INTERNAL MEDICINE

## 2020-08-25 PROCEDURE — 1101F PR PT FALLS ASSESS DOC 0-1 FALLS W/OUT INJ PAST YR: ICD-10-PCS | Mod: S$GLB,,, | Performed by: INTERNAL MEDICINE

## 2020-08-25 PROCEDURE — 99213 PR OFFICE/OUTPT VISIT, EST, LEVL III, 20-29 MIN: ICD-10-PCS | Mod: 25,S$GLB,, | Performed by: INTERNAL MEDICINE

## 2020-08-25 PROCEDURE — 1101F PT FALLS ASSESS-DOCD LE1/YR: CPT | Mod: S$GLB,,, | Performed by: INTERNAL MEDICINE

## 2020-08-25 PROCEDURE — 1159F MED LIST DOCD IN RCRD: CPT | Mod: S$GLB,,, | Performed by: INTERNAL MEDICINE

## 2020-08-25 PROCEDURE — 3075F SYST BP GE 130 - 139MM HG: CPT | Mod: S$GLB,,, | Performed by: INTERNAL MEDICINE

## 2020-08-25 PROCEDURE — 3078F DIAST BP <80 MM HG: CPT | Mod: S$GLB,,, | Performed by: INTERNAL MEDICINE

## 2020-08-25 PROCEDURE — 1126F PR PAIN SEVERITY QUANTIFIED, NO PAIN PRESENT: ICD-10-PCS | Mod: S$GLB,,, | Performed by: INTERNAL MEDICINE

## 2020-08-25 PROCEDURE — 3044F HG A1C LEVEL LT 7.0%: CPT | Mod: S$GLB,,, | Performed by: INTERNAL MEDICINE

## 2020-08-25 PROCEDURE — 3008F PR BODY MASS INDEX (BMI) DOCUMENTED: ICD-10-PCS | Mod: S$GLB,,, | Performed by: INTERNAL MEDICINE

## 2020-08-25 PROCEDURE — 3044F PR MOST RECENT HEMOGLOBIN A1C LEVEL <7.0%: ICD-10-PCS | Mod: S$GLB,,, | Performed by: INTERNAL MEDICINE

## 2020-08-25 RX ORDER — ROSUVASTATIN CALCIUM 5 MG/1
5 TABLET, COATED ORAL
Qty: 24 TABLET | Refills: 1
Start: 2020-08-28 | End: 2021-02-08

## 2020-08-25 RX ORDER — QUINIDINE GLUCONATE 324 MG
240 TABLET, EXTENDED RELEASE ORAL
Qty: 1 TABLET | Refills: 1
Start: 2020-08-28 | End: 2021-04-29

## 2020-08-25 NOTE — PATIENT INSTRUCTIONS
"  Exercise to Manage Your Blood Sugar    Being physically active every day can help you manage your blood sugar. Thats because an active lifestyle can improve your bodys ability to use insulin. Daily activity can also help delay or prevent complications of diabetes. And its a great way to relieve stress. If you arent normally active, be sure to consult your healthcare provider before getting started.  How much activity do you need?  If daily activity is new to you, start slow and steady. Begin with 10 minutes of activity each day. Then work up to at least 150 minutes a week of physical activity. Don't let more than 2 days go by without being active. When sitting for long periods of time, get up for short sessions of light activity every 30 minutes  Just move!  You dont have to join a gym or own pricey sports equipment. Just get out and walk. Walking is an aerobic exercise that makes your heart and lungs work hard. It helps your heart and blood vessels. Walking needs only a sturdy pair of sneakers and your own two feet. The more you walk, the easier it gets:  · Schedule time every day to move your feet.  · Make it part of your daily routine.  · Walk with a friend or a group to keep it interesting and fun.  · Try taking several short walks during the day to meet your daily activity goal.  A pedometer makes every step count  A pedometer is a small device that keeps track of how many steps you take. You can clip it to your belt (or a strap on your arm or leg) and go about your daily routine. "Smartphones" now also have apps to record your walking. At the end of the day, the pedometer shows the total number of steps you took. Use a pedometer to set daily goals for yourself. For instance, if you walk 4,000 steps a day, try adding 200 more steps each day. Aim for a goal of 7,500. With every step, youre doing a little more to help your body use insulin.   Adding resistance exercise  Resistance exercise (also called " strength training), makes muscles stronger. It also helps muscles use insulin better. Ask your healthcare provider whether this type of exercise is right for you. If it is, your healthcare provider can help you work it in to your activity plan.  Staying safe  Being active may cause blood sugar to drop faster than usual. This is especially true if you take medicine to manage your blood sugar. But there are things you can do to help reduce the risk of accidental lows. Keep these tips in mind:  · Always carry identification when you exercise outside your home. Carry a cell phone to use in case of emergency.  · If you can, include friends and family in your activities.  · Wear a medical ID bracelet that says you have diabetes.  · Use the right safety equipment for the activity you do (such as a bicycle helmet when you ride a bicycle outdoors). Wear closed-toed shoes that fit your feet well.  · Drink plenty of water before and during activity.  · Keep a fast-acting sugar (such as glucose tablets) on hand in case of low blood sugar.  · Dress properly for the weather. Wear a hat if its devendra, or wait until evening if its too hot.  · Avoid being active for long periods in very hot or very cold weather.  · Skip activity if youre sick.     Notice how activity affects blood sugar  Physical activity is important when you have diabetes. But you need to keep an eye on your blood sugar level. Check often if you have been active for longer than usual, or if the activity was unplanned. Make it a habit to check your blood sugar before being active. And check again a few hours later. Use your log book to write down how activity affects your numbers. If you take insulin, you may be able to adjust your dose before a planned activity. This can help prevent lows. You may also need to take a small carbohydrate snack before the exercise. Talk to your healthcare provider to learn more.    Date Last Reviewed: 6/1/2016  © 2487-0424 [employer]. [address] [place] [address], [place] [address]. All rights reserved. This information is not intended as a substitute for professional medical care. Always follow your healthcare professional's instructions.

## 2020-08-25 NOTE — PROGRESS NOTES
Subjective:       Patient ID: Preet West is a 70 y.o. female.    Chief Complaint: Hyperlipidemia (lab review ), Diabetes, and Hypertension       Patient ID: Preet West is a 70 y.o. female.    Chief Complaint: Diabetes (lab review ); Hypertension; Hyperlipidemia;   Patient is a 70-year-old female who comes for follow-up.    Due to complains of generalized body aches and itching last time statin medications the stop.  These complain seem to have subsided.   is interested in resuming her statin medication perhaps at a lower dosage and see how she does.  He was taking Crestor 3 times a week.  Patient agrees to try it twice a week.  She continues to take vitamin B12 injections at the infusion center.  Patient and her  seems to be reluctant to consider taking injections by themselves.      Past Medical History:   Diagnosis Date    Allergy     aspirin itching    Anemia     Cancer     Diabetes mellitus, type 2     H/O colon cancer, stage III 1/1/2013    Treated in [facility]    Malignant neoplasm of transverse colon (2013) - Stage III 4/4/2019    Osteoporosis     S/P laparoscopic colectomy (3/2013) 4/5/2019    Seasonal allergies      Subjective:       Patient ID: Preet West is a 70 y.o. female.    Chief Complaint: Hyperlipidemia (lab review ), Diabetes, and Hypertension    Patient is a 70-year-old female who comes for follow-up.  Multiple complains which keep on multiplying each time she comes.  Now today's complain is that she is hurting all over the body.  She points are to her thigh, legs, feet, arms, hands.  Everything hurts.    She has been on statin medications for quite some time.  It is unclear if the ache and pains have increased after the statins or not.    She also complains of itching in her private areas.  She denies any discharge.  Patient has limited insight as to why she should be itching.  She did have a gynecological checkup last year which was  unremarkable.    Blood sugars are good and her hemoglobin A1c has come down.  Lipid panel is super low.    Diabetes   She presents for her follow-up diabetic visit. She has type 2 diabetes mellitus. Her disease course has been stable. Pertinent negatives for hypoglycemia include no confusion, dizziness, headaches, nervousness/anxiousness, pallor, seizures or tremors. Associated symptoms include fatigue. Pertinent negatives for diabetes include no chest pain, no foot ulcerations, no polydipsia, no polyphagia and no polyuria. There are no hypoglycemic complications. Symptoms are stable. Risk factors for coronary artery disease include post-menopausal and sedentary lifestyle. Current diabetic treatment includes oral agent (dual therapy). She is compliant with treatment most of the time. Her weight is stable. Meal planning includes avoidance of concentrated sweets. She rarely participates in exercise. An ACE inhibitor/angiotensin II receptor blocker is being taken. She does not see a podiatrist.Eye exam is current.   Hypertension   This is a chronic problem. The current episode started more than 1 year ago. The problem is controlled. Pertinent negatives include no chest pain, headaches, neck pain, palpitations or shortness of breath. Risk factors for coronary artery disease include sedentary lifestyle, dyslipidemia and diabetes mellitus. Past treatments include angiotensin blockers and diuretics. The current treatment provides moderate improvement. Compliance problems include psychosocial issues.    Hyperlipidemia   This is a chronic problem. The current episode started more than 1 year ago. She has no history of obesity. Associated symptoms include myalgias. Pertinent negatives include no chest pain or shortness of breath. Current antihyperlipidemic treatment includes statins- off statins in past. The current treatment provides significant improvement of lipids. Compliance problems include psychosocial issues.  Risk  factors for coronary artery disease include hypertension, a sedentary lifestyle, post-menopausal, diabetes mellitus and dyslipidemia.            Past Medical History:   Diagnosis Date    Allergy     aspirin itching    Anemia     Cancer     Diabetes mellitus, type 2     H/O colon cancer, stage III 1/1/2013    Treated in [facility]    Malignant neoplasm of transverse colon (2013) - Stage III 4/4/2019    Osteoporosis     S/P laparoscopic colectomy (3/2013) 4/5/2019    Seasonal allergies      Social History     Socioeconomic History    Marital status:      Spouse name: Luis West    Number of children: 3    Years of education: Not on file    Highest education level: Not on file   Occupational History    Occupation:    Social Needs    Financial resource strain: Not on file    Food insecurity     Worry: Not on file     Inability: Not on file    Transportation needs     Medical: Not on file     Non-medical: Not on file   Tobacco Use    Smoking status: Never Smoker    Smokeless tobacco: Never Used   Substance and Sexual Activity    Alcohol use: No    Drug use: No    Sexual activity: Not Currently     Partners: Male     Birth control/protection: Post-menopausal   Lifestyle    Physical activity     Days per week: Not on file     Minutes per session: Not on file    Stress: Not at all   Relationships    Social connections     Talks on phone: Not on file     Gets together: Not on file     Attends Zoroastrianism service: Not on file     Active member of club or organization: Not on file     Attends meetings of clubs or organizations: Not on file     Relationship status: Not on file   Other Topics Concern    Not on file   Social History Narrative    Speaks Vianney only     Past Surgical History:   Procedure Laterality Date    COLON SURGERY      JOINT REPLACEMENT       Family History   Problem Relation Age of Onset    Stroke Mother     Heart disease Mother     Cancer Father         " [facility]       Review of Systems   Constitutional: Positive for fatigue. Negative for activity change, chills, fever and unexpected weight change   HENT: Negative for congestion, postnasal drip, sinus pressure and sneezing.         Uses Flonase for sinuses.   Eyes: Negative for pain, discharge and visual disturbance (Cataract).   Respiratory: Negative for cough, chest tightness, shortness of breath, wheezing and stridor.    Cardiovascular: Negative for chest pain, palpitations and leg swelling.   Gastrointestinal: Positive for change in bowel habit (constipation). Negative for abdominal distention, abdominal pain, anal bleeding, anorexia, constipation and diarrhea.        GERD occasional use of pantoprazole.   Endocrine: Negative for polydipsia, polyphagia and polyuria.        Type 2 diabetes mellitus on combination of metformin and Tradjenta. Also has osteoporosis.    Low sugar and eats extra food at night   Genitourinary: Negative for decreased urine volume, difficulty urinating, dysuria, flank pain, frequency, vaginal bleeding, vaginal discharge and vaginal pain.          Musculoskeletal: Negative for myalgias. Negative for arthralgias, back pain, joint swelling and neck pain.        Skin: Negative for color change, pallor and rash.   Allergic/Immunologic: Negative for environmental allergies, food allergies and immunocompromised state.   Neurological: Negative for dizziness, tremors, seizures, syncope, light-headedness and headaches.   Hematological: Negative for adenopathy. Does not bruise/bleed easily.   Psychiatric/Behavioral: Negative for agitation, confusion and dysphoric mood. The patient is not nervous/anxious.         Somewhat anxious about her health issues and tends to over utilize. (Every ill needs a pill)         Objective:      Blood pressure 139/79, pulse 70, temperature 97.9 °F (36.6 °C), height 4' 9" (1.448 m), weight 62.6 kg (138 lb). Body mass index is 29.86 kg/m².  Physical Exam "   Constitutional: She appears well-developed and well-nourished. She is cooperative. No distress.   HENT:   Head: Normocephalic and atraumatic.   Nose: No mucosal edema, sinus tenderness or nasal deformity. Right sinus exhibits no maxillary sinus tenderness and no frontal sinus tenderness. Left sinus exhibits no maxillary sinus tenderness and no frontal sinus tenderness.   Eyes: Pupils are equal, round, and reactive to light. Conjunctivae, EOM and lids are normal. Lids are everted and swept, no foreign bodies found. Right pupil is round and reactive. Left pupil is round and reactive.   Neck: Trachea normal and normal range of motion. Neck supple.   Cardiovascular: Normal rate, regular rhythm, S1 normal, S2 normal and normal heart sounds.   Pulmonary/Chest: Breath sounds normal.   Abdominal: Soft. Bowel sounds are normal. There is no rigidity and no guarding.   Musculoskeletal: She exhibits no edema, tenderness or deformity.        Right foot: There is normal range of motion and no deformity.        Left foot: There is normal range of motion and no deformity.   Feet:   Right Foot:   Protective Sensation: 4 sites tested. 4 sites sensed.   Skin Integrity: Negative for ulcer, blister or skin breakdown.   Left Foot:   Protective Sensation: 4 sites tested. 4 sites sensed.   Skin Integrity: Negative for ulcer, blister or skin breakdown.   Lymphadenopathy:     She has no cervical adenopathy.     She has no axillary adenopathy.   Neurological: She is alert. Coordination normal.   Skin: Skin is warm and dry.   Psychiatric: Her behavior is normal.   Nursing note and vitals reviewed.        Assessment:       1. Type 2 diabetes mellitus without complication, without long-term current use of insulin    2. Mild anemia    3. Essential hypertension    4. Mixed hyperlipidemia           Lab Visit on 08/21/2020   Component Date Value Ref Range Status    WBC 08/21/2020 8.44  3.90 - 12.70 K/uL Final    RBC 08/21/2020 3.99* 4.00 - 5.40  M/uL Final    Hemoglobin 08/21/2020 10.8* 12.0 - 16.0 g/dL Final    Hematocrit 08/21/2020 35.3* 37.0 - 48.5 % Final    Mean Corpuscular Volume 08/21/2020 89  82 - 98 fL Final    Mean Corpuscular Hemoglobin 08/21/2020 27.1  27.0 - 31.0 pg Final    Mean Corpuscular Hemoglobin Conc 08/21/2020 30.6* 32.0 - 36.0 g/dL Final    RDW 08/21/2020 14.1  11.5 - 14.5 % Final    Platelets 08/21/2020 231  150 - 350 K/uL Final    MPV 08/21/2020 10.6  9.2 - 12.9 fL Final    Immature Granulocytes 08/21/2020 0.5  0.0 - 0.5 % Final    Gran # (ANC) 08/21/2020 5.9  1.8 - 7.7 K/uL Final    Immature Grans (Abs) 08/21/2020 0.04  0.00 - 0.04 K/uL Final    Lymph # 08/21/2020 1.7  1.0 - 4.8 K/uL Final    Mono # 08/21/2020 0.6  0.3 - 1.0 K/uL Final    Eos # 08/21/2020 0.3  0.0 - 0.5 K/uL Final    Baso # 08/21/2020 0.04  0.00 - 0.20 K/uL Final    nRBC 08/21/2020 0  0 /100 WBC Final    Gran% 08/21/2020 69.5  38.0 - 73.0 % Final    Lymph% 08/21/2020 19.5  18.0 - 48.0 % Final    Mono% 08/21/2020 6.8  4.0 - 15.0 % Final    Eosinophil% 08/21/2020 3.2  0.0 - 8.0 % Final    Basophil% 08/21/2020 0.5  0.0 - 1.9 % Final    Differential Method 08/21/2020 Automated   Final    Sodium 08/21/2020 136  136 - 145 mmol/L Final    Potassium 08/21/2020 4.5  3.5 - 5.1 mmol/L Final    Chloride 08/21/2020 105  95 - 110 mmol/L Final    CO2 08/21/2020 23  23 - 29 mmol/L Final    Glucose 08/21/2020 168* 70 - 110 mg/dL Final    BUN, Bld 08/21/2020 16  8 - 23 mg/dL Final    Creatinine 08/21/2020 1.0  0.5 - 1.4 mg/dL Final    Calcium 08/21/2020 8.7  8.7 - 10.5 mg/dL Final    Total Protein 08/21/2020 7.2  6.0 - 8.4 g/dL Final    Albumin 08/21/2020 4.1  3.5 - 5.2 g/dL Final    Total Bilirubin 08/21/2020 0.8  0.1 - 1.0 mg/dL Final    Alkaline Phosphatase 08/21/2020 52* 55 - 135 U/L Final    AST 08/21/2020 19  10 - 40 U/L Final    ALT 08/21/2020 21  10 - 44 U/L Final    Anion Gap 08/21/2020 8  8 - 16 mmol/L Final    eGFR if   08/21/2020 >60.0  >60 mL/min/1.73 m^2 Final    eGFR if non African American 08/21/2020 58.0* >60 mL/min/1.73 m^2 Final    Troponin I 08/21/2020 <0.030  <=0.040 ng/mL Final    CPK 08/21/2020 52  20 - 180 U/L Final    Sed Rate 08/21/2020 23* 0 - 20 mm/Hr Final   Lab Visit on 06/25/2020   Component Date Value Ref Range Status    WBC 06/25/2020 7.96  3.90 - 12.70 K/uL Final    RBC 06/25/2020 3.91* 4.00 - 5.40 M/uL Final    Hemoglobin 06/25/2020 10.8* 12.0 - 16.0 g/dL Final    Hematocrit 06/25/2020 33.6* 37.0 - 48.5 % Final    Mean Corpuscular Volume 06/25/2020 86  82 - 98 fL Final    Mean Corpuscular Hemoglobin 06/25/2020 27.6  27.0 - 31.0 pg Final    Mean Corpuscular Hemoglobin Conc 06/25/2020 32.1  32.0 - 36.0 g/dL Final    RDW 06/25/2020 13.7  11.5 - 14.5 % Final    Platelets 06/25/2020 216  150 - 350 K/uL Final    MPV 06/25/2020 9.6  9.2 - 12.9 fL Final    Immature Granulocytes 06/25/2020 0.5  0.0 - 0.5 % Final    Gran # (ANC) 06/25/2020 5.2  1.8 - 7.7 K/uL Final    Immature Grans (Abs) 06/25/2020 0.04  0.00 - 0.04 K/uL Final    Lymph # 06/25/2020 1.9  1.0 - 4.8 K/uL Final    Mono # 06/25/2020 0.6  0.3 - 1.0 K/uL Final    Eos # 06/25/2020 0.3  0.0 - 0.5 K/uL Final    Baso # 06/25/2020 0.03  0.00 - 0.20 K/uL Final    nRBC 06/25/2020 0  0 /100 WBC Final    Gran% 06/25/2020 64.7  38.0 - 73.0 % Final    Lymph% 06/25/2020 23.5  18.0 - 48.0 % Final    Mono% 06/25/2020 7.5  4.0 - 15.0 % Final    Eosinophil% 06/25/2020 3.4  0.0 - 8.0 % Final    Basophil% 06/25/2020 0.4  0.0 - 1.9 % Final    Differential Method 06/25/2020 Automated   Final    Sodium 06/25/2020 135* 136 - 145 mmol/L Final    Potassium 06/25/2020 4.5  3.5 - 5.1 mmol/L Final    Chloride 06/25/2020 100  95 - 110 mmol/L Final    CO2 06/25/2020 23  23 - 29 mmol/L Final    Glucose 06/25/2020 143* 70 - 110 mg/dL Final    BUN, Bld 06/25/2020 18  8 - 23 mg/dL Final    Creatinine 06/25/2020 0.9  0.5 - 1.4 mg/dL Final    Calcium  06/25/2020 9.3  8.7 - 10.5 mg/dL Final    Total Protein 06/25/2020 7.5  6.0 - 8.4 g/dL Final    Albumin 06/25/2020 4.1  3.5 - 5.2 g/dL Final    Total Bilirubin 06/25/2020 0.6  0.1 - 1.0 mg/dL Final    Alkaline Phosphatase 06/25/2020 67  55 - 135 U/L Final    AST 06/25/2020 19  10 - 40 U/L Final    ALT 06/25/2020 20  10 - 44 U/L Final    Anion Gap 06/25/2020 12  8 - 16 mmol/L Final    eGFR if African American 06/25/2020 >60.0  >60 mL/min/1.73 m^2 Final    eGFR if non African American 06/25/2020 >60.0  >60 mL/min/1.73 m^2 Final    CEA 06/25/2020 2.3  0.0 - 5.0 ng/mL Final    Iron 06/25/2020 39  30 - 160 ug/dL Final    Transferrin 06/25/2020 230  200 - 375 mg/dL Final    TIBC 06/25/2020 322  250 - 450 ug/dL Final    Saturated Iron 06/25/2020 12* 20 - 50 % Final    Ferritin 06/25/2020 116  20.0 - 300.0 ng/mL Final    Vitamin B-12 06/25/2020 >1500* 210 - 950 pg/mL Final    Folate 06/25/2020 12.4  4.0 - 24.0 ng/mL Final                  Plan:           Type 2 diabetes mellitus without complication, without long-term current use of insulin  -     Hemoglobin A1C; Future; Expected date: 08/25/2020    Mild anemia  -     ferrous gluconate (FERGON) 240 (27 FE) MG tablet; Take 1 tablet (240 mg total) by mouth every Monday and Friday.  Dispense: 1 tablet; Refill: 1    Essential hypertension    Mixed hyperlipidemia  -     rosuvastatin (CRESTOR) 5 MG tablet; Take 1 tablet (5 mg total) by mouth every Monday and Friday.  Dispense: 24 tablet; Refill: 1      Check hemoglobin A1c.  If greater than 7 consider optimization of medications.    Resume rosuvastatin twice a week.  Her muscle aches are better at this point.    She can take Dulcolax at nighttime for constipation.  She can take up to 2 tablets.  Fup 4 months    Current Outpatient Medications:     alendronate (FOSAMAX) 70 MG tablet, Take 1 tablet (70 mg total) by mouth every 7 days., Disp: 13 tablet, Rfl: 2    blood sugar diagnostic (TRUE METRIX GLUCOSE TEST  STRIP) Strp, USE 1 STRIP TO CHECK GLUCOSE TWICE DAILY, Disp: 200 strip, Rfl: 3    blood-glucose meter kit, One touch meter and matching lancets and strips., Disp: 1 each, Rfl: 0    cyanocobalamin 1,000 mcg/mL injection, Inject 1 mL (1,000 mcg total) into the muscle every 30 days., Disp: 1 mL, Rfl: 11    fluticasone (FLONASE) 50 mcg/actuation nasal spray, 1 spray (50 mcg total) by Each Nare route once daily., Disp: 1 Bottle, Rfl: 4    irbesartan-hydrochlorothiazide (AVALIDE) 300-12.5 mg per tablet, Take 1 tablet by mouth once daily, Disp: 90 tablet, Rfl: 3    lancets (LANCETS,ULTRA THIN) 26 gauge Misc, 1 lancet by Misc.(Non-Drug; Combo Route) route once daily., Disp: 100 each, Rfl: 0    metFORMIN (GLUCOPHAGE) 1000 MG tablet, TAKE 1 TABLET BY MOUTH TWICE DAILY WITH MEALS, Disp: 180 tablet, Rfl: 1    [START ON 8/28/2020] rosuvastatin (CRESTOR) 5 MG tablet, Take 1 tablet (5 mg total) by mouth every Monday and Friday., Disp: 24 tablet, Rfl: 1    TRADJENTA 5 mg Tab tablet, TAKE 1 TABLET BY MOUTH ONCE DAILY, Disp: 90 tablet, Rfl: 2    [START ON 8/28/2020] ferrous gluconate (FERGON) 240 (27 FE) MG tablet, Take 1 tablet (240 mg total) by mouth every Monday and Friday., Disp: 1 tablet, Rfl: 1

## 2020-08-26 ENCOUNTER — LAB VISIT (OUTPATIENT)
Dept: LAB | Facility: HOSPITAL | Age: 70
End: 2020-08-26
Attending: INTERNAL MEDICINE
Payer: MEDICARE

## 2020-08-26 DIAGNOSIS — E11.9 TYPE 2 DIABETES MELLITUS WITHOUT COMPLICATION, WITHOUT LONG-TERM CURRENT USE OF INSULIN: ICD-10-CM

## 2020-08-26 LAB
ESTIMATED AVG GLUCOSE: 166 MG/DL (ref 68–131)
HBA1C MFR BLD HPLC: 7.4 % (ref 4.5–6.2)

## 2020-08-26 PROCEDURE — 83036 HEMOGLOBIN GLYCOSYLATED A1C: CPT

## 2020-08-26 PROCEDURE — 36415 COLL VENOUS BLD VENIPUNCTURE: CPT

## 2020-09-01 ENCOUNTER — DOCUMENTATION ONLY (OUTPATIENT)
Dept: PHARMACY | Facility: CLINIC | Age: 70
End: 2020-09-01

## 2020-10-01 ENCOUNTER — DOCUMENTATION ONLY (OUTPATIENT)
Dept: PHARMACY | Facility: CLINIC | Age: 70
End: 2020-10-01

## 2020-10-06 DIAGNOSIS — Z12.31 ENCOUNTER FOR SCREENING MAMMOGRAM FOR MALIGNANT NEOPLASM OF BREAST: Primary | ICD-10-CM

## 2020-10-09 ENCOUNTER — HOSPITAL ENCOUNTER (OUTPATIENT)
Dept: RADIOLOGY | Facility: HOSPITAL | Age: 70
Discharge: HOME | End: 2020-10-09
Attending: INTERNAL MEDICINE
Payer: MEDICARE

## 2020-10-09 VITALS — WEIGHT: 138 LBS | HEIGHT: 57 IN | BODY MASS INDEX: 29.77 KG/M2

## 2020-10-09 DIAGNOSIS — Z12.31 ENCOUNTER FOR SCREENING MAMMOGRAM FOR MALIGNANT NEOPLASM OF BREAST: ICD-10-CM

## 2020-10-09 PROCEDURE — 77067 SCR MAMMO BI INCL CAD: CPT | Mod: TC,PO

## 2020-10-28 ENCOUNTER — PATIENT MESSAGE (OUTPATIENT)
Dept: FAMILY MEDICINE | Facility: CLINIC | Age: 70
End: 2020-10-28

## 2020-10-28 DIAGNOSIS — R07.89 CHEST HEAVINESS: Primary | ICD-10-CM

## 2020-11-02 ENCOUNTER — DOCUMENTATION ONLY (OUTPATIENT)
Dept: PHARMACY | Facility: CLINIC | Age: 70
End: 2020-11-02

## 2020-11-03 ENCOUNTER — TELEPHONE (OUTPATIENT)
Dept: CARDIOLOGY | Facility: CLINIC | Age: 70
End: 2020-11-03

## 2020-11-03 NOTE — TELEPHONE ENCOUNTER
----- Message from Tonya Muhammad sent at 11/3/2020 12:54 PM CST -----  Regarding: appointment  Contact: son  Type:  Sooner Apoointment Request    Caller is requesting a sooner appointment.  Caller declined first available appointment listed below.  Caller will not accept being placed on the waitlist and is requesting a message be sent to doctor.    Name of Caller:  son- Espinoza West   When is the first available appointment?  01/15/2021  Symptoms:  chest heaviness  Best Call Back Number:  [phone]  Additional Information:

## 2020-11-12 ENCOUNTER — OFFICE VISIT (OUTPATIENT)
Dept: CARDIOLOGY | Facility: CLINIC | Age: 70
End: 2020-11-12
Payer: MEDICARE

## 2020-11-12 VITALS
DIASTOLIC BLOOD PRESSURE: 80 MMHG | BODY MASS INDEX: 29.77 KG/M2 | HEART RATE: 78 BPM | WEIGHT: 138 LBS | OXYGEN SATURATION: 99 % | RESPIRATION RATE: 16 BRPM | SYSTOLIC BLOOD PRESSURE: 150 MMHG | HEIGHT: 57 IN

## 2020-11-12 DIAGNOSIS — R07.9 CHEST PAIN, UNSPECIFIED TYPE: Primary | ICD-10-CM

## 2020-11-12 DIAGNOSIS — E11.9 TYPE 2 DIABETES MELLITUS WITHOUT COMPLICATION, WITHOUT LONG-TERM CURRENT USE OF INSULIN: ICD-10-CM

## 2020-11-12 DIAGNOSIS — E78.2 MIXED HYPERLIPIDEMIA: ICD-10-CM

## 2020-11-12 DIAGNOSIS — I10 ESSENTIAL HYPERTENSION: ICD-10-CM

## 2020-11-12 DIAGNOSIS — R53.83 FATIGUE, UNSPECIFIED TYPE: ICD-10-CM

## 2020-11-12 DIAGNOSIS — R06.09 DOE (DYSPNEA ON EXERTION): ICD-10-CM

## 2020-11-12 PROCEDURE — 1159F PR MEDICATION LIST DOCUMENTED IN MEDICAL RECORD: ICD-10-PCS | Mod: S$GLB,,, | Performed by: INTERNAL MEDICINE

## 2020-11-12 PROCEDURE — 3051F PR MOST RECENT HEMOGLOBIN A1C LEVEL 7.0 - < 8.0%: ICD-10-PCS | Mod: CPTII,S$GLB,, | Performed by: INTERNAL MEDICINE

## 2020-11-12 PROCEDURE — 1125F AMNT PAIN NOTED PAIN PRSNT: CPT | Mod: S$GLB,,, | Performed by: INTERNAL MEDICINE

## 2020-11-12 PROCEDURE — 1125F PR PAIN SEVERITY QUANTIFIED, PAIN PRESENT: ICD-10-PCS | Mod: S$GLB,,, | Performed by: INTERNAL MEDICINE

## 2020-11-12 PROCEDURE — 3079F PR MOST RECENT DIASTOLIC BLOOD PRESSURE 80-89 MM HG: ICD-10-PCS | Mod: CPTII,S$GLB,, | Performed by: INTERNAL MEDICINE

## 2020-11-12 PROCEDURE — 3008F PR BODY MASS INDEX (BMI) DOCUMENTED: ICD-10-PCS | Mod: CPTII,S$GLB,, | Performed by: INTERNAL MEDICINE

## 2020-11-12 PROCEDURE — 93000 ELECTROCARDIOGRAM COMPLETE: CPT | Mod: S$GLB,,, | Performed by: INTERNAL MEDICINE

## 2020-11-12 PROCEDURE — 93000 EKG 12-LEAD: ICD-10-PCS | Mod: S$GLB,,, | Performed by: INTERNAL MEDICINE

## 2020-11-12 PROCEDURE — 3051F HG A1C>EQUAL 7.0%<8.0%: CPT | Mod: CPTII,S$GLB,, | Performed by: INTERNAL MEDICINE

## 2020-11-12 PROCEDURE — 3008F BODY MASS INDEX DOCD: CPT | Mod: CPTII,S$GLB,, | Performed by: INTERNAL MEDICINE

## 2020-11-12 PROCEDURE — 99204 OFFICE O/P NEW MOD 45 MIN: CPT | Mod: S$GLB,,, | Performed by: INTERNAL MEDICINE

## 2020-11-12 PROCEDURE — 3077F PR MOST RECENT SYSTOLIC BLOOD PRESSURE >= 140 MM HG: ICD-10-PCS | Mod: CPTII,S$GLB,, | Performed by: INTERNAL MEDICINE

## 2020-11-12 PROCEDURE — 99204 PR OFFICE/OUTPT VISIT, NEW, LEVL IV, 45-59 MIN: ICD-10-PCS | Mod: S$GLB,,, | Performed by: INTERNAL MEDICINE

## 2020-11-12 PROCEDURE — 3077F SYST BP >= 140 MM HG: CPT | Mod: CPTII,S$GLB,, | Performed by: INTERNAL MEDICINE

## 2020-11-12 PROCEDURE — 3079F DIAST BP 80-89 MM HG: CPT | Mod: CPTII,S$GLB,, | Performed by: INTERNAL MEDICINE

## 2020-11-12 PROCEDURE — 1159F MED LIST DOCD IN RCRD: CPT | Mod: S$GLB,,, | Performed by: INTERNAL MEDICINE

## 2020-11-16 ENCOUNTER — LAB VISIT (OUTPATIENT)
Dept: LAB | Facility: HOSPITAL | Age: 70
End: 2020-11-16
Attending: INTERNAL MEDICINE
Payer: MEDICARE

## 2020-11-16 DIAGNOSIS — R53.83 FATIGUE, UNSPECIFIED TYPE: ICD-10-CM

## 2020-11-16 LAB — TSH SERPL DL<=0.005 MIU/L-ACNC: 2.91 UIU/ML (ref 0.34–5.6)

## 2020-11-16 PROCEDURE — 84443 ASSAY THYROID STIM HORMONE: CPT

## 2020-11-16 PROCEDURE — 36415 COLL VENOUS BLD VENIPUNCTURE: CPT

## 2020-12-03 ENCOUNTER — DOCUMENTATION ONLY (OUTPATIENT)
Dept: PHARMACY | Facility: CLINIC | Age: 70
End: 2020-12-03

## 2020-12-11 ENCOUNTER — HOSPITAL ENCOUNTER (OUTPATIENT)
Dept: CARDIOLOGY | Facility: CLINIC | Age: 70
Discharge: HOME | End: 2020-12-11
Attending: INTERNAL MEDICINE
Payer: MEDICARE

## 2020-12-11 ENCOUNTER — HOSPITAL ENCOUNTER (OUTPATIENT)
Dept: RADIOLOGY | Facility: CLINIC | Age: 70
Discharge: HOME | End: 2020-12-11
Attending: INTERNAL MEDICINE
Payer: MEDICARE

## 2020-12-11 VITALS — BODY MASS INDEX: 29.77 KG/M2 | HEIGHT: 57 IN | WEIGHT: 138 LBS

## 2020-12-11 DIAGNOSIS — R07.9 CHEST PAIN, UNSPECIFIED TYPE: ICD-10-CM

## 2020-12-11 DIAGNOSIS — E11.9 TYPE 2 DIABETES MELLITUS WITHOUT COMPLICATION, WITHOUT LONG-TERM CURRENT USE OF INSULIN: ICD-10-CM

## 2020-12-11 DIAGNOSIS — R06.09 DOE (DYSPNEA ON EXERTION): ICD-10-CM

## 2020-12-11 DIAGNOSIS — I10 ESSENTIAL HYPERTENSION: ICD-10-CM

## 2020-12-11 LAB
CV PHARM DOSE: 0.4 MG
EJECTION FRACTION- HIGH: 65 %
END DIASTOLIC INDEX-HIGH: 158 ML/M2
END DIASTOLIC INDEX-LOW: 94 ML/M2
END SYSTOLIC INDEX-HIGH: 71 ML/M2
END SYSTOLIC INDEX-LOW: 33 ML/M2
NUC STRESS DIASTOLIC VOLUME INDEX: 17
NUC STRESS EJECTION FRACTION: 98 %
NUC STRESS SYSTOLIC VOLUME INDEX: 0
OHS CV CPX 85 PERCENT MAX PREDICTED HEART RATE MALE: 124
OHS CV CPX MAX PREDICTED HEART RATE: 146
OHS CV CPX PATIENT IS FEMALE: 1
OHS CV CPX PATIENT IS MALE: 0
OHS CV CPX PEAK HEAR RATE: 123 BPM
OHS CV CPX PERCENT MAX PREDICTED HEART RATE ACHIEVED: 84
RETIRED EF AND QEF - SEE NOTES: 53 %

## 2020-12-11 PROCEDURE — 93306 TTE W/DOPPLER COMPLETE: CPT | Mod: S$GLB,,, | Performed by: INTERNAL MEDICINE

## 2020-12-11 PROCEDURE — 93306 ECHO (CUPID ONLY): ICD-10-PCS | Mod: S$GLB,,, | Performed by: INTERNAL MEDICINE

## 2020-12-11 RX ORDER — REGADENOSON 0.08 MG/ML
0.4 INJECTION, SOLUTION INTRAVENOUS ONCE
Status: COMPLETED | OUTPATIENT
Start: 2020-12-11 | End: 2020-12-11

## 2020-12-11 RX ADMIN — REGADENOSON 0.4 MG: 0.08 INJECTION, SOLUTION INTRAVENOUS at 08:12

## 2020-12-17 ENCOUNTER — OFFICE VISIT (OUTPATIENT)
Dept: CARDIOLOGY | Facility: CLINIC | Age: 70
End: 2020-12-17
Payer: MEDICARE

## 2020-12-17 VITALS
HEART RATE: 76 BPM | WEIGHT: 137 LBS | SYSTOLIC BLOOD PRESSURE: 124 MMHG | HEIGHT: 57 IN | BODY MASS INDEX: 29.56 KG/M2 | RESPIRATION RATE: 16 BRPM | OXYGEN SATURATION: 98 % | DIASTOLIC BLOOD PRESSURE: 74 MMHG

## 2020-12-17 DIAGNOSIS — I10 ESSENTIAL HYPERTENSION: Primary | ICD-10-CM

## 2020-12-17 DIAGNOSIS — E78.2 MIXED HYPERLIPIDEMIA: ICD-10-CM

## 2020-12-17 DIAGNOSIS — R07.9 CHEST PAIN, UNSPECIFIED TYPE: ICD-10-CM

## 2020-12-17 LAB
AORTIC ROOT ANNULUS: 2.7 CM
AORTIC VALVE CUSP SEPERATION: 1.6 CM
AV INDEX (PROSTH): 0.81
AV MEAN GRADIENT: 3 MMHG
AV PEAK GRADIENT: 4 MMHG
AV VALVE AREA: 2.81 CM2
AV VELOCITY RATIO: 0.83
BSA FOR ECHO PROCEDURE: 1.59 M2
CV ECHO LV RWT: 0.49 CM
DOP CALC AO PEAK VEL: 1.03 M/S
DOP CALC AO VTI: 25.1 CM
DOP CALC LVOT AREA: 3.5 CM2
DOP CALC LVOT DIAMETER: 2.1 CM
DOP CALC LVOT PEAK VEL: 0.86 M/S
DOP CALC LVOT STROKE VOLUME: 70.62 CM3
DOP CALCLVOT PEAK VEL VTI: 20.4 CM
E WAVE DECELERATION TIME: 153 MS
E/A RATIO: 0.94
E/E' RATIO: 13.09 M/S
ECHO LV POSTERIOR WALL: 0.91 CM (ref 0.6–1.1)
FRACTIONAL SHORTENING: 35 % (ref 28–44)
INTERVENTRICULAR SEPTUM: 0.99 CM (ref 0.6–1.1)
IVRT: 137 MS
LEFT ATRIUM SIZE: 3.1 CM
LEFT INTERNAL DIMENSION IN SYSTOLE: 2.43 CM (ref 2.1–4)
LEFT VENTRICLE MASS INDEX: 68 G/M2
LEFT VENTRICULAR INTERNAL DIMENSION IN DIASTOLE: 3.71 CM (ref 3.5–6)
LEFT VENTRICULAR MASS: 105.01 G
LV LATERAL E/E' RATIO: 14.4 M/S
LV SEPTAL E/E' RATIO: 12 M/S
MV PEAK A VEL: 0.77 M/S
MV PEAK E VEL: 0.72 M/S
PISA TR MAX VEL: 2 M/S
RA PRESSURE: 3 MMHG
RIGHT VENTRICULAR END-DIASTOLIC DIMENSION: 1.71 CM
TDI LATERAL: 0.05 M/S
TDI SEPTAL: 0.06 M/S
TDI: 0.06 M/S
TR MAX PG: 16 MMHG
TV REST PULMONARY ARTERY PRESSURE: 19 MMHG

## 2020-12-17 PROCEDURE — 3008F BODY MASS INDEX DOCD: CPT | Mod: CPTII,S$GLB,, | Performed by: INTERNAL MEDICINE

## 2020-12-17 PROCEDURE — 1126F PR PAIN SEVERITY QUANTIFIED, NO PAIN PRESENT: ICD-10-PCS | Mod: S$GLB,,, | Performed by: INTERNAL MEDICINE

## 2020-12-17 PROCEDURE — 3078F PR MOST RECENT DIASTOLIC BLOOD PRESSURE < 80 MM HG: ICD-10-PCS | Mod: CPTII,S$GLB,, | Performed by: INTERNAL MEDICINE

## 2020-12-17 PROCEDURE — 3078F DIAST BP <80 MM HG: CPT | Mod: CPTII,S$GLB,, | Performed by: INTERNAL MEDICINE

## 2020-12-17 PROCEDURE — 3074F PR MOST RECENT SYSTOLIC BLOOD PRESSURE < 130 MM HG: ICD-10-PCS | Mod: CPTII,S$GLB,, | Performed by: INTERNAL MEDICINE

## 2020-12-17 PROCEDURE — 3074F SYST BP LT 130 MM HG: CPT | Mod: CPTII,S$GLB,, | Performed by: INTERNAL MEDICINE

## 2020-12-17 PROCEDURE — 1159F PR MEDICATION LIST DOCUMENTED IN MEDICAL RECORD: ICD-10-PCS | Mod: S$GLB,,, | Performed by: INTERNAL MEDICINE

## 2020-12-17 PROCEDURE — 1159F MED LIST DOCD IN RCRD: CPT | Mod: S$GLB,,, | Performed by: INTERNAL MEDICINE

## 2020-12-17 PROCEDURE — 99212 OFFICE O/P EST SF 10 MIN: CPT | Mod: S$GLB,,, | Performed by: INTERNAL MEDICINE

## 2020-12-17 PROCEDURE — 99212 PR OFFICE/OUTPT VISIT, EST, LEVL II, 10-19 MIN: ICD-10-PCS | Mod: S$GLB,,, | Performed by: INTERNAL MEDICINE

## 2020-12-17 PROCEDURE — 1126F AMNT PAIN NOTED NONE PRSNT: CPT | Mod: S$GLB,,, | Performed by: INTERNAL MEDICINE

## 2020-12-17 PROCEDURE — 3008F PR BODY MASS INDEX (BMI) DOCUMENTED: ICD-10-PCS | Mod: CPTII,S$GLB,, | Performed by: INTERNAL MEDICINE

## 2020-12-28 ENCOUNTER — TELEPHONE (OUTPATIENT)
Dept: HEMATOLOGY/ONCOLOGY | Facility: CLINIC | Age: 70
End: 2020-12-28

## 2020-12-28 NOTE — PROGRESS NOTES
[facility] Hematology/Oncology  PROGRESS NOTE - Follow-up Visit      Subjective:       Patient ID:   NAME: Preet West : 1950     70 y.o. female    Referring Doc: Hetal  Other Physicians: Lucille    Chief Complaint:  Colon ca f/u    History of Present Illness:     Patient returns today for a regularly scheduled follow-up visit.  The patient is here today to go over the results of the recently ordered labs, tests and studies. She is here with her son. She reports that she saw a GI specialist Dr Adkins and had scopes in May 2019 which they report were negative.      She denies any CP, SOB, HA's ir N/V. Her bowels are moving adequately.   She no longer has any fatigue issues.. She has problems with sleeping at night have since improved.    She saw Dr Fong on 2020     Discussed covid19 precautions            ROS:   GEN: normal without any fever, night sweats or weight loss;    HEENT: normal with no HA's, sore throat, stiff neck, changes in vision  CV: normal with no CP, SOB, PND, FISHER or orthopnea  PULM: normal with no SOB, cough, hemoptysis, sputum or pleuritic pain  GI: normal with no abdominal pain, nausea, vomiting, constipation, diarrhea, melanotic stools, BRBPR, or hematemesis  : normal with no hematuria, dysuria  BREAST: normal with no mass, discharge, pain  SKIN: normal with no rash, erythema, bruising, or swelling    Allergies:  Review of patient's allergies indicates:   Allergen Reactions    Aspirin Itching       Medications:    Current Outpatient Medications:     alendronate (FOSAMAX) 70 MG tablet, Take 1 tablet by mouth once a week, Disp: 12 tablet, Rfl: 3    blood sugar diagnostic (TRUE METRIX GLUCOSE TEST STRIP) Strp, USE 1 STRIP TO CHECK GLUCOSE TWICE DAILY, Disp: 200 strip, Rfl: 3    cyanocobalamin 1,000 mcg/mL injection, Inject 1 mL (1,000 mcg total) into the muscle every 30 days., Disp: 1 mL, Rfl: 11    ferrous gluconate (FERGON) 240 (27 FE) MG tablet, Take 1 tablet (240 mg  total) by mouth every Monday and Friday., Disp: 1 tablet, Rfl: 1    fluticasone (FLONASE) 50 mcg/actuation nasal spray, 1 spray (50 mcg total) by Each Nare route once daily., Disp: 1 Bottle, Rfl: 4    irbesartan-hydrochlorothiazide (AVALIDE) 300-12.5 mg per tablet, Take 1 tablet by mouth once daily, Disp: 90 tablet, Rfl: 3    lancets (LANCETS,ULTRA THIN) 26 gauge Misc, 1 lancet by Misc.(Non-Drug; Combo Route) route once daily., Disp: 100 each, Rfl: 0    metFORMIN (GLUCOPHAGE) 1000 MG tablet, TAKE 1 TABLET BY MOUTH TWICE DAILY WITH MEALS, Disp: 180 tablet, Rfl: 1    nystatin (MYCOSTATIN) cream, Apply topically 2 (two) times daily., Disp: 30 g, Rfl: 1    polyethylene glycol (GLYCOLAX) 17 gram/dose powder, DISSOLVE 17 GRAMS IN WATER & DRINK ONCE DAILY, Disp: 510 g, Rfl: 3    rosuvastatin (CRESTOR) 5 MG tablet, Take 1 tablet (5 mg total) by mouth every Monday and Friday., Disp: 24 tablet, Rfl: 1    TRADJENTA 5 mg Tab tablet, TAKE 1 TABLET BY MOUTH ONCE DAILY, Disp: 90 tablet, Rfl: 2    blood-glucose meter kit, One touch meter and matching lancets and strips., Disp: 1 each, Rfl: 0    PMHx/PSHx Updates:  See patient's last visit with me on  6/25/2020.  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 rate 18, weight 62.6 kg (138 lb).    Physical Examination:   GEN: no apparent distress, comfortable; AAOx3  HEAD: atraumatic and normocephalic  EYES: no pallor, no icterus, PERRLA  ENT: OMM, no pharyngeal erythema, external ears WNL; no nasal discharge; no thrush  NECK: no masses, thyroid normal, trachea midline, no LAD/LN's, supple  CV: RRR with no murmur; normal pulse; normal S1 and S2; no pedal edema  CHEST:  Normal respiratory effort; CTAB; normal breath sounds; no wheeze or crackles  ABDOM: nontender and nondistended; soft; normal bowel sounds; no rebound/guarding  MUSC/Skeletal:  prior right knee surgery; arthropathy  EXTREM: no clubbing, cyanosis, inflammation or swelling  SKIN: no rashes, lesions, ulcers, petechiae or subcutaneous nodules  : no rocha  NEURO: grossly intact; motor/sensory WNL; AAOx3; no tremors  PSYCH: normal mood, affect and behavior  LYMPH: normal cervical, supraclavicular, axillary and groin LN's            Labs:        Lab Results   Component Value Date    WBC 8.69 12/29/2020    HGB 10.6 (L) 12/29/2020    HCT 33.7 (L) 12/29/2020    MCV 86 12/29/2020     12/29/2020     BMP  Lab Results   Component Value Date     08/21/2020    K 4.5 08/21/2020     08/21/2020    CO2 23 08/21/2020    BUN 16 08/21/2020    CREATININE 1.0 08/21/2020    CALCIUM 8.7 08/21/2020    ANIONGAP 8 08/21/2020    ESTGFRAFRICA >60.0 08/21/2020    EGFRNONAA 58.0 (A) 08/21/2020        Lab Results   Component Value Date    IRON 40 12/29/2020    TIBC 337 12/29/2020    FERRITIN 111 12/29/2020       Lab Results   Component Value Date    CEA 2.5 12/29/2020       Lab Results   Component Value Date    VFDLKPJA77 557 12/29/2020     Lab Results   Component Value Date    FOLATE 15.7 12/29/2020         Radiology/Diagnostic Studies:    CT Abdom/pelvis: 5/9/2019  IMPRESSION:  Postsurgical changes of the mid transverse colon without evidence of recurrent  or metastatic disease    Prior hysterectomy        X-ray Chest Pa And Lateral    Result Date: 5/9/2019  MDI CHEST, 2 VIEWS XRAY Indication: Personal history of other malignant neoplasm of large intestine. Comparison: June 27, 2018 Findings: Cardiomediastinal silhouette is within normal limits. There is no confluent airspace disease. There is no pleural effusion or pneumothorax. No acute osseous abnormality.     IMPRESSION: No acute pulmonary process. Read and electronically  signed by: Renetta Frost MD on 5/9/2019 9:23 AM CDT RENETTA FROST MD      I have reviewed all available lab results and radiology reports.    Assessment/Plan:   (1) 70 y.o. female with diagnosis of colon cancer who has been referred by Pal Fong MD for evaluation by medical hematology/oncology.   - diagnosed in Jan 2013  - s/p transverse colon resection 3/12/2013 followed by adjuvant chemotherapy with FOLFOX for 6 months in [place], [place]  - T3 N1a - 1 LN was positive  - Dr Sofia West was her oncologist in [place]  - she had a follow-up colonoscopy in Apr 2017  - latest CXR was negative; - CT scans in may 2019 were stable  - she saw Dr Adkins with GI since last visit and had scopes in [place] 2019    12/30/2020:  - latest CEA was stable at 2.5     (2) HTN and hypercholesterolemia     (3) DM II     (4) Osteoporosis     (5) OA    (6) B12 deficiency    (7) Mild chronic anemia - hgb at 10.6 - NCNC indices; normal iron panel          VISIT DIAGNOSES:      Mild anemia    Malignant neoplasm of transverse colon (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 Check up to date labs incl. CEA every 6 months  2. Encouraged compliance with referrals, studies and labs  3. F/u with GI as directed by them - colonoscopy due next year  4. F/u with PCP  RTC in 12 months    Fax note to   Pal Fong MD,  Lucille    Discussion:     COVID-19 Discussion:    I had long discussion with patient and any applicable family about the COVID-19 coronavirus epidemic and the recommended precautions with regard to cancer and/or hematology patients. I have re-iterated the CDC recommendations for adequate hand washing, use of hand -like products, and coughing into elbow, etc. In addition, especially for our patients who are on chemotherapy and/or our otherwise immunocompromised patients, I have recommended avoidance of crowds, including movie theaters, restaurants, churches, etc. I have  recommended avoidance of any sick or symptomatic family members and/or friends. I have also recommended avoidance of any raw and unwashed food products, and general avoidance of food items that have not been prepared by themselves. The patient has been asked to call us immediately with any symptom developments, issues, questions or other general concerns.       I spent over 25 mins of time with the patient. Reviewed results of the recently ordered labs, tests and studies; made directives with regards to the results. Over half of this time was spent couseling and coordinating care.    I have explained all of the above in detail and the patient understands all of the current recommendation(s). I have answered all of their questions to the best of my ability and to their complete satisfaction.   The patient is to continue with the current management plan.            Electronically signed by Clemente Mi MD

## 2020-12-28 NOTE — TELEPHONE ENCOUNTER
Called to see if patient had labs drawn. I spoke with patients son and he will take her to get labs drawn tomorrow morning. He was reminded of her appointment on 12-30 at 1:15.

## 2020-12-29 ENCOUNTER — LAB VISIT (OUTPATIENT)
Dept: LAB | Facility: HOSPITAL | Age: 70
End: 2020-12-29
Attending: INTERNAL MEDICINE
Payer: MEDICARE

## 2020-12-29 ENCOUNTER — OFFICE VISIT (OUTPATIENT)
Dept: FAMILY MEDICINE | Facility: CLINIC | Age: 70
End: 2020-12-29
Payer: MEDICARE

## 2020-12-29 VITALS
TEMPERATURE: 98 F | HEART RATE: 78 BPM | BODY MASS INDEX: 29.19 KG/M2 | OXYGEN SATURATION: 100 % | RESPIRATION RATE: 18 BRPM | SYSTOLIC BLOOD PRESSURE: 134 MMHG | WEIGHT: 135.31 LBS | DIASTOLIC BLOOD PRESSURE: 84 MMHG | HEIGHT: 57 IN

## 2020-12-29 DIAGNOSIS — D64.9 MILD ANEMIA: ICD-10-CM

## 2020-12-29 DIAGNOSIS — I10 ESSENTIAL HYPERTENSION: ICD-10-CM

## 2020-12-29 DIAGNOSIS — R21 RASH OF GROIN: ICD-10-CM

## 2020-12-29 DIAGNOSIS — E11.9 TYPE 2 DIABETES MELLITUS WITHOUT COMPLICATION, WITHOUT LONG-TERM CURRENT USE OF INSULIN: Primary | ICD-10-CM

## 2020-12-29 DIAGNOSIS — E53.8 VITAMIN B 12 DEFICIENCY: ICD-10-CM

## 2020-12-29 DIAGNOSIS — E78.2 MIXED HYPERLIPIDEMIA: ICD-10-CM

## 2020-12-29 DIAGNOSIS — C18.4 MALIGNANT NEOPLASM OF TRANSVERSE COLON: ICD-10-CM

## 2020-12-29 DIAGNOSIS — Z90.49 S/P LAPAROSCOPIC COLECTOMY: ICD-10-CM

## 2020-12-29 LAB
BASOPHILS # BLD AUTO: 0.05 K/UL (ref 0–0.2)
BASOPHILS NFR BLD: 0.6 % (ref 0–1.9)
CEA SERPL-MCNC: 2.5 NG/ML (ref 0–5)
DIFFERENTIAL METHOD: ABNORMAL
EOSINOPHIL # BLD AUTO: 0.2 K/UL (ref 0–0.5)
EOSINOPHIL NFR BLD: 1.7 % (ref 0–8)
ERYTHROCYTE [DISTWIDTH] IN BLOOD BY AUTOMATED COUNT: 13.8 % (ref 11.5–14.5)
FERRITIN SERPL-MCNC: 111 NG/ML (ref 20–300)
FOLATE SERPL-MCNC: 15.7 NG/ML (ref 4–24)
HCT VFR BLD AUTO: 33.7 % (ref 37–48.5)
HGB BLD-MCNC: 10.6 G/DL (ref 12–16)
IMM GRANULOCYTES # BLD AUTO: 0.04 K/UL (ref 0–0.04)
IMM GRANULOCYTES NFR BLD AUTO: 0.5 % (ref 0–0.5)
IRON SERPL-MCNC: 40 UG/DL (ref 30–160)
LYMPHOCYTES # BLD AUTO: 2.2 K/UL (ref 1–4.8)
LYMPHOCYTES NFR BLD: 24.9 % (ref 18–48)
MCH RBC QN AUTO: 27 PG (ref 27–31)
MCHC RBC AUTO-ENTMCNC: 31.5 G/DL (ref 32–36)
MCV RBC AUTO: 86 FL (ref 82–98)
MONOCYTES # BLD AUTO: 0.6 K/UL (ref 0.3–1)
MONOCYTES NFR BLD: 6.4 % (ref 4–15)
NEUTROPHILS # BLD AUTO: 5.7 K/UL (ref 1.8–7.7)
NEUTROPHILS NFR BLD: 65.9 % (ref 38–73)
NRBC BLD-RTO: 0 /100 WBC
PLATELET # BLD AUTO: 266 K/UL (ref 150–350)
PMV BLD AUTO: 10.5 FL (ref 9.2–12.9)
RBC # BLD AUTO: 3.92 M/UL (ref 4–5.4)
SATURATED IRON: 12 % (ref 20–50)
TOTAL IRON BINDING CAPACITY: 337 UG/DL (ref 250–450)
TRANSFERRIN SERPL-MCNC: 241 MG/DL (ref 200–375)
VIT B12 SERPL-MCNC: 557 PG/ML (ref 210–950)
WBC # BLD AUTO: 8.69 K/UL (ref 3.9–12.7)

## 2020-12-29 PROCEDURE — 1101F PR PT FALLS ASSESS DOC 0-1 FALLS W/OUT INJ PAST YR: ICD-10-PCS | Mod: S$GLB,,, | Performed by: INTERNAL MEDICINE

## 2020-12-29 PROCEDURE — 99214 OFFICE O/P EST MOD 30 MIN: CPT | Mod: S$GLB,,, | Performed by: INTERNAL MEDICINE

## 2020-12-29 PROCEDURE — 3051F HG A1C>EQUAL 7.0%<8.0%: CPT | Mod: S$GLB,,, | Performed by: INTERNAL MEDICINE

## 2020-12-29 PROCEDURE — 82607 VITAMIN B-12: CPT

## 2020-12-29 PROCEDURE — 1159F PR MEDICATION LIST DOCUMENTED IN MEDICAL RECORD: ICD-10-PCS | Mod: S$GLB,,, | Performed by: INTERNAL MEDICINE

## 2020-12-29 PROCEDURE — 85025 COMPLETE CBC W/AUTO DIFF WBC: CPT

## 2020-12-29 PROCEDURE — 1159F MED LIST DOCD IN RCRD: CPT | Mod: S$GLB,,, | Performed by: INTERNAL MEDICINE

## 2020-12-29 PROCEDURE — 82728 ASSAY OF FERRITIN: CPT

## 2020-12-29 PROCEDURE — 3079F DIAST BP 80-89 MM HG: CPT | Mod: S$GLB,,, | Performed by: INTERNAL MEDICINE

## 2020-12-29 PROCEDURE — 3288F FALL RISK ASSESSMENT DOCD: CPT | Mod: S$GLB,,, | Performed by: INTERNAL MEDICINE

## 2020-12-29 PROCEDURE — 1101F PT FALLS ASSESS-DOCD LE1/YR: CPT | Mod: S$GLB,,, | Performed by: INTERNAL MEDICINE

## 2020-12-29 PROCEDURE — 3288F PR FALLS RISK ASSESSMENT DOCUMENTED: ICD-10-PCS | Mod: S$GLB,,, | Performed by: INTERNAL MEDICINE

## 2020-12-29 PROCEDURE — 3008F BODY MASS INDEX DOCD: CPT | Mod: S$GLB,,, | Performed by: INTERNAL MEDICINE

## 2020-12-29 PROCEDURE — 82746 ASSAY OF FOLIC ACID SERUM: CPT

## 2020-12-29 PROCEDURE — 36415 COLL VENOUS BLD VENIPUNCTURE: CPT

## 2020-12-29 PROCEDURE — 3008F PR BODY MASS INDEX (BMI) DOCUMENTED: ICD-10-PCS | Mod: S$GLB,,, | Performed by: INTERNAL MEDICINE

## 2020-12-29 PROCEDURE — 99214 PR OFFICE/OUTPT VISIT, EST, LEVL IV, 30-39 MIN: ICD-10-PCS | Mod: S$GLB,,, | Performed by: INTERNAL MEDICINE

## 2020-12-29 PROCEDURE — 1126F PR PAIN SEVERITY QUANTIFIED, NO PAIN PRESENT: ICD-10-PCS | Mod: S$GLB,,, | Performed by: INTERNAL MEDICINE

## 2020-12-29 PROCEDURE — 3075F SYST BP GE 130 - 139MM HG: CPT | Mod: S$GLB,,, | Performed by: INTERNAL MEDICINE

## 2020-12-29 PROCEDURE — 82378 CARCINOEMBRYONIC ANTIGEN: CPT

## 2020-12-29 PROCEDURE — 3075F PR MOST RECENT SYSTOLIC BLOOD PRESS GE 130-139MM HG: ICD-10-PCS | Mod: S$GLB,,, | Performed by: INTERNAL MEDICINE

## 2020-12-29 PROCEDURE — 3051F PR MOST RECENT HEMOGLOBIN A1C LEVEL 7.0 - < 8.0%: ICD-10-PCS | Mod: S$GLB,,, | Performed by: INTERNAL MEDICINE

## 2020-12-29 PROCEDURE — 83540 ASSAY OF IRON: CPT

## 2020-12-29 PROCEDURE — 1126F AMNT PAIN NOTED NONE PRSNT: CPT | Mod: S$GLB,,, | Performed by: INTERNAL MEDICINE

## 2020-12-29 PROCEDURE — 3079F PR MOST RECENT DIASTOLIC BLOOD PRESSURE 80-89 MM HG: ICD-10-PCS | Mod: S$GLB,,, | Performed by: INTERNAL MEDICINE

## 2020-12-29 RX ORDER — NYSTATIN 100000 U/G
CREAM TOPICAL 2 TIMES DAILY
Qty: 30 G | Refills: 1 | Status: SHIPPED | OUTPATIENT
Start: 2020-12-29 | End: 2021-04-29

## 2020-12-29 NOTE — PROGRESS NOTES
Subjective:       Patient ID: Preet West is a 70 y.o. female.    Chief Complaint: Diabetes (4 month f/u ), Hyperlipidemia, Osteoporosis, Hypertension, B12 Deficiency, and Rash       Patient ID: Preet West is a 70 y.o. female.    Patient states that her blood sugars are running 120 and 125.  Patient  states that sometimes the blood sugars go low.    We had resumed her on rosuvastatin twice a week and she seems to be tolerating it.  New complains of lower abdominal rash have been noted.    She continues to get her B12 injections.        Diabetes  She presents for her follow-up diabetic visit. She has type 2 diabetes mellitus. No MedicAlert identification noted. The initial diagnosis of diabetes was made 9 years ago. Hypoglycemia symptoms include sleepiness. Pertinent negatives for hypoglycemia include no confusion, dizziness, headaches, hunger, mood changes, nervousness/anxiousness, pallor, seizures, speech difficulty, sweats or tremors. Associated symptoms include blurred vision, foot paresthesias, polyuria and visual change. Pertinent negatives for diabetes include no chest pain, no fatigue, no foot ulcerations, no polydipsia, no polyphagia, no weakness and no weight loss. Hypoglycemia complications include nocturnal hypoglycemia. Pertinent negatives for hypoglycemia complications include no blackouts, no hospitalization, no required assistance and no required glucagon injection. Symptoms are stable. Pertinent negatives for diabetic complications include no autonomic neuropathy, CVA, heart disease, impotence, nephropathy, peripheral neuropathy, PVD or retinopathy. Risk factors for coronary artery disease include hypertension, sedentary lifestyle, post-menopausal, dyslipidemia and diabetes mellitus. Current diabetic treatment includes oral agent (monotherapy). She is compliant with treatment all of the time. Her weight is stable. She is following a generally healthy diet. Meal planning  includes carbohydrate counting. She has not had a previous visit with a dietitian. She participates in exercise daily. She monitors blood glucose at home 1-2 x per day. She monitors urine at home 1-2 x per day. Blood glucose monitoring compliance is good. There is no change in her home blood glucose trend. She does not see a podiatrist.Eye exam is current.   Hyperlipidemia  This is a chronic problem. The current episode started more than 1 year ago. The problem is controlled. She has no history of obesity. Pertinent negatives include no chest pain, myalgias or shortness of breath. Current antihyperlipidemic treatment includes statins. The current treatment provides moderate improvement of lipids. Compliance problems include psychosocial issues.  Risk factors for coronary artery disease include a sedentary lifestyle, hypertension, diabetes mellitus and dyslipidemia.   Hypertension  This is a chronic problem. The current episode started more than 1 year ago. The problem is unchanged. Associated symptoms include blurred vision and malaise/fatigue. Pertinent negatives include no chest pain, headaches, palpitations, shortness of breath or sweats. Risk factors for coronary artery disease include sedentary lifestyle, dyslipidemia and diabetes mellitus. Past treatments include angiotensin blockers. The current treatment provides moderate improvement. There is no history of CVA, PVD or retinopathy. There is no history of coarctation of the aorta, hyperaldosteronism, hypercortisolism or pheochromocytoma.       Past Medical History:   Diagnosis Date    Allergy     aspirin itching    Anemia     Cancer     Diabetes mellitus, type 2     H/O colon cancer, stage III 1/1/2013    [facility]    Malignant neoplasm of transverse colon (2013) - Stage III 4/4/2019    Osteoporosis     S/P laparoscopic colectomy (3/2013) 4/5/2019    Seasonal allergies      Subjective:       Patient ID: Preet West is a 70 y.o.  female.    Chief Complaint: Diabetes (4 month f/u ), Hyperlipidemia, Osteoporosis, Hypertension, B12 Deficiency, and Rash    Patient is a 70-year-old female who comes for follow-up.  Multiple complains which keep on multiplying each time she comes.  Now today's complain is that she is hurting all over the body.  She points are to her thigh, legs, feet, arms, hands.  Everything hurts.    She has been on statin medications for quite some time.  It is unclear if the ache and pains have increased after the statins or not.    She also complains of itching in her private areas.  She denies any discharge.  Patient has limited insight as to why she should be itching.  She did have a gynecological checkup last year which was unremarkable.    Blood sugars are good and her hemoglobin A1c has come down.  Lipid panel is super low.    Diabetes   She presents for her follow-up diabetic visit. She has type 2 diabetes mellitus. Her disease course has been stable. Pertinent negatives for hypoglycemia include no confusion, dizziness, headaches, nervousness/anxiousness, pallor, seizures or tremors. Associated symptoms include fatigue. Pertinent negatives for diabetes include no chest pain, no foot ulcerations, no polydipsia, no polyphagia and no polyuria. There are no hypoglycemic complications. Symptoms are stable. Risk factors for coronary artery disease include post-menopausal and sedentary lifestyle. Current diabetic treatment includes oral agent (dual therapy). She is compliant with treatment most of the time. Her weight is stable. Meal planning includes avoidance of concentrated sweets. She rarely participates in exercise. An ACE inhibitor/angiotensin II receptor blocker is being taken. She does not see a podiatrist.Eye exam is current.   Hypertension   This is a chronic problem. The current episode started more than 1 year ago. The problem is controlled. Pertinent negatives include no chest pain, headaches, neck pain,  palpitations or shortness of breath. Risk factors for coronary artery disease include sedentary lifestyle, dyslipidemia and diabetes mellitus. Past treatments include angiotensin blockers and diuretics. The current treatment provides moderate improvement. Compliance problems include psychosocial issues.    Hyperlipidemia   This is a chronic problem. The current episode started more than 1 year ago. She has no history of obesity. Associated symptoms include myalgias. Pertinent negatives include no chest pain or shortness of breath. Current antihyperlipidemic treatment includes statins- off statins in past. The current treatment provides significant improvement of lipids. Compliance problems include psychosocial issues.  Risk factors for coronary artery disease include hypertension, a sedentary lifestyle, post-menopausal, diabetes mellitus and dyslipidemia.            Past Medical History:   Diagnosis Date    Allergy     aspirin itching    Anemia     Cancer     Diabetes mellitus, type 2     H/O colon cancer, stage III 1/1/2013    [facility] in [facility]    Malignant neoplasm of transverse colon (2013) - Stage III 4/4/2019    Osteoporosis     S/P laparoscopic colectomy (3/2013) 4/5/2019    Seasonal allergies      Social History     Socioeconomic History    Marital status:      Spouse name: Luis West    Number of children: 3    Years of education: Not on file    Highest education level: Not on file   Occupational History    Occupation:    Social Needs    Financial resource strain: Hard    Food insecurity     Worry: Sometimes true     Inability: Sometimes true    Transportation needs     Medical: No     Non-medical: No   Tobacco Use    Smoking status: Never Smoker    Smokeless tobacco: Never Used   Substance and Sexual Activity    Alcohol use: No     Frequency: Never     Drinks per session: Patient refused     Binge frequency: Patient refused    Drug use: No    Sexual  activity: Not Currently     Partners: Male     Birth control/protection: Post-menopausal   Lifestyle    Physical activity     Days per week: 1 day     Minutes per session: 20 min    Stress: To some extent   Relationships    Social connections     Talks on phone: Twice a week     Gets together: More than three times a week     Attends [employer] orthodox service: Not on file     Active member of club or organization: Yes     Attends meetings of clubs or organizations: Never     Relationship status:    Other Topics Concern    Not on file   Social History Narrative    Speaks [place] only     Past Surgical History:   Procedure Laterality Date    COLON SURGERY      JOINT REPLACEMENT       Family History   Problem Relation Age of Onset    Stroke Mother     Heart disease Mother     Cancer Father         [place]       Review of Systems   Constitutional: Positive for fatigue. Negative for activity change, chills, fever and unexpected weight change   HENT: Negative for congestion, postnasal drip, sinus pressure and sneezing.         Uses Flonase for sinuses.   Eyes: Negative for pain, discharge and visual disturbance (Cataract).   Respiratory: Negative for cough, chest tightness, shortness of breath, wheezing and stridor.    Cardiovascular: Negative for chest pain, palpitations and leg swelling.   Gastrointestinal: Positive for change in bowel habit (constipation). Negative for abdominal distention, abdominal pain, anal bleeding, anorexia, constipation and diarrhea.        GERD occasional use of pantoprazole.   Endocrine: Negative for polydipsia, polyphagia and polyuria.        Type 2 diabetes mellitus on combination of metformin and Tradjenta. Also has osteoporosis.    Low sugar and eats extra food at night   Genitourinary: Negative for decreased urine volume, difficulty urinating, dysuria, flank pain, frequency, vaginal bleeding, vaginal discharge and vaginal pain.          Musculoskeletal: Negative for myalgias.  "Negative for arthralgias, back pain, joint swelling and neck pain.        Skin: Negative for color change, pallor Rash in groin and lower abdomen   Allergic/Immunologic: Negative for environmental allergies, food allergies and immunocompromised state.   Neurological: Negative for dizziness, tremors, seizures, syncope, light-headedness and headaches.   Hematological: Negative for adenopathy. Does not bruise/bleed easily.   Psychiatric/Behavioral: Negative for agitation, confusion and dysphoric mood. The patient is not nervous/anxious.         Somewhat anxious about her health issues and tends to over utilize. (Every ill needs a pill)         Objective:      Blood pressure 134/84, pulse 78, temperature 97.5 °F (36.4 °C), temperature source Temporal, resp. rate 18, height 4' 9" (1.448 m), weight 61.4 kg (135 lb 4.8 oz), SpO2 100 %. Body mass index is 29.28 kg/m².  Physical Exam   Constitutional: She appears well-developed and well-nourished. She is cooperative. No distress.   HENT:   Head: Normocephalic and atraumatic.     Eyes: Pupils are equal, round, and reactive to light. Conjunctivae, EOM and lids are normal. Lids are everted and swept, no foreign bodies found. Right pupil is round and reactive. Left pupil is round and reactive.   Neck: Trachea normal and normal range of motion. Neck supple.   Cardiovascular: Normal rate, regular rhythm, S1 normal, S2 normal and normal heart sounds.   Pulmonary/Chest: Breath sounds normal.   Abdominal: Soft. Bowel sounds are normal. There is no rigidity and no guarding.   Musculoskeletal: She exhibits no edema, tenderness or deformity.        Right foot: There is normal range of motion and no deformity.        Left foot: There is normal range of motion and no deformity.   Feet:   Right Foot:   Protective Sensation: 4 sites tested. 4 sites sensed.   Skin Integrity: Negative for ulcer, blister or skin breakdown.   Left Foot:   Protective Sensation: 4 sites tested. 4 sites sensed. "   Skin Integrity: Negative for ulcer, blister or skin breakdown.   Lymphadenopathy:     She has no cervical adenopathy.     She has no axillary adenopathy.   Neurological: She is alert. Coordination normal.   Skin: Skin is warm and dry.Pt shy and coy about showing rash   Psychiatric: Her behavior is unchanged but somewhat terse and grave.   Nursing note and vitals reviewed.        Assessment:       1. Type 2 diabetes mellitus without complication, without long-term current use of insulin    2. Essential hypertension    3. Mixed hyperlipidemia    4. Rash of groin           Conclusion  Echo results in past  · The left ventricle is normal in size with normal systolic function. The estimated ejection fraction is 60%.  · Grade I left ventricular diastolic dysfunction.  · Normal right ventricular size with normal right ventricular systolic function.  · Normal central venous pressure (3 mmHg).  · The estimated PA systolic pressure is 19 mmHg.        Formal report for nuclear stress test is pending.  Patient's  tells me verbally that they were told that the stress test was normal.               Plan:           Type 2 diabetes mellitus without complication, without long-term current use of insulin  -     Hemoglobin A1C; Future; Expected date: 04/05/2021  -     Microalbumin/Creatinine Ratio, Urine; Future; Expected date: 04/05/2021    Essential hypertension  -     Comprehensive Metabolic Panel; Future; Expected date: 04/05/2021    Mixed hyperlipidemia    Rash of groin  -     nystatin (MYCOSTATIN) cream; Apply topically 2 (two) times daily.  Dispense: 30 g; Refill: 1     Patient's history of chest pain has been attributed to gas and acidity by .  She is doing okay now.    Her blood sugars at home seem to be okay but these are fasting levels.  Her last hemoglobin A1c was high.  She continues with rosuvastatin twice a week.    She can take Dulcolax at nighttime for constipation.  She can take up to 2  tablets.    rasdm    Advised Ms. West about age and season appropriate immunizations/ cancer screenings.  Also seasonal influenza vaccine, update on tetanus diphtheria vaccination every 10 years.      Please utilize precautions for current COVID-19 pandemic.  Try to avoid crowds or close contact with multiple people.  Minimize outside interaction.  Wash hands with soap for  frequently upon contact.Use face mask or cover.    Fup 4 months    Spent charmaine 25 minutes with patient which involved review of pts medical conditions, labs, medications and with 50% of time face-to-face discussion about medical problems, management and any applicable changes.    Current Outpatient Medications:     alendronate (FOSAMAX) 70 MG tablet, Take 1 tablet by mouth once a week, Disp: 12 tablet, Rfl: 3    blood sugar diagnostic (TRUE METRIX GLUCOSE TEST STRIP) Strp, USE 1 STRIP TO CHECK GLUCOSE TWICE DAILY, Disp: 200 strip, Rfl: 3    blood-glucose meter kit, One touch meter and matching lancets and strips., Disp: 1 each, Rfl: 0    cyanocobalamin 1,000 mcg/mL injection, Inject 1 mL (1,000 mcg total) into the muscle every 30 days., Disp: 1 mL, Rfl: 11    ferrous gluconate (FERGON) 240 (27 FE) MG tablet, Take 1 tablet (240 mg total) by mouth every Monday and Friday., Disp: 1 tablet, Rfl: 1    fluticasone (FLONASE) 50 mcg/actuation nasal spray, 1 spray (50 mcg total) by Each Nare route once daily., Disp: 1 Bottle, Rfl: 4    irbesartan-hydrochlorothiazide (AVALIDE) 300-12.5 mg per tablet, Take 1 tablet by mouth once daily, Disp: 90 tablet, Rfl: 3    lancets (LANCETS,ULTRA THIN) 26 gauge Misc, 1 lancet by Misc.(Non-Drug; Combo Route) route once daily., Disp: 100 each, Rfl: 0    metFORMIN (GLUCOPHAGE) 1000 MG tablet, TAKE 1 TABLET BY MOUTH TWICE DAILY WITH MEALS, Disp: 180 tablet, Rfl: 1    polyethylene glycol (GLYCOLAX) 17 gram/dose powder, DISSOLVE 17 GRAMS IN WATER & DRINK ONCE DAILY, Disp: 510 g, Rfl: 3    rosuvastatin  (CRESTOR) 5 MG tablet, Take 1 tablet (5 mg total) by mouth every Monday and Friday., Disp: 24 tablet, Rfl: 1    TRADJENTA 5 mg Tab tablet, TAKE 1 TABLET BY MOUTH ONCE DAILY, Disp: 90 tablet, Rfl: 2    nystatin (MYCOSTATIN) cream, Apply topically 2 (two) times daily., Disp: 30 g, Rfl: 1

## 2020-12-29 NOTE — PATIENT INSTRUCTIONS
Using a Blood Sugar Log    You have diabetes. This means your body has trouble regulating a sugar called glucose. To help manage your diabetes, youll need to check your blood sugar level as directed by your healthcare provider. Keeping a log of your blood sugar levels will help you track your blood sugar readings. Its a simple and easy way to see how well you are controlling your diabetes.  Checking your blood sugar level  You can check your blood sugar level with a blood glucose meter. Youll first prick the side of your finger with a tiny lancet to draw a tiny drop of blood onto the test strip. Some glucose meters let you use another place on your body to test. But these other places should not be used in some cases as they may be inaccurate. Follow the instructions for your glucose meter. And talk with your healthcare provider before doing the test on other places.  The strip goes into the meter first, then a drop of blood is placed on the tip of the strip. The meter then shows a reading that tells you the level of your blood sugar. Your readings should be in your target range as often as possible. This means not too high or too low. Staying in this range helps lower your risk for complications. Your healthcare provider will help you figure out the target range that is best for you.  Tracking your readings  Every time you check your blood sugar, use your log to keep track of your readings. Your meter will also probably have a memory feature that your healthcare provider can check at your next visit. You may be advised by your healthcare provider to check your blood sugar in the morning, at bedtime, and before and after meals. Be sure to write down all of your numbers. Also use your log to record things that might have affected your blood sugar. Some examples include being sick, certain medicines, being physically active, feeling stressed, or skipping meals.   Lessons learned from your readings  Tracking your  blood sugar readings helps you see patterns. These patterns tell you how your actions affect your blood sugar. For instance, you may have higher numbers after eating certain foods or lower numbers after exercise. They just help you understand how to stay in your target range more often, so that your diabetes remains in good control.  Sharing your log with your healthcare team  Bring your blood sugar log and glucose meter with you to all of your healthcare appointments. This can help your healthcare team make changes to your treatment plan, if needed. This may involve making changes in what you eat, what medicines you take, or how much you exercise.  To learn more  The resources below can help you learn more:  · American Diabetes Association 652-024-1970 www.diabetes.org  · [employer] 758-751-9313 www.lighthouse.org  · [employer] [place] 937-860-8571 www.nei.nih.gov  · [facility] 077-757-0904 www.hormone.org  Date Last Reviewed: 5/1/2016  © 5419-1864 [employer]. [address] [place] [address], [place] [address]. All rights reserved. This information is not intended as a substitute for professional medical care. Always follow your healthcare professional's instructions.

## 2020-12-30 ENCOUNTER — OFFICE VISIT (OUTPATIENT)
Dept: HEMATOLOGY/ONCOLOGY | Facility: CLINIC | Age: 70
End: 2020-12-30
Payer: MEDICARE

## 2020-12-30 VITALS
WEIGHT: 138 LBS | DIASTOLIC BLOOD PRESSURE: 77 MMHG | RESPIRATION RATE: 18 BRPM | HEART RATE: 91 BPM | SYSTOLIC BLOOD PRESSURE: 123 MMHG | BODY MASS INDEX: 29.86 KG/M2

## 2020-12-30 DIAGNOSIS — Z85.038 H/O COLON CANCER, STAGE III: ICD-10-CM

## 2020-12-30 DIAGNOSIS — D64.9 MILD ANEMIA: Primary | ICD-10-CM

## 2020-12-30 DIAGNOSIS — E53.8 VITAMIN B 12 DEFICIENCY: ICD-10-CM

## 2020-12-30 DIAGNOSIS — C18.4 MALIGNANT NEOPLASM OF TRANSVERSE COLON: ICD-10-CM

## 2020-12-30 PROCEDURE — 3078F PR MOST RECENT DIASTOLIC BLOOD PRESSURE < 80 MM HG: ICD-10-PCS | Mod: S$GLB,,, | Performed by: INTERNAL MEDICINE

## 2020-12-30 PROCEDURE — 99214 OFFICE O/P EST MOD 30 MIN: CPT | Mod: S$GLB,,, | Performed by: INTERNAL MEDICINE

## 2020-12-30 PROCEDURE — 1101F PT FALLS ASSESS-DOCD LE1/YR: CPT | Mod: S$GLB,,, | Performed by: INTERNAL MEDICINE

## 2020-12-30 PROCEDURE — 1126F PR PAIN SEVERITY QUANTIFIED, NO PAIN PRESENT: ICD-10-PCS | Mod: S$GLB,,, | Performed by: INTERNAL MEDICINE

## 2020-12-30 PROCEDURE — 3074F PR MOST RECENT SYSTOLIC BLOOD PRESSURE < 130 MM HG: ICD-10-PCS | Mod: S$GLB,,, | Performed by: INTERNAL MEDICINE

## 2020-12-30 PROCEDURE — 99214 PR OFFICE/OUTPT VISIT, EST, LEVL IV, 30-39 MIN: ICD-10-PCS | Mod: S$GLB,,, | Performed by: INTERNAL MEDICINE

## 2020-12-30 PROCEDURE — 1126F AMNT PAIN NOTED NONE PRSNT: CPT | Mod: S$GLB,,, | Performed by: INTERNAL MEDICINE

## 2020-12-30 PROCEDURE — 1159F MED LIST DOCD IN RCRD: CPT | Mod: S$GLB,,, | Performed by: INTERNAL MEDICINE

## 2020-12-30 PROCEDURE — 3008F PR BODY MASS INDEX (BMI) DOCUMENTED: ICD-10-PCS | Mod: S$GLB,,, | Performed by: INTERNAL MEDICINE

## 2020-12-30 PROCEDURE — 1101F PR PT FALLS ASSESS DOC 0-1 FALLS W/OUT INJ PAST YR: ICD-10-PCS | Mod: S$GLB,,, | Performed by: INTERNAL MEDICINE

## 2020-12-30 PROCEDURE — 3074F SYST BP LT 130 MM HG: CPT | Mod: S$GLB,,, | Performed by: INTERNAL MEDICINE

## 2020-12-30 PROCEDURE — 3008F BODY MASS INDEX DOCD: CPT | Mod: S$GLB,,, | Performed by: INTERNAL MEDICINE

## 2020-12-30 PROCEDURE — 1159F PR MEDICATION LIST DOCUMENTED IN MEDICAL RECORD: ICD-10-PCS | Mod: S$GLB,,, | Performed by: INTERNAL MEDICINE

## 2020-12-30 PROCEDURE — 3078F DIAST BP <80 MM HG: CPT | Mod: S$GLB,,, | Performed by: INTERNAL MEDICINE

## 2020-12-30 PROCEDURE — 3288F PR FALLS RISK ASSESSMENT DOCUMENTED: ICD-10-PCS | Mod: S$GLB,,, | Performed by: INTERNAL MEDICINE

## 2020-12-30 PROCEDURE — 3288F FALL RISK ASSESSMENT DOCD: CPT | Mod: S$GLB,,, | Performed by: INTERNAL MEDICINE

## 2021-01-05 ENCOUNTER — DOCUMENTATION ONLY (OUTPATIENT)
Dept: PHARMACY | Facility: CLINIC | Age: 71
End: 2021-01-05

## 2021-02-04 ENCOUNTER — DOCUMENTATION ONLY (OUTPATIENT)
Dept: PHARMACY | Facility: CLINIC | Age: 71
End: 2021-02-04

## 2021-02-13 ENCOUNTER — IMMUNIZATION (OUTPATIENT)
Dept: PRIMARY CARE CLINIC | Facility: CLINIC | Age: 71
End: 2021-02-13
Payer: MEDICARE

## 2021-02-13 DIAGNOSIS — Z23 NEED FOR VACCINATION: Primary | ICD-10-CM

## 2021-02-13 PROCEDURE — 91300 COVID-19, MRNA, LNP-S, PF, 30 MCG/0.3 ML DOSE VACCINE: CPT | Mod: S$GLB,,, | Performed by: FAMILY MEDICINE

## 2021-02-13 PROCEDURE — 0001A COVID-19, MRNA, LNP-S, PF, 30 MCG/0.3 ML DOSE VACCINE: CPT | Mod: CV19,S$GLB,, | Performed by: FAMILY MEDICINE

## 2021-02-13 PROCEDURE — 0001A COVID-19, MRNA, LNP-S, PF, 30 MCG/0.3 ML DOSE VACCINE: ICD-10-PCS | Mod: CV19,S$GLB,, | Performed by: FAMILY MEDICINE

## 2021-02-13 PROCEDURE — 91300 COVID-19, MRNA, LNP-S, PF, 30 MCG/0.3 ML DOSE VACCINE: ICD-10-PCS | Mod: S$GLB,,, | Performed by: FAMILY MEDICINE

## 2021-03-05 DIAGNOSIS — E53.8 VITAMIN B 12 DEFICIENCY: Chronic | ICD-10-CM

## 2021-03-05 RX ORDER — CYANOCOBALAMIN 1000 UG/ML
1000 INJECTION, SOLUTION INTRAMUSCULAR; SUBCUTANEOUS
Qty: 1 ML | Refills: 11 | Status: SHIPPED | OUTPATIENT
Start: 2021-03-05 | End: 2022-04-05 | Stop reason: SDUPTHER

## 2021-03-06 ENCOUNTER — IMMUNIZATION (OUTPATIENT)
Dept: PRIMARY CARE CLINIC | Facility: CLINIC | Age: 71
End: 2021-03-06
Payer: MEDICARE

## 2021-03-06 DIAGNOSIS — Z23 NEED FOR VACCINATION: Primary | ICD-10-CM

## 2021-03-06 PROCEDURE — 0002A COVID-19, MRNA, LNP-S, PF, 30 MCG/0.3 ML DOSE VACCINE: CPT | Mod: CV19,S$GLB,, | Performed by: FAMILY MEDICINE

## 2021-03-06 PROCEDURE — 91300 COVID-19, MRNA, LNP-S, PF, 30 MCG/0.3 ML DOSE VACCINE: ICD-10-PCS | Mod: S$GLB,,, | Performed by: FAMILY MEDICINE

## 2021-03-06 PROCEDURE — 91300 COVID-19, MRNA, LNP-S, PF, 30 MCG/0.3 ML DOSE VACCINE: CPT | Mod: S$GLB,,, | Performed by: FAMILY MEDICINE

## 2021-03-06 PROCEDURE — 0002A COVID-19, MRNA, LNP-S, PF, 30 MCG/0.3 ML DOSE VACCINE: ICD-10-PCS | Mod: CV19,S$GLB,, | Performed by: FAMILY MEDICINE

## 2021-04-01 ENCOUNTER — DOCUMENTATION ONLY (OUTPATIENT)
Dept: PHARMACY | Facility: CLINIC | Age: 71
End: 2021-04-01

## 2021-04-19 ENCOUNTER — LAB VISIT (OUTPATIENT)
Dept: LAB | Facility: HOSPITAL | Age: 71
End: 2021-04-19
Attending: INTERNAL MEDICINE
Payer: MEDICARE

## 2021-04-19 DIAGNOSIS — E11.9 TYPE 2 DIABETES MELLITUS WITHOUT COMPLICATION, WITHOUT LONG-TERM CURRENT USE OF INSULIN: ICD-10-CM

## 2021-04-19 LAB
ALBUMIN/CREAT UR: 6.5 UG/MG (ref 0–30)
CREAT UR-MCNC: 83 MG/DL (ref 15–325)
ESTIMATED AVG GLUCOSE: 163 MG/DL (ref 68–131)
HBA1C MFR BLD: 7.3 % (ref 4.5–6.2)
MICROALBUMIN UR DL<=1MG/L-MCNC: 5.4 UG/ML

## 2021-04-19 PROCEDURE — 82043 UR ALBUMIN QUANTITATIVE: CPT | Performed by: INTERNAL MEDICINE

## 2021-04-19 PROCEDURE — 82570 ASSAY OF URINE CREATININE: CPT | Performed by: INTERNAL MEDICINE

## 2021-04-19 PROCEDURE — 83036 HEMOGLOBIN GLYCOSYLATED A1C: CPT | Performed by: INTERNAL MEDICINE

## 2021-04-19 PROCEDURE — 36415 COLL VENOUS BLD VENIPUNCTURE: CPT | Performed by: INTERNAL MEDICINE

## 2021-04-29 ENCOUNTER — OFFICE VISIT (OUTPATIENT)
Dept: FAMILY MEDICINE | Facility: CLINIC | Age: 71
End: 2021-04-29
Payer: MEDICARE

## 2021-04-29 VITALS
HEIGHT: 57 IN | SYSTOLIC BLOOD PRESSURE: 127 MMHG | BODY MASS INDEX: 28.69 KG/M2 | WEIGHT: 133 LBS | DIASTOLIC BLOOD PRESSURE: 72 MMHG | HEART RATE: 76 BPM

## 2021-04-29 DIAGNOSIS — M81.6 LOCALIZED OSTEOPOROSIS WITHOUT CURRENT PATHOLOGICAL FRACTURE: ICD-10-CM

## 2021-04-29 DIAGNOSIS — Z23 NEED FOR 23-POLYVALENT PNEUMOCOCCAL POLYSACCHARIDE VACCINE: ICD-10-CM

## 2021-04-29 DIAGNOSIS — E11.9 TYPE 2 DIABETES MELLITUS WITHOUT COMPLICATION, WITHOUT LONG-TERM CURRENT USE OF INSULIN: Chronic | ICD-10-CM

## 2021-04-29 DIAGNOSIS — K21.9 GASTROESOPHAGEAL REFLUX DISEASE WITHOUT ESOPHAGITIS: Primary | ICD-10-CM

## 2021-04-29 DIAGNOSIS — E78.2 MIXED HYPERLIPIDEMIA: ICD-10-CM

## 2021-04-29 DIAGNOSIS — I10 ESSENTIAL HYPERTENSION: Chronic | ICD-10-CM

## 2021-04-29 PROCEDURE — 1101F PR PT FALLS ASSESS DOC 0-1 FALLS W/OUT INJ PAST YR: ICD-10-PCS | Mod: S$GLB,,, | Performed by: INTERNAL MEDICINE

## 2021-04-29 PROCEDURE — 3051F HG A1C>EQUAL 7.0%<8.0%: CPT | Mod: S$GLB,,, | Performed by: INTERNAL MEDICINE

## 2021-04-29 PROCEDURE — 90732 PNEUMOCOCCAL POLYSACCHARIDE VACCINE 23-VALENT =>2YO SQ IM: ICD-10-PCS | Mod: S$GLB,,, | Performed by: INTERNAL MEDICINE

## 2021-04-29 PROCEDURE — 1101F PT FALLS ASSESS-DOCD LE1/YR: CPT | Mod: S$GLB,,, | Performed by: INTERNAL MEDICINE

## 2021-04-29 PROCEDURE — 3008F BODY MASS INDEX DOCD: CPT | Mod: S$GLB,,, | Performed by: INTERNAL MEDICINE

## 2021-04-29 PROCEDURE — 90732 PPSV23 VACC 2 YRS+ SUBQ/IM: CPT | Mod: S$GLB,,, | Performed by: INTERNAL MEDICINE

## 2021-04-29 PROCEDURE — 1170F FXNL STATUS ASSESSED: CPT | Mod: S$GLB,,, | Performed by: INTERNAL MEDICINE

## 2021-04-29 PROCEDURE — 99214 OFFICE O/P EST MOD 30 MIN: CPT | Mod: 25,S$GLB,, | Performed by: INTERNAL MEDICINE

## 2021-04-29 PROCEDURE — 3288F PR FALLS RISK ASSESSMENT DOCUMENTED: ICD-10-PCS | Mod: S$GLB,,, | Performed by: INTERNAL MEDICINE

## 2021-04-29 PROCEDURE — 1126F AMNT PAIN NOTED NONE PRSNT: CPT | Mod: S$GLB,,, | Performed by: INTERNAL MEDICINE

## 2021-04-29 PROCEDURE — 99214 PR OFFICE/OUTPT VISIT, EST, LEVL IV, 30-39 MIN: ICD-10-PCS | Mod: 25,S$GLB,, | Performed by: INTERNAL MEDICINE

## 2021-04-29 PROCEDURE — G0009 ADMIN PNEUMOCOCCAL VACCINE: HCPCS | Mod: S$GLB,,, | Performed by: INTERNAL MEDICINE

## 2021-04-29 PROCEDURE — 3078F DIAST BP <80 MM HG: CPT | Mod: S$GLB,,, | Performed by: INTERNAL MEDICINE

## 2021-04-29 PROCEDURE — 3288F FALL RISK ASSESSMENT DOCD: CPT | Mod: S$GLB,,, | Performed by: INTERNAL MEDICINE

## 2021-04-29 PROCEDURE — G0009 PNEUMOCOCCAL POLYSACCHARIDE VACCINE 23-VALENT =>2YO SQ IM: ICD-10-PCS | Mod: S$GLB,,, | Performed by: INTERNAL MEDICINE

## 2021-04-29 PROCEDURE — 1170F PR FUNCTIONAL STATUS ASSESSED: ICD-10-PCS | Mod: S$GLB,,, | Performed by: INTERNAL MEDICINE

## 2021-04-29 PROCEDURE — 3074F SYST BP LT 130 MM HG: CPT | Mod: S$GLB,,, | Performed by: INTERNAL MEDICINE

## 2021-04-29 PROCEDURE — 1126F PR PAIN SEVERITY QUANTIFIED, NO PAIN PRESENT: ICD-10-PCS | Mod: S$GLB,,, | Performed by: INTERNAL MEDICINE

## 2021-04-29 PROCEDURE — 3051F PR MOST RECENT HEMOGLOBIN A1C LEVEL 7.0 - < 8.0%: ICD-10-PCS | Mod: S$GLB,,, | Performed by: INTERNAL MEDICINE

## 2021-04-29 PROCEDURE — 3008F PR BODY MASS INDEX (BMI) DOCUMENTED: ICD-10-PCS | Mod: S$GLB,,, | Performed by: INTERNAL MEDICINE

## 2021-04-29 PROCEDURE — 3078F PR MOST RECENT DIASTOLIC BLOOD PRESSURE < 80 MM HG: ICD-10-PCS | Mod: S$GLB,,, | Performed by: INTERNAL MEDICINE

## 2021-04-29 PROCEDURE — 1159F PR MEDICATION LIST DOCUMENTED IN MEDICAL RECORD: ICD-10-PCS | Mod: S$GLB,,, | Performed by: INTERNAL MEDICINE

## 2021-04-29 PROCEDURE — 3074F PR MOST RECENT SYSTOLIC BLOOD PRESSURE < 130 MM HG: ICD-10-PCS | Mod: S$GLB,,, | Performed by: INTERNAL MEDICINE

## 2021-04-29 PROCEDURE — 1159F MED LIST DOCD IN RCRD: CPT | Mod: S$GLB,,, | Performed by: INTERNAL MEDICINE

## 2021-04-29 RX ORDER — ROSUVASTATIN CALCIUM 5 MG/1
TABLET, COATED ORAL
Qty: 36 TABLET | Refills: 3 | Status: SHIPPED | OUTPATIENT
Start: 2021-04-29 | End: 2022-06-24 | Stop reason: SINTOL

## 2021-04-29 RX ORDER — IRBESARTAN AND HYDROCHLOROTHIAZIDE 300; 12.5 MG/1; MG/1
1 TABLET, FILM COATED ORAL DAILY
Qty: 90 TABLET | Refills: 3 | Status: ON HOLD | OUTPATIENT
Start: 2021-04-29 | End: 2021-06-10 | Stop reason: HOSPADM

## 2021-04-29 RX ORDER — METFORMIN HYDROCHLORIDE 1000 MG/1
1000 TABLET ORAL 2 TIMES DAILY WITH MEALS
Qty: 180 TABLET | Refills: 3 | Status: SHIPPED | OUTPATIENT
Start: 2021-04-29 | End: 2021-05-17 | Stop reason: SDUPTHER

## 2021-05-03 ENCOUNTER — DOCUMENTATION ONLY (OUTPATIENT)
Dept: PHARMACY | Facility: CLINIC | Age: 71
End: 2021-05-03

## 2021-05-17 DIAGNOSIS — E11.9 TYPE 2 DIABETES MELLITUS WITHOUT COMPLICATION, WITHOUT LONG-TERM CURRENT USE OF INSULIN: Chronic | ICD-10-CM

## 2021-05-18 RX ORDER — METFORMIN HYDROCHLORIDE 1000 MG/1
1000 TABLET ORAL 2 TIMES DAILY WITH MEALS
Qty: 180 TABLET | Refills: 3 | Status: SHIPPED | OUTPATIENT
Start: 2021-05-18 | End: 2021-05-23 | Stop reason: SDUPTHER

## 2021-05-23 DIAGNOSIS — E11.9 TYPE 2 DIABETES MELLITUS WITHOUT COMPLICATION, WITHOUT LONG-TERM CURRENT USE OF INSULIN: Chronic | ICD-10-CM

## 2021-05-23 DIAGNOSIS — M81.6 LOCALIZED OSTEOPOROSIS WITHOUT CURRENT PATHOLOGICAL FRACTURE: Chronic | ICD-10-CM

## 2021-05-23 RX ORDER — METFORMIN HYDROCHLORIDE 1000 MG/1
1000 TABLET ORAL 2 TIMES DAILY WITH MEALS
Qty: 180 TABLET | Refills: 3 | Status: CANCELLED | OUTPATIENT
Start: 2021-05-23

## 2021-05-24 RX ORDER — ALENDRONATE SODIUM 70 MG/1
70 TABLET ORAL
Qty: 12 TABLET | Refills: 3 | Status: SHIPPED | OUTPATIENT
Start: 2021-05-24 | End: 2021-07-14 | Stop reason: SDUPTHER

## 2021-05-24 RX ORDER — METFORMIN HYDROCHLORIDE 1000 MG/1
1000 TABLET ORAL 2 TIMES DAILY WITH MEALS
Qty: 180 TABLET | Refills: 3 | Status: SHIPPED | OUTPATIENT
Start: 2021-05-24 | End: 2022-08-05

## 2021-06-06 PROBLEM — Z98.890 HISTORY OF COLONOSCOPY: Status: ACTIVE | Noted: 2021-06-04

## 2021-06-08 ENCOUNTER — HOSPITAL ENCOUNTER (INPATIENT)
Facility: HOSPITAL | Age: 71
LOS: 2 days | Discharge: HOME | DRG: 641 | End: 2021-06-10
Attending: EMERGENCY MEDICINE | Admitting: HOSPITALIST
Payer: MEDICARE

## 2021-06-08 DIAGNOSIS — E87.1 HYPONATREMIA: Primary | ICD-10-CM

## 2021-06-08 DIAGNOSIS — R07.9 CHEST PAIN: ICD-10-CM

## 2021-06-08 DIAGNOSIS — R73.9 HYPERGLYCEMIA: ICD-10-CM

## 2021-06-08 LAB
25(OH)D3+25(OH)D2 SERPL-MCNC: 32 NG/ML (ref 30–96)
ALBUMIN SERPL BCP-MCNC: 3.9 G/DL (ref 3.5–5.2)
ALBUMIN SERPL BCP-MCNC: 3.9 G/DL (ref 3.5–5.2)
ALBUMIN SERPL BCP-MCNC: 4 G/DL (ref 3.5–5.2)
ALBUMIN SERPL BCP-MCNC: 4.2 G/DL (ref 3.5–5.2)
ALP SERPL-CCNC: 51 U/L (ref 55–135)
ALP SERPL-CCNC: 52 U/L (ref 55–135)
ALT SERPL W/O P-5'-P-CCNC: 26 U/L (ref 10–44)
ALT SERPL W/O P-5'-P-CCNC: 27 U/L (ref 10–44)
ANION GAP SERPL CALC-SCNC: 11 MMOL/L (ref 8–16)
ANION GAP SERPL CALC-SCNC: 12 MMOL/L (ref 8–16)
AST SERPL-CCNC: 30 U/L (ref 10–40)
AST SERPL-CCNC: 32 U/L (ref 10–40)
BASOPHILS # BLD AUTO: 0.01 K/UL (ref 0–0.2)
BASOPHILS NFR BLD: 0.1 % (ref 0–1.9)
BILIRUB SERPL-MCNC: 0.9 MG/DL (ref 0.1–1)
BILIRUB SERPL-MCNC: 1 MG/DL (ref 0.1–1)
BILIRUB UR QL STRIP: NEGATIVE
BILIRUB UR QL STRIP: NEGATIVE
BNP SERPL-MCNC: 63 PG/ML (ref 0–99)
BUN SERPL-MCNC: 7 MG/DL (ref 8–23)
BUN SERPL-MCNC: 7 MG/DL (ref 8–23)
BUN SERPL-MCNC: 9 MG/DL (ref 8–23)
BUN SERPL-MCNC: 9 MG/DL (ref 8–23)
CALCIUM SERPL-MCNC: 8.2 MG/DL (ref 8.7–10.5)
CALCIUM SERPL-MCNC: 8.4 MG/DL (ref 8.7–10.5)
CALCIUM SERPL-MCNC: 8.6 MG/DL (ref 8.7–10.5)
CALCIUM SERPL-MCNC: 8.7 MG/DL (ref 8.7–10.5)
CHLORIDE SERPL-SCNC: 76 MMOL/L (ref 95–110)
CHLORIDE SERPL-SCNC: 76 MMOL/L (ref 95–110)
CHLORIDE SERPL-SCNC: 80 MMOL/L (ref 95–110)
CHLORIDE SERPL-SCNC: 85 MMOL/L (ref 95–110)
CLARITY UR: CLEAR
CLARITY UR: CLEAR
CO2 SERPL-SCNC: 19 MMOL/L (ref 23–29)
CO2 SERPL-SCNC: 19 MMOL/L (ref 23–29)
CO2 SERPL-SCNC: 20 MMOL/L (ref 23–29)
CO2 SERPL-SCNC: 20 MMOL/L (ref 23–29)
COLOR UR: COLORLESS
COLOR UR: COLORLESS
CREAT SERPL-MCNC: 0.7 MG/DL (ref 0.5–1.4)
CREAT SERPL-MCNC: 0.8 MG/DL (ref 0.5–1.4)
CREAT UR-MCNC: <10 MG/DL (ref 15–325)
DIFFERENTIAL METHOD: ABNORMAL
EOSINOPHIL # BLD AUTO: 0 K/UL (ref 0–0.5)
EOSINOPHIL NFR BLD: 0.3 % (ref 0–8)
ERYTHROCYTE [DISTWIDTH] IN BLOOD BY AUTOMATED COUNT: 13.4 % (ref 11.5–14.5)
EST. GFR  (AFRICAN AMERICAN): >60 ML/MIN/1.73 M^2
EST. GFR  (NON AFRICAN AMERICAN): >60 ML/MIN/1.73 M^2
GLUCOSE SERPL-MCNC: 149 MG/DL (ref 70–110)
GLUCOSE SERPL-MCNC: 152 MG/DL (ref 70–110)
GLUCOSE SERPL-MCNC: 169 MG/DL (ref 70–110)
GLUCOSE SERPL-MCNC: 199 MG/DL (ref 70–110)
GLUCOSE SERPL-MCNC: 207 MG/DL (ref 70–110)
GLUCOSE SERPL-MCNC: 213 MG/DL (ref 70–110)
GLUCOSE UR QL STRIP: ABNORMAL
GLUCOSE UR QL STRIP: ABNORMAL
HCT VFR BLD AUTO: 29.3 % (ref 37–48.5)
HGB BLD-MCNC: 10.4 G/DL (ref 12–16)
HGB UR QL STRIP: ABNORMAL
HGB UR QL STRIP: NEGATIVE
IMM GRANULOCYTES # BLD AUTO: 0.09 K/UL (ref 0–0.04)
IMM GRANULOCYTES NFR BLD AUTO: 1.1 % (ref 0–0.5)
KETONES UR QL STRIP: ABNORMAL
KETONES UR QL STRIP: NEGATIVE
LEUKOCYTE ESTERASE UR QL STRIP: NEGATIVE
LEUKOCYTE ESTERASE UR QL STRIP: NEGATIVE
LYMPHOCYTES # BLD AUTO: 1.1 K/UL (ref 1–4.8)
LYMPHOCYTES NFR BLD: 13.3 % (ref 18–48)
MCH RBC QN AUTO: 27 PG (ref 27–31)
MCHC RBC AUTO-ENTMCNC: 35.5 G/DL (ref 32–36)
MCV RBC AUTO: 76 FL (ref 82–98)
MONOCYTES # BLD AUTO: 0.7 K/UL (ref 0.3–1)
MONOCYTES NFR BLD: 8.2 % (ref 4–15)
NEUTROPHILS # BLD AUTO: 6.1 K/UL (ref 1.8–7.7)
NEUTROPHILS NFR BLD: 77 % (ref 38–73)
NITRITE UR QL STRIP: NEGATIVE
NITRITE UR QL STRIP: NEGATIVE
NRBC BLD-RTO: 0 /100 WBC
OSMOLALITY SERPL: 240 MOSM/KG (ref 275–295)
OSMOLALITY UR: 107 MOSM/KG (ref 50–1200)
PH UR STRIP: 7 [PH] (ref 5–8)
PH UR STRIP: 7 [PH] (ref 5–8)
PHOSPHATE SERPL-MCNC: 2 MG/DL (ref 2.7–4.5)
PHOSPHATE SERPL-MCNC: 2.5 MG/DL (ref 2.7–4.5)
PLATELET # BLD AUTO: 241 K/UL (ref 150–450)
PMV BLD AUTO: 9.9 FL (ref 9.2–12.9)
POTASSIUM SERPL-SCNC: 3.9 MMOL/L (ref 3.5–5.1)
POTASSIUM SERPL-SCNC: 4 MMOL/L (ref 3.5–5.1)
POTASSIUM SERPL-SCNC: 4.1 MMOL/L (ref 3.5–5.1)
POTASSIUM SERPL-SCNC: 4.1 MMOL/L (ref 3.5–5.1)
PROT SERPL-MCNC: 7.3 G/DL (ref 6–8.4)
PROT SERPL-MCNC: 7.6 G/DL (ref 6–8.4)
PROT UR QL STRIP: NEGATIVE
PROT UR QL STRIP: NEGATIVE
RBC # BLD AUTO: 3.85 M/UL (ref 4–5.4)
SARS-COV-2 RDRP RESP QL NAA+PROBE: NEGATIVE
SODIUM SERPL-SCNC: 107 MMOL/L (ref 136–145)
SODIUM SERPL-SCNC: 108 MMOL/L (ref 136–145)
SODIUM SERPL-SCNC: 111 MMOL/L (ref 136–145)
SODIUM SERPL-SCNC: 116 MMOL/L (ref 136–145)
SODIUM SERPL-SCNC: 117 MMOL/L (ref 136–145)
SODIUM UR-SCNC: 39 MMOL/L (ref 20–250)
SODIUM UR-SCNC: 54 MMOL/L (ref 20–250)
SP GR UR STRIP: 1 (ref 1–1.03)
SP GR UR STRIP: 1 (ref 1–1.03)
TROPONIN I SERPL DL<=0.01 NG/ML-MCNC: <0.03 NG/ML
TROPONIN I SERPL DL<=0.01 NG/ML-MCNC: <0.03 NG/ML
URN SPEC COLLECT METH UR: ABNORMAL
URN SPEC COLLECT METH UR: ABNORMAL
UROBILINOGEN UR STRIP-ACNC: NEGATIVE EU/DL
UROBILINOGEN UR STRIP-ACNC: NEGATIVE EU/DL
UUN UR-MCNC: <50 MG/DL (ref 140–1050)
WBC # BLD AUTO: 7.97 K/UL (ref 3.9–12.7)

## 2021-06-08 PROCEDURE — 93010 EKG 12-LEAD: ICD-10-PCS | Mod: ,,, | Performed by: INTERNAL MEDICINE

## 2021-06-08 PROCEDURE — 93005 ELECTROCARDIOGRAM TRACING: CPT | Performed by: INTERNAL MEDICINE

## 2021-06-08 PROCEDURE — 82570 ASSAY OF URINE CREATININE: CPT | Performed by: HOSPITALIST

## 2021-06-08 PROCEDURE — 80069 RENAL FUNCTION PANEL: CPT | Mod: 91 | Performed by: HOSPITALIST

## 2021-06-08 PROCEDURE — U0002 COVID-19 LAB TEST NON-CDC: HCPCS | Performed by: EMERGENCY MEDICINE

## 2021-06-08 PROCEDURE — 84295 ASSAY OF SERUM SODIUM: CPT | Performed by: HOSPITALIST

## 2021-06-08 PROCEDURE — 99900035 HC TECH TIME PER 15 MIN (STAT)

## 2021-06-08 PROCEDURE — 83880 ASSAY OF NATRIURETIC PEPTIDE: CPT | Performed by: EMERGENCY MEDICINE

## 2021-06-08 PROCEDURE — 84300 ASSAY OF URINE SODIUM: CPT | Mod: 91 | Performed by: HOSPITALIST

## 2021-06-08 PROCEDURE — 81003 URINALYSIS AUTO W/O SCOPE: CPT | Performed by: EMERGENCY MEDICINE

## 2021-06-08 PROCEDURE — 85025 COMPLETE CBC W/AUTO DIFF WBC: CPT | Performed by: EMERGENCY MEDICINE

## 2021-06-08 PROCEDURE — 36415 COLL VENOUS BLD VENIPUNCTURE: CPT | Performed by: HOSPITALIST

## 2021-06-08 PROCEDURE — 83930 ASSAY OF BLOOD OSMOLALITY: CPT | Performed by: HOSPITALIST

## 2021-06-08 PROCEDURE — 99900031 HC PATIENT EDUCATION (STAT)

## 2021-06-08 PROCEDURE — 25000003 PHARM REV CODE 250: Performed by: HOSPITALIST

## 2021-06-08 PROCEDURE — 25500020 PHARM REV CODE 255: Performed by: HOSPITALIST

## 2021-06-08 PROCEDURE — 83935 ASSAY OF URINE OSMOLALITY: CPT | Performed by: HOSPITALIST

## 2021-06-08 PROCEDURE — 84484 ASSAY OF TROPONIN QUANT: CPT | Mod: 91 | Performed by: EMERGENCY MEDICINE

## 2021-06-08 PROCEDURE — 25000003 PHARM REV CODE 250

## 2021-06-08 PROCEDURE — 20000000 HC ICU ROOM

## 2021-06-08 PROCEDURE — 93010 ELECTROCARDIOGRAM REPORT: CPT | Mod: ,,, | Performed by: INTERNAL MEDICINE

## 2021-06-08 PROCEDURE — 94761 N-INVAS EAR/PLS OXIMETRY MLT: CPT

## 2021-06-08 PROCEDURE — 81003 URINALYSIS AUTO W/O SCOPE: CPT | Mod: 91 | Performed by: HOSPITALIST

## 2021-06-08 PROCEDURE — 63600175 PHARM REV CODE 636 W HCPCS: Performed by: HOSPITALIST

## 2021-06-08 PROCEDURE — 84300 ASSAY OF URINE SODIUM: CPT | Performed by: EMERGENCY MEDICINE

## 2021-06-08 PROCEDURE — 80053 COMPREHEN METABOLIC PANEL: CPT | Mod: 91 | Performed by: EMERGENCY MEDICINE

## 2021-06-08 PROCEDURE — 25000003 PHARM REV CODE 250: Performed by: EMERGENCY MEDICINE

## 2021-06-08 PROCEDURE — 99285 EMERGENCY DEPT VISIT HI MDM: CPT | Mod: 25

## 2021-06-08 PROCEDURE — 84540 ASSAY OF URINE/UREA-N: CPT | Performed by: HOSPITALIST

## 2021-06-08 PROCEDURE — 82306 VITAMIN D 25 HYDROXY: CPT | Performed by: HOSPITALIST

## 2021-06-08 RX ORDER — POTASSIUM CHLORIDE 7.45 MG/ML
20 INJECTION INTRAVENOUS
Status: DISCONTINUED | OUTPATIENT
Start: 2021-06-08 | End: 2021-06-10 | Stop reason: HOSPADM

## 2021-06-08 RX ORDER — ACETAMINOPHEN 325 MG/1
650 TABLET ORAL EVERY 4 HOURS PRN
Status: DISCONTINUED | OUTPATIENT
Start: 2021-06-08 | End: 2021-06-10 | Stop reason: HOSPADM

## 2021-06-08 RX ORDER — POTASSIUM CHLORIDE 20 MEQ/1
20 TABLET, EXTENDED RELEASE ORAL
Status: DISCONTINUED | OUTPATIENT
Start: 2021-06-08 | End: 2021-06-10 | Stop reason: HOSPADM

## 2021-06-08 RX ORDER — MAGNESIUM SULFATE HEPTAHYDRATE 40 MG/ML
2 INJECTION, SOLUTION INTRAVENOUS
Status: DISCONTINUED | OUTPATIENT
Start: 2021-06-08 | End: 2021-06-10 | Stop reason: HOSPADM

## 2021-06-08 RX ORDER — TALC
6 POWDER (GRAM) TOPICAL NIGHTLY PRN
Status: DISCONTINUED | OUTPATIENT
Start: 2021-06-08 | End: 2021-06-08

## 2021-06-08 RX ORDER — LABETALOL HYDROCHLORIDE 5 MG/ML
5 INJECTION, SOLUTION INTRAVENOUS EVERY 6 HOURS PRN
Status: DISCONTINUED | OUTPATIENT
Start: 2021-06-08 | End: 2021-06-10 | Stop reason: HOSPADM

## 2021-06-08 RX ORDER — MAGNESIUM SULFATE HEPTAHYDRATE 40 MG/ML
4 INJECTION, SOLUTION INTRAVENOUS
Status: DISCONTINUED | OUTPATIENT
Start: 2021-06-08 | End: 2021-06-10 | Stop reason: HOSPADM

## 2021-06-08 RX ORDER — POTASSIUM CHLORIDE 20 MEQ/1
40 TABLET, EXTENDED RELEASE ORAL
Status: DISCONTINUED | OUTPATIENT
Start: 2021-06-08 | End: 2021-06-10 | Stop reason: HOSPADM

## 2021-06-08 RX ORDER — POTASSIUM CHLORIDE 7.45 MG/ML
40 INJECTION INTRAVENOUS
Status: DISCONTINUED | OUTPATIENT
Start: 2021-06-08 | End: 2021-06-10 | Stop reason: HOSPADM

## 2021-06-08 RX ORDER — LANOLIN ALCOHOL/MO/W.PET/CERES
800 CREAM (GRAM) TOPICAL
Status: DISCONTINUED | OUTPATIENT
Start: 2021-06-08 | End: 2021-06-10 | Stop reason: HOSPADM

## 2021-06-08 RX ORDER — MORPHINE SULFATE 2 MG/ML
2 INJECTION, SOLUTION INTRAMUSCULAR; INTRAVENOUS EVERY 6 HOURS PRN
Status: DISCONTINUED | OUTPATIENT
Start: 2021-06-08 | End: 2021-06-08

## 2021-06-08 RX ORDER — TALC
9 POWDER (GRAM) TOPICAL NIGHTLY
Status: DISCONTINUED | OUTPATIENT
Start: 2021-06-08 | End: 2021-06-10 | Stop reason: HOSPADM

## 2021-06-08 RX ORDER — SODIUM CHLORIDE 9 MG/ML
INJECTION, SOLUTION INTRAVENOUS
Status: COMPLETED | OUTPATIENT
Start: 2021-06-08 | End: 2021-06-08

## 2021-06-08 RX ORDER — CHLORHEXIDINE GLUCONATE ORAL RINSE 1.2 MG/ML
15 SOLUTION DENTAL 2 TIMES DAILY
Status: DISCONTINUED | OUTPATIENT
Start: 2021-06-08 | End: 2021-06-10 | Stop reason: HOSPADM

## 2021-06-08 RX ORDER — IBUPROFEN 200 MG
24 TABLET ORAL
Status: DISCONTINUED | OUTPATIENT
Start: 2021-06-08 | End: 2021-06-10 | Stop reason: HOSPADM

## 2021-06-08 RX ORDER — IBUPROFEN 200 MG
16 TABLET ORAL
Status: DISCONTINUED | OUTPATIENT
Start: 2021-06-08 | End: 2021-06-10 | Stop reason: HOSPADM

## 2021-06-08 RX ORDER — ONDANSETRON 2 MG/ML
4 INJECTION INTRAMUSCULAR; INTRAVENOUS EVERY 8 HOURS PRN
Status: DISCONTINUED | OUTPATIENT
Start: 2021-06-08 | End: 2021-06-10 | Stop reason: HOSPADM

## 2021-06-08 RX ORDER — ENOXAPARIN SODIUM 100 MG/ML
40 INJECTION SUBCUTANEOUS EVERY 24 HOURS
Status: DISCONTINUED | OUTPATIENT
Start: 2021-06-08 | End: 2021-06-10 | Stop reason: HOSPADM

## 2021-06-08 RX ORDER — INSULIN ASPART 100 [IU]/ML
0-5 INJECTION, SOLUTION INTRAVENOUS; SUBCUTANEOUS
Status: DISCONTINUED | OUTPATIENT
Start: 2021-06-08 | End: 2021-06-10 | Stop reason: HOSPADM

## 2021-06-08 RX ORDER — GLUCAGON 1 MG
1 KIT INJECTION
Status: DISCONTINUED | OUTPATIENT
Start: 2021-06-08 | End: 2021-06-10 | Stop reason: HOSPADM

## 2021-06-08 RX ORDER — SODIUM CHLORIDE 0.9 % (FLUSH) 0.9 %
10 SYRINGE (ML) INJECTION
Status: DISCONTINUED | OUTPATIENT
Start: 2021-06-08 | End: 2021-06-10 | Stop reason: HOSPADM

## 2021-06-08 RX ORDER — FAMOTIDINE 10 MG/ML
20 INJECTION INTRAVENOUS 2 TIMES DAILY
Status: DISCONTINUED | OUTPATIENT
Start: 2021-06-08 | End: 2021-06-10

## 2021-06-08 RX ORDER — HYDROCODONE BITARTRATE AND ACETAMINOPHEN 5; 325 MG/1; MG/1
1 TABLET ORAL EVERY 6 HOURS PRN
Status: DISCONTINUED | OUTPATIENT
Start: 2021-06-08 | End: 2021-06-08

## 2021-06-08 RX ORDER — MAGNESIUM SULFATE 1 G/100ML
1 INJECTION INTRAVENOUS
Status: DISCONTINUED | OUTPATIENT
Start: 2021-06-08 | End: 2021-06-10 | Stop reason: HOSPADM

## 2021-06-08 RX ORDER — MUPIROCIN 20 MG/G
OINTMENT TOPICAL 2 TIMES DAILY
Status: DISCONTINUED | OUTPATIENT
Start: 2021-06-08 | End: 2021-06-10 | Stop reason: HOSPADM

## 2021-06-08 RX ORDER — SIMETHICONE 80 MG
1 TABLET,CHEWABLE ORAL
Status: DISCONTINUED | OUTPATIENT
Start: 2021-06-08 | End: 2021-06-10 | Stop reason: HOSPADM

## 2021-06-08 RX ORDER — SODIUM CHLORIDE 9 MG/ML
INJECTION, SOLUTION INTRAVENOUS CONTINUOUS
Status: DISCONTINUED | OUTPATIENT
Start: 2021-06-08 | End: 2021-06-08

## 2021-06-08 RX ADMIN — ACETAMINOPHEN 650 MG: 325 TABLET, FILM COATED ORAL at 05:06

## 2021-06-08 RX ADMIN — MUPIROCIN: 20 OINTMENT TOPICAL at 10:06

## 2021-06-08 RX ADMIN — SODIUM CHLORIDE: 0.9 INJECTION, SOLUTION INTRAVENOUS at 10:06

## 2021-06-08 RX ADMIN — MELATONIN TAB 3 MG 9 MG: 3 TAB at 10:06

## 2021-06-08 RX ADMIN — ENOXAPARIN SODIUM 40 MG: 40 INJECTION SUBCUTANEOUS at 05:06

## 2021-06-08 RX ADMIN — INSULIN ASPART 1 UNITS: 100 INJECTION, SOLUTION INTRAVENOUS; SUBCUTANEOUS at 10:06

## 2021-06-08 RX ADMIN — SODIUM CHLORIDE: 0.9 INJECTION, SOLUTION INTRAVENOUS at 09:06

## 2021-06-08 RX ADMIN — FAMOTIDINE 20 MG: 10 INJECTION INTRAVENOUS at 10:06

## 2021-06-08 RX ADMIN — IOHEXOL 100 ML: 350 INJECTION, SOLUTION INTRAVENOUS at 12:06

## 2021-06-08 RX ADMIN — CHLORHEXIDINE GLUCONATE 15 ML: 1.2 RINSE ORAL at 10:06

## 2021-06-09 PROBLEM — E83.42 HYPOMAGNESEMIA: Status: ACTIVE | Noted: 2021-06-09

## 2021-06-09 PROBLEM — R07.9 CHEST PAIN: Status: RESOLVED | Noted: 2020-11-12 | Resolved: 2021-06-09

## 2021-06-09 PROBLEM — E83.39 HYPOPHOSPHATEMIA: Status: ACTIVE | Noted: 2021-06-09

## 2021-06-09 LAB
ALBUMIN SERPL BCP-MCNC: 3.7 G/DL (ref 3.5–5.2)
ALP SERPL-CCNC: 47 U/L (ref 55–135)
ALT SERPL W/O P-5'-P-CCNC: 27 U/L (ref 10–44)
ANION GAP SERPL CALC-SCNC: 9 MMOL/L (ref 8–16)
AST SERPL-CCNC: 33 U/L (ref 10–40)
BASOPHILS # BLD AUTO: 0.02 K/UL (ref 0–0.2)
BASOPHILS NFR BLD: 0.3 % (ref 0–1.9)
BILIRUB SERPL-MCNC: 0.5 MG/DL (ref 0.1–1)
BUN SERPL-MCNC: 8 MG/DL (ref 8–23)
CALCIUM SERPL-MCNC: 8.6 MG/DL (ref 8.7–10.5)
CHLORIDE SERPL-SCNC: 92 MMOL/L (ref 95–110)
CO2 SERPL-SCNC: 20 MMOL/L (ref 23–29)
CREAT SERPL-MCNC: 0.8 MG/DL (ref 0.5–1.4)
DIFFERENTIAL METHOD: ABNORMAL
EOSINOPHIL # BLD AUTO: 0 K/UL (ref 0–0.5)
EOSINOPHIL NFR BLD: 0.7 % (ref 0–8)
ERYTHROCYTE [DISTWIDTH] IN BLOOD BY AUTOMATED COUNT: 13.9 % (ref 11.5–14.5)
EST. GFR  (AFRICAN AMERICAN): >60 ML/MIN/1.73 M^2
EST. GFR  (NON AFRICAN AMERICAN): >60 ML/MIN/1.73 M^2
GLUCOSE SERPL-MCNC: 165 MG/DL (ref 70–110)
GLUCOSE SERPL-MCNC: 219 MG/DL (ref 70–110)
GLUCOSE SERPL-MCNC: 236 MG/DL (ref 70–110)
GLUCOSE SERPL-MCNC: 282 MG/DL (ref 70–110)
GLUCOSE SERPL-MCNC: 300 MG/DL (ref 70–110)
HCT VFR BLD AUTO: 30 % (ref 37–48.5)
HGB BLD-MCNC: 10.3 G/DL (ref 12–16)
IMM GRANULOCYTES # BLD AUTO: 0.04 K/UL (ref 0–0.04)
IMM GRANULOCYTES NFR BLD AUTO: 0.7 % (ref 0–0.5)
LYMPHOCYTES # BLD AUTO: 1.2 K/UL (ref 1–4.8)
LYMPHOCYTES NFR BLD: 20.6 % (ref 18–48)
MAGNESIUM SERPL-MCNC: 1.5 MG/DL (ref 1.6–2.6)
MCH RBC QN AUTO: 27.1 PG (ref 27–31)
MCHC RBC AUTO-ENTMCNC: 34.3 G/DL (ref 32–36)
MCV RBC AUTO: 79 FL (ref 82–98)
MONOCYTES # BLD AUTO: 0.8 K/UL (ref 0.3–1)
MONOCYTES NFR BLD: 13.1 % (ref 4–15)
NEUTROPHILS # BLD AUTO: 3.7 K/UL (ref 1.8–7.7)
NEUTROPHILS NFR BLD: 64.6 % (ref 38–73)
NRBC BLD-RTO: 0 /100 WBC
PHOSPHATE SERPL-MCNC: 2.8 MG/DL (ref 2.7–4.5)
PLATELET # BLD AUTO: 227 K/UL (ref 150–450)
PMV BLD AUTO: 10.4 FL (ref 9.2–12.9)
POTASSIUM SERPL-SCNC: 3.6 MMOL/L (ref 3.5–5.1)
PROT SERPL-MCNC: 6.7 G/DL (ref 6–8.4)
RBC # BLD AUTO: 3.8 M/UL (ref 4–5.4)
SODIUM SERPL-SCNC: 121 MMOL/L (ref 136–145)
SODIUM SERPL-SCNC: 122 MMOL/L (ref 136–145)
SODIUM SERPL-SCNC: 123 MMOL/L (ref 136–145)
SODIUM SERPL-SCNC: 123 MMOL/L (ref 136–145)
WBC # BLD AUTO: 5.78 K/UL (ref 3.9–12.7)

## 2021-06-09 PROCEDURE — 83735 ASSAY OF MAGNESIUM: CPT | Performed by: HOSPITALIST

## 2021-06-09 PROCEDURE — 85025 COMPLETE CBC W/AUTO DIFF WBC: CPT | Performed by: HOSPITALIST

## 2021-06-09 PROCEDURE — 25000003 PHARM REV CODE 250: Performed by: HOSPITALIST

## 2021-06-09 PROCEDURE — 25000003 PHARM REV CODE 250: Performed by: INTERNAL MEDICINE

## 2021-06-09 PROCEDURE — 36415 COLL VENOUS BLD VENIPUNCTURE: CPT | Performed by: HOSPITALIST

## 2021-06-09 PROCEDURE — 99900035 HC TECH TIME PER 15 MIN (STAT)

## 2021-06-09 PROCEDURE — 94761 N-INVAS EAR/PLS OXIMETRY MLT: CPT

## 2021-06-09 PROCEDURE — 21400001 HC TELEMETRY ROOM

## 2021-06-09 PROCEDURE — 63600175 PHARM REV CODE 636 W HCPCS: Performed by: HOSPITALIST

## 2021-06-09 PROCEDURE — 25000003 PHARM REV CODE 250

## 2021-06-09 PROCEDURE — 84295 ASSAY OF SERUM SODIUM: CPT | Mod: 91 | Performed by: HOSPITALIST

## 2021-06-09 PROCEDURE — 82962 GLUCOSE BLOOD TEST: CPT

## 2021-06-09 PROCEDURE — 80053 COMPREHEN METABOLIC PANEL: CPT | Performed by: HOSPITALIST

## 2021-06-09 PROCEDURE — 84100 ASSAY OF PHOSPHORUS: CPT | Performed by: HOSPITALIST

## 2021-06-09 RX ORDER — SODIUM,POTASSIUM PHOSPHATES 280-250MG
1 POWDER IN PACKET (EA) ORAL
Status: DISCONTINUED | OUTPATIENT
Start: 2021-06-09 | End: 2021-06-10 | Stop reason: HOSPADM

## 2021-06-09 RX ORDER — BENZONATATE 100 MG/1
100 CAPSULE ORAL 3 TIMES DAILY
Status: DISCONTINUED | OUTPATIENT
Start: 2021-06-09 | End: 2021-06-10 | Stop reason: HOSPADM

## 2021-06-09 RX ORDER — DEXTROSE MONOHYDRATE 50 MG/ML
INJECTION, SOLUTION INTRAVENOUS CONTINUOUS
Status: DISCONTINUED | OUTPATIENT
Start: 2021-06-09 | End: 2021-06-10

## 2021-06-09 RX ADMIN — MAGNESIUM SULFATE IN WATER 2 G: 40 INJECTION, SOLUTION INTRAVENOUS at 04:06

## 2021-06-09 RX ADMIN — SIMETHICONE 80 MG: 80 TABLET, CHEWABLE ORAL at 07:06

## 2021-06-09 RX ADMIN — POTASSIUM, SODIUM PHOSPHATES 280 MG-160 MG-250 MG ORAL POWDER PACKET 1 PACKET: POWDER IN PACKET at 11:06

## 2021-06-09 RX ADMIN — INSULIN ASPART 4 UNITS: 100 INJECTION, SOLUTION INTRAVENOUS; SUBCUTANEOUS at 11:06

## 2021-06-09 RX ADMIN — DEXTROSE: 5 SOLUTION INTRAVENOUS at 03:06

## 2021-06-09 RX ADMIN — ENOXAPARIN SODIUM 40 MG: 40 INJECTION SUBCUTANEOUS at 04:06

## 2021-06-09 RX ADMIN — BENZONATATE 100 MG: 100 CAPSULE ORAL at 09:06

## 2021-06-09 RX ADMIN — MUPIROCIN: 20 OINTMENT TOPICAL at 09:06

## 2021-06-09 RX ADMIN — MELATONIN TAB 3 MG 9 MG: 3 TAB at 09:06

## 2021-06-09 RX ADMIN — POTASSIUM CHLORIDE 40 MEQ: 20 TABLET, EXTENDED RELEASE ORAL at 04:06

## 2021-06-09 RX ADMIN — MUPIROCIN: 20 OINTMENT TOPICAL at 08:06

## 2021-06-09 RX ADMIN — CHLORHEXIDINE GLUCONATE 15 ML: 1.2 RINSE ORAL at 08:06

## 2021-06-09 RX ADMIN — FAMOTIDINE 20 MG: 10 INJECTION INTRAVENOUS at 08:06

## 2021-06-09 RX ADMIN — INSULIN ASPART 2 UNITS: 100 INJECTION, SOLUTION INTRAVENOUS; SUBCUTANEOUS at 05:06

## 2021-06-09 RX ADMIN — POTASSIUM, SODIUM PHOSPHATES 280 MG-160 MG-250 MG ORAL POWDER PACKET 1 PACKET: POWDER IN PACKET at 04:06

## 2021-06-09 RX ADMIN — DEXTROSE: 5 SOLUTION INTRAVENOUS at 09:06

## 2021-06-09 RX ADMIN — POTASSIUM, SODIUM PHOSPHATES 280 MG-160 MG-250 MG ORAL POWDER PACKET 1 PACKET: POWDER IN PACKET at 09:06

## 2021-06-09 RX ADMIN — BENZONATATE 100 MG: 100 CAPSULE ORAL at 04:06

## 2021-06-09 RX ADMIN — CHLORHEXIDINE GLUCONATE 15 ML: 1.2 RINSE ORAL at 09:06

## 2021-06-09 RX ADMIN — FAMOTIDINE 20 MG: 10 INJECTION INTRAVENOUS at 09:06

## 2021-06-09 RX ADMIN — SIMETHICONE 80 MG: 80 TABLET, CHEWABLE ORAL at 08:06

## 2021-06-09 RX ADMIN — SIMETHICONE 80 MG: 80 TABLET, CHEWABLE ORAL at 04:06

## 2021-06-10 VITALS
RESPIRATION RATE: 17 BRPM | OXYGEN SATURATION: 100 % | SYSTOLIC BLOOD PRESSURE: 132 MMHG | WEIGHT: 136.88 LBS | HEART RATE: 83 BPM | TEMPERATURE: 97 F | HEIGHT: 63 IN | BODY MASS INDEX: 24.25 KG/M2 | DIASTOLIC BLOOD PRESSURE: 75 MMHG

## 2021-06-10 PROBLEM — R53.83 FATIGUE: Status: RESOLVED | Noted: 2019-08-01 | Resolved: 2021-06-10

## 2021-06-10 PROBLEM — E83.39 HYPOPHOSPHATEMIA: Status: RESOLVED | Noted: 2021-06-09 | Resolved: 2021-06-10

## 2021-06-10 PROBLEM — E87.1 HYPONATREMIA: Status: RESOLVED | Noted: 2021-06-08 | Resolved: 2021-06-10

## 2021-06-10 PROBLEM — E83.42 HYPOMAGNESEMIA: Status: RESOLVED | Noted: 2021-06-09 | Resolved: 2021-06-10

## 2021-06-10 LAB
ALBUMIN SERPL BCP-MCNC: 4.2 G/DL (ref 3.5–5.2)
ALP SERPL-CCNC: 50 U/L (ref 55–135)
ALT SERPL W/O P-5'-P-CCNC: 32 U/L (ref 10–44)
ANION GAP SERPL CALC-SCNC: 11 MMOL/L (ref 8–16)
AST SERPL-CCNC: 32 U/L (ref 10–40)
BASOPHILS # BLD AUTO: 0.03 K/UL (ref 0–0.2)
BASOPHILS NFR BLD: 0.4 % (ref 0–1.9)
BILIRUB SERPL-MCNC: 0.8 MG/DL (ref 0.1–1)
BUN SERPL-MCNC: 6 MG/DL (ref 8–23)
CALCIUM SERPL-MCNC: 8.5 MG/DL (ref 8.7–10.5)
CHLORIDE SERPL-SCNC: 89 MMOL/L (ref 95–110)
CO2 SERPL-SCNC: 22 MMOL/L (ref 23–29)
CREAT SERPL-MCNC: 0.8 MG/DL (ref 0.5–1.4)
DIFFERENTIAL METHOD: ABNORMAL
EOSINOPHIL # BLD AUTO: 0.1 K/UL (ref 0–0.5)
EOSINOPHIL NFR BLD: 0.9 % (ref 0–8)
ERYTHROCYTE [DISTWIDTH] IN BLOOD BY AUTOMATED COUNT: 14.5 % (ref 11.5–14.5)
EST. GFR  (AFRICAN AMERICAN): >60 ML/MIN/1.73 M^2
EST. GFR  (NON AFRICAN AMERICAN): >60 ML/MIN/1.73 M^2
GLUCOSE SERPL-MCNC: 161 MG/DL (ref 70–110)
GLUCOSE SERPL-MCNC: 215 MG/DL (ref 70–110)
GLUCOSE SERPL-MCNC: 226 MG/DL (ref 70–110)
HCT VFR BLD AUTO: 29.8 % (ref 37–48.5)
HGB BLD-MCNC: 9.9 G/DL (ref 12–16)
IMM GRANULOCYTES # BLD AUTO: 0.05 K/UL (ref 0–0.04)
IMM GRANULOCYTES NFR BLD AUTO: 0.7 % (ref 0–0.5)
LYMPHOCYTES # BLD AUTO: 1.5 K/UL (ref 1–4.8)
LYMPHOCYTES NFR BLD: 21.2 % (ref 18–48)
MAGNESIUM SERPL-MCNC: 1.9 MG/DL (ref 1.6–2.6)
MCH RBC QN AUTO: 26.7 PG (ref 27–31)
MCHC RBC AUTO-ENTMCNC: 33.2 G/DL (ref 32–36)
MCV RBC AUTO: 80 FL (ref 82–98)
MONOCYTES # BLD AUTO: 0.6 K/UL (ref 0.3–1)
MONOCYTES NFR BLD: 9.1 % (ref 4–15)
NEUTROPHILS # BLD AUTO: 4.6 K/UL (ref 1.8–7.7)
NEUTROPHILS NFR BLD: 67.7 % (ref 38–73)
NRBC BLD-RTO: 0 /100 WBC
PHOSPHATE SERPL-MCNC: 3.9 MG/DL (ref 2.7–4.5)
PLATELET # BLD AUTO: 238 K/UL (ref 150–450)
PMV BLD AUTO: 10.4 FL (ref 9.2–12.9)
POTASSIUM SERPL-SCNC: 4.1 MMOL/L (ref 3.5–5.1)
PROT SERPL-MCNC: 7.5 G/DL (ref 6–8.4)
RBC # BLD AUTO: 3.71 M/UL (ref 4–5.4)
SODIUM SERPL-SCNC: 122 MMOL/L (ref 136–145)
SODIUM SERPL-SCNC: 124 MMOL/L (ref 136–145)
SODIUM SERPL-SCNC: 127 MMOL/L (ref 136–145)
WBC # BLD AUTO: 6.84 K/UL (ref 3.9–12.7)

## 2021-06-10 PROCEDURE — 82962 GLUCOSE BLOOD TEST: CPT

## 2021-06-10 PROCEDURE — 84295 ASSAY OF SERUM SODIUM: CPT | Performed by: HOSPITALIST

## 2021-06-10 PROCEDURE — 25000003 PHARM REV CODE 250: Performed by: INTERNAL MEDICINE

## 2021-06-10 PROCEDURE — 80053 COMPREHEN METABOLIC PANEL: CPT | Performed by: HOSPITALIST

## 2021-06-10 PROCEDURE — 84295 ASSAY OF SERUM SODIUM: CPT | Mod: 91 | Performed by: HOSPITALIST

## 2021-06-10 PROCEDURE — 84100 ASSAY OF PHOSPHORUS: CPT | Performed by: HOSPITALIST

## 2021-06-10 PROCEDURE — 36415 COLL VENOUS BLD VENIPUNCTURE: CPT | Performed by: HOSPITALIST

## 2021-06-10 PROCEDURE — 83735 ASSAY OF MAGNESIUM: CPT | Performed by: HOSPITALIST

## 2021-06-10 PROCEDURE — 25000003 PHARM REV CODE 250

## 2021-06-10 PROCEDURE — 85025 COMPLETE CBC W/AUTO DIFF WBC: CPT | Performed by: HOSPITALIST

## 2021-06-10 PROCEDURE — 25000003 PHARM REV CODE 250: Performed by: HOSPITALIST

## 2021-06-10 RX ORDER — ONDANSETRON 4 MG/1
4 TABLET, ORALLY DISINTEGRATING ORAL EVERY 6 HOURS PRN
Qty: 20 TABLET | Refills: 0 | Status: SHIPPED | OUTPATIENT
Start: 2021-06-10 | End: 2021-06-15

## 2021-06-10 RX ORDER — SIMETHICONE 80 MG
80 TABLET,CHEWABLE ORAL EVERY 6 HOURS PRN
Qty: 20 TABLET | Refills: 0 | Status: SHIPPED | OUTPATIENT
Start: 2021-06-10 | End: 2021-06-15

## 2021-06-10 RX ORDER — AMLODIPINE BESYLATE 5 MG/1
5 TABLET ORAL DAILY
Qty: 30 TABLET | Refills: 0 | Status: SHIPPED | OUTPATIENT
Start: 2021-06-10 | End: 2021-06-17 | Stop reason: SDUPTHER

## 2021-06-10 RX ORDER — SODIUM CHLORIDE 9 MG/ML
INJECTION, SOLUTION INTRAVENOUS CONTINUOUS
Status: DISCONTINUED | OUTPATIENT
Start: 2021-06-10 | End: 2021-06-10 | Stop reason: HOSPADM

## 2021-06-10 RX ORDER — BENZONATATE 100 MG/1
100 CAPSULE ORAL 3 TIMES DAILY
Qty: 15 CAPSULE | Refills: 0 | Status: SHIPPED | OUTPATIENT
Start: 2021-06-10 | End: 2021-06-15

## 2021-06-10 RX ADMIN — ACETAMINOPHEN 650 MG: 325 TABLET, FILM COATED ORAL at 09:06

## 2021-06-10 RX ADMIN — CHLORHEXIDINE GLUCONATE 15 ML: 1.2 RINSE ORAL at 09:06

## 2021-06-10 RX ADMIN — SODIUM CHLORIDE: 0.9 INJECTION, SOLUTION INTRAVENOUS at 08:06

## 2021-06-10 RX ADMIN — FAMOTIDINE 20 MG: 10 INJECTION INTRAVENOUS at 09:06

## 2021-06-10 RX ADMIN — BENZONATATE 100 MG: 100 CAPSULE ORAL at 09:06

## 2021-06-10 RX ADMIN — SIMETHICONE 80 MG: 80 TABLET, CHEWABLE ORAL at 09:06

## 2021-06-10 RX ADMIN — MUPIROCIN: 20 OINTMENT TOPICAL at 09:06

## 2021-06-10 RX ADMIN — SIMETHICONE 80 MG: 80 TABLET, CHEWABLE ORAL at 03:06

## 2021-06-10 RX ADMIN — POTASSIUM, SODIUM PHOSPHATES 280 MG-160 MG-250 MG ORAL POWDER PACKET 1 PACKET: POWDER IN PACKET at 11:06

## 2021-06-10 RX ADMIN — DEXTROSE: 5 SOLUTION INTRAVENOUS at 03:06

## 2021-06-10 RX ADMIN — POTASSIUM, SODIUM PHOSPHATES 280 MG-160 MG-250 MG ORAL POWDER PACKET 1 PACKET: POWDER IN PACKET at 05:06

## 2021-06-10 RX ADMIN — BENZONATATE 100 MG: 100 CAPSULE ORAL at 03:06

## 2021-06-10 RX ADMIN — INSULIN ASPART 2 UNITS: 100 INJECTION, SOLUTION INTRAVENOUS; SUBCUTANEOUS at 08:06

## 2021-06-10 RX ADMIN — POTASSIUM, SODIUM PHOSPHATES 280 MG-160 MG-250 MG ORAL POWDER PACKET 1 PACKET: POWDER IN PACKET at 04:06

## 2021-06-11 ENCOUNTER — PATIENT OUTREACH (OUTPATIENT)
Dept: FAMILY MEDICINE | Facility: CLINIC | Age: 71
End: 2021-06-11

## 2021-06-11 ENCOUNTER — LAB VISIT (OUTPATIENT)
Dept: LAB | Facility: HOSPITAL | Age: 71
End: 2021-06-11
Attending: HOSPITALIST
Payer: MEDICARE

## 2021-06-11 DIAGNOSIS — E87.1 HYPONATREMIA: ICD-10-CM

## 2021-06-11 LAB
ALBUMIN SERPL BCP-MCNC: 4.1 G/DL (ref 3.5–5.2)
ALP SERPL-CCNC: 60 U/L (ref 55–135)
ALT SERPL W/O P-5'-P-CCNC: 36 U/L (ref 10–44)
ANION GAP SERPL CALC-SCNC: 11 MMOL/L (ref 8–16)
ANION GAP SERPL CALC-SCNC: 11 MMOL/L (ref 8–16)
AST SERPL-CCNC: 36 U/L (ref 10–40)
BILIRUB SERPL-MCNC: 0.4 MG/DL (ref 0.1–1)
BUN SERPL-MCNC: 14 MG/DL (ref 8–23)
BUN SERPL-MCNC: 14 MG/DL (ref 8–23)
CALCIUM SERPL-MCNC: 9.2 MG/DL (ref 8.7–10.5)
CALCIUM SERPL-MCNC: 9.2 MG/DL (ref 8.7–10.5)
CHLORIDE SERPL-SCNC: 99 MMOL/L (ref 95–110)
CHLORIDE SERPL-SCNC: 99 MMOL/L (ref 95–110)
CO2 SERPL-SCNC: 21 MMOL/L (ref 23–29)
CO2 SERPL-SCNC: 21 MMOL/L (ref 23–29)
CREAT SERPL-MCNC: 1.2 MG/DL (ref 0.5–1.4)
CREAT SERPL-MCNC: 1.2 MG/DL (ref 0.5–1.4)
EST. GFR  (AFRICAN AMERICAN): 53.3 ML/MIN/1.73 M^2
EST. GFR  (AFRICAN AMERICAN): 53.3 ML/MIN/1.73 M^2
EST. GFR  (NON AFRICAN AMERICAN): 46.2 ML/MIN/1.73 M^2
EST. GFR  (NON AFRICAN AMERICAN): 46.2 ML/MIN/1.73 M^2
GLUCOSE SERPL-MCNC: 225 MG/DL (ref 70–110)
GLUCOSE SERPL-MCNC: 225 MG/DL (ref 70–110)
POTASSIUM SERPL-SCNC: 5 MMOL/L (ref 3.5–5.1)
POTASSIUM SERPL-SCNC: 5 MMOL/L (ref 3.5–5.1)
PROT SERPL-MCNC: 7.2 G/DL (ref 6–8.4)
SODIUM SERPL-SCNC: 131 MMOL/L (ref 136–145)
SODIUM SERPL-SCNC: 131 MMOL/L (ref 136–145)

## 2021-06-11 PROCEDURE — 80053 COMPREHEN METABOLIC PANEL: CPT | Performed by: HOSPITALIST

## 2021-06-11 PROCEDURE — 36415 COLL VENOUS BLD VENIPUNCTURE: CPT | Performed by: HOSPITALIST

## 2021-06-14 ENCOUNTER — LAB VISIT (OUTPATIENT)
Dept: LAB | Facility: HOSPITAL | Age: 71
End: 2021-06-14
Attending: HOSPITALIST
Payer: MEDICARE

## 2021-06-14 DIAGNOSIS — E87.1 HYPONATREMIA: Primary | ICD-10-CM

## 2021-06-14 LAB
ANION GAP SERPL CALC-SCNC: 11 MMOL/L (ref 8–16)
BUN SERPL-MCNC: 15 MG/DL (ref 8–23)
CALCIUM SERPL-MCNC: 9.4 MG/DL (ref 8.7–10.5)
CHLORIDE SERPL-SCNC: 97 MMOL/L (ref 95–110)
CO2 SERPL-SCNC: 23 MMOL/L (ref 23–29)
CREAT SERPL-MCNC: 0.9 MG/DL (ref 0.5–1.4)
EST. GFR  (AFRICAN AMERICAN): >60 ML/MIN/1.73 M^2
EST. GFR  (NON AFRICAN AMERICAN): >60 ML/MIN/1.73 M^2
GLUCOSE SERPL-MCNC: 233 MG/DL (ref 70–110)
POTASSIUM SERPL-SCNC: 4.3 MMOL/L (ref 3.5–5.1)
SODIUM SERPL-SCNC: 131 MMOL/L (ref 136–145)

## 2021-06-14 PROCEDURE — 36415 COLL VENOUS BLD VENIPUNCTURE: CPT | Performed by: HOSPITALIST

## 2021-06-14 PROCEDURE — 80048 BASIC METABOLIC PNL TOTAL CA: CPT | Performed by: HOSPITALIST

## 2021-06-17 ENCOUNTER — OFFICE VISIT (OUTPATIENT)
Dept: FAMILY MEDICINE | Facility: CLINIC | Age: 71
End: 2021-06-17
Payer: MEDICARE

## 2021-06-17 VITALS
BODY MASS INDEX: 22.86 KG/M2 | DIASTOLIC BLOOD PRESSURE: 71 MMHG | HEART RATE: 90 BPM | SYSTOLIC BLOOD PRESSURE: 126 MMHG | HEIGHT: 63 IN | WEIGHT: 129 LBS

## 2021-06-17 DIAGNOSIS — K21.9 GASTROESOPHAGEAL REFLUX DISEASE WITHOUT ESOPHAGITIS: ICD-10-CM

## 2021-06-17 DIAGNOSIS — E11.9 TYPE 2 DIABETES MELLITUS WITHOUT COMPLICATION, WITHOUT LONG-TERM CURRENT USE OF INSULIN: Chronic | ICD-10-CM

## 2021-06-17 DIAGNOSIS — I10 ESSENTIAL HYPERTENSION: Primary | ICD-10-CM

## 2021-06-17 DIAGNOSIS — R41.0 CONFUSION: ICD-10-CM

## 2021-06-17 DIAGNOSIS — E78.2 MIXED HYPERLIPIDEMIA: ICD-10-CM

## 2021-06-17 DIAGNOSIS — E87.1 HYPONATREMIA: ICD-10-CM

## 2021-06-17 PROCEDURE — 1111F PR DISCHARGE MEDS RECONCILED W/ CURRENT OUTPATIENT MED LIST: ICD-10-PCS | Mod: S$GLB,,, | Performed by: INTERNAL MEDICINE

## 2021-06-17 PROCEDURE — 3288F FALL RISK ASSESSMENT DOCD: CPT | Mod: S$GLB,,, | Performed by: INTERNAL MEDICINE

## 2021-06-17 PROCEDURE — 1126F PR PAIN SEVERITY QUANTIFIED, NO PAIN PRESENT: ICD-10-PCS | Mod: S$GLB,,, | Performed by: INTERNAL MEDICINE

## 2021-06-17 PROCEDURE — 1159F MED LIST DOCD IN RCRD: CPT | Mod: S$GLB,,, | Performed by: INTERNAL MEDICINE

## 2021-06-17 PROCEDURE — 3051F HG A1C>EQUAL 7.0%<8.0%: CPT | Mod: S$GLB,,, | Performed by: INTERNAL MEDICINE

## 2021-06-17 PROCEDURE — 3074F PR MOST RECENT SYSTOLIC BLOOD PRESSURE < 130 MM HG: ICD-10-PCS | Mod: S$GLB,,, | Performed by: INTERNAL MEDICINE

## 2021-06-17 PROCEDURE — 99214 OFFICE O/P EST MOD 30 MIN: CPT | Mod: S$GLB,,, | Performed by: INTERNAL MEDICINE

## 2021-06-17 PROCEDURE — 3288F PR FALLS RISK ASSESSMENT DOCUMENTED: ICD-10-PCS | Mod: S$GLB,,, | Performed by: INTERNAL MEDICINE

## 2021-06-17 PROCEDURE — 3078F PR MOST RECENT DIASTOLIC BLOOD PRESSURE < 80 MM HG: ICD-10-PCS | Mod: S$GLB,,, | Performed by: INTERNAL MEDICINE

## 2021-06-17 PROCEDURE — 3008F PR BODY MASS INDEX (BMI) DOCUMENTED: ICD-10-PCS | Mod: S$GLB,,, | Performed by: INTERNAL MEDICINE

## 2021-06-17 PROCEDURE — 99214 PR OFFICE/OUTPT VISIT, EST, LEVL IV, 30-39 MIN: ICD-10-PCS | Mod: S$GLB,,, | Performed by: INTERNAL MEDICINE

## 2021-06-17 PROCEDURE — 1126F AMNT PAIN NOTED NONE PRSNT: CPT | Mod: S$GLB,,, | Performed by: INTERNAL MEDICINE

## 2021-06-17 PROCEDURE — 1101F PR PT FALLS ASSESS DOC 0-1 FALLS W/OUT INJ PAST YR: ICD-10-PCS | Mod: S$GLB,,, | Performed by: INTERNAL MEDICINE

## 2021-06-17 PROCEDURE — 3008F BODY MASS INDEX DOCD: CPT | Mod: S$GLB,,, | Performed by: INTERNAL MEDICINE

## 2021-06-17 PROCEDURE — 3078F DIAST BP <80 MM HG: CPT | Mod: S$GLB,,, | Performed by: INTERNAL MEDICINE

## 2021-06-17 PROCEDURE — 1111F DSCHRG MED/CURRENT MED MERGE: CPT | Mod: S$GLB,,, | Performed by: INTERNAL MEDICINE

## 2021-06-17 PROCEDURE — 1159F PR MEDICATION LIST DOCUMENTED IN MEDICAL RECORD: ICD-10-PCS | Mod: S$GLB,,, | Performed by: INTERNAL MEDICINE

## 2021-06-17 PROCEDURE — 3051F PR MOST RECENT HEMOGLOBIN A1C LEVEL 7.0 - < 8.0%: ICD-10-PCS | Mod: S$GLB,,, | Performed by: INTERNAL MEDICINE

## 2021-06-17 PROCEDURE — 1101F PT FALLS ASSESS-DOCD LE1/YR: CPT | Mod: S$GLB,,, | Performed by: INTERNAL MEDICINE

## 2021-06-17 PROCEDURE — 3074F SYST BP LT 130 MM HG: CPT | Mod: S$GLB,,, | Performed by: INTERNAL MEDICINE

## 2021-06-17 RX ORDER — AMLODIPINE BESYLATE 5 MG/1
5 TABLET ORAL DAILY
Qty: 90 TABLET | Refills: 2 | Status: SHIPPED | OUTPATIENT
Start: 2021-06-17 | End: 2021-07-02 | Stop reason: SDUPTHER

## 2021-06-23 ENCOUNTER — LAB VISIT (OUTPATIENT)
Dept: LAB | Facility: HOSPITAL | Age: 71
End: 2021-06-23
Attending: INTERNAL MEDICINE
Payer: MEDICARE

## 2021-06-23 DIAGNOSIS — E87.1 HYPONATREMIA: ICD-10-CM

## 2021-06-23 LAB
ANION GAP SERPL CALC-SCNC: 10 MMOL/L (ref 8–16)
BUN SERPL-MCNC: 13 MG/DL (ref 8–23)
CALCIUM SERPL-MCNC: 9.1 MG/DL (ref 8.7–10.5)
CHLORIDE SERPL-SCNC: 103 MMOL/L (ref 95–110)
CO2 SERPL-SCNC: 23 MMOL/L (ref 23–29)
CREAT SERPL-MCNC: 0.9 MG/DL (ref 0.5–1.4)
EST. GFR  (AFRICAN AMERICAN): >60 ML/MIN/1.73 M^2
EST. GFR  (NON AFRICAN AMERICAN): >60 ML/MIN/1.73 M^2
GLUCOSE SERPL-MCNC: 122 MG/DL (ref 70–110)
POTASSIUM SERPL-SCNC: 4.2 MMOL/L (ref 3.5–5.1)
SODIUM SERPL-SCNC: 136 MMOL/L (ref 136–145)

## 2021-06-23 PROCEDURE — 36415 COLL VENOUS BLD VENIPUNCTURE: CPT | Performed by: INTERNAL MEDICINE

## 2021-06-23 PROCEDURE — 80048 BASIC METABOLIC PNL TOTAL CA: CPT | Performed by: INTERNAL MEDICINE

## 2021-07-02 ENCOUNTER — DOCUMENTATION ONLY (OUTPATIENT)
Dept: PHARMACY | Facility: CLINIC | Age: 71
End: 2021-07-02

## 2021-07-14 DIAGNOSIS — M81.6 LOCALIZED OSTEOPOROSIS WITHOUT CURRENT PATHOLOGICAL FRACTURE: Chronic | ICD-10-CM

## 2021-07-14 RX ORDER — ALENDRONATE SODIUM 70 MG/1
70 TABLET ORAL
Qty: 12 TABLET | Refills: 3 | Status: SHIPPED | OUTPATIENT
Start: 2021-07-14 | End: 2022-02-25 | Stop reason: SDUPTHER

## 2021-07-20 ENCOUNTER — LAB VISIT (OUTPATIENT)
Dept: LAB | Facility: HOSPITAL | Age: 71
End: 2021-07-20
Attending: INTERNAL MEDICINE
Payer: MEDICARE

## 2021-07-20 DIAGNOSIS — R94.4 NONSPECIFIC ABNORMAL RESULTS OF KIDNEY FUNCTION STUDY: Primary | ICD-10-CM

## 2021-07-20 LAB
ALBUMIN SERPL BCP-MCNC: 4 G/DL (ref 3.5–5.2)
ANION GAP SERPL CALC-SCNC: 11 MMOL/L (ref 8–16)
BUN SERPL-MCNC: 15 MG/DL (ref 8–23)
CALCIUM SERPL-MCNC: 9.3 MG/DL (ref 8.7–10.5)
CHLORIDE SERPL-SCNC: 105 MMOL/L (ref 95–110)
CO2 SERPL-SCNC: 21 MMOL/L (ref 23–29)
CREAT SERPL-MCNC: 0.9 MG/DL (ref 0.5–1.4)
EST. GFR  (AFRICAN AMERICAN): >60 ML/MIN/1.73 M^2
EST. GFR  (NON AFRICAN AMERICAN): >60 ML/MIN/1.73 M^2
GLUCOSE SERPL-MCNC: 164 MG/DL (ref 70–110)
PHOSPHATE SERPL-MCNC: 4.3 MG/DL (ref 2.7–4.5)
POTASSIUM SERPL-SCNC: 4.3 MMOL/L (ref 3.5–5.1)
SODIUM SERPL-SCNC: 137 MMOL/L (ref 136–145)

## 2021-07-20 PROCEDURE — 36415 COLL VENOUS BLD VENIPUNCTURE: CPT | Performed by: INTERNAL MEDICINE

## 2021-07-20 PROCEDURE — 80069 RENAL FUNCTION PANEL: CPT | Performed by: INTERNAL MEDICINE

## 2021-08-02 ENCOUNTER — DOCUMENTATION ONLY (OUTPATIENT)
Dept: PHARMACY | Facility: CLINIC | Age: 71
End: 2021-08-02

## 2021-09-01 ENCOUNTER — OFFICE VISIT (OUTPATIENT)
Dept: FAMILY MEDICINE | Facility: CLINIC | Age: 71
End: 2021-09-01
Payer: MEDICARE

## 2021-09-01 VITALS
BODY MASS INDEX: 23.1 KG/M2 | HEART RATE: 78 BPM | WEIGHT: 130.38 LBS | HEIGHT: 63 IN | RESPIRATION RATE: 18 BRPM | OXYGEN SATURATION: 100 % | SYSTOLIC BLOOD PRESSURE: 128 MMHG | TEMPERATURE: 98 F | DIASTOLIC BLOOD PRESSURE: 70 MMHG

## 2021-09-01 DIAGNOSIS — R60.0 BILATERAL LEG EDEMA: Primary | ICD-10-CM

## 2021-09-01 DIAGNOSIS — I10 ESSENTIAL HYPERTENSION: Chronic | ICD-10-CM

## 2021-09-01 DIAGNOSIS — E11.9 TYPE 2 DIABETES MELLITUS WITHOUT COMPLICATION, WITHOUT LONG-TERM CURRENT USE OF INSULIN: Chronic | ICD-10-CM

## 2021-09-01 DIAGNOSIS — Z78.0 ENCOUNTER FOR OSTEOPOROSIS SCREENING IN ASYMPTOMATIC POSTMENOPAUSAL PATIENT: ICD-10-CM

## 2021-09-01 DIAGNOSIS — S99.921A INJURY OF RIGHT FOOT, INITIAL ENCOUNTER: ICD-10-CM

## 2021-09-01 DIAGNOSIS — Z13.820 ENCOUNTER FOR OSTEOPOROSIS SCREENING IN ASYMPTOMATIC POSTMENOPAUSAL PATIENT: ICD-10-CM

## 2021-09-01 PROCEDURE — 1170F PR FUNCTIONAL STATUS ASSESSED: ICD-10-PCS | Mod: S$GLB,,, | Performed by: INTERNAL MEDICINE

## 2021-09-01 PROCEDURE — 3078F PR MOST RECENT DIASTOLIC BLOOD PRESSURE < 80 MM HG: ICD-10-PCS | Mod: S$GLB,,, | Performed by: INTERNAL MEDICINE

## 2021-09-01 PROCEDURE — 1101F PR PT FALLS ASSESS DOC 0-1 FALLS W/OUT INJ PAST YR: ICD-10-PCS | Mod: S$GLB,,, | Performed by: INTERNAL MEDICINE

## 2021-09-01 PROCEDURE — 1159F PR MEDICATION LIST DOCUMENTED IN MEDICAL RECORD: ICD-10-PCS | Mod: S$GLB,,, | Performed by: INTERNAL MEDICINE

## 2021-09-01 PROCEDURE — 1170F FXNL STATUS ASSESSED: CPT | Mod: S$GLB,,, | Performed by: INTERNAL MEDICINE

## 2021-09-01 PROCEDURE — 3051F HG A1C>EQUAL 7.0%<8.0%: CPT | Mod: S$GLB,,, | Performed by: INTERNAL MEDICINE

## 2021-09-01 PROCEDURE — 1101F PT FALLS ASSESS-DOCD LE1/YR: CPT | Mod: S$GLB,,, | Performed by: INTERNAL MEDICINE

## 2021-09-01 PROCEDURE — 3008F BODY MASS INDEX DOCD: CPT | Mod: S$GLB,,, | Performed by: INTERNAL MEDICINE

## 2021-09-01 PROCEDURE — 3288F PR FALLS RISK ASSESSMENT DOCUMENTED: ICD-10-PCS | Mod: S$GLB,,, | Performed by: INTERNAL MEDICINE

## 2021-09-01 PROCEDURE — 3051F PR MOST RECENT HEMOGLOBIN A1C LEVEL 7.0 - < 8.0%: ICD-10-PCS | Mod: S$GLB,,, | Performed by: INTERNAL MEDICINE

## 2021-09-01 PROCEDURE — 3078F DIAST BP <80 MM HG: CPT | Mod: S$GLB,,, | Performed by: INTERNAL MEDICINE

## 2021-09-01 PROCEDURE — 99214 PR OFFICE/OUTPT VISIT, EST, LEVL IV, 30-39 MIN: ICD-10-PCS | Mod: S$GLB,,, | Performed by: INTERNAL MEDICINE

## 2021-09-01 PROCEDURE — 3008F PR BODY MASS INDEX (BMI) DOCUMENTED: ICD-10-PCS | Mod: S$GLB,,, | Performed by: INTERNAL MEDICINE

## 2021-09-01 PROCEDURE — 3074F PR MOST RECENT SYSTOLIC BLOOD PRESSURE < 130 MM HG: ICD-10-PCS | Mod: S$GLB,,, | Performed by: INTERNAL MEDICINE

## 2021-09-01 PROCEDURE — 1126F AMNT PAIN NOTED NONE PRSNT: CPT | Mod: S$GLB,,, | Performed by: INTERNAL MEDICINE

## 2021-09-01 PROCEDURE — 1159F MED LIST DOCD IN RCRD: CPT | Mod: S$GLB,,, | Performed by: INTERNAL MEDICINE

## 2021-09-01 PROCEDURE — 99214 OFFICE O/P EST MOD 30 MIN: CPT | Mod: S$GLB,,, | Performed by: INTERNAL MEDICINE

## 2021-09-01 PROCEDURE — 3074F SYST BP LT 130 MM HG: CPT | Mod: S$GLB,,, | Performed by: INTERNAL MEDICINE

## 2021-09-01 PROCEDURE — 1126F PR PAIN SEVERITY QUANTIFIED, NO PAIN PRESENT: ICD-10-PCS | Mod: S$GLB,,, | Performed by: INTERNAL MEDICINE

## 2021-09-01 PROCEDURE — 1160F PR REVIEW ALL MEDS BY PRESCRIBER/CLIN PHARMACIST DOCUMENTED: ICD-10-PCS | Mod: S$GLB,,, | Performed by: INTERNAL MEDICINE

## 2021-09-01 PROCEDURE — 1160F RVW MEDS BY RX/DR IN RCRD: CPT | Mod: S$GLB,,, | Performed by: INTERNAL MEDICINE

## 2021-09-01 PROCEDURE — 3288F FALL RISK ASSESSMENT DOCD: CPT | Mod: S$GLB,,, | Performed by: INTERNAL MEDICINE

## 2021-09-01 RX ORDER — EMPAGLIFLOZIN 10 MG/1
10 TABLET, FILM COATED ORAL DAILY
Qty: 30 TABLET | Refills: 1 | Status: SHIPPED | OUTPATIENT
Start: 2021-09-01 | End: 2021-09-01 | Stop reason: SDUPTHER

## 2021-09-01 RX ORDER — LOSARTAN POTASSIUM 50 MG/1
50 TABLET ORAL DAILY
Qty: 90 TABLET | Refills: 3 | Status: SHIPPED | OUTPATIENT
Start: 2021-09-01 | End: 2021-09-01 | Stop reason: SDUPTHER

## 2021-09-01 RX ORDER — LOSARTAN POTASSIUM 50 MG/1
50 TABLET ORAL DAILY
Qty: 90 TABLET | Refills: 3 | Status: SHIPPED | OUTPATIENT
Start: 2021-09-01 | End: 2022-06-30 | Stop reason: SDUPTHER

## 2021-09-01 RX ORDER — EMPAGLIFLOZIN 10 MG/1
10 TABLET, FILM COATED ORAL DAILY
Qty: 30 TABLET | Refills: 1 | Status: SHIPPED | OUTPATIENT
Start: 2021-09-01 | End: 2021-11-04

## 2021-09-02 ENCOUNTER — HOSPITAL ENCOUNTER (OUTPATIENT)
Dept: RADIOLOGY | Facility: HOSPITAL | Age: 71
Discharge: HOME | End: 2021-09-02
Attending: INTERNAL MEDICINE
Payer: MEDICARE

## 2021-09-02 DIAGNOSIS — S99.921A INJURY OF RIGHT FOOT, INITIAL ENCOUNTER: ICD-10-CM

## 2021-09-02 PROCEDURE — 73630 X-RAY EXAM OF FOOT: CPT | Mod: TC,PO,RT

## 2021-09-09 ENCOUNTER — HOSPITAL ENCOUNTER (OUTPATIENT)
Dept: RADIOLOGY | Facility: HOSPITAL | Age: 71
Discharge: HOME | End: 2021-09-09
Attending: INTERNAL MEDICINE
Payer: MEDICARE

## 2021-09-09 DIAGNOSIS — Z13.820 ENCOUNTER FOR OSTEOPOROSIS SCREENING IN ASYMPTOMATIC POSTMENOPAUSAL PATIENT: ICD-10-CM

## 2021-09-09 DIAGNOSIS — Z78.0 ENCOUNTER FOR OSTEOPOROSIS SCREENING IN ASYMPTOMATIC POSTMENOPAUSAL PATIENT: ICD-10-CM

## 2021-09-09 PROCEDURE — 77080 DXA BONE DENSITY AXIAL: CPT | Mod: TC,PO

## 2021-09-22 ENCOUNTER — TELEPHONE (OUTPATIENT)
Dept: FAMILY MEDICINE | Facility: CLINIC | Age: 71
End: 2021-09-22

## 2021-10-01 ENCOUNTER — DOCUMENTATION ONLY (OUTPATIENT)
Dept: PHARMACY | Facility: CLINIC | Age: 71
End: 2021-10-01

## 2021-10-18 ENCOUNTER — IMMUNIZATION (OUTPATIENT)
Dept: PRIMARY CARE CLINIC | Facility: CLINIC | Age: 71
End: 2021-10-18
Payer: MEDICARE

## 2021-10-18 DIAGNOSIS — Z23 NEED FOR VACCINATION: Primary | ICD-10-CM

## 2021-10-18 PROCEDURE — 0003A COVID-19, MRNA, LNP-S, PF, 30 MCG/0.3 ML DOSE VACCINE: CPT | Mod: S$GLB,,, | Performed by: FAMILY MEDICINE

## 2021-10-18 PROCEDURE — 91300 COVID-19, MRNA, LNP-S, PF, 30 MCG/0.3 ML DOSE VACCINE: CPT | Mod: S$GLB,,, | Performed by: FAMILY MEDICINE

## 2021-10-18 PROCEDURE — 0003A COVID-19, MRNA, LNP-S, PF, 30 MCG/0.3 ML DOSE VACCINE: ICD-10-PCS | Mod: S$GLB,,, | Performed by: FAMILY MEDICINE

## 2021-10-18 PROCEDURE — 91300 COVID-19, MRNA, LNP-S, PF, 30 MCG/0.3 ML DOSE VACCINE: ICD-10-PCS | Mod: S$GLB,,, | Performed by: FAMILY MEDICINE

## 2021-11-02 ENCOUNTER — DOCUMENTATION ONLY (OUTPATIENT)
Dept: PHARMACY | Facility: CLINIC | Age: 71
End: 2021-11-02
Payer: MEDICARE

## 2021-11-04 ENCOUNTER — OFFICE VISIT (OUTPATIENT)
Dept: FAMILY MEDICINE | Facility: CLINIC | Age: 71
End: 2021-11-04
Payer: MEDICARE

## 2021-11-04 ENCOUNTER — LAB VISIT (OUTPATIENT)
Dept: LAB | Facility: HOSPITAL | Age: 71
End: 2021-11-04
Attending: INTERNAL MEDICINE
Payer: MEDICARE

## 2021-11-04 VITALS
BODY MASS INDEX: 22.5 KG/M2 | WEIGHT: 127 LBS | SYSTOLIC BLOOD PRESSURE: 120 MMHG | HEIGHT: 63 IN | DIASTOLIC BLOOD PRESSURE: 69 MMHG | HEART RATE: 77 BPM

## 2021-11-04 DIAGNOSIS — I10 ESSENTIAL HYPERTENSION: ICD-10-CM

## 2021-11-04 DIAGNOSIS — E78.2 MIXED HYPERLIPIDEMIA: ICD-10-CM

## 2021-11-04 DIAGNOSIS — I10 ESSENTIAL HYPERTENSION: Chronic | ICD-10-CM

## 2021-11-04 DIAGNOSIS — E11.9 TYPE 2 DIABETES MELLITUS WITHOUT COMPLICATION, WITHOUT LONG-TERM CURRENT USE OF INSULIN: ICD-10-CM

## 2021-11-04 DIAGNOSIS — B35.4 DERMATOPHYTOSIS OF BODY: ICD-10-CM

## 2021-11-04 DIAGNOSIS — E11.9 TYPE 2 DIABETES MELLITUS WITHOUT COMPLICATION, WITHOUT LONG-TERM CURRENT USE OF INSULIN: Chronic | ICD-10-CM

## 2021-11-04 DIAGNOSIS — M81.0 OSTEOPOROSIS OF LUMBAR SPINE: Chronic | ICD-10-CM

## 2021-11-04 DIAGNOSIS — R21 RASH AND NONSPECIFIC SKIN ERUPTION: ICD-10-CM

## 2021-11-04 DIAGNOSIS — K21.9 GASTROESOPHAGEAL REFLUX DISEASE WITHOUT ESOPHAGITIS: ICD-10-CM

## 2021-11-04 DIAGNOSIS — Z12.31 ENCOUNTER FOR SCREENING MAMMOGRAM FOR BREAST CANCER: Primary | ICD-10-CM

## 2021-11-04 DIAGNOSIS — R47.89 OTHER SPEECH DISTURBANCE: ICD-10-CM

## 2021-11-04 LAB
ALBUMIN/CREAT UR: 12.3 UG/MG (ref 0–30)
ALT SERPL W/O P-5'-P-CCNC: 14 U/L (ref 10–44)
ANION GAP SERPL CALC-SCNC: 8 MMOL/L (ref 8–16)
BUN SERPL-MCNC: 18 MG/DL (ref 8–23)
CALCIUM SERPL-MCNC: 9.7 MG/DL (ref 8.7–10.5)
CHLORIDE SERPL-SCNC: 106 MMOL/L (ref 95–110)
CHOLEST SERPL-MCNC: 120 MG/DL (ref 120–199)
CHOLEST/HDLC SERPL: 2.5 {RATIO} (ref 2–5)
CO2 SERPL-SCNC: 24 MMOL/L (ref 23–29)
CREAT SERPL-MCNC: 1 MG/DL (ref 0.5–1.4)
CREAT UR-MCNC: 43 MG/DL (ref 15–325)
EST. GFR  (AFRICAN AMERICAN): >60 ML/MIN/1.73 M^2
EST. GFR  (NON AFRICAN AMERICAN): 57.6 ML/MIN/1.73 M^2
ESTIMATED AVG GLUCOSE: 160 MG/DL (ref 68–131)
GLUCOSE SERPL-MCNC: 149 MG/DL (ref 70–110)
HBA1C MFR BLD: 7.2 % (ref 4.5–6.2)
HDLC SERPL-MCNC: 48 MG/DL (ref 40–75)
HDLC SERPL: 40 % (ref 20–50)
LDLC SERPL CALC-MCNC: 51 MG/DL (ref 63–159)
MICROALBUMIN UR DL<=1MG/L-MCNC: 5.3 UG/ML
NONHDLC SERPL-MCNC: 72 MG/DL
POTASSIUM SERPL-SCNC: 4.7 MMOL/L (ref 3.5–5.1)
SODIUM SERPL-SCNC: 138 MMOL/L (ref 136–145)
TRIGL SERPL-MCNC: 105 MG/DL (ref 30–150)

## 2021-11-04 PROCEDURE — 1126F PR PAIN SEVERITY QUANTIFIED, NO PAIN PRESENT: ICD-10-PCS | Mod: S$GLB,,, | Performed by: INTERNAL MEDICINE

## 2021-11-04 PROCEDURE — 4010F PR ACE/ARB THEARPY RXD/TAKEN: ICD-10-PCS | Mod: S$GLB,,, | Performed by: INTERNAL MEDICINE

## 2021-11-04 PROCEDURE — 1126F AMNT PAIN NOTED NONE PRSNT: CPT | Mod: S$GLB,,, | Performed by: INTERNAL MEDICINE

## 2021-11-04 PROCEDURE — 3078F PR MOST RECENT DIASTOLIC BLOOD PRESSURE < 80 MM HG: ICD-10-PCS | Mod: S$GLB,,, | Performed by: INTERNAL MEDICINE

## 2021-11-04 PROCEDURE — 3008F BODY MASS INDEX DOCD: CPT | Mod: S$GLB,,, | Performed by: INTERNAL MEDICINE

## 2021-11-04 PROCEDURE — 3288F PR FALLS RISK ASSESSMENT DOCUMENTED: ICD-10-PCS | Mod: S$GLB,,, | Performed by: INTERNAL MEDICINE

## 2021-11-04 PROCEDURE — 3066F PR DOCUMENTATION OF TREATMENT FOR NEPHROPATHY: ICD-10-PCS | Mod: S$GLB,,, | Performed by: INTERNAL MEDICINE

## 2021-11-04 PROCEDURE — 1101F PR PT FALLS ASSESS DOC 0-1 FALLS W/OUT INJ PAST YR: ICD-10-PCS | Mod: S$GLB,,, | Performed by: INTERNAL MEDICINE

## 2021-11-04 PROCEDURE — 80061 LIPID PANEL: CPT | Performed by: INTERNAL MEDICINE

## 2021-11-04 PROCEDURE — 3074F PR MOST RECENT SYSTOLIC BLOOD PRESSURE < 130 MM HG: ICD-10-PCS | Mod: S$GLB,,, | Performed by: INTERNAL MEDICINE

## 2021-11-04 PROCEDURE — 1160F RVW MEDS BY RX/DR IN RCRD: CPT | Mod: S$GLB,,, | Performed by: INTERNAL MEDICINE

## 2021-11-04 PROCEDURE — 84460 ALANINE AMINO (ALT) (SGPT): CPT | Performed by: INTERNAL MEDICINE

## 2021-11-04 PROCEDURE — 3078F DIAST BP <80 MM HG: CPT | Mod: S$GLB,,, | Performed by: INTERNAL MEDICINE

## 2021-11-04 PROCEDURE — 3061F NEG MICROALBUMINURIA REV: CPT | Mod: S$GLB,,, | Performed by: INTERNAL MEDICINE

## 2021-11-04 PROCEDURE — 36415 COLL VENOUS BLD VENIPUNCTURE: CPT | Performed by: INTERNAL MEDICINE

## 2021-11-04 PROCEDURE — 1159F MED LIST DOCD IN RCRD: CPT | Mod: S$GLB,,, | Performed by: INTERNAL MEDICINE

## 2021-11-04 PROCEDURE — 3074F SYST BP LT 130 MM HG: CPT | Mod: S$GLB,,, | Performed by: INTERNAL MEDICINE

## 2021-11-04 PROCEDURE — 80048 BASIC METABOLIC PNL TOTAL CA: CPT | Performed by: INTERNAL MEDICINE

## 2021-11-04 PROCEDURE — 3061F PR NEG MICROALBUMINURIA RESULT DOCUMENTED/REVIEW: ICD-10-PCS | Mod: S$GLB,,, | Performed by: INTERNAL MEDICINE

## 2021-11-04 PROCEDURE — 3288F FALL RISK ASSESSMENT DOCD: CPT | Mod: S$GLB,,, | Performed by: INTERNAL MEDICINE

## 2021-11-04 PROCEDURE — 3008F PR BODY MASS INDEX (BMI) DOCUMENTED: ICD-10-PCS | Mod: S$GLB,,, | Performed by: INTERNAL MEDICINE

## 2021-11-04 PROCEDURE — 1101F PT FALLS ASSESS-DOCD LE1/YR: CPT | Mod: S$GLB,,, | Performed by: INTERNAL MEDICINE

## 2021-11-04 PROCEDURE — 99214 OFFICE O/P EST MOD 30 MIN: CPT | Mod: S$GLB,,, | Performed by: INTERNAL MEDICINE

## 2021-11-04 PROCEDURE — 82570 ASSAY OF URINE CREATININE: CPT | Performed by: INTERNAL MEDICINE

## 2021-11-04 PROCEDURE — 4010F ACE/ARB THERAPY RXD/TAKEN: CPT | Mod: S$GLB,,, | Performed by: INTERNAL MEDICINE

## 2021-11-04 PROCEDURE — 99214 PR OFFICE/OUTPT VISIT, EST, LEVL IV, 30-39 MIN: ICD-10-PCS | Mod: S$GLB,,, | Performed by: INTERNAL MEDICINE

## 2021-11-04 PROCEDURE — 3051F HG A1C>EQUAL 7.0%<8.0%: CPT | Mod: S$GLB,,, | Performed by: INTERNAL MEDICINE

## 2021-11-04 PROCEDURE — 83036 HEMOGLOBIN GLYCOSYLATED A1C: CPT | Performed by: INTERNAL MEDICINE

## 2021-11-04 PROCEDURE — 3051F PR MOST RECENT HEMOGLOBIN A1C LEVEL 7.0 - < 8.0%: ICD-10-PCS | Mod: S$GLB,,, | Performed by: INTERNAL MEDICINE

## 2021-11-04 PROCEDURE — 1160F PR REVIEW ALL MEDS BY PRESCRIBER/CLIN PHARMACIST DOCUMENTED: ICD-10-PCS | Mod: S$GLB,,, | Performed by: INTERNAL MEDICINE

## 2021-11-04 PROCEDURE — 1159F PR MEDICATION LIST DOCUMENTED IN MEDICAL RECORD: ICD-10-PCS | Mod: S$GLB,,, | Performed by: INTERNAL MEDICINE

## 2021-11-04 PROCEDURE — 3066F NEPHROPATHY DOC TX: CPT | Mod: S$GLB,,, | Performed by: INTERNAL MEDICINE

## 2021-11-04 RX ORDER — NYSTATIN 100000 U/G
CREAM TOPICAL 2 TIMES DAILY
Qty: 30 G | Refills: 1 | Status: SHIPPED | OUTPATIENT
Start: 2021-11-04

## 2021-11-04 RX ORDER — CLOTRIMAZOLE AND BETAMETHASONE DIPROPIONATE 10; .64 MG/G; MG/G
CREAM TOPICAL 2 TIMES DAILY
Qty: 15 G | Refills: 1 | Status: SHIPPED | OUTPATIENT
Start: 2021-11-04

## 2021-12-06 ENCOUNTER — DOCUMENTATION ONLY (OUTPATIENT)
Dept: PHARMACY | Facility: CLINIC | Age: 71
End: 2021-12-06
Payer: MEDICARE

## 2021-12-30 ENCOUNTER — TELEPHONE (OUTPATIENT)
Dept: HEMATOLOGY/ONCOLOGY | Facility: CLINIC | Age: 71
End: 2021-12-30
Payer: MEDICARE

## 2022-01-03 ENCOUNTER — DOCUMENTATION ONLY (OUTPATIENT)
Dept: PHARMACY | Facility: CLINIC | Age: 72
End: 2022-01-03
Payer: MEDICARE

## 2022-01-05 ENCOUNTER — LAB VISIT (OUTPATIENT)
Dept: LAB | Facility: HOSPITAL | Age: 72
End: 2022-01-05
Attending: INTERNAL MEDICINE
Payer: MEDICARE

## 2022-01-05 DIAGNOSIS — Z85.038 H/O COLON CANCER, STAGE III: ICD-10-CM

## 2022-01-05 DIAGNOSIS — E53.8 VITAMIN B 12 DEFICIENCY: ICD-10-CM

## 2022-01-05 DIAGNOSIS — D64.9 MILD ANEMIA: ICD-10-CM

## 2022-01-05 DIAGNOSIS — C18.4 MALIGNANT NEOPLASM OF TRANSVERSE COLON: ICD-10-CM

## 2022-01-05 LAB
ALBUMIN SERPL BCP-MCNC: 3.9 G/DL (ref 3.5–5.2)
ALP SERPL-CCNC: 53 U/L (ref 55–135)
ALT SERPL W/O P-5'-P-CCNC: 19 U/L (ref 10–44)
ANION GAP SERPL CALC-SCNC: 9 MMOL/L (ref 8–16)
AST SERPL-CCNC: 17 U/L (ref 10–40)
BASOPHILS # BLD AUTO: 0.04 K/UL (ref 0–0.2)
BASOPHILS NFR BLD: 0.5 % (ref 0–1.9)
BILIRUB SERPL-MCNC: 0.6 MG/DL (ref 0.1–1)
BUN SERPL-MCNC: 17 MG/DL (ref 8–23)
CALCIUM SERPL-MCNC: 9.3 MG/DL (ref 8.7–10.5)
CEA SERPL-MCNC: 2.6 NG/ML (ref 0–5)
CHLORIDE SERPL-SCNC: 103 MMOL/L (ref 95–110)
CO2 SERPL-SCNC: 23 MMOL/L (ref 23–29)
CREAT SERPL-MCNC: 0.9 MG/DL (ref 0.5–1.4)
DIFFERENTIAL METHOD: ABNORMAL
EOSINOPHIL # BLD AUTO: 0.4 K/UL (ref 0–0.5)
EOSINOPHIL NFR BLD: 4.5 % (ref 0–8)
ERYTHROCYTE [DISTWIDTH] IN BLOOD BY AUTOMATED COUNT: 14.6 % (ref 11.5–14.5)
EST. GFR  (AFRICAN AMERICAN): >60 ML/MIN/1.73 M^2
EST. GFR  (NON AFRICAN AMERICAN): >60 ML/MIN/1.73 M^2
FOLATE SERPL-MCNC: 8.1 NG/ML (ref 4–24)
GLUCOSE SERPL-MCNC: 161 MG/DL (ref 70–110)
HCT VFR BLD AUTO: 37.4 % (ref 37–48.5)
HGB BLD-MCNC: 11.2 G/DL (ref 12–16)
IMM GRANULOCYTES # BLD AUTO: 0.04 K/UL (ref 0–0.04)
IMM GRANULOCYTES NFR BLD AUTO: 0.5 % (ref 0–0.5)
LYMPHOCYTES # BLD AUTO: 2.3 K/UL (ref 1–4.8)
LYMPHOCYTES NFR BLD: 27.7 % (ref 18–48)
MCH RBC QN AUTO: 26.2 PG (ref 27–31)
MCHC RBC AUTO-ENTMCNC: 29.9 G/DL (ref 32–36)
MCV RBC AUTO: 88 FL (ref 82–98)
MONOCYTES # BLD AUTO: 0.6 K/UL (ref 0.3–1)
MONOCYTES NFR BLD: 6.6 % (ref 4–15)
NEUTROPHILS # BLD AUTO: 5 K/UL (ref 1.8–7.7)
NEUTROPHILS NFR BLD: 60.2 % (ref 38–73)
NRBC BLD-RTO: 0 /100 WBC
PLATELET # BLD AUTO: 237 K/UL (ref 150–450)
PMV BLD AUTO: 10.4 FL (ref 9.2–12.9)
POTASSIUM SERPL-SCNC: 4.5 MMOL/L (ref 3.5–5.1)
PROT SERPL-MCNC: 7.3 G/DL (ref 6–8.4)
RBC # BLD AUTO: 4.27 M/UL (ref 4–5.4)
SODIUM SERPL-SCNC: 135 MMOL/L (ref 136–145)
WBC # BLD AUTO: 8.3 K/UL (ref 3.9–12.7)

## 2022-01-05 PROCEDURE — 36415 COLL VENOUS BLD VENIPUNCTURE: CPT | Performed by: INTERNAL MEDICINE

## 2022-01-05 PROCEDURE — 82378 CARCINOEMBRYONIC ANTIGEN: CPT | Performed by: INTERNAL MEDICINE

## 2022-01-05 PROCEDURE — 85025 COMPLETE CBC W/AUTO DIFF WBC: CPT | Performed by: INTERNAL MEDICINE

## 2022-01-05 PROCEDURE — 82746 ASSAY OF FOLIC ACID SERUM: CPT | Performed by: INTERNAL MEDICINE

## 2022-01-05 PROCEDURE — 80053 COMPREHEN METABOLIC PANEL: CPT | Performed by: INTERNAL MEDICINE

## 2022-02-03 ENCOUNTER — DOCUMENTATION ONLY (OUTPATIENT)
Dept: PHARMACY | Facility: CLINIC | Age: 72
End: 2022-02-03
Payer: MEDICARE

## 2022-02-07 NOTE — PROGRESS NOTES
[facility] Hematology/Oncology  PROGRESS NOTE - Follow-up Visit      Subjective:       Patient ID:   NAME: Preet West : 1950     71 y.o. female    Referring Doc: Hetal  Other Physicians: Lucille    Chief Complaint:  Colon ca f/u    History of Present Illness:     Patient returns today for a regularly scheduled follow-up visit.  The patient is here today to go over the results of the recently ordered labs, tests and studies. She last showed for an oncology clinic appointment in Dec 2020.    She is here with her son.      She denies any CP, SOB, HA's ir N/V. Her bowels are moving adequately.   She no longer has any fatigue issues and energy levels are good.     She saw Dr Fong on 2021    Discussed covid19 precautions - she had  her vaccinations            ROS:   GEN: normal without any fever, night sweats or weight loss;    HEENT: normal with no HA's, sore throat, stiff neck, changes in vision  CV: normal with no CP, SOB, PND, FISHER or orthopnea  PULM: normal with no SOB, cough, hemoptysis, sputum or pleuritic pain  GI: normal with no abdominal pain, nausea, vomiting, constipation, diarrhea, melanotic stools, BRBPR, or hematemesis  : normal with no hematuria, dysuria  BREAST: normal with no mass, discharge, pain  SKIN: normal with no rash, erythema, bruising, or swelling    Allergies:  Review of patient's allergies indicates:   Allergen Reactions    Tradjenta [linagliptin]      Itching and gastric discomfort > difficult to assess nature of side effect    Aspirin Itching    Opioids - morphine analogues Other (See Comments)     Confusion    Hydrochlorothiazide Other (See Comments)     Suspect Low sodium by hospital ist. Not confirmed       Medications:    Current Outpatient Medications:     alendronate (FOSAMAX) 70 MG tablet, Take 1 tablet (70 mg total) by mouth every 7 days., Disp: 12 tablet, Rfl: 3    blood sugar diagnostic (TRUE METRIX GLUCOSE TEST STRIP) Strp, USE 1 STRIP TO CHECK GLUCOSE  "TWICE DAILY, Disp: 200 strip, Rfl: 3    clotrimazole-betamethasone 1-0.05% (LOTRISONE) cream, Apply topically 2 (two) times daily., Disp: 15 g, Rfl: 1    cyanocobalamin 1,000 mcg/mL injection, Inject 1 mL (1,000 mcg total) into the muscle every 30 days., Disp: 1 mL, Rfl: 11    fluticasone propionate (FLONASE) 50 mcg/actuation nasal spray, Use 1 spray(s) in each nostril once daily, Disp: 16 g, Rfl: 5    JARDIANCE 10 mg tablet, Take 1 tablet by mouth once daily, Disp: 30 tablet, Rfl: 6    lancets (LANCETS,ULTRA THIN) 26 gauge Misc, 1 lancet by Misc.(Non-Drug; Combo Route) route once daily., Disp: 100 each, Rfl: 0    losartan (COZAAR) 50 MG tablet, Take 1 tablet (50 mg total) by mouth once daily., Disp: 90 tablet, Rfl: 3    metFORMIN (GLUCOPHAGE) 1000 MG tablet, Take 1 tablet (1,000 mg total) by mouth 2 (two) times daily with meals., Disp: 180 tablet, Rfl: 3    nystatin (MYCOSTATIN) cream, Apply topically 2 (two) times daily., Disp: 30 g, Rfl: 1    polyethylene glycol (GLYCOLAX) 17 gram/dose powder, DISSOLVE 17 GRAMS IN WATER AND DRINK ONCE DAILY, Disp: 510 g, Rfl: 0    rosuvastatin (CRESTOR) 5 MG tablet, TAKE 1 TABLET BY MOUTH EVERY MONDAY AND EVERY WEDNESDAY AND EVERY FRIDAY, Disp: 36 tablet, Rfl: 3    blood-glucose meter kit, One touch meter and matching lancets and strips., Disp: 1 each, Rfl: 0    PMHx/PSHx Updates:  See patient's last visit with me on  12/30/2020.  See H&P on 5/12/2019        Pathology:  Cancer Staging    Colectomy -  3/12/2013:  - low grade adenocarcinoma with invasion of the pericolonic adipose tissue and johnny metastasis  - 5.3cm size tumor  - T3 N1a (1 out of 12 LN's were positive)  - histologic freatures of MSI present                Objective:     Vitals:  Blood pressure (!) 145/78, pulse 91, temperature 97.8 °F (36.6 °C), resp. rate 18, height 5' 2" (1.575 m), weight 59.4 kg (131 lb).    Physical Examination:   GEN: no apparent distress, comfortable; AAOx3  HEAD: atraumatic and " normocephalic  EYES: no pallor, no icterus, PERRLA  ENT: OMM, no pharyngeal erythema, external ears WNL; no nasal discharge; no thrush  NECK: no masses, thyroid normal, trachea midline, no LAD/LN's, supple  CV: RRR with no murmur; normal pulse; normal S1 and S2; no pedal edema  CHEST: Normal respiratory effort; CTAB; normal breath sounds; no wheeze or crackles  ABDOM: nontender and nondistended; soft; normal bowel sounds; no rebound/guarding  MUSC/Skeletal:  prior right knee surgery; arthropathy  EXTREM: no clubbing, cyanosis, inflammation or swelling  SKIN: no rashes, lesions, ulcers, petechiae or subcutaneous nodules  : no rocha  NEURO: grossly intact; motor/sensory WNL; AAOx3; no tremors  PSYCH: normal mood, affect and behavior  LYMPH: normal cervical, supraclavicular, axillary and groin LN's            Labs:        Lab Results   Component Value Date    WBC 8.30 01/05/2022    HGB 11.2 (L) 01/05/2022    HCT 37.4 01/05/2022    MCV 88 01/05/2022     01/05/2022     BMP  Lab Results   Component Value Date     (L) 01/05/2022    K 4.5 01/05/2022     01/05/2022    CO2 23 01/05/2022    BUN 17 01/05/2022    CREATININE 0.9 01/05/2022    CALCIUM 9.3 01/05/2022    ANIONGAP 9 01/05/2022    ESTGFRAFRICA >60.0 01/05/2022    EGFRNONAA >60.0 01/05/2022        Lab Results   Component Value Date    IRON 40 12/29/2020    TIBC 337 12/29/2020    FERRITIN 111 12/29/2020       Lab Results   Component Value Date    CEA 2.6 01/05/2022       Lab Results   Component Value Date    ITLRELLM94 557 12/29/2020     Lab Results   Component Value Date    FOLATE 8.1 01/05/2022         Radiology/Diagnostic Studies:    CT Abdom/pelvis: 5/9/2019  IMPRESSION:  Postsurgical changes of the mid transverse colon without evidence of recurrent  or metastatic disease    Prior hysterectomy        X-ray Chest Pa And Lateral    Result Date: 5/9/2019  MDI CHEST, 2 VIEWS XRAY Indication: Personal history of other malignant neoplasm of large  intestine. Comparison: June 27, 2018 Findings: Cardiomediastinal silhouette is within normal limits. There is no confluent airspace disease. There is no pleural effusion or pneumothorax. No acute osseous abnormality.     IMPRESSION: No acute pulmonary process. Read and electronically signed by: Renetta Frost MD on 5/9/2019 9:23 AM CDT RENETTA FROST MD      I have reviewed all available lab results and radiology reports.    Assessment/Plan:   (1) 71 y.o. female with diagnosis of colon cancer who has been referred by Pal Fong MD for evaluation by medical hematology/oncology.   - diagnosed in Jan 2013  - s/p transverse colon resection 3/12/2013 followed by adjuvant chemotherapy with FOLFOX for 6 months in [place], [place]  - T3 N1a - 1 LN was positive  - Dr Sofia West was her oncologist in [place]  - she had a follow-up colonoscopy in Apr 2017  - latest CXR was negative; - CT scans in may 2019 were stable  - she saw Dr Adkins with GI since last visit and had scopes in [place] 2019    12/30/2020:  - latest CEA was stable at 2.5  - she had last scopes in Oct 2021    2/8/2022:  - latest labs with CEA at 2.6     (2) HTN and hypercholesterolemia     (3) DM II     (4) Osteoporosis     (5) OA    (6) B12 deficiency    (7) Mild chronic anemia - hgb at 10.6 - NCNC indices; normal iron panel    2/8/2022:   - latest hgb relatively adequate at 11.2  - iron panel adequate  - B12 and folate adequate          VISIT DIAGNOSES:      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 Check up to date labs incl. CEA every 6 months  2. Encouraged compliance with referrals, studies and labs  3. F/u with GI as directed by them   4. F/u with PCP  RTC in 12 months    Fax note to   Pal Fong MD,  Lucille    Discussion:     COVID-19 Discussion:    I had long discussion with patient and any applicable family about  the COVID-19 coronavirus epidemic and the recommended precautions with regard to cancer and/or hematology patients. I have re-iterated the CDC recommendations for adequate hand washing, use of hand -like products, and coughing into elbow, etc. In addition, especially for our patients who are on chemotherapy and/or our otherwise immunocompromised patients, I have recommended avoidance of crowds, including movie theaters, restaurants, churches, etc. I have recommended avoidance of any sick or symptomatic family members and/or friends. I have also recommended avoidance of any raw and unwashed food products, and general avoidance of food items that have not been prepared by themselves. The patient has been asked to call us immediately with any symptom developments, issues, questions or other general concerns.       I spent over 25 mins of time with the patient. Reviewed results of the recently ordered labs, tests and studies; made directives with regards to the results. Over half of this time was spent couseling and coordinating care.    I have explained all of the above in detail and the patient understands all of the current recommendation(s). I have answered all of their questions to the best of my ability and to their complete satisfaction.   The patient is to continue with the current management plan.            Electronically signed by Clemente Mi MD

## 2022-02-08 ENCOUNTER — OFFICE VISIT (OUTPATIENT)
Dept: HEMATOLOGY/ONCOLOGY | Facility: CLINIC | Age: 72
End: 2022-02-08
Payer: MEDICARE

## 2022-02-08 VITALS
HEART RATE: 91 BPM | WEIGHT: 131 LBS | HEIGHT: 62 IN | SYSTOLIC BLOOD PRESSURE: 145 MMHG | TEMPERATURE: 98 F | DIASTOLIC BLOOD PRESSURE: 78 MMHG | BODY MASS INDEX: 24.11 KG/M2 | RESPIRATION RATE: 18 BRPM

## 2022-02-08 DIAGNOSIS — Z85.038 H/O COLON CANCER, STAGE III: ICD-10-CM

## 2022-02-08 DIAGNOSIS — Z90.49 S/P LAPAROSCOPIC COLECTOMY: ICD-10-CM

## 2022-02-08 DIAGNOSIS — E53.8 VITAMIN B 12 DEFICIENCY: ICD-10-CM

## 2022-02-08 DIAGNOSIS — C18.4 MALIGNANT NEOPLASM OF TRANSVERSE COLON: Primary | ICD-10-CM

## 2022-02-08 PROCEDURE — 1101F PR PT FALLS ASSESS DOC 0-1 FALLS W/OUT INJ PAST YR: ICD-10-PCS | Mod: S$GLB,,, | Performed by: INTERNAL MEDICINE

## 2022-02-08 PROCEDURE — 1101F PT FALLS ASSESS-DOCD LE1/YR: CPT | Mod: S$GLB,,, | Performed by: INTERNAL MEDICINE

## 2022-02-08 PROCEDURE — 3288F PR FALLS RISK ASSESSMENT DOCUMENTED: ICD-10-PCS | Mod: S$GLB,,, | Performed by: INTERNAL MEDICINE

## 2022-02-08 PROCEDURE — 3078F DIAST BP <80 MM HG: CPT | Mod: S$GLB,,, | Performed by: INTERNAL MEDICINE

## 2022-02-08 PROCEDURE — 1159F MED LIST DOCD IN RCRD: CPT | Mod: S$GLB,,, | Performed by: INTERNAL MEDICINE

## 2022-02-08 PROCEDURE — 3288F FALL RISK ASSESSMENT DOCD: CPT | Mod: S$GLB,,, | Performed by: INTERNAL MEDICINE

## 2022-02-08 PROCEDURE — 1126F PR PAIN SEVERITY QUANTIFIED, NO PAIN PRESENT: ICD-10-PCS | Mod: S$GLB,,, | Performed by: INTERNAL MEDICINE

## 2022-02-08 PROCEDURE — 3008F PR BODY MASS INDEX (BMI) DOCUMENTED: ICD-10-PCS | Mod: S$GLB,,, | Performed by: INTERNAL MEDICINE

## 2022-02-08 PROCEDURE — 99213 PR OFFICE/OUTPT VISIT, EST, LEVL III, 20-29 MIN: ICD-10-PCS | Mod: S$GLB,,, | Performed by: INTERNAL MEDICINE

## 2022-02-08 PROCEDURE — 3078F PR MOST RECENT DIASTOLIC BLOOD PRESSURE < 80 MM HG: ICD-10-PCS | Mod: S$GLB,,, | Performed by: INTERNAL MEDICINE

## 2022-02-08 PROCEDURE — 1160F PR REVIEW ALL MEDS BY PRESCRIBER/CLIN PHARMACIST DOCUMENTED: ICD-10-PCS | Mod: S$GLB,,, | Performed by: INTERNAL MEDICINE

## 2022-02-08 PROCEDURE — 3008F BODY MASS INDEX DOCD: CPT | Mod: S$GLB,,, | Performed by: INTERNAL MEDICINE

## 2022-02-08 PROCEDURE — 3077F SYST BP >= 140 MM HG: CPT | Mod: S$GLB,,, | Performed by: INTERNAL MEDICINE

## 2022-02-08 PROCEDURE — 1160F RVW MEDS BY RX/DR IN RCRD: CPT | Mod: S$GLB,,, | Performed by: INTERNAL MEDICINE

## 2022-02-08 PROCEDURE — 1159F PR MEDICATION LIST DOCUMENTED IN MEDICAL RECORD: ICD-10-PCS | Mod: S$GLB,,, | Performed by: INTERNAL MEDICINE

## 2022-02-08 PROCEDURE — 3077F PR MOST RECENT SYSTOLIC BLOOD PRESSURE >= 140 MM HG: ICD-10-PCS | Mod: S$GLB,,, | Performed by: INTERNAL MEDICINE

## 2022-02-08 PROCEDURE — 1126F AMNT PAIN NOTED NONE PRSNT: CPT | Mod: S$GLB,,, | Performed by: INTERNAL MEDICINE

## 2022-02-08 PROCEDURE — 99213 OFFICE O/P EST LOW 20 MIN: CPT | Mod: S$GLB,,, | Performed by: INTERNAL MEDICINE

## 2022-02-25 DIAGNOSIS — M81.6 LOCALIZED OSTEOPOROSIS WITHOUT CURRENT PATHOLOGICAL FRACTURE: Chronic | ICD-10-CM

## 2022-02-28 DIAGNOSIS — M81.6 LOCALIZED OSTEOPOROSIS WITHOUT CURRENT PATHOLOGICAL FRACTURE: Chronic | ICD-10-CM

## 2022-02-28 RX ORDER — ALENDRONATE SODIUM 70 MG/1
70 TABLET ORAL
Qty: 12 TABLET | Refills: 3 | Status: CANCELLED | OUTPATIENT
Start: 2022-02-28

## 2022-02-28 RX ORDER — ALENDRONATE SODIUM 70 MG/1
70 TABLET ORAL
Qty: 12 TABLET | Refills: 3 | Status: SHIPPED | OUTPATIENT
Start: 2022-02-28 | End: 2022-08-04 | Stop reason: SDUPTHER

## 2022-03-03 ENCOUNTER — DOCUMENTATION ONLY (OUTPATIENT)
Dept: PHARMACY | Facility: CLINIC | Age: 72
End: 2022-03-03
Payer: MEDICARE

## 2022-04-05 ENCOUNTER — DOCUMENTATION ONLY (OUTPATIENT)
Dept: PHARMACY | Facility: CLINIC | Age: 72
End: 2022-04-05
Payer: MEDICARE

## 2022-05-03 ENCOUNTER — DOCUMENTATION ONLY (OUTPATIENT)
Dept: PHARMACY | Facility: CLINIC | Age: 72
End: 2022-05-03
Payer: MEDICARE

## 2022-05-04 NOTE — PROGRESS NOTES
Subjective:       Patient ID: Preet West is a 72 y.o. female.    Chief Complaint: Diabetes, Hypertension, Gastroesophageal Reflux, and Hyperlipidemia    Patient is a 72-year-old  [place] female who comes for follow-up.  She is as always accompanied with her  who tends to take care of her interpretation/translation and stewardship of her medical needs.    Her underlying medical issues are as below:-    1. Essential hypertension   2. Type 2 diabetes mellitus without complication, without long-term current use of insulin   3. Gastroesophageal reflux disease without esophagitis   4. Mixed hyperlipidemia   5. Bilateral leg edema     LAST VISIT SHE HAD STATED THAT SHE HAD A EPISODE OR 2 OF SLURRED SPEECH AND DIFFICULTY FINDING WORDS.  MY CONCERN WAS FOR POTENTIAL HYPOGLYCEMIC EPISODE VERSUS POTENTIAL FOR A TIA.  ADVISED THE  TO KEEP AN EYE ON THIS ISSUE AND CONTINUE WITH ASPIRIN.  HOWEVER ASPIRIN HAD CREATED SOME PROBLEMS IN PAST WITH REACTION AND THIS WAS NOT ADVISED THEREAFTER.  CONSIDERATION WAS GIVEN FOR PLAVIX BUT I FELT THAT WITHOUT A REASONABLE DIAGNOSIS IT MIGHT BE AN OVERKILL.    Last visit she had also seen Dr. Finn Mi [facility] given a history of colon cancer status post resection and treatment with FOLFOX past.  Basic labs including Ca had been ordered and a follow-up is pending with him to review the labs on 02/08/2023.    In the interim, she gets regular B12 injections which may perhaps serve more of a placebo effect than any dL benefits.  Patient's  states that she had B12 deficiency in past but unfortunately I never had a hold of those records.    She had to stop Jardiance because apparently as per the  it caused her burning sensation in the feet.    After stopping the Jardiance the burning sensation in the feet had dissipated.  However, now she complains of some crampy pain and feeling in the shin bones in the lower legs.  This is only at  nighttime.  It is unclear if there is any cramps or tetany spasm like features.  Please note that patient is on statin that it is unclear if she has any statin related side effects.    There is no history of injury and trauma.    She does have a high arch in both the feet.    Diabetes  She presents for her follow-up diabetic visit. She has type 2 diabetes mellitus. No MedicAlert identification noted. Her disease course has been fluctuating. Hypoglycemia symptoms include nervousness/anxiousness. Pertinent negatives for hypoglycemia include no confusion, dizziness, pallor, seizures or tremors. Associated symptoms include fatigue (Getting better). Pertinent negatives for diabetes include no chest pain, no polydipsia, no polyphagia and no polyuria. There are no hypoglycemic complications. Risk factors for coronary artery disease include sedentary lifestyle, post-menopausal, hypertension, dyslipidemia and diabetes mellitus. Current diabetic treatment includes oral agent (monotherapy). She is compliant with treatment most of the time. She has not had a previous visit with a dietitian. She never participates in exercise. An ACE inhibitor/angiotensin II receptor blocker is being taken. She does not see a podiatrist.  Hypertension  This is a chronic problem. The current episode started more than 1 year ago. The problem is controlled. Associated symptoms include anxiety and peripheral edema. Pertinent negatives include no chest pain, neck pain, palpitations or shortness of breath. Risk factors for coronary artery disease include sedentary lifestyle. Past treatments include calcium channel blockers. The current treatment provides moderate improvement. Compliance problems include psychosocial issues.  There is no history of chronic renal disease, hyperaldosteronism, hypercortisolism or sleep apnea.   Gastroesophageal Reflux  She complains of heartburn. She reports no abdominal pain, no chest pain or no wheezing. This is a  chronic problem. The current episode started more than 1 year ago. The problem occurs occasionally. Associated symptoms include fatigue (Getting better). She has tried a PPI for the symptoms. The treatment provided moderate relief.   Constipation  This is a chronic problem. The current episode started more than 1 year ago. Her stool frequency is 1 time per day. The patient is not on a high fiber diet. She does not exercise regularly. There has not been adequate water intake. Pertinent negatives include no abdominal pain, difficulty urinating or fever. Treatments tried: Polyethylene glycol-Glycolax. The treatment provided moderate relief.   Hyperlipidemia  This is a chronic problem. The current episode started more than 1 year ago. The problem is controlled. Exacerbating diseases include diabetes. She has no history of chronic renal disease, hypothyroidism, liver disease, obesity or nephrotic syndrome. Pertinent negatives include no chest pain or shortness of breath. Current antihyperlipidemic treatment includes statins. The current treatment provides moderate improvement of lipids. Compliance problems include psychosocial issues.  Risk factors for coronary artery disease include hypertension, diabetes mellitus, dyslipidemia, post-menopausal and a sedentary lifestyle.       Past Medical History:   Diagnosis Date    Allergy     aspirin itching    Anemia     Cancer     Diabetes mellitus, type 2     H/O colon cancer, stage III 1/1/2013    Treated in [facility]    History of colonoscopy 6/4/2021    Colonoscopy done by Dr. Rayshawn Adkins MD on 06/04/21.non bleeding internal hemorrhoids.  4 mm polyp in the sigmoid colon removed with cold snare.  Patent end-to-end colocolonic anastomosis characterized by healthy-appearing mucosa.  Examination was otherwise normal.  Repeat colonoscopy in 3 years for surveillance.    Malignant neoplasm of transverse colon (2013) - Stage III 4/4/2019    Osteoporosis     S/P laparoscopic  colectomy (3/2013) 4/5/2019    Seasonal allergies      Social History     Socioeconomic History    Marital status:      Spouse name: Luis West    Number of children: 3   Occupational History    Occupation:    Tobacco Use    Smoking status: Never Smoker    Smokeless tobacco: Never Used   Substance and Sexual Activity    Alcohol use: No    Drug use: No    Sexual activity: Not Currently     Partners: Male     Birth control/protection: Post-menopausal   Social History Narrative    Speaks Amandathi only     Social Determinants of Health     Financial Resource Strain: Low Risk     Difficulty of Paying Living Expenses: Not very hard   Food Insecurity: No Food Insecurity    Worried About Running Out of Food in the Last Year: Never true    Ran Out of Food in the Last Year: Never true   Transportation Needs: No Transportation Needs    Lack of Transportation (Medical): No    Lack of Transportation (Non-Medical): No   Physical Activity: Insufficiently Active    Days of Exercise per Week: 7 days    Minutes of Exercise per Session: 20 min   Stress: Stress Concern Present    Feeling of Stress : To some extent   Social Connections: Moderately Integrated    Frequency of Communication with Friends and Family: More than three times a week    Attends Jew Services: 1 to 4 times per year    Active Member of Clubs or Organizations: No    Attends Club or Organization Meetings: Never    Marital Status:    Housing Stability: Low Risk     Unable to Pay for Housing in the Last Year: No    Number of Places Lived in the Last Year: 1    Unstable Housing in the Last Year: No     Past Surgical History:   Procedure Laterality Date    COLON SURGERY      JOINT REPLACEMENT       Family History   Problem Relation Age of Onset    Stroke Mother     Heart disease Mother     Cancer Father         [place]       Review of Systems   Constitutional: Positive for fatigue (Getting better).  Negative for activity change, chills, fever and unexpected weight change (Gained 1 lb since the last documentation.  129--130 lb.).   HENT: Negative for congestion, postnasal drip, sinus pressure and sneezing.         Uses Flonase for sinuses.   Eyes: Negative for pain, discharge and visual disturbance (Cataract).   Respiratory: Negative for chest tightness, shortness of breath, wheezing and stridor.    Cardiovascular: Positive for leg swelling. Negative for chest pain and palpitations.   Gastrointestinal: Positive for constipation and heartburn. Negative for abdominal distention, abdominal pain and anal bleeding.        GERD occasional use of pantoprazole.   Endocrine: Negative for cold intolerance, polydipsia, polyphagia and polyuria.        Recently was hospitalized for severe hyponatremia with sodium of 107 and also confusion.  Gradually recuperated after stabilization.  Stopped irbesartan hydrochlorothiazide.  Not sure at this point if she has stop Tradjenta now or in past.   Genitourinary: Negative for difficulty urinating and flank pain.        Previously had complained of vaginal itching but no more.   Musculoskeletal: Positive for arthralgias. Negative for joint swelling, neck pain and neck stiffness.        Bilateral swelling in legs more on the right side as compared to the left side.  Pain on the right lateral foot over the metatarsal bones.  History of ice bag falling on her right foot.   Skin: Negative for color change and pallor.   Allergic/Immunologic: Negative for environmental allergies, food allergies and immunocompromised state.   Neurological: Negative for dizziness, tremors, seizures, syncope and light-headedness.   Hematological: Negative for adenopathy. Does not bruise/bleed easily.   Psychiatric/Behavioral: Negative for agitation, confusion and dysphoric mood. The patient is nervous/anxious.         Somewhat anxious about her health issues.         Objective:      Blood pressure 115/73,  "pulse 84, height 5' 2" (1.575 m), weight 59 kg (130 lb). Body mass index is 23.78 kg/m².  Physical Exam  Vitals and nursing note reviewed.   Constitutional:       General: She is not in acute distress.     Appearance: She is well-developed. She is not ill-appearing or diaphoretic.      Comments: BMI is 23.09     HENT:      Head: Normocephalic and atraumatic.      Nose: No nasal deformity or mucosal edema.      Right Sinus: No maxillary sinus tenderness or frontal sinus tenderness.      Left Sinus: No maxillary sinus tenderness or frontal sinus tenderness.   Eyes:      Conjunctiva/sclera: Conjunctivae normal.   Neck:      Trachea: Trachea normal.   Cardiovascular:      Rate and Rhythm: Normal rate and regular rhythm.      Heart sounds: Normal heart sounds, S1 normal and S2 normal.   Pulmonary:      Breath sounds: Normal breath sounds. No wheezing, rhonchi or rales.   Chest:      Chest wall: No tenderness.   Abdominal:      General: There is no distension.      Palpations: Abdomen is soft. Abdomen is not rigid.      Tenderness: There is no abdominal tenderness. There is no guarding.   Musculoskeletal:         General: No tenderness.      Cervical back: Neck supple.      Right lower leg: No tenderness. No edema.      Left lower leg: No tenderness. No edema.      Right foot: Deformity (Pes cavus) present.      Left foot: Deformity (pes cavus) present.        Legs:       Comments: The above she did area indicates pain and discomfort perceived by patient towards evening.  Not in morning.  At the time of my examination this was nontender.   Feet:      Right foot:      Protective Sensation: 5 sites tested. 5 sites sensed.      Left foot:      Protective Sensation: 5 sites tested.   Lymphadenopathy:      Cervical: No cervical adenopathy.   Skin:     General: Skin is warm and dry.   Neurological:      Mental Status: She is alert.      Coordination: Coordination normal.   Psychiatric:         Mood and Affect: Mood is anxious.  "        Behavior: Behavior is cooperative.      Comments: Historically has been very anxious about every little ache and discomfort or change in body.           Assessment:       1. Type 2 diabetes mellitus without complication, without long-term current use of insulin    2. Essential hypertension    3. Mixed hyperlipidemia    4. Gastroesophageal reflux disease without esophagitis    5. Pain in both lower extremities           No visits with results within 3 Month(s) from this visit.   Latest known visit with results is:   Lab Visit on 01/05/2022   Component Date Value Ref Range Status    WBC 01/05/2022 8.30  3.90 - 12.70 K/uL Final    RBC 01/05/2022 4.27  4.00 - 5.40 M/uL Final    Hemoglobin 01/05/2022 11.2 (A) 12.0 - 16.0 g/dL Final    Hematocrit 01/05/2022 37.4  37.0 - 48.5 % Final    MCV 01/05/2022 88  82 - 98 fL Final    MCH 01/05/2022 26.2 (A) 27.0 - 31.0 pg Final    MCHC 01/05/2022 29.9 (A) 32.0 - 36.0 g/dL Final    RDW 01/05/2022 14.6 (A) 11.5 - 14.5 % Final    Platelets 01/05/2022 237  150 - 450 K/uL Final    MPV 01/05/2022 10.4  9.2 - 12.9 fL Final    Immature Granulocytes 01/05/2022 0.5  0.0 - 0.5 % Final    Gran # (ANC) 01/05/2022 5.0  1.8 - 7.7 K/uL Final    Immature Grans (Abs) 01/05/2022 0.04  0.00 - 0.04 K/uL Final    Lymph # 01/05/2022 2.3  1.0 - 4.8 K/uL Final    Mono # 01/05/2022 0.6  0.3 - 1.0 K/uL Final    Eos # 01/05/2022 0.4  0.0 - 0.5 K/uL Final    Baso # 01/05/2022 0.04  0.00 - 0.20 K/uL Final    nRBC 01/05/2022 0  0 /100 WBC Final    Gran % 01/05/2022 60.2  38.0 - 73.0 % Final    Lymph % 01/05/2022 27.7  18.0 - 48.0 % Final    Mono % 01/05/2022 6.6  4.0 - 15.0 % Final    Eosinophil % 01/05/2022 4.5  0.0 - 8.0 % Final    Basophil % 01/05/2022 0.5  0.0 - 1.9 % Final    Differential Method 01/05/2022 Automated   Final    Sodium 01/05/2022 135 (A) 136 - 145 mmol/L Final    Potassium 01/05/2022 4.5  3.5 - 5.1 mmol/L Final    Chloride 01/05/2022 103  95 - 110 mmol/L  Final    CO2 01/05/2022 23  23 - 29 mmol/L Final    Glucose 01/05/2022 161 (A) 70 - 110 mg/dL Final    BUN 01/05/2022 17  8 - 23 mg/dL Final    Creatinine 01/05/2022 0.9  0.5 - 1.4 mg/dL Final    Calcium 01/05/2022 9.3  8.7 - 10.5 mg/dL Final    Total Protein 01/05/2022 7.3  6.0 - 8.4 g/dL Final    Albumin 01/05/2022 3.9  3.5 - 5.2 g/dL Final    Total Bilirubin 01/05/2022 0.6  0.1 - 1.0 mg/dL Final    Alkaline Phosphatase 01/05/2022 53 (A) 55 - 135 U/L Final    AST 01/05/2022 17  10 - 40 U/L Final    ALT 01/05/2022 19  10 - 44 U/L Final    Anion Gap 01/05/2022 9  8 - 16 mmol/L Final    eGFR if African American 01/05/2022 >60.0  >60 mL/min/1.73 m^2 Final    eGFR if non African American 01/05/2022 >60.0  >60 mL/min/1.73 m^2 Final    CEA 01/05/2022 2.6  0.0 - 5.0 ng/mL Final    Folate 01/05/2022 8.1  4.0 - 24.0 ng/mL Final     Component Ref Range & Units 1 yr ago   (12/29/20) 1 yr ago   (6/25/20)   Vitamin B-12 210 - 950 pg/mL 557  >1500 High     Resulting Agency  [facility]         Plan:           Type 2 diabetes mellitus without complication, without long-term current use of insulin  Comments:  Blood sugars are running in 150s and 160s.  Cannot tolerate Jardiance.  Trial of glimepiride 1 mg per day.  Check A1c at follow-up in 3 months  Orders:  -     Hemoglobin A1C; Future; Expected date: 08/01/2022  -     Microalbumin/Creatinine Ratio, Urine; Future; Expected date: 08/01/2022  -     glimepiride (AMARYL) 1 MG tablet; Take 1 tablet (1 mg total) by mouth before breakfast.  Dispense: 90 tablet; Refill: 3    Essential hypertension  Comments:  Blood pressures are stable.  Currently on losartan 50 mg.  Continue.  Watch salt intake.  Go for regular exercise.    Mixed hyperlipidemia  Comments:  Currently taking rosuvastatin on Monday, Wednesday and Friday.  Seems to be tolerating this reduced level dosage.  Orders:  -     Lipid Panel; Future; Expected date: 08/01/2022    Gastroesophageal reflux disease without  esophagitis  Comments:  Did not endorse on volunteer any new complains.  I do not see any medications like PPIs in the list.  Previously was noted to taking PPIs.    Pain in both lower extremities  Comments:  Patient has bilateral shin pains mostly at nighttime when she goes to sleep.  I suspect this is probably secondary to alignment issue in the lower extremity and    Patient's medical conditions have been reviewed.  Intolerance to Jardiance has been reviewed which I am not sure as to why it would cause burning sensation in the feet or neuropathy like symptoms.  She has stopped the medication is not willing to go back on it.  Previously she could not tolerate Tradjenta.    Will give a trial for glimepiride 1 mg per day.  It should be reasonably safe even though sulfonylureas have not been indicated for patient's above 65. Not believe that this should be at risk for hypoglycemia.    She also complains of bilateral shin pains mostly at nighttime when she has done her chores during the daytime.    I do not see any obvious deformity or significant edema or shin splint like symptoms.    She does have a high arch in the feet which might be responsible for some imbalance or alignment issues.    Massaging will be helpful.    I will stop rosuvastatin for 1 month with the believes that probably statin medications may be causing some muscle cramps.  Let us see how she does.  If no relief will consider physical therapy.    I do suspect however, that because the patient's high arch feet and probably resolve the RF, she might have some pain lower extremity this is mostly over the shin bones and not in the calf muscles.  No evidence of DVT at this point.    Follow-up in 3 months and check A1c, lipid panel and also urine microalbumin.    She is also following up with the oncology colleagues Dr. Finn Mi for history of colon cancer.    She takes vitamin B12 injection every month and probably more of a placebo relief.    I  have made a summary for patient's  who effectively controls and acts as radha/ for patient's medical conditions:-    1.-Jardiance will be discontinued now because of burning sensation in the feet.    2.-I am starting a new medication call glimepiride 1 mg per day before breakfast for diabetes.    3.-please stop rosuvastatin which is a cholesterol medication for 1 month.  Please list send me a message if her muscle pain and leg pain is better or not.  If it does not make any difference then she should continue rosuvastatin.    4.-I would like to see her back in 3 months time and I will check some labs at that point including hemoglobin A1c, urine test and cholesterol report.    Spent charmaine 30 minutes with patient which involved review of pts medical conditions, labs, medications and with 50% of time face-to-face discussion about medical problems, management and any applicable changes.        Current Outpatient Medications:     alendronate (FOSAMAX) 70 MG tablet, Take 1 tablet (70 mg total) by mouth every 7 days., Disp: 12 tablet, Rfl: 3    blood sugar diagnostic (TRUE METRIX GLUCOSE TEST STRIP) Strp, USE 1 STRIP TO CHECK GLUCOSE TWICE DAILY, Disp: 200 strip, Rfl: 3    clotrimazole-betamethasone 1-0.05% (LOTRISONE) cream, Apply topically 2 (two) times daily., Disp: 15 g, Rfl: 1    cyanocobalamin 1,000 mcg/mL injection, Inject 1 mL (1,000 mcg total) into the muscle every 30 days., Disp: 1 mL, Rfl: 11    fluticasone propionate (FLONASE) 50 mcg/actuation nasal spray, Use 1 spray(s) in each nostril once daily, Disp: 16 g, Rfl: 5    lancets (LANCETS,ULTRA THIN) 26 gauge Misc, 1 lancet by Misc.(Non-Drug; Combo Route) route once daily., Disp: 100 each, Rfl: 0    losartan (COZAAR) 50 MG tablet, Take 1 tablet (50 mg total) by mouth once daily., Disp: 90 tablet, Rfl: 3    metFORMIN (GLUCOPHAGE) 1000 MG tablet, Take 1 tablet (1,000 mg total) by mouth 2 (two) times daily with meals., Disp: 180 tablet,  Rfl: 3    nystatin (MYCOSTATIN) cream, Apply topically 2 (two) times daily., Disp: 30 g, Rfl: 1    polyethylene glycol (GLYCOLAX) 17 gram/dose powder, DISSOLVE 17 GRAMS IN WATER AND DRINK ONCE DAILY, Disp: 510 g, Rfl: 0    rosuvastatin (CRESTOR) 5 MG tablet, TAKE 1 TABLET BY MOUTH EVERY MONDAY AND EVERY WEDNESDAY AND EVERY FRIDAY, Disp: 36 tablet, Rfl: 3    blood-glucose meter kit, One touch meter and matching lancets and strips., Disp: 1 each, Rfl: 0    glimepiride (AMARYL) 1 MG tablet, Take 1 tablet (1 mg total) by mouth before breakfast., Disp: 90 tablet, Rfl: 3

## 2022-05-05 ENCOUNTER — OFFICE VISIT (OUTPATIENT)
Dept: FAMILY MEDICINE | Facility: CLINIC | Age: 72
End: 2022-05-05
Payer: MEDICARE

## 2022-05-05 VITALS
HEIGHT: 62 IN | HEART RATE: 84 BPM | WEIGHT: 130 LBS | BODY MASS INDEX: 23.92 KG/M2 | DIASTOLIC BLOOD PRESSURE: 73 MMHG | SYSTOLIC BLOOD PRESSURE: 115 MMHG

## 2022-05-05 DIAGNOSIS — K21.9 GASTROESOPHAGEAL REFLUX DISEASE WITHOUT ESOPHAGITIS: Chronic | ICD-10-CM

## 2022-05-05 DIAGNOSIS — I10 ESSENTIAL HYPERTENSION: Chronic | ICD-10-CM

## 2022-05-05 DIAGNOSIS — M79.605 PAIN IN BOTH LOWER EXTREMITIES: Chronic | ICD-10-CM

## 2022-05-05 DIAGNOSIS — M79.604 PAIN IN BOTH LOWER EXTREMITIES: Chronic | ICD-10-CM

## 2022-05-05 DIAGNOSIS — E78.2 MIXED HYPERLIPIDEMIA: Chronic | ICD-10-CM

## 2022-05-05 DIAGNOSIS — E11.9 TYPE 2 DIABETES MELLITUS WITHOUT COMPLICATION, WITHOUT LONG-TERM CURRENT USE OF INSULIN: Primary | Chronic | ICD-10-CM

## 2022-05-05 PROCEDURE — 3074F PR MOST RECENT SYSTOLIC BLOOD PRESSURE < 130 MM HG: ICD-10-PCS | Mod: CPTII,S$GLB,, | Performed by: INTERNAL MEDICINE

## 2022-05-05 PROCEDURE — 1159F MED LIST DOCD IN RCRD: CPT | Mod: CPTII,S$GLB,, | Performed by: INTERNAL MEDICINE

## 2022-05-05 PROCEDURE — 99214 OFFICE O/P EST MOD 30 MIN: CPT | Mod: S$GLB,,, | Performed by: INTERNAL MEDICINE

## 2022-05-05 PROCEDURE — 3074F SYST BP LT 130 MM HG: CPT | Mod: CPTII,S$GLB,, | Performed by: INTERNAL MEDICINE

## 2022-05-05 PROCEDURE — 1160F RVW MEDS BY RX/DR IN RCRD: CPT | Mod: CPTII,S$GLB,, | Performed by: INTERNAL MEDICINE

## 2022-05-05 PROCEDURE — 3078F DIAST BP <80 MM HG: CPT | Mod: CPTII,S$GLB,, | Performed by: INTERNAL MEDICINE

## 2022-05-05 PROCEDURE — 99214 PR OFFICE/OUTPT VISIT, EST, LEVL IV, 30-39 MIN: ICD-10-PCS | Mod: S$GLB,,, | Performed by: INTERNAL MEDICINE

## 2022-05-05 PROCEDURE — 3051F HG A1C>EQUAL 7.0%<8.0%: CPT | Mod: CPTII,S$GLB,, | Performed by: INTERNAL MEDICINE

## 2022-05-05 PROCEDURE — 3078F PR MOST RECENT DIASTOLIC BLOOD PRESSURE < 80 MM HG: ICD-10-PCS | Mod: CPTII,S$GLB,, | Performed by: INTERNAL MEDICINE

## 2022-05-05 PROCEDURE — 1159F PR MEDICATION LIST DOCUMENTED IN MEDICAL RECORD: ICD-10-PCS | Mod: CPTII,S$GLB,, | Performed by: INTERNAL MEDICINE

## 2022-05-05 PROCEDURE — 1126F AMNT PAIN NOTED NONE PRSNT: CPT | Mod: CPTII,S$GLB,, | Performed by: INTERNAL MEDICINE

## 2022-05-05 PROCEDURE — 1160F PR REVIEW ALL MEDS BY PRESCRIBER/CLIN PHARMACIST DOCUMENTED: ICD-10-PCS | Mod: CPTII,S$GLB,, | Performed by: INTERNAL MEDICINE

## 2022-05-05 PROCEDURE — 3008F BODY MASS INDEX DOCD: CPT | Mod: CPTII,S$GLB,, | Performed by: INTERNAL MEDICINE

## 2022-05-05 PROCEDURE — 3008F PR BODY MASS INDEX (BMI) DOCUMENTED: ICD-10-PCS | Mod: CPTII,S$GLB,, | Performed by: INTERNAL MEDICINE

## 2022-05-05 PROCEDURE — 3051F PR MOST RECENT HEMOGLOBIN A1C LEVEL 7.0 - < 8.0%: ICD-10-PCS | Mod: CPTII,S$GLB,, | Performed by: INTERNAL MEDICINE

## 2022-05-05 PROCEDURE — 1126F PR PAIN SEVERITY QUANTIFIED, NO PAIN PRESENT: ICD-10-PCS | Mod: CPTII,S$GLB,, | Performed by: INTERNAL MEDICINE

## 2022-05-05 RX ORDER — GLIMEPIRIDE 1 MG/1
1 TABLET ORAL
Qty: 90 TABLET | Refills: 3 | Status: SHIPPED | OUTPATIENT
Start: 2022-05-05 | End: 2022-08-04 | Stop reason: SDUPTHER

## 2022-06-01 ENCOUNTER — OFFICE VISIT (OUTPATIENT)
Dept: FAMILY MEDICINE | Facility: CLINIC | Age: 72
End: 2022-06-01
Payer: MEDICARE

## 2022-06-01 VITALS
HEART RATE: 68 BPM | SYSTOLIC BLOOD PRESSURE: 132 MMHG | DIASTOLIC BLOOD PRESSURE: 78 MMHG | HEIGHT: 62 IN | OXYGEN SATURATION: 99 % | BODY MASS INDEX: 24.84 KG/M2 | WEIGHT: 135 LBS | TEMPERATURE: 98 F

## 2022-06-01 DIAGNOSIS — U07.1 COVID-19 VIRUS INFECTION: Primary | ICD-10-CM

## 2022-06-01 DIAGNOSIS — U07.1 COVID-19 VIRUS DETECTED: ICD-10-CM

## 2022-06-01 DIAGNOSIS — J02.9 SORE THROAT: ICD-10-CM

## 2022-06-01 DIAGNOSIS — R53.83 FATIGUE, UNSPECIFIED TYPE: ICD-10-CM

## 2022-06-01 LAB
CTP QC/QA: YES
CTP QC/QA: YES
S PYO RRNA THROAT QL PROBE: NEGATIVE
SARS-COV-2 RDRP RESP QL NAA+PROBE: POSITIVE

## 2022-06-01 PROCEDURE — 99214 PR OFFICE/OUTPT VISIT, EST, LEVL IV, 30-39 MIN: ICD-10-PCS | Mod: 25,S$GLB,, | Performed by: NURSE PRACTITIONER

## 2022-06-01 PROCEDURE — 99214 OFFICE O/P EST MOD 30 MIN: CPT | Mod: 25,S$GLB,, | Performed by: NURSE PRACTITIONER

## 2022-06-01 PROCEDURE — 87880 POCT RAPID STREP A: ICD-10-PCS | Mod: QW,S$GLB,, | Performed by: NURSE PRACTITIONER

## 2022-06-01 PROCEDURE — 87880 STREP A ASSAY W/OPTIC: CPT | Mod: QW,S$GLB,, | Performed by: NURSE PRACTITIONER

## 2022-06-01 PROCEDURE — 3008F BODY MASS INDEX DOCD: CPT | Mod: CPTII,S$GLB,, | Performed by: NURSE PRACTITIONER

## 2022-06-01 PROCEDURE — 1159F MED LIST DOCD IN RCRD: CPT | Mod: CPTII,S$GLB,, | Performed by: NURSE PRACTITIONER

## 2022-06-01 PROCEDURE — 1125F PR PAIN SEVERITY QUANTIFIED, PAIN PRESENT: ICD-10-PCS | Mod: CPTII,S$GLB,, | Performed by: NURSE PRACTITIONER

## 2022-06-01 PROCEDURE — 3008F PR BODY MASS INDEX (BMI) DOCUMENTED: ICD-10-PCS | Mod: CPTII,S$GLB,, | Performed by: NURSE PRACTITIONER

## 2022-06-01 PROCEDURE — U0002 COVID-19 LAB TEST NON-CDC: HCPCS | Mod: QW,S$GLB,, | Performed by: NURSE PRACTITIONER

## 2022-06-01 PROCEDURE — U0002: ICD-10-PCS | Mod: QW,S$GLB,, | Performed by: NURSE PRACTITIONER

## 2022-06-01 PROCEDURE — 1125F AMNT PAIN NOTED PAIN PRSNT: CPT | Mod: CPTII,S$GLB,, | Performed by: NURSE PRACTITIONER

## 2022-06-01 PROCEDURE — 1159F PR MEDICATION LIST DOCUMENTED IN MEDICAL RECORD: ICD-10-PCS | Mod: CPTII,S$GLB,, | Performed by: NURSE PRACTITIONER

## 2022-06-01 NOTE — PROGRESS NOTES
SUBJECTIVE:      Patient ID: Preet West is a 72 y.o. female.    Chief Complaint: Sore Throat (X 3 days.) and Fatigue (X 3 days. Patient states that she is severly fatigued just walking up the stairs. The last time she had these symptoms her sodium was low and she was hospitalized.)    76-year-old female with a past medical history colon cancer, anemia, T2DM, HTN, and hyperlipidemia presents to the clinic with complaints of sore throat and fatigue.  Son is present in the visit as .  Symptoms started 3 days ago.  Denies fever, cough, SOB, and sick contacts.  Patient received the COVID-19 vaccine plus the booster x1.  She did not receive the influenza vaccine.  COVID-19 risk score 4.       Family History   Problem Relation Age of Onset    Stroke Mother     Heart disease Mother     Cancer Father         [place]      Social History     Socioeconomic History    Marital status:      Spouse name: Luis West    Number of children: 3   Occupational History    Occupation:    Tobacco Use    Smoking status: Never Smoker    Smokeless tobacco: Never Used   Substance and Sexual Activity    Alcohol use: No    Drug use: No    Sexual activity: Not Currently     Partners: Male     Birth control/protection: Post-menopausal   Social History Narrative    Speaks [employer]athi only     Social Determinants of Health     Financial Resource Strain: Low Risk     Difficulty of Paying Living Expenses: Not very hard   Food Insecurity: No Food Insecurity    Worried About Running Out of Food in the Last Year: Never true    Ran Out of Food in the Last Year: Never true   Transportation Needs: No Transportation Needs    Lack of Transportation (Medical): No    Lack of Transportation (Non-Medical): No   Physical Activity: Insufficiently Active    Days of Exercise per Week: 7 days    Minutes of Exercise per Session: 20 min   Stress: Stress Concern Present    Feeling of Stress : To some  extent   Social Connections: Moderately Integrated    Frequency of Communication with Friends and Family: More than three times a week    Attends Nondenominational Services: 1 to 4 times per year    Active Member of Clubs or Organizations: No    Attends Club or Organization Meetings: Never    Marital Status:    Housing Stability: Low Risk     Unable to Pay for Housing in the Last Year: No    Number of Places Lived in the Last Year: 1    Unstable Housing in the Last Year: No     Current Outpatient Medications   Medication Sig Dispense Refill    alendronate (FOSAMAX) 70 MG tablet Take 1 tablet (70 mg total) by mouth every 7 days. 12 tablet 3    blood sugar diagnostic (TRUE METRIX GLUCOSE TEST STRIP) Strp USE 1 STRIP TO CHECK GLUCOSE TWICE DAILY 200 strip 3    fluticasone propionate (FLONASE) 50 mcg/actuation nasal spray Use 1 spray(s) in each nostril once daily 16 g 5    glimepiride (AMARYL) 1 MG tablet Take 1 tablet (1 mg total) by mouth before breakfast. 90 tablet 3    lancets (LANCETS,ULTRA THIN) 26 gauge Misc 1 lancet by Misc.(Non-Drug; Combo Route) route once daily. 100 each 0    losartan (COZAAR) 50 MG tablet Take 1 tablet (50 mg total) by mouth once daily. 90 tablet 3    metFORMIN (GLUCOPHAGE) 1000 MG tablet Take 1 tablet (1,000 mg total) by mouth 2 (two) times daily with meals. 180 tablet 3    polyethylene glycol (GLYCOLAX) 17 gram/dose powder DISSOLVE 17 GRAMS IN WATER AND DRINK ONCE DAILY 510 g 0    blood-glucose meter kit One touch meter and matching lancets and strips. 1 each 0    clotrimazole-betamethasone 1-0.05% (LOTRISONE) cream Apply topically 2 (two) times daily. (Patient not taking: Reported on 6/1/2022) 15 g 1    cyanocobalamin 1,000 mcg/mL injection Inject 1 mL (1,000 mcg total) into the muscle every 30 days. (Patient not taking: Reported on 6/1/2022) 1 mL 11    nirmatrelvir-ritonavir 150 mg x 2- 100 mg copackaged tablets (EUA) Take 3 tablets by mouth 2 (two) times daily for 5  days. Each dose contains 2 nirmatrelvir (pink tablets) and 1 ritonavir (white tablet). Take all 3 tablets together 30 tablet 0    nystatin (MYCOSTATIN) cream Apply topically 2 (two) times daily. (Patient not taking: Reported on 6/1/2022) 30 g 1    rosuvastatin (CRESTOR) 5 MG tablet TAKE 1 TABLET BY MOUTH EVERY MONDAY AND EVERY WEDNESDAY AND EVERY FRIDAY (Patient not taking: Reported on 6/1/2022) 36 tablet 3     No current facility-administered medications for this visit.     Review of patient's allergies indicates:   Allergen Reactions    Tradjenta [linagliptin]      Itching and gastric discomfort > difficult to assess nature of side effect    Aspirin Itching    Opioids - morphine analogues Other (See Comments)     Confusion    Hydrochlorothiazide Other (See Comments)     Suspect Low sodium by hospital ist. Not confirmed    Jardiance [empagliflozin] Other (See Comments)     Caused burning sensation in the feet.      Past Medical History:   Diagnosis Date    Allergy     aspirin itching    Anemia     Cancer     Diabetes mellitus, type 2     H/O colon cancer, stage III 1/1/2013    Treated in [facility]    History of colonoscopy 6/4/2021    Colonoscopy done by Dr. Rayshawn Adkins MD on 06/04/21.non bleeding internal hemorrhoids.  4 mm polyp in the sigmoid colon removed with cold snare.  Patent end-to-end colocolonic anastomosis characterized by healthy-appearing mucosa.  Examination was otherwise normal.  Repeat colonoscopy in 3 years for surveillance.    Malignant neoplasm of transverse colon (2013) - Stage III 4/4/2019    Osteoporosis     S/P laparoscopic colectomy (3/2013) 4/5/2019    Seasonal allergies      Past Surgical History:   Procedure Laterality Date    COLON SURGERY      JOINT REPLACEMENT         Review of Systems   Constitutional: Positive for fatigue. Negative for activity change, appetite change, chills, diaphoresis, fever and unexpected weight change.   HENT: Positive for sore throat.  "Negative for congestion, ear pain, sinus pressure, trouble swallowing and voice change.    Eyes: Negative for pain, discharge and visual disturbance.   Respiratory: Negative for cough, chest tightness, shortness of breath and wheezing.    Cardiovascular: Negative for chest pain and palpitations.        Hypertension and hyperlipidemia   Gastrointestinal: Negative for abdominal pain, constipation, diarrhea, nausea and vomiting.        Hx of colon cancer.    Endocrine:        Controlled T2DM   Genitourinary: Negative for difficulty urinating, flank pain, frequency and urgency.   Musculoskeletal: Negative for back pain and joint swelling.   Skin: Negative for color change and rash.   Neurological: Negative for dizziness, seizures, syncope, weakness, numbness and headaches.   Hematological: Negative for adenopathy.        Anemia, hx of hyponatremia    Psychiatric/Behavioral: Negative for dysphoric mood and sleep disturbance. The patient is not nervous/anxious.       OBJECTIVE:      Vitals:    06/01/22 1631   BP: 132/78   BP Location: Left arm   Patient Position: Sitting   BP Method: Medium (Manual)   Pulse: 68   Temp: 98.2 °F (36.8 °C)   TempSrc: Oral   SpO2: 99%   Weight: 61.2 kg (135 lb)   Height: 5' 2" (1.575 m)     Physical Exam  Vitals and nursing note reviewed.   Constitutional:       General: She is awake. She is not in acute distress.     Appearance: Normal appearance. She is normal weight. She is not ill-appearing, toxic-appearing or diaphoretic.   HENT:      Head: Normocephalic and atraumatic.      Right Ear: Tympanic membrane, ear canal and external ear normal.      Left Ear: Tympanic membrane, ear canal and external ear normal.      Nose: Nose normal.      Mouth/Throat:      Comments: COVID-19 positive, did not have patient remove mask.   Eyes:      General: Lids are normal. Gaze aligned appropriately.      Conjunctiva/sclera: Conjunctivae normal.      Right eye: Right conjunctiva is not injected.      Left " eye: Left conjunctiva is not injected.      Pupils: Pupils are equal, round, and reactive to light.   Cardiovascular:      Rate and Rhythm: Normal rate and regular rhythm.      Pulses: Normal pulses.      Heart sounds: Normal heart sounds, S1 normal and S2 normal. No murmur heard.    No friction rub. No gallop.   Pulmonary:      Effort: Pulmonary effort is normal. No respiratory distress.      Breath sounds: Normal breath sounds. No stridor. No decreased breath sounds, wheezing, rhonchi or rales.   Chest:      Chest wall: No tenderness.   Musculoskeletal:      Cervical back: Neck supple.      Right lower leg: No edema.      Left lower leg: No edema.   Lymphadenopathy:      Cervical: No cervical adenopathy.   Skin:     General: Skin is warm and dry.      Capillary Refill: Capillary refill takes less than 2 seconds.      Findings: No erythema or rash.   Neurological:      Mental Status: She is alert and oriented to person, place, and time. Mental status is at baseline.   Psychiatric:         Attention and Perception: Attention normal.         Mood and Affect: Mood normal.         Speech: Speech normal.         Behavior: Behavior normal. Behavior is cooperative.         Thought Content: Thought content normal.         Judgment: Judgment normal.         Office Visit on 06/01/2022   Component Date Value Ref Range Status    POC Rapid COVID 06/01/2022 Positive (A) Negative Final     Acceptable 06/01/2022 Yes   Final    Rapid Strep A Screen 06/01/2022 Negative  Negative Final     Acceptable 06/01/2022 Yes   Final     Assessment:       1. COVID-19 virus infection    2. Sore throat    3. Fatigue, unspecified type        Plan:       COVID-19 virus infection  Paxlovid and Molnupiravir were discussed as treatment options.  Son would prefer her mother receive Paxlovid due to the 88% reduction in hospitalization it provides.  Side effects of new medication discussed with patient; understanding  voiced. Patient to stop Crestor x5 days while taking the medication.  Last LDL was well controlled.  Patient informed to remain in quarantine for 5 days.  If symptoms are resolving after 5 days, she can leave the house.  Continue to wear a mask around others for 5 additional days.  -     nirmatrelvir-ritonavir 150 mg x 2- 100 mg copackaged tablets (EUA); Take 3 tablets by mouth 2 (two) times daily for 5 days. Each dose contains 2 nirmatrelvir (pink tablets) and 1 ritonavir (white tablet). Take all 3 tablets together  Dispense: 30 tablet; Refill: 0    Sore throat  Throat lozenges, tea with honey, and salt water gargles.   -     POCT COVID-19 Rapid Screening  -     POCT rapid strep A    Fatigue, unspecified type  Likely related to COVID-19 instead of hyponatremia.  Sodium 4 months ago 135. Symptoms should improve with antivirals and time.  If no improvement patient to follow-up with PCP.     This note was created using [employer] voice recognition software that occasionally misinterprets phrases or words.     Follow up if symptoms worsen or fail to improve.      6/1/2022 DAYANARA Lockett, FNP

## 2022-06-02 DIAGNOSIS — E11.9 TYPE 2 DIABETES MELLITUS WITHOUT COMPLICATION, WITHOUT LONG-TERM CURRENT USE OF INSULIN: Chronic | ICD-10-CM

## 2022-06-09 ENCOUNTER — PATIENT MESSAGE (OUTPATIENT)
Dept: FAMILY MEDICINE | Facility: CLINIC | Age: 72
End: 2022-06-09

## 2022-06-09 DIAGNOSIS — G72.0 STATIN MYOPATHY: ICD-10-CM

## 2022-06-09 DIAGNOSIS — E78.2 MIXED HYPERLIPIDEMIA: Primary | ICD-10-CM

## 2022-06-09 DIAGNOSIS — T46.6X5A STATIN MYOPATHY: ICD-10-CM

## 2022-06-09 DIAGNOSIS — Z78.9 STATIN INTOLERANCE: ICD-10-CM

## 2022-06-13 DIAGNOSIS — E11.9 TYPE 2 DIABETES MELLITUS WITHOUT COMPLICATION, WITHOUT LONG-TERM CURRENT USE OF INSULIN: Chronic | ICD-10-CM

## 2022-06-23 ENCOUNTER — PATIENT MESSAGE (OUTPATIENT)
Dept: FAMILY MEDICINE | Facility: CLINIC | Age: 72
End: 2022-06-23

## 2022-06-23 DIAGNOSIS — E11.9 TYPE 2 DIABETES MELLITUS WITHOUT COMPLICATION, WITHOUT LONG-TERM CURRENT USE OF INSULIN: Chronic | ICD-10-CM

## 2022-06-24 PROBLEM — G72.0 STATIN MYOPATHY: Status: ACTIVE | Noted: 2022-06-24

## 2022-06-24 PROBLEM — T46.6X5A STATIN MYOPATHY: Status: ACTIVE | Noted: 2022-06-24

## 2022-06-24 PROBLEM — Z78.9 STATIN INTOLERANCE: Status: ACTIVE | Noted: 2022-06-24

## 2022-06-24 RX ORDER — EZETIMIBE 10 MG/1
10 TABLET ORAL NIGHTLY
Qty: 30 TABLET | Refills: 5 | Status: SHIPPED | OUTPATIENT
Start: 2022-06-24 | End: 2022-08-22 | Stop reason: SINTOL

## 2022-06-24 NOTE — TELEPHONE ENCOUNTER
Patient seems to have pain intolerant to statin rosuvastatin at a low-dose.  Will try Zetia 10 mg at bedtime.  Prescription sent to pharmacy.  Communicated with patient on the patient portal.    Mixed hyperlipidemia  -     ezetimibe (ZETIA) 10 mg tablet; Take 1 tablet (10 mg total) by mouth every evening.  Dispense: 30 tablet; Refill: 5    Statin intolerance    Statin myopathy

## 2022-06-27 DIAGNOSIS — E11.9 TYPE 2 DIABETES MELLITUS WITHOUT COMPLICATION, WITHOUT LONG-TERM CURRENT USE OF INSULIN: Chronic | ICD-10-CM

## 2022-06-30 ENCOUNTER — TELEPHONE (OUTPATIENT)
Dept: FAMILY MEDICINE | Facility: CLINIC | Age: 72
End: 2022-06-30

## 2022-06-30 DIAGNOSIS — E11.9 TYPE 2 DIABETES MELLITUS WITHOUT COMPLICATION, WITHOUT LONG-TERM CURRENT USE OF INSULIN: Chronic | ICD-10-CM

## 2022-06-30 DIAGNOSIS — I10 ESSENTIAL HYPERTENSION: Chronic | ICD-10-CM

## 2022-06-30 RX ORDER — LOSARTAN POTASSIUM 50 MG/1
50 TABLET ORAL DAILY
Qty: 90 TABLET | Refills: 3 | Status: SHIPPED | OUTPATIENT
Start: 2022-06-30 | End: 2022-08-04 | Stop reason: SDUPTHER

## 2022-06-30 RX ORDER — LOSARTAN POTASSIUM 50 MG/1
50 TABLET ORAL DAILY
Qty: 90 TABLET | Refills: 3 | Status: SHIPPED | OUTPATIENT
Start: 2022-06-30 | End: 2022-06-30 | Stop reason: SDUPTHER

## 2022-06-30 NOTE — TELEPHONE ENCOUNTER
Type 2 diabetes mellitus without complication, without long-term current use of insulin  Comments:  Mild rise in hemoglobin A1c has been noted. Medications have been noted.  Compliance to diet has been urged again.  Orders:  -     blood sugar diagnostic (TRUE METRIX GLUCOSE TEST STRIP) Strp; USE 1 STRIP TO CHECK GLUCOSE TWICE DAILY  Dispense: 200 strip; Refill: 3    Essential hypertension  Comments:  Blood pressures are under control.  DC amlodipine secondary to edema.  Trial of losartan 50 mg without HCTZ.  Check labs 1-2 months.  Orders:  -     losartan (COZAAR) 50 MG tablet; Take 1 tablet (50 mg total) by mouth once daily.  Dispense: 90 tablet; Refill: 3

## 2022-08-11 ENCOUNTER — DOCUMENTATION ONLY (OUTPATIENT)
Dept: PHARMACY | Facility: CLINIC | Age: 72
End: 2022-08-11
Payer: MEDICARE

## 2022-08-11 ENCOUNTER — LAB VISIT (OUTPATIENT)
Dept: LAB | Facility: HOSPITAL | Age: 72
End: 2022-08-11
Attending: INTERNAL MEDICINE
Payer: MEDICARE

## 2022-08-11 DIAGNOSIS — E78.2 MIXED HYPERLIPIDEMIA: Chronic | ICD-10-CM

## 2022-08-11 DIAGNOSIS — E11.9 TYPE 2 DIABETES MELLITUS WITHOUT COMPLICATION, WITHOUT LONG-TERM CURRENT USE OF INSULIN: Chronic | ICD-10-CM

## 2022-08-11 LAB
ALBUMIN/CREAT UR: 16.7 UG/MG (ref 0–30)
CHOLEST SERPL-MCNC: 148 MG/DL (ref 120–199)
CHOLEST/HDLC SERPL: 2.6 {RATIO} (ref 2–5)
CREAT UR-MCNC: 42 MG/DL (ref 15–325)
ESTIMATED AVG GLUCOSE: 180 MG/DL (ref 68–131)
HBA1C MFR BLD: 7.9 % (ref 4.5–6.2)
HDLC SERPL-MCNC: 58 MG/DL (ref 40–75)
HDLC SERPL: 39.2 % (ref 20–50)
LDLC SERPL CALC-MCNC: 75 MG/DL (ref 63–159)
MICROALBUMIN UR DL<=1MG/L-MCNC: 7 UG/ML
NONHDLC SERPL-MCNC: 90 MG/DL
TRIGL SERPL-MCNC: 75 MG/DL (ref 30–150)

## 2022-08-11 PROCEDURE — 36415 COLL VENOUS BLD VENIPUNCTURE: CPT | Performed by: INTERNAL MEDICINE

## 2022-08-11 PROCEDURE — 83036 HEMOGLOBIN GLYCOSYLATED A1C: CPT | Performed by: INTERNAL MEDICINE

## 2022-08-11 PROCEDURE — 82043 UR ALBUMIN QUANTITATIVE: CPT | Performed by: INTERNAL MEDICINE

## 2022-08-11 PROCEDURE — 80061 LIPID PANEL: CPT | Performed by: INTERNAL MEDICINE

## 2022-08-11 PROCEDURE — 82570 ASSAY OF URINE CREATININE: CPT | Performed by: INTERNAL MEDICINE

## 2022-08-19 NOTE — PROGRESS NOTES
Subjective:       Patient ID: Preet West is a 72 y.o. female.    Chief Complaint: Hyperlipidemia, Hypertension, Diabetes, Follow-up, Fatigue ( On B12 supplementation.  Possible placebo benefit.), and Gastroesophageal Reflux    Patient comes for follow-up.  Underlying medical issues are as below:-she is accompanied with her  as usual.    1. Type 2 diabetes mellitus without complication, without long-term current use of insulin   2. Essential hypertension   3. Mixed hyperlipidemia   4. Statin intolerance   5. Statin myopathy   6. Fatigue, unspecified type   7. Gastroesophageal reflux disease without esophagitis     Interestingly she had stopped Crestor or rosuvastatin in past because she had myalgias and myopathy symptoms.  Then she was given Ezetemibe and she started having symptoms again and she stopped ezetemibe and she went back to Crestor.  Interestingly now she is tolerating the Crestor without any problems.      Diabetes  She presents for her follow-up diabetic visit. She has type 2 diabetes mellitus. No MedicAlert identification noted. Her disease course has been fluctuating. Hypoglycemia symptoms include nervousness/anxiousness. Pertinent negatives for hypoglycemia include no confusion, dizziness, pallor, seizures or tremors. Associated symptoms include fatigue (Getting better). Pertinent negatives for diabetes include no chest pain, no polydipsia, no polyphagia and no polyuria. There are no hypoglycemic complications. Risk factors for coronary artery disease include sedentary lifestyle, post-menopausal, hypertension, dyslipidemia and diabetes mellitus. Current diabetic treatment includes oral agent (monotherapy). She is compliant with treatment most of the time. She has not had a previous visit with a dietitian. She never participates in exercise. An ACE inhibitor/angiotensin II receptor blocker is being taken. She does not see a podiatrist.  Hypertension  This is a chronic problem. The  current episode started more than 1 year ago. The problem is controlled. Associated symptoms include anxiety and peripheral edema. Pertinent negatives include no chest pain, neck pain, palpitations or shortness of breath. Risk factors for coronary artery disease include sedentary lifestyle. Past treatments include calcium channel blockers. The current treatment provides moderate improvement. Compliance problems include psychosocial issues.  There is no history of chronic renal disease, hyperaldosteronism, hypercortisolism or sleep apnea.   Gastroesophageal Reflux  She complains of heartburn. She reports no abdominal pain, no chest pain or no wheezing. This is a chronic problem. The current episode started more than 1 year ago. The problem occurs occasionally. Associated symptoms include fatigue (Getting better). She has tried a PPI for the symptoms. The treatment provided moderate relief.   Constipation  This is a chronic problem. The current episode started more than 1 year ago. Her stool frequency is 1 time per day. The patient is not on a high fiber diet. She does not exercise regularly. There has not been adequate water intake. Pertinent negatives include no abdominal pain, difficulty urinating or fever. Treatments tried: Polyethylene glycol-Glycolax. The treatment provided moderate relief.   Hyperlipidemia  This is a chronic problem. The current episode started more than 1 year ago. The problem is controlled. Exacerbating diseases include diabetes. She has no history of chronic renal disease, hypothyroidism, liver disease, obesity or nephrotic syndrome. Pertinent negatives include no chest pain or shortness of breath. Current antihyperlipidemic treatment includes statins. The current treatment provides moderate improvement of lipids. Compliance problems include psychosocial issues.  Risk factors for coronary artery disease include hypertension, diabetes mellitus, dyslipidemia, post-menopausal and a sedentary  lifestyle.       Past Medical History:   Diagnosis Date    Allergy     aspirin itching    Anemia     Cancer     Diabetes mellitus, type 2     H/O colon cancer, stage III 1/1/2013    Treated in [facility]    History of colonoscopy 6/4/2021    Colonoscopy done by Dr. Rayshawn Adkins MD on 06/04/21.non bleeding internal hemorrhoids.  4 mm polyp in the sigmoid colon removed with cold snare.  Patent end-to-end colocolonic anastomosis characterized by healthy-appearing mucosa.  Examination was otherwise normal.  Repeat colonoscopy in 3 years for surveillance.    Malignant neoplasm of transverse colon (2013) - Stage III 4/4/2019    Osteoporosis     S/P laparoscopic colectomy (3/2013) 4/5/2019    Seasonal allergies      Social History     Socioeconomic History    Marital status:      Spouse name: Luis West    Number of children: 3   Occupational History    Occupation:    Tobacco Use    Smoking status: Never Smoker    Smokeless tobacco: Never Used   Substance and Sexual Activity    Alcohol use: No    Drug use: No    Sexual activity: Not Currently     Partners: Male     Birth control/protection: Post-menopausal   Social History Narrative    [facility] only     Social Determinants of Health     Financial Resource Strain: Low Risk     Difficulty of Paying Living Expenses: Not very hard   Food Insecurity: No Food Insecurity    Worried About Running Out of Food in the Last Year: Never true    Ran Out of Food in the Last Year: Never true   Transportation Needs: No Transportation Needs    Lack of Transportation (Medical): No    Lack of Transportation (Non-Medical): No   Physical Activity: Insufficiently Active    Days of Exercise per Week: 7 days    Minutes of Exercise per Session: 20 min   Stress: Stress Concern Present    Feeling of Stress : To some extent   Social Connections: Moderately Integrated    Frequency of Communication with Friends and Family: More than three times  a week    Attends Mandaen Services: 1 to 4 times per year    Active Member of Clubs or Organizations: No    Attends Club or Organization Meetings: Never    Marital Status:    Housing Stability: Low Risk     Unable to Pay for Housing in the Last Year: No    Number of Places Lived in the Last Year: 1    Unstable Housing in the Last Year: No     Past Surgical History:   Procedure Laterality Date    COLON SURGERY      JOINT REPLACEMENT       Family History   Problem Relation Age of Onset    Stroke Mother     Heart disease Mother     Cancer Father         [place]       Review of Systems   Constitutional: Positive for fatigue (Getting better). Negative for activity change, chills, fever and unexpected weight change (Gained 1 lb since the last documentation.  129--130 lb.).   HENT: Negative for congestion, postnasal drip, sinus pressure and sneezing.         Uses Flonase for sinuses.   Eyes: Negative for pain, discharge and visual disturbance (Cataract).   Respiratory: Negative for chest tightness, shortness of breath, wheezing and stridor.    Cardiovascular: Positive for leg swelling. Negative for chest pain and palpitations.   Gastrointestinal: Positive for constipation and heartburn. Negative for abdominal distention, abdominal pain and anal bleeding.        GERD occasional use of pantoprazole.   Endocrine: Negative for cold intolerance, polydipsia, polyphagia and polyuria.        Recently was hospitalized for severe hyponatremia with sodium of 107 and also confusion.  Gradually recuperated after stabilization.  Stopped irbesartan hydrochlorothiazide.  Not sure at this point if she has stop Tradjenta now or in past.   Genitourinary: Negative for difficulty urinating and flank pain.        Previously had complained of vaginal itching but no more.   Musculoskeletal: Positive for arthralgias. Negative for joint swelling, neck pain and neck stiffness.        Bilateral swelling in legs more on the right  "side as compared to the left side.  Pain on the right lateral foot over the metatarsal bones.  History of ice bag falling on her right foot.   Skin: Negative for color change and pallor.   Allergic/Immunologic: Negative for environmental allergies, food allergies and immunocompromised state.   Neurological: Negative for dizziness, tremors, seizures, syncope and light-headedness.   Hematological: Negative for adenopathy. Does not bruise/bleed easily.   Psychiatric/Behavioral: Negative for agitation, confusion and dysphoric mood. The patient is nervous/anxious.         Somewhat anxious about her health issues.         Objective:      Blood pressure 138/78, pulse 89, height 5' 2" (1.575 m), weight 60.8 kg (134 lb). Body mass index is 24.51 kg/m².  Physical Exam  Vitals and nursing note reviewed.   Constitutional:       General: She is not in acute distress.     Appearance: She is well-developed. She is not ill-appearing or diaphoretic.      Comments: BMI is 24.51   HENT:      Head: Normocephalic and atraumatic.      Nose: No nasal deformity or mucosal edema.      Right Sinus: No maxillary sinus tenderness or frontal sinus tenderness.      Left Sinus: No maxillary sinus tenderness or frontal sinus tenderness.   Eyes:      Conjunctiva/sclera: Conjunctivae normal.   Neck:      Trachea: Trachea normal.   Cardiovascular:      Rate and Rhythm: Normal rate and regular rhythm.      Heart sounds: Normal heart sounds, S1 normal and S2 normal.   Pulmonary:      Breath sounds: Normal breath sounds. No wheezing, rhonchi or rales.   Chest:      Chest wall: No tenderness.   Abdominal:      General: There is no distension.      Palpations: Abdomen is soft. Abdomen is not rigid.      Tenderness: There is no abdominal tenderness. There is no guarding.   Musculoskeletal:         General: No tenderness.      Cervical back: Neck supple.      Right lower leg: No tenderness. No edema.      Left lower leg: No tenderness. No edema.      Right " foot: Deformity (Pes cavus) present.      Left foot: Deformity (pes cavus) present.   Feet:      Right foot:      Protective Sensation: 5 sites tested. 5 sites sensed.      Left foot:      Protective Sensation: 5 sites tested.   Lymphadenopathy:      Cervical: No cervical adenopathy.   Skin:     General: Skin is warm and dry.   Neurological:      Mental Status: She is alert.      Coordination: Coordination normal.   Psychiatric:         Mood and Affect: Mood is anxious.         Behavior: Behavior is cooperative.      Comments: Historically has been very anxious about every little ache and discomfort or change in body.           Assessment:       1. Type 2 diabetes mellitus without complication, without long-term current use of insulin    2. Essential hypertension    3. Mixed hyperlipidemia    4. Statin myopathy    5. Fatigue, unspecified type    6. Gastroesophageal reflux disease without esophagitis           Lab Visit on 08/11/2022   Component Date Value Ref Range Status    Hemoglobin A1C 08/11/2022 7.9 (A) 4.5 - 6.2 % Final    Estimated Avg Glucose 08/11/2022 180 (A) 68 - 131 mg/dL Final    Microalbumin, Urine 08/11/2022 7.0  <19.9 ug/mL Final    Creatinine, Urine 08/11/2022 42.0  15.0 - 325.0 mg/dL Final    Microalb/Creat Ratio 08/11/2022 16.7  0.0 - 30.0 ug/mg Final    Cholesterol 08/11/2022 148  120 - 199 mg/dL Final    Triglycerides 08/11/2022 75  30 - 150 mg/dL Final    HDL 08/11/2022 58  40 - 75 mg/dL Final    LDL Cholesterol 08/11/2022 75.0  63.0 - 159.0 mg/dL Final    HDL/Cholesterol Ratio 08/11/2022 39.2  20.0 - 50.0 % Final    Total Cholesterol/HDL Ratio 08/11/2022 2.6  2.0 - 5.0 Final    Non-HDL Cholesterol 08/11/2022 90  mg/dL Final   Office Visit on 06/01/2022   Component Date Value Ref Range Status    POC Rapid COVID 06/01/2022 Positive (A) Negative Final     Acceptable 06/01/2022 Yes   Final    Rapid Strep A Screen 06/01/2022 Negative  Negative Final      Acceptable 06/01/2022 Yes   Final         Plan:           Type 2 diabetes mellitus without complication, without long-term current use of insulin  -     glimepiride (AMARYL) 1 MG tablet; Take 1 tablet (1 mg total) by mouth before breakfast.  Dispense: 90 tablet; Refill: 3  -     metFORMIN (GLUCOPHAGE) 1000 MG tablet; Take 1 tablet (1,000 mg total) by mouth 2 (two) times daily with meals.  Dispense: 180 tablet; Refill: 3  -     Hemoglobin A1C; Future; Expected date: 02/06/2023    Essential hypertension  -     Basic Metabolic Panel; Future; Expected date: 02/06/2023    Mixed hyperlipidemia  -     rosuvastatin (CRESTOR) 5 MG tablet; Take 1 tablet (5 mg total) by mouth every evening.  Dispense: 90 tablet; Refill: 3    Statin myopathy  Comments:  1st she took statins and complained of myalgias. Then she took Zetia complain of myalgias and now she is back on the same statins with no significant complains    Fatigue, unspecified type  Comments:    General fatigue continues though she claims benefit from vitamin B12 for fatigue but cannot explain the discrepancy.    Gastroesophageal reflux disease without esophagitis    Patient's medical conditions have been noted.  Hemoglobin A1c has gone up.  She eats more when she gets hypoglycemic.  At this point not ready to change glimepiride and except other medications.    Blood pressure is borderline control.    Lipids are okay.    It is unclear as to what medication is causing her statin intolerance and if it is rosuvastatin or Zetia.  She has gone back on rosuvastatin and miraculously seems to be tolerating it well.    General fatigue continues and she takes vitamin B12.    Reflux symptoms are stable.    Advised Pt about age and season appropriate immunizations/ cancer screenings.  Also seasonal influenza vaccine, update on tetanus diphtheria vaccination every 10 years.  Patient has been advised to watch diet and exercise. Avoid fried and fatty food. Compliance to medications and  follow up urged.  Advised Pt. to monitor Blood sugars at home and record them.  Advised Pt  for Anti reflux measures like small feequent meals, avoid spicy and greasy food. Head end up at night.  keep a close eye on feet and keep them clean. Annual eye examination. Annual influenza vaccine.  Monitor HgbA1c every 3 to 6 months. Monitor urine microalbumin every year.keep LDL less than 100. Monitor blood pressure and target blood pressure 120/70.  Please utilize precautions for current COVID-19 pandemic.  Try to avoid crowds or close contact with multiple people.  Minimize outside interaction.  Wash hands with soap for  frequently upon contact.Use face mask or cover.    Fup 4 months    Spent charmaine 30 minutes with patient which involved review of pts medical conditions, labs, medications and with 50% of time face-to-face discussion about medical problems, management and any applicable changes.        Current Outpatient Medications:     alendronate (FOSAMAX) 70 MG tablet, Take 1 tablet (70 mg total) by mouth every 7 days., Disp: 12 tablet, Rfl: 3    blood sugar diagnostic (TRUE METRIX GLUCOSE TEST STRIP) Strp, USE 1 STRIP TO CHECK GLUCOSE TWICE DAILY, Disp: 200 strip, Rfl: 3    clotrimazole-betamethasone 1-0.05% (LOTRISONE) cream, Apply topically 2 (two) times daily., Disp: 15 g, Rfl: 1    cyanocobalamin 1,000 mcg/mL injection, Inject 1 mL (1,000 mcg total) into the muscle every 30 days., Disp: 1 mL, Rfl: 11    fluticasone propionate (FLONASE) 50 mcg/actuation nasal spray, Use 1 spray(s) in each nostril once daily, Disp: 16 g, Rfl: 5    lancets (LANCETS,ULTRA THIN) 26 gauge Misc, 1 lancet by Misc.(Non-Drug; Combo Route) route once daily., Disp: 100 each, Rfl: 0    losartan (COZAAR) 50 MG tablet, Take 1 tablet (50 mg total) by mouth once daily., Disp: 90 tablet, Rfl: 3    nystatin (MYCOSTATIN) cream, Apply topically 2 (two) times daily., Disp: 30 g, Rfl: 1    polyethylene glycol (GLYCOLAX) 17 gram/dose  powder, DISSOLVE 17 GRAMS IN WATER AND DRINK ONCE DAILY, Disp: 510 g, Rfl: 0    blood-glucose meter kit, One touch meter and matching lancets and strips., Disp: 1 each, Rfl: 0    glimepiride (AMARYL) 1 MG tablet, Take 1 tablet (1 mg total) by mouth before breakfast., Disp: 90 tablet, Rfl: 3    metFORMIN (GLUCOPHAGE) 1000 MG tablet, Take 1 tablet (1,000 mg total) by mouth 2 (two) times daily with meals., Disp: 180 tablet, Rfl: 3    rosuvastatin (CRESTOR) 5 MG tablet, Take 1 tablet (5 mg total) by mouth every evening., Disp: 90 tablet, Rfl: 3

## 2022-08-22 ENCOUNTER — OFFICE VISIT (OUTPATIENT)
Dept: FAMILY MEDICINE | Facility: CLINIC | Age: 72
End: 2022-08-22
Payer: MEDICARE

## 2022-08-22 VITALS
HEART RATE: 89 BPM | DIASTOLIC BLOOD PRESSURE: 78 MMHG | WEIGHT: 134 LBS | SYSTOLIC BLOOD PRESSURE: 138 MMHG | HEIGHT: 62 IN | BODY MASS INDEX: 24.66 KG/M2

## 2022-08-22 DIAGNOSIS — E11.9 TYPE 2 DIABETES MELLITUS WITHOUT COMPLICATION, WITHOUT LONG-TERM CURRENT USE OF INSULIN: Primary | ICD-10-CM

## 2022-08-22 DIAGNOSIS — T46.6X5A STATIN MYOPATHY: Chronic | ICD-10-CM

## 2022-08-22 DIAGNOSIS — E78.2 MIXED HYPERLIPIDEMIA: ICD-10-CM

## 2022-08-22 DIAGNOSIS — I10 ESSENTIAL HYPERTENSION: ICD-10-CM

## 2022-08-22 DIAGNOSIS — K21.9 GASTROESOPHAGEAL REFLUX DISEASE WITHOUT ESOPHAGITIS: ICD-10-CM

## 2022-08-22 DIAGNOSIS — G72.0 STATIN MYOPATHY: Chronic | ICD-10-CM

## 2022-08-22 DIAGNOSIS — R53.83 FATIGUE, UNSPECIFIED TYPE: Chronic | ICD-10-CM

## 2022-08-22 PROCEDURE — 1101F PR PT FALLS ASSESS DOC 0-1 FALLS W/OUT INJ PAST YR: ICD-10-PCS | Mod: CPTII,S$GLB,, | Performed by: INTERNAL MEDICINE

## 2022-08-22 PROCEDURE — 3061F PR NEG MICROALBUMINURIA RESULT DOCUMENTED/REVIEW: ICD-10-PCS | Mod: CPTII,S$GLB,, | Performed by: INTERNAL MEDICINE

## 2022-08-22 PROCEDURE — 1160F RVW MEDS BY RX/DR IN RCRD: CPT | Mod: CPTII,S$GLB,, | Performed by: INTERNAL MEDICINE

## 2022-08-22 PROCEDURE — 3288F FALL RISK ASSESSMENT DOCD: CPT | Mod: CPTII,S$GLB,, | Performed by: INTERNAL MEDICINE

## 2022-08-22 PROCEDURE — 3288F PR FALLS RISK ASSESSMENT DOCUMENTED: ICD-10-PCS | Mod: CPTII,S$GLB,, | Performed by: INTERNAL MEDICINE

## 2022-08-22 PROCEDURE — 3075F PR MOST RECENT SYSTOLIC BLOOD PRESS GE 130-139MM HG: ICD-10-PCS | Mod: CPTII,S$GLB,, | Performed by: INTERNAL MEDICINE

## 2022-08-22 PROCEDURE — 4010F PR ACE/ARB THEARPY RXD/TAKEN: ICD-10-PCS | Mod: CPTII,S$GLB,, | Performed by: INTERNAL MEDICINE

## 2022-08-22 PROCEDURE — 99214 OFFICE O/P EST MOD 30 MIN: CPT | Mod: S$GLB,,, | Performed by: INTERNAL MEDICINE

## 2022-08-22 PROCEDURE — 3008F PR BODY MASS INDEX (BMI) DOCUMENTED: ICD-10-PCS | Mod: CPTII,S$GLB,, | Performed by: INTERNAL MEDICINE

## 2022-08-22 PROCEDURE — 3008F BODY MASS INDEX DOCD: CPT | Mod: CPTII,S$GLB,, | Performed by: INTERNAL MEDICINE

## 2022-08-22 PROCEDURE — 3075F SYST BP GE 130 - 139MM HG: CPT | Mod: CPTII,S$GLB,, | Performed by: INTERNAL MEDICINE

## 2022-08-22 PROCEDURE — 3051F PR MOST RECENT HEMOGLOBIN A1C LEVEL 7.0 - < 8.0%: ICD-10-PCS | Mod: CPTII,S$GLB,, | Performed by: INTERNAL MEDICINE

## 2022-08-22 PROCEDURE — 3051F HG A1C>EQUAL 7.0%<8.0%: CPT | Mod: CPTII,S$GLB,, | Performed by: INTERNAL MEDICINE

## 2022-08-22 PROCEDURE — 1126F PR PAIN SEVERITY QUANTIFIED, NO PAIN PRESENT: ICD-10-PCS | Mod: CPTII,S$GLB,, | Performed by: INTERNAL MEDICINE

## 2022-08-22 PROCEDURE — 1160F PR REVIEW ALL MEDS BY PRESCRIBER/CLIN PHARMACIST DOCUMENTED: ICD-10-PCS | Mod: CPTII,S$GLB,, | Performed by: INTERNAL MEDICINE

## 2022-08-22 PROCEDURE — 99214 PR OFFICE/OUTPT VISIT, EST, LEVL IV, 30-39 MIN: ICD-10-PCS | Mod: S$GLB,,, | Performed by: INTERNAL MEDICINE

## 2022-08-22 PROCEDURE — 3066F PR DOCUMENTATION OF TREATMENT FOR NEPHROPATHY: ICD-10-PCS | Mod: CPTII,S$GLB,, | Performed by: INTERNAL MEDICINE

## 2022-08-22 PROCEDURE — 3061F NEG MICROALBUMINURIA REV: CPT | Mod: CPTII,S$GLB,, | Performed by: INTERNAL MEDICINE

## 2022-08-22 PROCEDURE — 3078F PR MOST RECENT DIASTOLIC BLOOD PRESSURE < 80 MM HG: ICD-10-PCS | Mod: CPTII,S$GLB,, | Performed by: INTERNAL MEDICINE

## 2022-08-22 PROCEDURE — 1159F PR MEDICATION LIST DOCUMENTED IN MEDICAL RECORD: ICD-10-PCS | Mod: CPTII,S$GLB,, | Performed by: INTERNAL MEDICINE

## 2022-08-22 PROCEDURE — 3078F DIAST BP <80 MM HG: CPT | Mod: CPTII,S$GLB,, | Performed by: INTERNAL MEDICINE

## 2022-08-22 PROCEDURE — 1159F MED LIST DOCD IN RCRD: CPT | Mod: CPTII,S$GLB,, | Performed by: INTERNAL MEDICINE

## 2022-08-22 PROCEDURE — 1101F PT FALLS ASSESS-DOCD LE1/YR: CPT | Mod: CPTII,S$GLB,, | Performed by: INTERNAL MEDICINE

## 2022-08-22 PROCEDURE — 3066F NEPHROPATHY DOC TX: CPT | Mod: CPTII,S$GLB,, | Performed by: INTERNAL MEDICINE

## 2022-08-22 PROCEDURE — 4010F ACE/ARB THERAPY RXD/TAKEN: CPT | Mod: CPTII,S$GLB,, | Performed by: INTERNAL MEDICINE

## 2022-08-22 PROCEDURE — 1126F AMNT PAIN NOTED NONE PRSNT: CPT | Mod: CPTII,S$GLB,, | Performed by: INTERNAL MEDICINE

## 2022-08-22 RX ORDER — ROSUVASTATIN CALCIUM 5 MG/1
5 TABLET, COATED ORAL NIGHTLY
Qty: 90 TABLET | Refills: 3 | Status: SHIPPED | OUTPATIENT
Start: 2022-08-22 | End: 2022-10-24 | Stop reason: SDUPTHER

## 2022-08-22 RX ORDER — GLIMEPIRIDE 1 MG/1
1 TABLET ORAL
Qty: 90 TABLET | Refills: 3 | Status: SHIPPED | OUTPATIENT
Start: 2022-08-22 | End: 2022-10-24 | Stop reason: SDUPTHER

## 2022-08-22 RX ORDER — METFORMIN HYDROCHLORIDE 1000 MG/1
1000 TABLET ORAL 2 TIMES DAILY WITH MEALS
Qty: 180 TABLET | Refills: 3 | Status: SHIPPED | OUTPATIENT
Start: 2022-08-22 | End: 2023-07-03 | Stop reason: SDUPTHER

## 2022-09-23 ENCOUNTER — OFFICE VISIT (OUTPATIENT)
Dept: FAMILY MEDICINE | Facility: CLINIC | Age: 72
End: 2022-09-23
Payer: MEDICARE

## 2022-09-23 VITALS
TEMPERATURE: 98 F | HEART RATE: 77 BPM | WEIGHT: 137.13 LBS | BODY MASS INDEX: 25.23 KG/M2 | SYSTOLIC BLOOD PRESSURE: 136 MMHG | OXYGEN SATURATION: 99 % | HEIGHT: 62 IN | DIASTOLIC BLOOD PRESSURE: 84 MMHG

## 2022-09-23 DIAGNOSIS — I10 ESSENTIAL HYPERTENSION: Primary | ICD-10-CM

## 2022-09-23 PROCEDURE — 4010F ACE/ARB THERAPY RXD/TAKEN: CPT | Mod: CPTII,S$GLB,, | Performed by: NURSE PRACTITIONER

## 2022-09-23 PROCEDURE — 3008F BODY MASS INDEX DOCD: CPT | Mod: CPTII,S$GLB,, | Performed by: NURSE PRACTITIONER

## 2022-09-23 PROCEDURE — 3061F PR NEG MICROALBUMINURIA RESULT DOCUMENTED/REVIEW: ICD-10-PCS | Mod: CPTII,S$GLB,, | Performed by: NURSE PRACTITIONER

## 2022-09-23 PROCEDURE — 3075F SYST BP GE 130 - 139MM HG: CPT | Mod: CPTII,S$GLB,, | Performed by: NURSE PRACTITIONER

## 2022-09-23 PROCEDURE — 4010F PR ACE/ARB THEARPY RXD/TAKEN: ICD-10-PCS | Mod: CPTII,S$GLB,, | Performed by: NURSE PRACTITIONER

## 2022-09-23 PROCEDURE — 3066F PR DOCUMENTATION OF TREATMENT FOR NEPHROPATHY: ICD-10-PCS | Mod: CPTII,S$GLB,, | Performed by: NURSE PRACTITIONER

## 2022-09-23 PROCEDURE — 1160F PR REVIEW ALL MEDS BY PRESCRIBER/CLIN PHARMACIST DOCUMENTED: ICD-10-PCS | Mod: CPTII,S$GLB,, | Performed by: NURSE PRACTITIONER

## 2022-09-23 PROCEDURE — 3066F NEPHROPATHY DOC TX: CPT | Mod: CPTII,S$GLB,, | Performed by: NURSE PRACTITIONER

## 2022-09-23 PROCEDURE — 1126F AMNT PAIN NOTED NONE PRSNT: CPT | Mod: CPTII,S$GLB,, | Performed by: NURSE PRACTITIONER

## 2022-09-23 PROCEDURE — 1101F PR PT FALLS ASSESS DOC 0-1 FALLS W/OUT INJ PAST YR: ICD-10-PCS | Mod: CPTII,S$GLB,, | Performed by: NURSE PRACTITIONER

## 2022-09-23 PROCEDURE — 3051F HG A1C>EQUAL 7.0%<8.0%: CPT | Mod: CPTII,S$GLB,, | Performed by: NURSE PRACTITIONER

## 2022-09-23 PROCEDURE — 3075F PR MOST RECENT SYSTOLIC BLOOD PRESS GE 130-139MM HG: ICD-10-PCS | Mod: CPTII,S$GLB,, | Performed by: NURSE PRACTITIONER

## 2022-09-23 PROCEDURE — 1159F PR MEDICATION LIST DOCUMENTED IN MEDICAL RECORD: ICD-10-PCS | Mod: CPTII,S$GLB,, | Performed by: NURSE PRACTITIONER

## 2022-09-23 PROCEDURE — 3288F PR FALLS RISK ASSESSMENT DOCUMENTED: ICD-10-PCS | Mod: CPTII,S$GLB,, | Performed by: NURSE PRACTITIONER

## 2022-09-23 PROCEDURE — 1159F MED LIST DOCD IN RCRD: CPT | Mod: CPTII,S$GLB,, | Performed by: NURSE PRACTITIONER

## 2022-09-23 PROCEDURE — 3079F PR MOST RECENT DIASTOLIC BLOOD PRESSURE 80-89 MM HG: ICD-10-PCS | Mod: CPTII,S$GLB,, | Performed by: NURSE PRACTITIONER

## 2022-09-23 PROCEDURE — 1101F PT FALLS ASSESS-DOCD LE1/YR: CPT | Mod: CPTII,S$GLB,, | Performed by: NURSE PRACTITIONER

## 2022-09-23 PROCEDURE — 3288F FALL RISK ASSESSMENT DOCD: CPT | Mod: CPTII,S$GLB,, | Performed by: NURSE PRACTITIONER

## 2022-09-23 PROCEDURE — 3008F PR BODY MASS INDEX (BMI) DOCUMENTED: ICD-10-PCS | Mod: CPTII,S$GLB,, | Performed by: NURSE PRACTITIONER

## 2022-09-23 PROCEDURE — 3061F NEG MICROALBUMINURIA REV: CPT | Mod: CPTII,S$GLB,, | Performed by: NURSE PRACTITIONER

## 2022-09-23 PROCEDURE — 1160F RVW MEDS BY RX/DR IN RCRD: CPT | Mod: CPTII,S$GLB,, | Performed by: NURSE PRACTITIONER

## 2022-09-23 PROCEDURE — 99214 PR OFFICE/OUTPT VISIT, EST, LEVL IV, 30-39 MIN: ICD-10-PCS | Mod: S$GLB,,, | Performed by: NURSE PRACTITIONER

## 2022-09-23 PROCEDURE — 99214 OFFICE O/P EST MOD 30 MIN: CPT | Mod: S$GLB,,, | Performed by: NURSE PRACTITIONER

## 2022-09-23 PROCEDURE — 3079F DIAST BP 80-89 MM HG: CPT | Mod: CPTII,S$GLB,, | Performed by: NURSE PRACTITIONER

## 2022-09-23 PROCEDURE — 1126F PR PAIN SEVERITY QUANTIFIED, NO PAIN PRESENT: ICD-10-PCS | Mod: CPTII,S$GLB,, | Performed by: NURSE PRACTITIONER

## 2022-09-23 PROCEDURE — 3051F PR MOST RECENT HEMOGLOBIN A1C LEVEL 7.0 - < 8.0%: ICD-10-PCS | Mod: CPTII,S$GLB,, | Performed by: NURSE PRACTITIONER

## 2022-09-23 RX ORDER — LOSARTAN POTASSIUM 100 MG/1
100 TABLET ORAL DAILY
Qty: 90 TABLET | Refills: 1 | Status: SHIPPED | OUTPATIENT
Start: 2022-09-23 | End: 2023-02-15 | Stop reason: SDUPTHER

## 2022-09-23 NOTE — PROGRESS NOTES
SUBJECTIVE:      Patient ID: Preet West is a 72 y.o. female.    Chief Complaint: Hypertension    72-year-old female with a history of anemia, colon cancer, T2DM, osteoporosis, HTN, HLD, GERD, and season allergies presents to the clinic with complaints of elevated blood pressures.  She is a patient of Dr. Fong.  Last office visit 8/22/2022. Blood pressure has been in the 140's/ 90's.  Last night her BP was 170 systolic.  This morning she took Losartan 75 mg and BP is now 148/98.  She did have a headache last night when her BP was elevated, but has now resolved.  She does have some anxiety.  Denies chest pain and SOB.     Hypertension  This is a chronic problem. The current episode started more than 1 year ago. The problem has been gradually worsening since onset. The problem is resistant. Associated symptoms include anxiety and headaches. Pertinent negatives include no blurred vision, chest pain, malaise/fatigue, neck pain, orthopnea, palpitations, peripheral edema, PND, shortness of breath or sweats. There are no associated agents to hypertension. Risk factors for coronary artery disease include diabetes mellitus, dyslipidemia and post-menopausal state. Past treatments include angiotensin blockers. The current treatment provides mild improvement. There are no compliance problems.  There is no history of angina or kidney disease. There is no history of sleep apnea.     Family History   Problem Relation Age of Onset    Stroke Mother     Heart disease Mother     Cancer Father         [place]      Social History     Socioeconomic History    Marital status:      Spouse name: Luis West    Number of children: 3   Occupational History    Occupation:    Tobacco Use    Smoking status: Never    Smokeless tobacco: Never   Substance and Sexual Activity    Alcohol use: No    Drug use: No    Sexual activity: Not Currently     Partners: Male     Birth control/protection: Post-menopausal    Social History Narrative    Speaks [employer] only     Social Determinants of Health     Financial Resource Strain: Low Risk     Difficulty of Paying Living Expenses: Not very hard   Food Insecurity: No Food Insecurity    Worried About Running Out of Food in the Last Year: Never true    Ran Out of Food in the Last Year: Never true   Transportation Needs: No Transportation Needs    Lack of Transportation (Medical): No    Lack of Transportation (Non-Medical): No   Physical Activity: Insufficiently Active    Days of Exercise per Week: 7 days    Minutes of Exercise per Session: 20 min   Stress: Stress Concern Present    Feeling of Stress : To some extent   Social Connections: Moderately Integrated    Frequency of Communication with Friends and Family: More than three times a week    Attends Jainism Services: 1 to 4 times per year    Active Member of Clubs or Organizations: No    Attends Club or Organization Meetings: Never    Marital Status:    Housing Stability: Low Risk     Unable to Pay for Housing in the Last Year: No    Number of Places Lived in the Last Year: 1    Unstable Housing in the Last Year: No     Current Outpatient Medications   Medication Sig Dispense Refill    alendronate (FOSAMAX) 70 MG tablet Take 1 tablet (70 mg total) by mouth every 7 days. 12 tablet 3    blood sugar diagnostic (TRUE METRIX GLUCOSE TEST STRIP) Strp USE 1 STRIP TO CHECK GLUCOSE TWICE DAILY 200 strip 3    clotrimazole-betamethasone 1-0.05% (LOTRISONE) cream Apply topically 2 (two) times daily. 15 g 1    glimepiride (AMARYL) 1 MG tablet Take 1 tablet (1 mg total) by mouth before breakfast. 90 tablet 3    metFORMIN (GLUCOPHAGE) 1000 MG tablet Take 1 tablet (1,000 mg total) by mouth 2 (two) times daily with meals. 180 tablet 3    polyethylene glycol (GLYCOLAX) 17 gram/dose powder DISSOLVE 17 GRAMS IN WATER AND DRINK ONCE DAILY 510 g 0    rosuvastatin (CRESTOR) 5 MG tablet Take 1 tablet (5 mg total) by mouth every evening. 90  tablet 3    blood-glucose meter kit One touch meter and matching lancets and strips. 1 each 0    cyanocobalamin 1,000 mcg/mL injection Inject 1 mL (1,000 mcg total) into the muscle every 30 days. (Patient not taking: Reported on 9/23/2022) 1 mL 11    fluticasone propionate (FLONASE) 50 mcg/actuation nasal spray Use 1 spray(s) in each nostril once daily (Patient not taking: Reported on 9/23/2022) 16 g 11    lancets (LANCETS,ULTRA THIN) 26 gauge Misc 1 lancet by Misc.(Non-Drug; Combo Route) route once daily. (Patient not taking: Reported on 9/23/2022) 100 each 0    losartan (COZAAR) 100 MG tablet Take 1 tablet (100 mg total) by mouth once daily. 90 tablet 1    nystatin (MYCOSTATIN) cream Apply topically 2 (two) times daily. (Patient not taking: Reported on 9/23/2022) 30 g 1    pantoprazole (PROTONIX) 40 MG tablet Take 1 tablet by mouth once daily (Patient not taking: Reported on 9/23/2022) 90 tablet 0     No current facility-administered medications for this visit.     Review of patient's allergies indicates:   Allergen Reactions    Tradjenta [linagliptin]      Itching and gastric discomfort > difficult to assess nature of side effect    Aspirin Itching    Opioids - morphine analogues Other (See Comments)     Confusion    Hydrochlorothiazide Other (See Comments)     Suspect Low sodium by hospital ist. Not confirmed    Jardiance [empagliflozin] Other (See Comments)     Caused burning sensation in the feet.      Past Medical History:   Diagnosis Date    Allergy     aspirin itching    Anemia     Cancer     Diabetes mellitus, type 2     H/O colon cancer, stage III 1/1/2013    Treated in [facility]    History of colonoscopy 6/4/2021    Colonoscopy done by Dr. Rayshawn Adkins MD on 06/04/21.non bleeding internal hemorrhoids.  4 mm polyp in the sigmoid colon removed with cold snare.  Patent end-to-end colocolonic anastomosis characterized by healthy-appearing mucosa.  Examination was otherwise normal.  Repeat colonoscopy in 3  "years for surveillance.    Malignant neoplasm of transverse colon (2013) - Stage III 4/4/2019    Osteoporosis     S/P laparoscopic colectomy (3/2013) 4/5/2019    Seasonal allergies      Past Surgical History:   Procedure Laterality Date    COLON SURGERY      JOINT REPLACEMENT         Review of Systems   Constitutional:  Positive for fatigue. Negative for activity change, appetite change, chills, diaphoresis, fever, malaise/fatigue and unexpected weight change.   HENT:  Negative for congestion, ear pain, sinus pressure, sore throat, trouble swallowing and voice change.    Eyes:  Negative for blurred vision, pain, discharge and visual disturbance.   Respiratory:  Negative for cough, chest tightness, shortness of breath and wheezing.    Cardiovascular:  Negative for chest pain, palpitations, orthopnea, leg swelling and PND.        Hypertension    Gastrointestinal:  Negative for abdominal pain, constipation, diarrhea, nausea and vomiting.   Endocrine:        T2DM, borderline control    Genitourinary:  Negative for difficulty urinating, flank pain, frequency and urgency.   Musculoskeletal:  Negative for back pain, joint swelling and neck pain.   Skin:  Negative for color change and rash.   Neurological:  Positive for headaches. Negative for dizziness, seizures, syncope, weakness and numbness.   Hematological:  Negative for adenopathy.   Psychiatric/Behavioral:  Negative for dysphoric mood and sleep disturbance. The patient is nervous/anxious.     OBJECTIVE:      Vitals:    09/23/22 1010 09/23/22 1030   BP: (!) 148/92 (!) 152/78   BP Location: Right arm    Patient Position: Sitting    BP Method: Medium (Manual)    Pulse: 77    Temp: 98.3 °F (36.8 °C)    TempSrc: Oral    SpO2: 99%    Weight: 62.2 kg (137 lb 1.6 oz)    Height: 5' 2" (1.575 m)      Physical Exam  Vitals and nursing note reviewed.   Constitutional:       General: She is awake. She is not in acute distress.     Appearance: Normal appearance. She is overweight. " She is not ill-appearing, toxic-appearing or diaphoretic.   HENT:      Head: Normocephalic and atraumatic.      Right Ear: Tympanic membrane, ear canal and external ear normal.      Left Ear: Tympanic membrane, ear canal and external ear normal.      Nose: Nose normal.      Mouth/Throat:      Lips: Pink.      Mouth: Mucous membranes are moist.      Pharynx: Oropharynx is clear. Uvula midline. No oropharyngeal exudate or posterior oropharyngeal erythema.   Eyes:      General: Lids are normal. Gaze aligned appropriately.      Conjunctiva/sclera: Conjunctivae normal.      Right eye: Right conjunctiva is not injected.      Left eye: Left conjunctiva is not injected.      Pupils: Pupils are equal, round, and reactive to light.   Cardiovascular:      Rate and Rhythm: Normal rate and regular rhythm.      Pulses: Normal pulses.      Heart sounds: Normal heart sounds, S1 normal and S2 normal. No murmur heard.    No friction rub. No gallop.   Pulmonary:      Effort: Pulmonary effort is normal. No respiratory distress.      Breath sounds: Normal breath sounds. No stridor. No decreased breath sounds, wheezing, rhonchi or rales.   Chest:      Chest wall: No tenderness.   Musculoskeletal:      Cervical back: Neck supple.      Right lower leg: No edema.      Left lower leg: No edema.   Lymphadenopathy:      Cervical: No cervical adenopathy.   Skin:     General: Skin is warm and dry.      Capillary Refill: Capillary refill takes less than 2 seconds.      Findings: No erythema or rash.   Neurological:      Mental Status: She is alert and oriented to person, place, and time. Mental status is at baseline.   Psychiatric:         Attention and Perception: Attention normal.         Mood and Affect: Mood normal.         Speech: Speech normal.         Behavior: Behavior normal. Behavior is cooperative.         Thought Content: Thought content normal.         Judgment: Judgment normal.      Assessment:       1. Essential hypertension         Plan:       Essential hypertension  Losartan increased.  Log blood pressures and bring to follow-up visit in 1 month. Low sodium/low fat diet.  Patient informed to take Losartan 50 mg x2 if she has multiple pills left. Discussed ideal BP is closer to 130/80.  -     losartan (COZAAR) 100 MG tablet; Take 1 tablet (100 mg total) by mouth once daily.  Dispense: 90 tablet; Refill: 1    This note was created using [employer] voice recognition software that occasionally misinterprets phrases or words.     Follow up in about 1 month (around 10/23/2022) for HTN.      9/23/2022 DAYANARA Lockett, FNP

## 2022-10-05 ENCOUNTER — DOCUMENTATION ONLY (OUTPATIENT)
Dept: PHARMACY | Facility: CLINIC | Age: 72
End: 2022-10-05
Payer: MEDICARE

## 2022-10-20 ENCOUNTER — LAB VISIT (OUTPATIENT)
Dept: LAB | Facility: HOSPITAL | Age: 72
End: 2022-10-20
Attending: INTERNAL MEDICINE
Payer: MEDICARE

## 2022-10-20 DIAGNOSIS — E11.9 TYPE 2 DIABETES MELLITUS WITHOUT COMPLICATION, WITHOUT LONG-TERM CURRENT USE OF INSULIN: ICD-10-CM

## 2022-10-20 DIAGNOSIS — I10 ESSENTIAL HYPERTENSION: ICD-10-CM

## 2022-10-20 LAB
ANION GAP SERPL CALC-SCNC: 8 MMOL/L (ref 8–16)
BUN SERPL-MCNC: 18 MG/DL (ref 8–23)
CALCIUM SERPL-MCNC: 8.7 MG/DL (ref 8.7–10.5)
CHLORIDE SERPL-SCNC: 105 MMOL/L (ref 95–110)
CO2 SERPL-SCNC: 23 MMOL/L (ref 23–29)
CREAT SERPL-MCNC: 0.9 MG/DL (ref 0.5–1.4)
EST. GFR  (NO RACE VARIABLE): >60 ML/MIN/1.73 M^2
ESTIMATED AVG GLUCOSE: 174 MG/DL (ref 68–131)
GLUCOSE SERPL-MCNC: 131 MG/DL (ref 70–110)
HBA1C MFR BLD: 7.7 % (ref 4.5–6.2)
POTASSIUM SERPL-SCNC: 5.1 MMOL/L (ref 3.5–5.1)
SODIUM SERPL-SCNC: 136 MMOL/L (ref 136–145)

## 2022-10-20 PROCEDURE — 36415 COLL VENOUS BLD VENIPUNCTURE: CPT | Performed by: INTERNAL MEDICINE

## 2022-10-20 PROCEDURE — 80048 BASIC METABOLIC PNL TOTAL CA: CPT | Performed by: INTERNAL MEDICINE

## 2022-10-20 PROCEDURE — 83036 HEMOGLOBIN GLYCOSYLATED A1C: CPT | Performed by: INTERNAL MEDICINE

## 2022-10-22 NOTE — PROGRESS NOTES
Subjective:       Patient ID: Preet West is a 72 y.o. female.    Chief Complaint: Hypertension, Follow-up, Diabetes, and Hyperlipidemia    PATIENT IS A 72-YEAR-OLD  FEMALE WHO COMES FOR FOLLOW-UP.  She comes as usual accompanied with her  who helps her interpret with her limited language skills.    Underlying medical issues include the following:-    1.-type 2 diabetes mellitus   2.-hypertension  3.-hyperlipidemia  4.--anxiety though she is not on any medication for this   5.-osteoporosis currently on alendronate.     6. -nonspecific complains of fatigue and empirical treatment with vitamin B12 injections.  Please note that she is on vitamin B12 injection prior to coming to [place] and under my care from an outside doctor.  No records were available which actually demonstrated a low vitamin-D 12 but some of this injection makes her feel better and I am not sure if there is a psychological improvement or there is some real physical improvement.  7.-gastroesophageal reflux currently on pantoprazole  8.-IBS type of symptoms with constipation and taking Glycolax as needed.      Last visit was with our associate  Harman Woodmehnaz who increased her losartan to 50 mg twice a day in view of uncontrolled blood pressures.      Control of diabetes is somewhat borderline and I had wanted her to stop glimepiride in view of potential adverse effects from this sulfonylurea.  However she is psychologically and mentally unable to get off this medication.  She wants to continue with this.      Discussed about ophthalmology examination and COVID precautions and booster vaccine.    In past she had complained of itching all over her body and had attributed this to statins.  I had advised her to stop the statins but for some reason either she resumed it or she continued it and thereafter she had no itching.    Recent labs have shown a marginally improved hemoglobin A1c of 7.7.  Potassium level is greater than 5.   She is not on any potassium supplements and the risk factors for hyperkalemia seems to be ARB medication.  And possibly some degree of diabetic distal renal tubular acidosis.    Lipid panel done 2 months ago was in good range.      Past Medical History:   Diagnosis Date    Allergy     aspirin itching    Anemia     Cancer     Diabetes mellitus, type 2     H/O colon cancer, stage III 1/1/2013    Treated in [facility]    History of colonoscopy 6/4/2021    Colonoscopy done by Dr. Rayshawn Adkins MD on 06/04/21.non bleeding internal hemorrhoids.  4 mm polyp in the sigmoid colon removed with cold snare.  Patent end-to-end colocolonic anastomosis characterized by healthy-appearing mucosa.  Examination was otherwise normal.  Repeat colonoscopy in 3 years for surveillance.    Malignant neoplasm of transverse colon (2013) - Stage III 4/4/2019    Osteoporosis     S/P laparoscopic colectomy (3/2013) 4/5/2019    Seasonal allergies      Social History     Socioeconomic History    Marital status:      Spouse name: Luis West    Number of children: 3   Occupational History    Occupation:    Tobacco Use    Smoking status: Never    Smokeless tobacco: Never   Substance and Sexual Activity    Alcohol use: No    Drug use: No    Sexual activity: Not Currently     Partners: Male     Birth control/protection: Post-menopausal   Social History Narrative    [facility]     Social Determinants of Health     Financial Resource Strain: Low Risk     Difficulty of Paying Living Expenses: Not very hard   Food Insecurity: No Food Insecurity    Worried About Running Out of Food in the Last Year: Never true    Ran Out of Food in the Last Year: Never true   Transportation Needs: No Transportation Needs    Lack of Transportation (Medical): No    Lack of Transportation (Non-Medical): No   Physical Activity: Insufficiently Active    Days of Exercise per Week: 7 days    Minutes of Exercise per Session: 20 min   Stress: Stress  Concern Present    Feeling of Stress : To some extent   Social Connections: Moderately Integrated    Frequency of Communication with Friends and Family: More than three times a week    Attends Buddhism Services: 1 to 4 times per year    Active Member of Clubs or Organizations: No    Attends Club or Organization Meetings: Never    Marital Status:    Housing Stability: Low Risk     Unable to Pay for Housing in the Last Year: No    Number of Places Lived in the Last Year: 1    Unstable Housing in the Last Year: No     Past Surgical History:   Procedure Laterality Date    COLON SURGERY      JOINT REPLACEMENT       Family History   Problem Relation Age of Onset    Stroke Mother     Heart disease Mother     Cancer Father         [place]       Review of Systems   Constitutional:  Positive for fatigue (Getting better). Negative for activity change, chills, fever and unexpected weight change (Gained 1 lb since the last documentation.  129--130 lb.).   HENT:  Negative for congestion, postnasal drip, sinus pressure and sneezing.         Uses Flonase for sinuses.   Eyes:  Negative for pain, discharge and visual disturbance (Cataract).   Respiratory:  Negative for chest tightness, shortness of breath, wheezing and stridor.    Cardiovascular:  Positive for leg swelling. Negative for chest pain and palpitations.        Blood pressure was previously elevated now they are better on losartan 100 mg.   Gastrointestinal:  Positive for constipation. Negative for abdominal distention, abdominal pain and anal bleeding.        GERD occasional use of pantoprazole.   Endocrine: Negative for cold intolerance, polydipsia, polyphagia and polyuria.        Past history of hyponatremia has been noted.      Underlying diabetes has been noted with improvement in blood sugars to 7.7.   Genitourinary:  Negative for difficulty urinating and flank pain.        Previously had complained of vaginal itching but no more.   Musculoskeletal:  Positive  "for arthralgias. Negative for joint swelling, neck pain and neck stiffness.        Bilateral swelling in legs more on the right side as compared to the left side.  Pain on the right lateral foot over the metatarsal bones.  History of ice bag falling on her right foot.   Skin:  Negative for color change and pallor.   Allergic/Immunologic: Negative for environmental allergies, food allergies and immunocompromised state.   Neurological:  Negative for dizziness, tremors, seizures, syncope and light-headedness.   Hematological:  Negative for adenopathy. Does not bruise/bleed easily.   Psychiatric/Behavioral:  Negative for agitation, confusion and dysphoric mood. The patient is nervous/anxious.         Somewhat anxious about her health issues.       Objective:      Blood pressure 133/70, pulse 80, height 5' 2" (1.575 m), weight 61.2 kg (135 lb). Body mass index is 24.69 kg/m².  Physical Exam  Vitals and nursing note reviewed.   Constitutional:       General: She is not in acute distress.     Appearance: She is well-developed. She is not ill-appearing or diaphoretic.      Comments: BMI is 24.69   HENT:      Head: Normocephalic and atraumatic.      Nose: No nasal deformity or mucosal edema.      Right Sinus: No maxillary sinus tenderness or frontal sinus tenderness.      Left Sinus: No maxillary sinus tenderness or frontal sinus tenderness.   Eyes:      Conjunctiva/sclera: Conjunctivae normal.   Neck:      Trachea: Trachea normal.   Cardiovascular:      Rate and Rhythm: Normal rate and regular rhythm.      Heart sounds: Normal heart sounds, S1 normal and S2 normal.   Pulmonary:      Breath sounds: Normal breath sounds. No wheezing, rhonchi or rales.   Chest:      Chest wall: No tenderness.   Abdominal:      General: There is no distension.      Palpations: Abdomen is soft. Abdomen is not rigid.      Tenderness: There is no abdominal tenderness. There is no guarding.   Musculoskeletal:         General: No tenderness.      " Cervical back: Neck supple.      Right lower leg: No tenderness. No edema.      Left lower leg: No tenderness. No edema.      Right foot: Deformity (Pes cavus) present.      Left foot: Deformity (pes cavus) present.   Lymphadenopathy:      Cervical: No cervical adenopathy.   Skin:     General: Skin is warm and dry.   Neurological:      Mental Status: She is alert.      Coordination: Coordination normal.   Psychiatric:         Mood and Affect: Mood is anxious.         Behavior: Behavior is cooperative.         Assessment:       1. Type 2 diabetes mellitus without complication, without long-term current use of insulin    2. Essential hypertension    3. Mixed hyperlipidemia    4. Fatigue, unspecified type    5. Gastroesophageal reflux disease without esophagitis    6. Chronic idiopathic constipation           Lab Visit on 10/20/2022   Component Date Value Ref Range Status    Hemoglobin A1C 10/20/2022 7.7 (H)  4.5 - 6.2 % Final    Estimated Avg Glucose 10/20/2022 174 (H)  68 - 131 mg/dL Final    Sodium 10/20/2022 136  136 - 145 mmol/L Final    Potassium 10/20/2022 5.1  3.5 - 5.1 mmol/L Final    Chloride 10/20/2022 105  95 - 110 mmol/L Final    CO2 10/20/2022 23  23 - 29 mmol/L Final    Glucose 10/20/2022 131 (H)  70 - 110 mg/dL Final    BUN 10/20/2022 18  8 - 23 mg/dL Final    Creatinine 10/20/2022 0.9  0.5 - 1.4 mg/dL Final    Calcium 10/20/2022 8.7  8.7 - 10.5 mg/dL Final    Anion Gap 10/20/2022 8  8 - 16 mmol/L Final    eGFR 10/20/2022 >60.0  >60 mL/min/1.73 m^2 Final   Lab Visit on 08/11/2022   Component Date Value Ref Range Status    Hemoglobin A1C 08/11/2022 7.9 (H)  4.5 - 6.2 % Final    Estimated Avg Glucose 08/11/2022 180 (H)  68 - 131 mg/dL Final    Microalbumin, Urine 08/11/2022 7.0  <19.9 ug/mL Final    Creatinine, Urine 08/11/2022 42.0  15.0 - 325.0 mg/dL Final    Microalb/Creat Ratio 08/11/2022 16.7  0.0 - 30.0 ug/mg Final    Cholesterol 08/11/2022 148  120 - 199 mg/dL Final    Triglycerides 08/11/2022 75   30 - 150 mg/dL Final    HDL 08/11/2022 58  40 - 75 mg/dL Final    LDL Cholesterol 08/11/2022 75.0  63.0 - 159.0 mg/dL Final    HDL/Cholesterol Ratio 08/11/2022 39.2  20.0 - 50.0 % Final    Total Cholesterol/HDL Ratio 08/11/2022 2.6  2.0 - 5.0 Final    Non-HDL Cholesterol 08/11/2022 90  mg/dL Final         Plan:           Type 2 diabetes mellitus without complication, without long-term current use of insulin  Comments:  Hemoglobin A1c is now 7.7.  Patient will be going to [facility] for [facility] months and all the controls will be off now.  Orders:  -     glimepiride (AMARYL) 1 MG tablet; Take 1 tablet (1 mg total) by mouth before breakfast.  Dispense: 90 tablet; Refill: 3  -     Hemoglobin A1C; Future; Expected date: 01/23/2023    Essential hypertension  Comments:  Blood pressure is under control with losartan 100 mg  Orders:  -     Basic Metabolic Panel; Future; Expected date: 01/23/2023    Mixed hyperlipidemia  Comments:  Reasonably under control.  Orders:  -     rosuvastatin (CRESTOR) 5 MG tablet; Take 1 tablet (5 mg total) by mouth every evening.  Dispense: 90 tablet; Refill: 3  -     ALT (SGPT); Future; Expected date: 01/23/2023    Fatigue, unspecified type  Comments:  Some good days and some not so good days.  Multifactorial.  Empirical treatment with B12.    Gastroesophageal reflux disease without esophagitis  Comments:  Reflux symptoms are stable.  Takes pantoprazole.    Chronic idiopathic constipation  Comments:  Takes MiraLax    Patient's medical issues have been reviewed.      Blood glucose and hemoglobin A1c is slightly better though still above 7.0.  Tighter control of diabetes might be difficult in this lady with somewhat limited insight and understanding.      Newer medications have been also potentially discussed though the cost or any new side effects might be determined to this lady who is very similar to changes in her body.      Through her  I tried to keep her engaged.   seems to have a little  better understanding of issues.      Preventive care issues have been discussed.      Future labs include lipid panel and A1c.    Advised Pt about age and season appropriate immunizations/ cancer screenings.  Also seasonal influenza vaccine, update on tetanus diphtheria vaccination every 10 years.  Patient has been advised to watch diet and exercise. Avoid fried and fatty food. Compliance to medications and follow up urged.  Advised Pt. to monitor Blood sugars at home and record them.  Advised Pt  for Anti reflux measures like small feequent meals, avoid spicy and greasy food. Head end up at night.  keep a close eye on feet and keep them clean. Annual eye examination. Annual influenza vaccine.  Monitor HgbA1c every 3 to 6 months. Monitor urine microalbumin every year.keep LDL less than 100. Monitor blood pressure and target blood pressure 120/70.  Please utilize precautions for current COVID-19 pandemic.  Try to avoid crowds or close contact with multiple people.  Minimize outside interaction.  Wash hands with soap for  frequently upon contact.Use face mask or cover.    Fup 4 months    Spent charmaine 30 minutes with patient which involved review of pts medical conditions, labs, medications and with 50% of time face-to-face discussion about medical problems, management and any applicable changes.      Current Outpatient Medications:     alendronate (FOSAMAX) 70 MG tablet, Take 1 tablet (70 mg total) by mouth every 7 days., Disp: 12 tablet, Rfl: 3    blood sugar diagnostic (TRUE METRIX GLUCOSE TEST STRIP) Strp, USE 1 STRIP TO CHECK GLUCOSE TWICE DAILY, Disp: 200 strip, Rfl: 3    clotrimazole-betamethasone 1-0.05% (LOTRISONE) cream, Apply topically 2 (two) times daily., Disp: 15 g, Rfl: 1    cyanocobalamin 1,000 mcg/mL injection, Inject 1 mL (1,000 mcg total) into the muscle every 30 days., Disp: 1 mL, Rfl: 11    fluticasone propionate (FLONASE) 50 mcg/actuation nasal spray, Use 1 spray(s) in each nostril once  daily, Disp: 16 g, Rfl: 11    lancets (LANCETS,ULTRA THIN) 26 gauge Misc, 1 lancet by Misc.(Non-Drug; Combo Route) route once daily., Disp: 100 each, Rfl: 0    losartan (COZAAR) 100 MG tablet, Take 1 tablet (100 mg total) by mouth once daily., Disp: 90 tablet, Rfl: 1    metFORMIN (GLUCOPHAGE) 1000 MG tablet, Take 1 tablet (1,000 mg total) by mouth 2 (two) times daily with meals., Disp: 180 tablet, Rfl: 3    nystatin (MYCOSTATIN) cream, Apply topically 2 (two) times daily., Disp: 30 g, Rfl: 1    pantoprazole (PROTONIX) 40 MG tablet, Take 1 tablet by mouth once daily, Disp: 90 tablet, Rfl: 0    polyethylene glycol (GLYCOLAX) 17 gram/dose powder, DISSOLVE 17 GRAMS IN WATER AND DRINK ONCE DAILY, Disp: 510 g, Rfl: 3    blood-glucose meter kit, One touch meter and matching lancets and strips., Disp: 1 each, Rfl: 0    glimepiride (AMARYL) 1 MG tablet, Take 1 tablet (1 mg total) by mouth before breakfast., Disp: 90 tablet, Rfl: 3    rosuvastatin (CRESTOR) 5 MG tablet, Take 1 tablet (5 mg total) by mouth every evening., Disp: 90 tablet, Rfl: 3

## 2022-10-24 ENCOUNTER — OFFICE VISIT (OUTPATIENT)
Dept: FAMILY MEDICINE | Facility: CLINIC | Age: 72
End: 2022-10-24
Payer: MEDICARE

## 2022-10-24 VITALS
HEART RATE: 80 BPM | SYSTOLIC BLOOD PRESSURE: 133 MMHG | BODY MASS INDEX: 24.84 KG/M2 | DIASTOLIC BLOOD PRESSURE: 70 MMHG | HEIGHT: 62 IN | WEIGHT: 135 LBS

## 2022-10-24 DIAGNOSIS — E11.9 TYPE 2 DIABETES MELLITUS WITHOUT COMPLICATION, WITHOUT LONG-TERM CURRENT USE OF INSULIN: Primary | Chronic | ICD-10-CM

## 2022-10-24 DIAGNOSIS — I10 ESSENTIAL HYPERTENSION: ICD-10-CM

## 2022-10-24 DIAGNOSIS — K59.04 CHRONIC IDIOPATHIC CONSTIPATION: Chronic | ICD-10-CM

## 2022-10-24 DIAGNOSIS — K21.9 GASTROESOPHAGEAL REFLUX DISEASE WITHOUT ESOPHAGITIS: ICD-10-CM

## 2022-10-24 DIAGNOSIS — R53.83 FATIGUE, UNSPECIFIED TYPE: Chronic | ICD-10-CM

## 2022-10-24 DIAGNOSIS — E78.2 MIXED HYPERLIPIDEMIA: Chronic | ICD-10-CM

## 2022-10-24 PROCEDURE — 1126F PR PAIN SEVERITY QUANTIFIED, NO PAIN PRESENT: ICD-10-PCS | Mod: CPTII,S$GLB,, | Performed by: INTERNAL MEDICINE

## 2022-10-24 PROCEDURE — 1101F PR PT FALLS ASSESS DOC 0-1 FALLS W/OUT INJ PAST YR: ICD-10-PCS | Mod: CPTII,S$GLB,, | Performed by: INTERNAL MEDICINE

## 2022-10-24 PROCEDURE — 99214 OFFICE O/P EST MOD 30 MIN: CPT | Mod: S$GLB,,, | Performed by: INTERNAL MEDICINE

## 2022-10-24 PROCEDURE — 3051F PR MOST RECENT HEMOGLOBIN A1C LEVEL 7.0 - < 8.0%: ICD-10-PCS | Mod: CPTII,S$GLB,, | Performed by: INTERNAL MEDICINE

## 2022-10-24 PROCEDURE — 3288F PR FALLS RISK ASSESSMENT DOCUMENTED: ICD-10-PCS | Mod: CPTII,S$GLB,, | Performed by: INTERNAL MEDICINE

## 2022-10-24 PROCEDURE — 1101F PT FALLS ASSESS-DOCD LE1/YR: CPT | Mod: CPTII,S$GLB,, | Performed by: INTERNAL MEDICINE

## 2022-10-24 PROCEDURE — 3075F SYST BP GE 130 - 139MM HG: CPT | Mod: CPTII,S$GLB,, | Performed by: INTERNAL MEDICINE

## 2022-10-24 PROCEDURE — 3051F HG A1C>EQUAL 7.0%<8.0%: CPT | Mod: CPTII,S$GLB,, | Performed by: INTERNAL MEDICINE

## 2022-10-24 PROCEDURE — 3078F DIAST BP <80 MM HG: CPT | Mod: CPTII,S$GLB,, | Performed by: INTERNAL MEDICINE

## 2022-10-24 PROCEDURE — 3061F NEG MICROALBUMINURIA REV: CPT | Mod: CPTII,S$GLB,, | Performed by: INTERNAL MEDICINE

## 2022-10-24 PROCEDURE — 4010F PR ACE/ARB THEARPY RXD/TAKEN: ICD-10-PCS | Mod: CPTII,S$GLB,, | Performed by: INTERNAL MEDICINE

## 2022-10-24 PROCEDURE — 3288F FALL RISK ASSESSMENT DOCD: CPT | Mod: CPTII,S$GLB,, | Performed by: INTERNAL MEDICINE

## 2022-10-24 PROCEDURE — 1159F MED LIST DOCD IN RCRD: CPT | Mod: CPTII,S$GLB,, | Performed by: INTERNAL MEDICINE

## 2022-10-24 PROCEDURE — 1160F RVW MEDS BY RX/DR IN RCRD: CPT | Mod: CPTII,S$GLB,, | Performed by: INTERNAL MEDICINE

## 2022-10-24 PROCEDURE — 3061F PR NEG MICROALBUMINURIA RESULT DOCUMENTED/REVIEW: ICD-10-PCS | Mod: CPTII,S$GLB,, | Performed by: INTERNAL MEDICINE

## 2022-10-24 PROCEDURE — 99214 PR OFFICE/OUTPT VISIT, EST, LEVL IV, 30-39 MIN: ICD-10-PCS | Mod: S$GLB,,, | Performed by: INTERNAL MEDICINE

## 2022-10-24 PROCEDURE — 3078F PR MOST RECENT DIASTOLIC BLOOD PRESSURE < 80 MM HG: ICD-10-PCS | Mod: CPTII,S$GLB,, | Performed by: INTERNAL MEDICINE

## 2022-10-24 PROCEDURE — 3066F NEPHROPATHY DOC TX: CPT | Mod: CPTII,S$GLB,, | Performed by: INTERNAL MEDICINE

## 2022-10-24 PROCEDURE — 1159F PR MEDICATION LIST DOCUMENTED IN MEDICAL RECORD: ICD-10-PCS | Mod: CPTII,S$GLB,, | Performed by: INTERNAL MEDICINE

## 2022-10-24 PROCEDURE — 1160F PR REVIEW ALL MEDS BY PRESCRIBER/CLIN PHARMACIST DOCUMENTED: ICD-10-PCS | Mod: CPTII,S$GLB,, | Performed by: INTERNAL MEDICINE

## 2022-10-24 PROCEDURE — 4010F ACE/ARB THERAPY RXD/TAKEN: CPT | Mod: CPTII,S$GLB,, | Performed by: INTERNAL MEDICINE

## 2022-10-24 PROCEDURE — 1126F AMNT PAIN NOTED NONE PRSNT: CPT | Mod: CPTII,S$GLB,, | Performed by: INTERNAL MEDICINE

## 2022-10-24 PROCEDURE — 3066F PR DOCUMENTATION OF TREATMENT FOR NEPHROPATHY: ICD-10-PCS | Mod: CPTII,S$GLB,, | Performed by: INTERNAL MEDICINE

## 2022-10-24 PROCEDURE — 3075F PR MOST RECENT SYSTOLIC BLOOD PRESS GE 130-139MM HG: ICD-10-PCS | Mod: CPTII,S$GLB,, | Performed by: INTERNAL MEDICINE

## 2022-10-24 RX ORDER — GLIMEPIRIDE 1 MG/1
1 TABLET ORAL
Qty: 90 TABLET | Refills: 3 | Status: SHIPPED | OUTPATIENT
Start: 2022-10-24 | End: 2023-03-07

## 2022-10-24 RX ORDER — ROSUVASTATIN CALCIUM 5 MG/1
5 TABLET, COATED ORAL NIGHTLY
Qty: 90 TABLET | Refills: 3 | Status: SHIPPED | OUTPATIENT
Start: 2022-10-24 | End: 2022-12-01 | Stop reason: SDUPTHER

## 2022-11-03 ENCOUNTER — DOCUMENTATION ONLY (OUTPATIENT)
Dept: PHARMACY | Facility: CLINIC | Age: 72
End: 2022-11-03
Payer: MEDICARE

## 2022-12-01 DIAGNOSIS — I10 ESSENTIAL HYPERTENSION: ICD-10-CM

## 2022-12-01 RX ORDER — LOSARTAN POTASSIUM 100 MG/1
100 TABLET ORAL DAILY
Qty: 90 TABLET | Refills: 1 | Status: CANCELLED | OUTPATIENT
Start: 2022-12-01

## 2023-02-15 DIAGNOSIS — I10 ESSENTIAL HYPERTENSION: ICD-10-CM

## 2023-02-15 RX ORDER — LOSARTAN POTASSIUM 100 MG/1
100 TABLET ORAL DAILY
Qty: 90 TABLET | Refills: 4 | Status: SHIPPED | OUTPATIENT
Start: 2023-02-15 | End: 2024-03-05 | Stop reason: SDUPTHER

## 2023-02-17 ENCOUNTER — TELEPHONE (OUTPATIENT)
Dept: HEMATOLOGY/ONCOLOGY | Facility: CLINIC | Age: 73
End: 2023-02-17

## 2023-02-20 ENCOUNTER — LAB VISIT (OUTPATIENT)
Dept: LAB | Facility: HOSPITAL | Age: 73
End: 2023-02-20
Attending: INTERNAL MEDICINE
Payer: MEDICARE

## 2023-02-20 DIAGNOSIS — I10 ESSENTIAL HYPERTENSION: ICD-10-CM

## 2023-02-20 DIAGNOSIS — E78.2 MIXED HYPERLIPIDEMIA: Chronic | ICD-10-CM

## 2023-02-20 DIAGNOSIS — E11.9 TYPE 2 DIABETES MELLITUS WITHOUT COMPLICATION, WITHOUT LONG-TERM CURRENT USE OF INSULIN: Chronic | ICD-10-CM

## 2023-02-20 LAB
ALT SERPL W/O P-5'-P-CCNC: 19 U/L (ref 10–44)
ANION GAP SERPL CALC-SCNC: 9 MMOL/L (ref 8–16)
BUN SERPL-MCNC: 18 MG/DL (ref 8–23)
CALCIUM SERPL-MCNC: 9.3 MG/DL (ref 8.7–10.5)
CHLORIDE SERPL-SCNC: 107 MMOL/L (ref 95–110)
CO2 SERPL-SCNC: 20 MMOL/L (ref 23–29)
CREAT SERPL-MCNC: 1 MG/DL (ref 0.5–1.4)
EST. GFR  (NO RACE VARIABLE): >60 ML/MIN/1.73 M^2
ESTIMATED AVG GLUCOSE: 174 MG/DL (ref 68–131)
GLUCOSE SERPL-MCNC: 90 MG/DL (ref 70–110)
HBA1C MFR BLD: 7.7 % (ref 4.5–6.2)
POTASSIUM SERPL-SCNC: 4.8 MMOL/L (ref 3.5–5.1)
SODIUM SERPL-SCNC: 136 MMOL/L (ref 136–145)

## 2023-02-20 PROCEDURE — 83036 HEMOGLOBIN GLYCOSYLATED A1C: CPT | Performed by: INTERNAL MEDICINE

## 2023-02-20 PROCEDURE — 36415 COLL VENOUS BLD VENIPUNCTURE: CPT | Performed by: INTERNAL MEDICINE

## 2023-02-20 PROCEDURE — 84460 ALANINE AMINO (ALT) (SGPT): CPT | Performed by: INTERNAL MEDICINE

## 2023-02-20 PROCEDURE — 80048 BASIC METABOLIC PNL TOTAL CA: CPT | Mod: XB | Performed by: INTERNAL MEDICINE

## 2023-02-22 NOTE — PROGRESS NOTES
[facility] Hematology/Oncology  PROGRESS NOTE - Follow-up Visit      Subjective:       Patient ID:   NAME: Preet West : 1950     73 y.o. female    Referring Doc: Hetal  Other Physicians: Lucille    Chief Complaint:  Colon ca f/u    History of Present Illness:     Patient returns today for a regularly scheduled follow-up visit.  The patient is here today to go over the results of the recently ordered labs, tests and studies. She is here for her annual visit. She is here with her son.     She saw Dr Fong last in oct 2022 and sees him again on 2023. She reports having leg cramps at night but is otherwise doing ok.          She denies any CP, SOB, HA's ir N/V. Her bowels are moving adequately.             Discussed covid19 precautions - she had  her vaccinations            ROS:   GEN: normal without any fever, night sweats or weight loss;    HEENT: normal with no HA's, sore throat, stiff neck, changes in vision  CV: normal with no CP, SOB, PND, FISHER or orthopnea  PULM: normal with no SOB, cough, hemoptysis, sputum or pleuritic pain  GI: normal with no abdominal pain, nausea, vomiting, constipation, diarrhea, melanotic stools, BRBPR, or hematemesis  : normal with no hematuria, dysuria  BREAST: normal with no mass, discharge, pain  SKIN: normal with no rash, erythema, bruising, or swelling    Allergies:  Review of patient's allergies indicates:   Allergen Reactions    Tradjenta [linagliptin]      Itching and gastric discomfort > difficult to assess nature of side effect    Aspirin Itching    Opioids - morphine analogues Other (See Comments)     Confusion    Hydrochlorothiazide Other (See Comments)     Suspect Low sodium by hospital ist. Not confirmed    Jardiance [empagliflozin] Other (See Comments)     Caused burning sensation in the feet.       Medications:    Current Outpatient Medications:     alendronate (FOSAMAX) 70 MG tablet, Take 1 tablet (70 mg total) by mouth every 7 days., Disp: 12 tablet,  Rfl: 3    blood sugar diagnostic (TRUE METRIX GLUCOSE TEST STRIP) Strp, USE 1 STRIP TO CHECK GLUCOSE TWICE DAILY, Disp: 200 strip, Rfl: 3    cyanocobalamin 1,000 mcg/mL injection, Inject 1 mL (1,000 mcg total) into the muscle every 30 days., Disp: 1 mL, Rfl: 11    fluticasone propionate (FLONASE) 50 mcg/actuation nasal spray, Use 1 spray(s) in each nostril once daily, Disp: 16 g, Rfl: 11    glimepiride (AMARYL) 1 MG tablet, Take 1 tablet (1 mg total) by mouth before breakfast., Disp: 90 tablet, Rfl: 3    lancets (LANCETS,ULTRA THIN) 26 gauge Misc, 1 lancet by Misc.(Non-Drug; Combo Route) route once daily., Disp: 100 each, Rfl: 0    losartan (COZAAR) 100 MG tablet, Take 1 tablet (100 mg total) by mouth once daily., Disp: 90 tablet, Rfl: 4    metFORMIN (GLUCOPHAGE) 1000 MG tablet, Take 1 tablet (1,000 mg total) by mouth 2 (two) times daily with meals., Disp: 180 tablet, Rfl: 3    nystatin (MYCOSTATIN) cream, Apply topically 2 (two) times daily., Disp: 30 g, Rfl: 1    polyethylene glycol (GLYCOLAX) 17 gram/dose powder, DISSOLVE 17 GRAMS IN WATER AND DRINK ONCE DAILY, Disp: 510 g, Rfl: 3    rosuvastatin (CRESTOR) 5 MG tablet, Take 1 tablet (5 mg total) by mouth every evening., Disp: 90 tablet, Rfl: 3    blood-glucose meter kit, One touch meter and matching lancets and strips., Disp: 1 each, Rfl: 0    clotrimazole-betamethasone 1-0.05% (LOTRISONE) cream, Apply topically 2 (two) times daily. (Patient not taking: Reported on 2/23/2023), Disp: 15 g, Rfl: 1    pantoprazole (PROTONIX) 40 MG tablet, Take 1 tablet (40 mg total) by mouth once daily. (Patient not taking: Reported on 2/23/2023), Disp: 90 tablet, Rfl: 0    PMHx/PSHx Updates:  See patient's last visit with me on  2/8/2022  See H&P on 5/12/2019        Pathology:  Cancer Staging    Colectomy -  3/12/2013:  - low grade adenocarcinoma with invasion of the pericolonic adipose tissue and johnny metastasis  - 5.3cm size tumor  - T3 N1a (1 out of 12 LN's were positive)  -  histologic freatures of MSI present                Objective:     Vitals:  Blood pressure (!) 174/75, pulse 75, temperature 97.5 °F (36.4 °C), resp. rate 18, weight 61.7 kg (136 lb).    Physical Examination:   GEN: no apparent distress, comfortable; AAOx3  HEAD: atraumatic and normocephalic  EYES: no pallor, no icterus, PERRLA  ENT: OMM, no pharyngeal erythema, external ears WNL; no nasal discharge; no thrush  NECK: no masses, thyroid normal, trachea midline, no LAD/LN's, supple  CV: RRR with no murmur; normal pulse; normal S1 and S2; no pedal edema  CHEST: Normal respiratory effort; CTAB; normal breath sounds; no wheeze or crackles  ABDOM: nontender and nondistended; soft; normal bowel sounds; no rebound/guarding  MUSC/Skeletal:  prior right knee surgery; arthropathy  EXTREM: no clubbing, cyanosis, inflammation or swelling  SKIN: no rashes, lesions, ulcers, petechiae or subcutaneous nodules  : no rocha  NEURO: grossly intact; motor/sensory WNL; AAOx3; no tremors  PSYCH: normal mood, affect and behavior  LYMPH: normal cervical, supraclavicular, axillary and groin LN's            Labs:        Lab Results   Component Value Date    WBC 8.09 02/20/2023    HGB 10.8 (L) 02/20/2023    HCT 34.7 (L) 02/20/2023    MCV 86 02/20/2023     02/20/2023     BMP  Lab Results   Component Value Date     02/20/2023     (L) 02/20/2023    K 4.8 02/20/2023    K 4.5 02/20/2023     02/20/2023     02/20/2023    CO2 20 (L) 02/20/2023    CO2 20 (L) 02/20/2023    BUN 18 02/20/2023    BUN 18 02/20/2023    CREATININE 1.0 02/20/2023    CREATININE 1.0 02/20/2023    CALCIUM 9.3 02/20/2023    CALCIUM 9.2 02/20/2023    ANIONGAP 9 02/20/2023    ANIONGAP 11 02/20/2023    ESTGFRAFRICA >60.0 01/05/2022    EGFRNONAA >60.0 01/05/2022        Lab Results   Component Value Date    IRON 40 12/29/2020    TIBC 337 12/29/2020    FERRITIN 111 12/29/2020       Lab Results   Component Value Date    CEA 2.9 02/20/2023       Lab Results    Component Value Date    BIOFIBSV07 557 12/29/2020     Lab Results   Component Value Date    FOLATE 12.6 02/20/2023         Radiology/Diagnostic Studies:    CT Abdom/pelvis: 5/9/2019  IMPRESSION:  Postsurgical changes of the mid transverse colon without evidence of recurrent  or metastatic disease    Prior hysterectomy        X-ray Chest Pa And Lateral    Result Date: 5/9/2019  MDI CHEST, 2 VIEWS XRAY Indication: Personal history of other malignant neoplasm of large intestine. Comparison: June 27, 2018 Findings: Cardiomediastinal silhouette is within normal limits. There is no confluent airspace disease. There is no pleural effusion or pneumothorax. No acute osseous abnormality.     IMPRESSION: No acute pulmonary process. Read and electronically signed by: Richard Frost MD on 5/9/2019 9:23 AM CDT RICHARD FROST MD      I have reviewed all available lab results and radiology reports.    Assessment/Plan:   (1) 73 y.o. female with diagnosis of colon cancer who has been referred by Pal Fong MD for evaluation by medical hematology/oncology.   - diagnosed in Jan 2013  - s/p transverse colon resection 3/12/2013 followed by adjuvant chemotherapy with FOLFOX for 6 months in [place], [place]  - T3 N1a - 1 LN was positive  - Dr Sofia West was her oncologist in [place]  - she had a follow-up colonoscopy in Apr 2017  - latest CXR was negative; - CT scans in may 2019 were stable  - she saw Dr Adkins with GI since last visit and had scopes in [place]y 2019    12/30/2020:  - latest CEA was stable at 2.5  - she had last scopes in Oct 2021    2/8/2022:  - latest labs with CEA at 2.6    2/23/2023:  - latest CEA at 2.9  - she has not seen GI in awhile     (2) HTN and hypercholesterolemia     (3) DM II     (4) Osteoporosis     (5) OA    (6) B12 deficiency    (7) Mild chronic anemia - hgb at 10.6 - NCNC indices; normal iron panel    2/8/2022:   - latest hgb relatively adequate at 11.2  - iron panel adequate  - B12 and folate  adequate    2/23/2023:  - latest hgb relatively stable at 10.8  - iron panel, B12/folate all adequate          VISIT DIAGNOSES:      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 Check up to date labs incl. CEA every 6 months  2. Encouraged compliance with referrals, studies and labs  3. F/u with GI as directed by them   4. F/u with PCP  RTC in 12 months    Fax note to   Pal Fong MD,  Lucille    Discussion:     COVID-19 Discussion:    I had long discussion with patient and any applicable family about the COVID-19 coronavirus epidemic and the recommended precautions with regard to cancer and/or hematology patients. I have re-iterated the CDC recommendations for adequate hand washing, use of hand -like products, and coughing into elbow, etc. In addition, especially for our patients who are on chemotherapy and/or our otherwise immunocompromised patients, I have recommended avoidance of crowds, including movie theaters, restaurants, churches, etc. I have recommended avoidance of any sick or symptomatic family members and/or friends. I have also recommended avoidance of any raw and unwashed food products, and general avoidance of food items that have not been prepared by themselves. The patient has been asked to call us immediately with any symptom developments, issues, questions or other general concerns.       I spent over 25 mins of time with the patient. Reviewed results of the recently ordered labs, tests and studies; made directives with regards to the results. Over half of this time was spent couseling and coordinating care.    I have explained all of the above in detail and the patient understands all of the current recommendation(s). I have answered all of their questions to the best of my ability and to their complete satisfaction.   The patient is to  continue with the current management plan.            Electronically signed by Clemente Mi MD

## 2023-02-23 ENCOUNTER — OFFICE VISIT (OUTPATIENT)
Dept: HEMATOLOGY/ONCOLOGY | Facility: CLINIC | Age: 73
End: 2023-02-23
Payer: MEDICARE

## 2023-02-23 VITALS
SYSTOLIC BLOOD PRESSURE: 174 MMHG | DIASTOLIC BLOOD PRESSURE: 75 MMHG | RESPIRATION RATE: 18 BRPM | BODY MASS INDEX: 24.87 KG/M2 | HEART RATE: 75 BPM | WEIGHT: 136 LBS | TEMPERATURE: 98 F

## 2023-02-23 DIAGNOSIS — C18.4 MALIGNANT NEOPLASM OF TRANSVERSE COLON: Primary | ICD-10-CM

## 2023-02-23 DIAGNOSIS — E53.8 VITAMIN B 12 DEFICIENCY: ICD-10-CM

## 2023-02-23 DIAGNOSIS — Z85.038 H/O COLON CANCER, STAGE III: ICD-10-CM

## 2023-02-23 DIAGNOSIS — Z90.49 S/P LAPAROSCOPIC COLECTOMY: ICD-10-CM

## 2023-02-23 DIAGNOSIS — D64.9 MILD ANEMIA: ICD-10-CM

## 2023-02-23 PROCEDURE — 3288F FALL RISK ASSESSMENT DOCD: CPT | Mod: CPTII,S$GLB,, | Performed by: INTERNAL MEDICINE

## 2023-02-23 PROCEDURE — 1101F PT FALLS ASSESS-DOCD LE1/YR: CPT | Mod: CPTII,S$GLB,, | Performed by: INTERNAL MEDICINE

## 2023-02-23 PROCEDURE — 3078F PR MOST RECENT DIASTOLIC BLOOD PRESSURE < 80 MM HG: ICD-10-PCS | Mod: CPTII,S$GLB,, | Performed by: INTERNAL MEDICINE

## 2023-02-23 PROCEDURE — 3051F HG A1C>EQUAL 7.0%<8.0%: CPT | Mod: CPTII,S$GLB,, | Performed by: INTERNAL MEDICINE

## 2023-02-23 PROCEDURE — 1126F PR PAIN SEVERITY QUANTIFIED, NO PAIN PRESENT: ICD-10-PCS | Mod: CPTII,S$GLB,, | Performed by: INTERNAL MEDICINE

## 2023-02-23 PROCEDURE — 3008F PR BODY MASS INDEX (BMI) DOCUMENTED: ICD-10-PCS | Mod: CPTII,S$GLB,, | Performed by: INTERNAL MEDICINE

## 2023-02-23 PROCEDURE — 1160F PR REVIEW ALL MEDS BY PRESCRIBER/CLIN PHARMACIST DOCUMENTED: ICD-10-PCS | Mod: CPTII,S$GLB,, | Performed by: INTERNAL MEDICINE

## 2023-02-23 PROCEDURE — 3288F PR FALLS RISK ASSESSMENT DOCUMENTED: ICD-10-PCS | Mod: CPTII,S$GLB,, | Performed by: INTERNAL MEDICINE

## 2023-02-23 PROCEDURE — 99213 OFFICE O/P EST LOW 20 MIN: CPT | Mod: S$GLB,,, | Performed by: INTERNAL MEDICINE

## 2023-02-23 PROCEDURE — 1159F MED LIST DOCD IN RCRD: CPT | Mod: CPTII,S$GLB,, | Performed by: INTERNAL MEDICINE

## 2023-02-23 PROCEDURE — 3008F BODY MASS INDEX DOCD: CPT | Mod: CPTII,S$GLB,, | Performed by: INTERNAL MEDICINE

## 2023-02-23 PROCEDURE — 3077F SYST BP >= 140 MM HG: CPT | Mod: CPTII,S$GLB,, | Performed by: INTERNAL MEDICINE

## 2023-02-23 PROCEDURE — 3078F DIAST BP <80 MM HG: CPT | Mod: CPTII,S$GLB,, | Performed by: INTERNAL MEDICINE

## 2023-02-23 PROCEDURE — 4010F ACE/ARB THERAPY RXD/TAKEN: CPT | Mod: CPTII,S$GLB,, | Performed by: INTERNAL MEDICINE

## 2023-02-23 PROCEDURE — 1101F PR PT FALLS ASSESS DOC 0-1 FALLS W/OUT INJ PAST YR: ICD-10-PCS | Mod: CPTII,S$GLB,, | Performed by: INTERNAL MEDICINE

## 2023-02-23 PROCEDURE — 1159F PR MEDICATION LIST DOCUMENTED IN MEDICAL RECORD: ICD-10-PCS | Mod: CPTII,S$GLB,, | Performed by: INTERNAL MEDICINE

## 2023-02-23 PROCEDURE — 3051F PR MOST RECENT HEMOGLOBIN A1C LEVEL 7.0 - < 8.0%: ICD-10-PCS | Mod: CPTII,S$GLB,, | Performed by: INTERNAL MEDICINE

## 2023-02-23 PROCEDURE — 1160F RVW MEDS BY RX/DR IN RCRD: CPT | Mod: CPTII,S$GLB,, | Performed by: INTERNAL MEDICINE

## 2023-02-23 PROCEDURE — 4010F PR ACE/ARB THEARPY RXD/TAKEN: ICD-10-PCS | Mod: CPTII,S$GLB,, | Performed by: INTERNAL MEDICINE

## 2023-02-23 PROCEDURE — 3077F PR MOST RECENT SYSTOLIC BLOOD PRESSURE >= 140 MM HG: ICD-10-PCS | Mod: CPTII,S$GLB,, | Performed by: INTERNAL MEDICINE

## 2023-02-23 PROCEDURE — 99213 PR OFFICE/OUTPT VISIT, EST, LEVL III, 20-29 MIN: ICD-10-PCS | Mod: S$GLB,,, | Performed by: INTERNAL MEDICINE

## 2023-02-23 PROCEDURE — 1126F AMNT PAIN NOTED NONE PRSNT: CPT | Mod: CPTII,S$GLB,, | Performed by: INTERNAL MEDICINE

## 2023-02-28 ENCOUNTER — OFFICE VISIT (OUTPATIENT)
Dept: FAMILY MEDICINE | Facility: CLINIC | Age: 73
End: 2023-02-28
Payer: MEDICARE

## 2023-02-28 VITALS
SYSTOLIC BLOOD PRESSURE: 133 MMHG | HEART RATE: 80 BPM | BODY MASS INDEX: 25.03 KG/M2 | HEIGHT: 62 IN | WEIGHT: 136 LBS | DIASTOLIC BLOOD PRESSURE: 82 MMHG

## 2023-02-28 DIAGNOSIS — K59.04 CHRONIC IDIOPATHIC CONSTIPATION: ICD-10-CM

## 2023-02-28 DIAGNOSIS — E78.2 MIXED HYPERLIPIDEMIA: ICD-10-CM

## 2023-02-28 DIAGNOSIS — Z12.31 ENCOUNTER FOR SCREENING MAMMOGRAM FOR BREAST CANCER: ICD-10-CM

## 2023-02-28 DIAGNOSIS — I10 ESSENTIAL HYPERTENSION: Chronic | ICD-10-CM

## 2023-02-28 DIAGNOSIS — M79.672 FOOT PAIN, LEFT: ICD-10-CM

## 2023-02-28 DIAGNOSIS — E53.8 VITAMIN B 12 DEFICIENCY: Chronic | ICD-10-CM

## 2023-02-28 DIAGNOSIS — E11.9 TYPE 2 DIABETES MELLITUS WITHOUT COMPLICATION, WITHOUT LONG-TERM CURRENT USE OF INSULIN: Primary | Chronic | ICD-10-CM

## 2023-02-28 DIAGNOSIS — K21.9 GASTROESOPHAGEAL REFLUX DISEASE WITHOUT ESOPHAGITIS: ICD-10-CM

## 2023-02-28 PROCEDURE — 3008F PR BODY MASS INDEX (BMI) DOCUMENTED: ICD-10-PCS | Mod: CPTII,,, | Performed by: INTERNAL MEDICINE

## 2023-02-28 PROCEDURE — 3051F HG A1C>EQUAL 7.0%<8.0%: CPT | Mod: CPTII,,, | Performed by: INTERNAL MEDICINE

## 2023-02-28 PROCEDURE — 3079F PR MOST RECENT DIASTOLIC BLOOD PRESSURE 80-89 MM HG: ICD-10-PCS | Mod: CPTII,,, | Performed by: INTERNAL MEDICINE

## 2023-02-28 PROCEDURE — 3079F DIAST BP 80-89 MM HG: CPT | Mod: CPTII,,, | Performed by: INTERNAL MEDICINE

## 2023-02-28 PROCEDURE — 4010F ACE/ARB THERAPY RXD/TAKEN: CPT | Mod: CPTII,,, | Performed by: INTERNAL MEDICINE

## 2023-02-28 PROCEDURE — 3075F PR MOST RECENT SYSTOLIC BLOOD PRESS GE 130-139MM HG: ICD-10-PCS | Mod: CPTII,,, | Performed by: INTERNAL MEDICINE

## 2023-02-28 PROCEDURE — 1160F RVW MEDS BY RX/DR IN RCRD: CPT | Mod: CPTII,,, | Performed by: INTERNAL MEDICINE

## 2023-02-28 PROCEDURE — 3288F FALL RISK ASSESSMENT DOCD: CPT | Mod: CPTII,,, | Performed by: INTERNAL MEDICINE

## 2023-02-28 PROCEDURE — 1159F MED LIST DOCD IN RCRD: CPT | Mod: CPTII,,, | Performed by: INTERNAL MEDICINE

## 2023-02-28 PROCEDURE — 99214 OFFICE O/P EST MOD 30 MIN: CPT | Mod: S$PBB,,, | Performed by: INTERNAL MEDICINE

## 2023-02-28 PROCEDURE — 1126F PR PAIN SEVERITY QUANTIFIED, NO PAIN PRESENT: ICD-10-PCS | Mod: CPTII,,, | Performed by: INTERNAL MEDICINE

## 2023-02-28 PROCEDURE — 3008F BODY MASS INDEX DOCD: CPT | Mod: CPTII,,, | Performed by: INTERNAL MEDICINE

## 2023-02-28 PROCEDURE — 1126F AMNT PAIN NOTED NONE PRSNT: CPT | Mod: CPTII,,, | Performed by: INTERNAL MEDICINE

## 2023-02-28 PROCEDURE — 1160F PR REVIEW ALL MEDS BY PRESCRIBER/CLIN PHARMACIST DOCUMENTED: ICD-10-PCS | Mod: CPTII,,, | Performed by: INTERNAL MEDICINE

## 2023-02-28 PROCEDURE — 3288F PR FALLS RISK ASSESSMENT DOCUMENTED: ICD-10-PCS | Mod: CPTII,,, | Performed by: INTERNAL MEDICINE

## 2023-02-28 PROCEDURE — 4010F PR ACE/ARB THEARPY RXD/TAKEN: ICD-10-PCS | Mod: CPTII,,, | Performed by: INTERNAL MEDICINE

## 2023-02-28 PROCEDURE — 3075F SYST BP GE 130 - 139MM HG: CPT | Mod: CPTII,,, | Performed by: INTERNAL MEDICINE

## 2023-02-28 PROCEDURE — 99214 PR OFFICE/OUTPT VISIT, EST, LEVL IV, 30-39 MIN: ICD-10-PCS | Mod: S$PBB,,, | Performed by: INTERNAL MEDICINE

## 2023-02-28 PROCEDURE — 1159F PR MEDICATION LIST DOCUMENTED IN MEDICAL RECORD: ICD-10-PCS | Mod: CPTII,,, | Performed by: INTERNAL MEDICINE

## 2023-02-28 PROCEDURE — 1101F PT FALLS ASSESS-DOCD LE1/YR: CPT | Mod: CPTII,,, | Performed by: INTERNAL MEDICINE

## 2023-02-28 PROCEDURE — 3051F PR MOST RECENT HEMOGLOBIN A1C LEVEL 7.0 - < 8.0%: ICD-10-PCS | Mod: CPTII,,, | Performed by: INTERNAL MEDICINE

## 2023-02-28 PROCEDURE — 1101F PR PT FALLS ASSESS DOC 0-1 FALLS W/OUT INJ PAST YR: ICD-10-PCS | Mod: CPTII,,, | Performed by: INTERNAL MEDICINE

## 2023-02-28 RX ORDER — CYANOCOBALAMIN 1000 UG/ML
1000 INJECTION, SOLUTION INTRAMUSCULAR; SUBCUTANEOUS
Qty: 1 ML | Refills: 11 | Status: CANCELLED | OUTPATIENT
Start: 2023-02-28 | End: 2024-02-28

## 2023-02-28 NOTE — PROGRESS NOTES
Subjective:       Patient ID: Preet West is a 73 y.o. female.    Chief Complaint: Hypertension, Diabetes, Follow-up, and Hyperlipidemia    PATIENT IS A 73-YEAR-OLD  FEMALE WHO COMES FOR FOLLOW-UP.  She comes as usual accompanied with her  who helps her interpret/understand with her limited additional language (besides her native language) skills.    Underlying medical issues include the following:-    1.-type 2 diabetes mellitus   2.-hypertension  3.-hyperlipidemia  4.--anxiety though she is not on any medication for this   5.-osteoporosis currently on alendronate.     6. -nonspecific complains of fatigue and empirical treatment with vitamin B12 injections.  Please note that she is on vitamin B12 injection prior to coming to [place] and under my care from an outside doctor.  No records were available which actually demonstrated a low vitamin-B-12 but some of this injection makes her feel better and I am not sure if there is a psychological improvement or there is some real physical improvement.    Today patient's  asks if this injection can be stop.  Overall he does not feel that it has given her any benefits.  Last vitamin B12 was greater than 1500.  7.-gastroesophageal reflux currently on pantoprazole  8.-IBS type of symptoms with constipation and taking Glycolax as needed.      Previously our associate . Harman Rico JULIA-C had  increased her losartan to 50 mg twice a day in view of uncontrolled blood pressures.      Control of diabetes is somewhat borderline and I had wanted her to stop glimepiride in view of potential adverse effects from this sulfonylurea.  However she is psychologically and mentally unable to get off this medication.  She wants to continue with this.      Discussed about ophthalmology examination and COVID precautions and booster vaccine.    In past she had complained about itching from statin medications which were temporarily held and upon resumption she did  not experience any symptoms.    Hemoglobin A1c is 7.7.          Past Medical History:   Diagnosis Date    Allergy     aspirin itching    Anemia     Cancer     Diabetes mellitus, type 2     H/O colon cancer, stage III 1/1/2013    Treated in [facility]    History of colonoscopy 6/4/2021    Colonoscopy done by Dr. Rayshawn Adkins MD on 06/04/21.non bleeding internal hemorrhoids.  4 mm polyp in the sigmoid colon removed with cold snare.  Patent end-to-end colocolonic anastomosis characterized by healthy-appearing mucosa.  Examination was otherwise normal.  Repeat colonoscopy in 3 years for surveillance.    Malignant neoplasm of transverse colon (2013) - Stage III 4/4/2019    Osteoporosis     S/P laparoscopic colectomy (3/2013) 4/5/2019    Seasonal allergies      Social History     Socioeconomic History    Marital status:      Spouse name: Luis West    Number of children: 3   Occupational History    Occupation:    Tobacco Use    Smoking status: Never    Smokeless tobacco: Never   Substance and Sexual Activity    Alcohol use: No    Drug use: No    Sexual activity: Not Currently     Partners: Male     Birth control/protection: Post-menopausal   Social History Narrative    Speaks Vianney only     Social Determinants of Health     Financial Resource Strain: Low Risk     Difficulty of Paying Living Expenses: Not very hard   Food Insecurity: No Food Insecurity    Worried About Running Out of Food in the Last Year: Never true    Ran Out of Food in the Last Year: Never true   Transportation Needs: No Transportation Needs    Lack of Transportation (Medical): No    Lack of Transportation (Non-Medical): No   Physical Activity: Insufficiently Active    Days of Exercise per Week: 7 days    Minutes of Exercise per Session: 20 min   Stress: Stress Concern Present    Feeling of Stress : To some extent   Social Connections: Moderately Integrated    Frequency of Communication with Friends and Family: More than  three times a week    Attends Rastafarian Services: 1 to 4 times per year    Active Member of Clubs or Organizations: No    Attends Club or Organization Meetings: Never    Marital Status:    Housing Stability: Low Risk     Unable to Pay for Housing in the Last Year: No    Number of Places Lived in the Last Year: 1    Unstable Housing in the Last Year: No     Past Surgical History:   Procedure Laterality Date    COLON SURGERY      JOINT REPLACEMENT       Family History   Problem Relation Age of Onset    Stroke Mother     Heart disease Mother     Cancer Father         [place]       Review of Systems   Constitutional:  Positive for fatigue (Getting better). Negative for activity change, chills, fever and unexpected weight change (Gained 1 lb since the last documentation.  129--130 lb.).   HENT:  Negative for congestion, postnasal drip, sinus pressure and sneezing.         Uses Flonase for sinuses.   Eyes:  Negative for pain, discharge and visual disturbance (Cataract).   Respiratory:  Negative for chest tightness, shortness of breath, wheezing and stridor.    Cardiovascular:  Positive for leg swelling. Negative for chest pain and palpitations.        Blood pressure was previously elevated now they are better on losartan 100 mg.   Gastrointestinal:  Positive for constipation. Negative for abdominal distention, abdominal pain, anal bleeding, nausea and rectal pain.        GERD occasional use of pantoprazole.   Endocrine: Negative for cold intolerance, polydipsia, polyphagia and polyuria.        Past history of hyponatremia has been noted.      Underlying diabetes has been noted with improvement in blood sugars to 7.7.   Genitourinary:  Negative for difficulty urinating and flank pain.        Previously had complained of vaginal itching but no more.   Musculoskeletal:  Positive for arthralgias. Negative for joint swelling, neck pain and neck stiffness.        Bilateral swelling in legs more on the right side as  "compared to the left side.  Pain on the right lateral foot over the metatarsal bones.  History of ice bag falling on her right foot.   Skin:  Negative for color change and pallor.   Allergic/Immunologic: Negative for environmental allergies, food allergies and immunocompromised state.   Neurological:  Negative for dizziness, tremors, seizures, syncope and light-headedness.   Hematological:  Negative for adenopathy. Does not bruise/bleed easily.   Psychiatric/Behavioral:  Negative for agitation, confusion and dysphoric mood. The patient is nervous/anxious.         Somewhat anxious about her health issues.       Objective:      Blood pressure 133/82, pulse 80, height 5' 2" (1.575 m), weight 61.7 kg (136 lb). Body mass index is 24.87 kg/m².  Physical Exam  Vitals and nursing note reviewed.   Constitutional:       General: She is not in acute distress.     Appearance: She is well-developed. She is not ill-appearing or diaphoretic.      Comments: BMI is 24.87   HENT:      Head: Normocephalic and atraumatic.      Nose: No nasal deformity or mucosal edema.      Right Sinus: No maxillary sinus tenderness or frontal sinus tenderness.      Left Sinus: No maxillary sinus tenderness or frontal sinus tenderness.   Eyes:      Conjunctiva/sclera: Conjunctivae normal.   Neck:      Trachea: Trachea normal.   Cardiovascular:      Rate and Rhythm: Normal rate and regular rhythm.      Heart sounds: Normal heart sounds, S1 normal and S2 normal.   Pulmonary:      Breath sounds: Normal breath sounds. No wheezing, rhonchi or rales.   Chest:      Chest wall: No tenderness.   Abdominal:      General: There is no distension.      Palpations: Abdomen is soft. Abdomen is not rigid.      Tenderness: There is no abdominal tenderness. There is no guarding.   Musculoskeletal:         General: No tenderness.      Cervical back: Neck supple.      Right lower leg: No tenderness. No edema.      Left lower leg: No tenderness. No edema.      Right foot: " Deformity (Pes cavus) present.      Left foot: Deformity (pes cavus) present.   Lymphadenopathy:      Cervical: No cervical adenopathy.   Skin:     General: Skin is warm and dry.      Findings: No lesion or rash.   Neurological:      Mental Status: She is alert.      Coordination: Coordination normal.   Psychiatric:         Mood and Affect: Mood is anxious. Affect is not labile or flat.         Behavior: Behavior is cooperative.         Assessment:       1. Type 2 diabetes mellitus without complication, without long-term current use of insulin    2. Essential hypertension    3. Mixed hyperlipidemia    4. Encounter for screening mammogram for breast cancer    5. Gastroesophageal reflux disease without esophagitis    6. Vitamin B 12 deficiency    7. Foot pain, left    8. Chronic idiopathic constipation             Lab Visit on 02/20/2023   Component Date Value Ref Range Status    WBC 02/20/2023 8.09  3.90 - 12.70 K/uL Final    RBC 02/20/2023 4.02  4.00 - 5.40 M/uL Final    Hemoglobin 02/20/2023 10.8 (L)  12.0 - 16.0 g/dL Final    Hematocrit 02/20/2023 34.7 (L)  37.0 - 48.5 % Final    MCV 02/20/2023 86  82 - 98 fL Final    MCH 02/20/2023 26.9 (L)  27.0 - 31.0 pg Final    MCHC 02/20/2023 31.1 (L)  32.0 - 36.0 g/dL Final    RDW 02/20/2023 14.7 (H)  11.5 - 14.5 % Final    Platelets 02/20/2023 229  150 - 450 K/uL Final    MPV 02/20/2023 10.4  9.2 - 12.9 fL Final    Immature Granulocytes 02/20/2023 0.4  0.0 - 0.5 % Final    Gran # (ANC) 02/20/2023 4.9  1.8 - 7.7 K/uL Final    Immature Grans (Abs) 02/20/2023 0.03  0.00 - 0.04 K/uL Final    Lymph # 02/20/2023 2.4  1.0 - 4.8 K/uL Final    Mono # 02/20/2023 0.6  0.3 - 1.0 K/uL Final    Eos # 02/20/2023 0.2  0.0 - 0.5 K/uL Final    Baso # 02/20/2023 0.03  0.00 - 0.20 K/uL Final    nRBC 02/20/2023 0  0 /100 WBC Final    Gran % 02/20/2023 59.9  38.0 - 73.0 % Final    Lymph % 02/20/2023 30.2  18.0 - 48.0 % Final    Mono % 02/20/2023 6.9  4.0 - 15.0 % Final    Eosinophil %  02/20/2023 2.2  0.0 - 8.0 % Final    Basophil % 02/20/2023 0.4  0.0 - 1.9 % Final    Differential Method 02/20/2023 Automated   Final    Sodium 02/20/2023 134 (L)  136 - 145 mmol/L Final    Potassium 02/20/2023 4.5  3.5 - 5.1 mmol/L Final    Chloride 02/20/2023 103  95 - 110 mmol/L Final    CO2 02/20/2023 20 (L)  23 - 29 mmol/L Final    Glucose 02/20/2023 90  70 - 110 mg/dL Final    BUN 02/20/2023 18  8 - 23 mg/dL Final    Creatinine 02/20/2023 1.0  0.5 - 1.4 mg/dL Final    Calcium 02/20/2023 9.2  8.7 - 10.5 mg/dL Final    Total Protein 02/20/2023 7.3  6.0 - 8.4 g/dL Final    Albumin 02/20/2023 3.7  3.5 - 5.2 g/dL Final    Total Bilirubin 02/20/2023 0.5  0.1 - 1.0 mg/dL Final    Alkaline Phosphatase 02/20/2023 42 (L)  55 - 135 U/L Final    AST 02/20/2023 19  10 - 40 U/L Final    ALT 02/20/2023 18  10 - 44 U/L Final    Anion Gap 02/20/2023 11  8 - 16 mmol/L Final    eGFR 02/20/2023 >60.0  >60 mL/min/1.73 m^2 Final    CEA 02/20/2023 2.9  0.0 - 5.0 ng/mL Final    Folate 02/20/2023 12.6  4.0 - 24.0 ng/mL Final   Lab Visit on 02/20/2023   Component Date Value Ref Range Status    Hemoglobin A1C 02/20/2023 7.7 (H)  4.5 - 6.2 % Final    Estimated Avg Glucose 02/20/2023 174 (H)  68 - 131 mg/dL Final    Sodium 02/20/2023 136  136 - 145 mmol/L Final    Potassium 02/20/2023 4.8  3.5 - 5.1 mmol/L Final    Chloride 02/20/2023 107  95 - 110 mmol/L Final    CO2 02/20/2023 20 (L)  23 - 29 mmol/L Final    Glucose 02/20/2023 90  70 - 110 mg/dL Final    BUN 02/20/2023 18  8 - 23 mg/dL Final    Creatinine 02/20/2023 1.0  0.5 - 1.4 mg/dL Final    Calcium 02/20/2023 9.3  8.7 - 10.5 mg/dL Final    Anion Gap 02/20/2023 9  8 - 16 mmol/L Final    eGFR 02/20/2023 >60.0  >60 mL/min/1.73 m^2 Final    ALT 02/20/2023 19  10 - 44 U/L Final         Plan:           Type 2 diabetes mellitus without complication, without long-term current use of insulin  Comments:  Control of blood sugars are somewhat suboptimal.  Orders:  -     Hemoglobin A1C; Future;  Expected date: 06/05/2023  -     Microalbumin/Creatinine Ratio, Urine; Future; Expected date: 06/05/2023    Essential hypertension    Mixed hyperlipidemia  -     Lipid Panel; Future; Expected date: 06/05/2023    Encounter for screening mammogram for breast cancer  -     Mammo Digital Screening Bilat; Future; Expected date: 03/14/2023    Gastroesophageal reflux disease without esophagitis    Vitamin B 12 deficiency  Comments:  Continues with injection vitamin B12.  [place].  Records available.    Foot pain, left  -     Ambulatory referral/consult to Podiatry; Future; Expected date: 03/28/2023    Chronic idiopathic constipation    Her hemoglobin A1c 7.7.  She is taking glimepiride and metformin.    She continues on metformin and glimepiride.      We did discuss about adding perhaps medications like Jardiance or even GLP 1.  At this point she is not ready and there is concern about incurring more costs of medications.    Patient's  state that when she had gone to [facility], she was more liberal with her diet.      ///////////////////////////////////////////    The name of foot doctor is Dr. Allen Dueñas DPM.  His office will call you for foot evaluation.      I have sent labs to [facility]/imaging to be done fasting before your next follow-up in the month of July.    We will continue with the same medications.      The goal of hemoglobin A1c is to bring it closer to 7.    You can stop the vitamin B12 injections now.  You can take the pill if your able to tolerate it.  If not you can take a multivitamin pill which has all the vitamins.    I have sent a mammogram also to [facility] and they will call you for appointment.    Advised Pt about age and season appropriate immunizations/ cancer screenings.  Also seasonal influenza vaccine, update on tetanus diphtheria vaccination every 10 years.  Patient has been advised to watch diet  and exercise. Avoid fried and fatty food. Compliance to medications and follow up urged.  Advised Pt. to monitor Blood sugars at home and record them.  Advised Pt  for Anti reflux measures like small feequent meals, avoid spicy and greasy food. Head end up at night.  keep a close eye on feet and keep them clean. Annual eye examination. Annual influenza vaccine.  Monitor HgbA1c every 3 to 6 months. Monitor urine microalbumin every year.keep LDL less than 100. Monitor blood pressure and target blood pressure 120/70.  Please utilize precautions for current COVID-19 pandemic.  Try to avoid crowds or close contact with multiple people.  Minimize outside interaction.  Wash hands with soap for  frequently upon contact.Use face mask or cover.    [facility] 7.11 .23    Spent charmaine 30 minutes with patient which involved review of pts medical conditions, labs, medications and with 50% of time face-to-face discussion about medical problems, management and any applicable changes.      Current Outpatient Medications:     alendronate (FOSAMAX) 70 MG tablet, Take 1 tablet (70 mg total) by mouth every 7 days., Disp: 12 tablet, Rfl: 3    blood sugar diagnostic (TRUE METRIX GLUCOSE TEST STRIP) Strp, USE 1 STRIP TO CHECK GLUCOSE TWICE DAILY, Disp: 200 strip, Rfl: 3    clotrimazole-betamethasone 1-0.05% (LOTRISONE) cream, Apply topically 2 (two) times daily., Disp: 15 g, Rfl: 1    fluticasone propionate (FLONASE) 50 mcg/actuation nasal spray, Use 1 spray(s) in each nostril once daily, Disp: 16 g, Rfl: 11    glimepiride (AMARYL) 1 MG tablet, Take 1 tablet (1 mg total) by mouth before breakfast., Disp: 90 tablet, Rfl: 3    lancets (LANCETS,ULTRA THIN) 26 gauge Misc, 1 lancet by Misc.(Non-Drug; Combo Route) route once daily., Disp: 100 each, Rfl: 0    losartan (COZAAR) 100 MG tablet, Take 1 tablet (100 mg total) by mouth once daily., Disp: 90 tablet, Rfl: 4    metFORMIN (GLUCOPHAGE) 1000 MG tablet, Take 1 tablet (1,000 mg total) by mouth 2  (two) times daily with meals., Disp: 180 tablet, Rfl: 3    nystatin (MYCOSTATIN) cream, Apply topically 2 (two) times daily., Disp: 30 g, Rfl: 1    pantoprazole (PROTONIX) 40 MG tablet, Take 1 tablet (40 mg total) by mouth once daily., Disp: 90 tablet, Rfl: 0    polyethylene glycol (GLYCOLAX) 17 gram/dose powder, DISSOLVE 17 GRAMS IN WATER AND DRINK ONCE DAILY, Disp: 510 g, Rfl: 3    rosuvastatin (CRESTOR) 5 MG tablet, Take 1 tablet (5 mg total) by mouth every evening., Disp: 90 tablet, Rfl: 3    blood-glucose meter kit, One touch meter and matching lancets and strips., Disp: 1 each, Rfl: 0

## 2023-03-16 ENCOUNTER — HOSPITAL ENCOUNTER (OUTPATIENT)
Dept: RADIOLOGY | Facility: HOSPITAL | Age: 73
Discharge: HOME | End: 2023-03-16
Attending: INTERNAL MEDICINE
Payer: MEDICARE

## 2023-03-16 VITALS — WEIGHT: 136 LBS | HEIGHT: 60 IN | BODY MASS INDEX: 26.7 KG/M2

## 2023-03-16 DIAGNOSIS — Z12.31 ENCOUNTER FOR SCREENING MAMMOGRAM FOR BREAST CANCER: ICD-10-CM

## 2023-03-16 PROCEDURE — 77067 SCR MAMMO BI INCL CAD: CPT | Mod: TC,PO

## 2023-05-15 ENCOUNTER — OFFICE VISIT (OUTPATIENT)
Dept: FAMILY MEDICINE | Facility: CLINIC | Age: 73
End: 2023-05-15
Payer: MEDICARE

## 2023-05-15 ENCOUNTER — HOSPITAL ENCOUNTER (OUTPATIENT)
Dept: RADIOLOGY | Facility: HOSPITAL | Age: 73
Discharge: HOME | End: 2023-05-15
Attending: INTERNAL MEDICINE
Payer: MEDICARE

## 2023-05-15 VITALS
SYSTOLIC BLOOD PRESSURE: 132 MMHG | DIASTOLIC BLOOD PRESSURE: 75 MMHG | HEART RATE: 95 BPM | WEIGHT: 135 LBS | BODY MASS INDEX: 26.5 KG/M2 | HEIGHT: 60 IN

## 2023-05-15 DIAGNOSIS — M17.12 ARTHRITIS OF LEFT KNEE: ICD-10-CM

## 2023-05-15 DIAGNOSIS — M17.12 ARTHRITIS OF LEFT KNEE: Primary | ICD-10-CM

## 2023-05-15 DIAGNOSIS — E11.9 TYPE 2 DIABETES MELLITUS WITHOUT COMPLICATION, WITHOUT LONG-TERM CURRENT USE OF INSULIN: Chronic | ICD-10-CM

## 2023-05-15 DIAGNOSIS — E78.2 MIXED HYPERLIPIDEMIA: Chronic | ICD-10-CM

## 2023-05-15 PROCEDURE — 1160F RVW MEDS BY RX/DR IN RCRD: CPT | Mod: CPTII,S$GLB,, | Performed by: INTERNAL MEDICINE

## 2023-05-15 PROCEDURE — 3008F PR BODY MASS INDEX (BMI) DOCUMENTED: ICD-10-PCS | Mod: CPTII,S$GLB,, | Performed by: INTERNAL MEDICINE

## 2023-05-15 PROCEDURE — 3075F PR MOST RECENT SYSTOLIC BLOOD PRESS GE 130-139MM HG: ICD-10-PCS | Mod: CPTII,S$GLB,, | Performed by: INTERNAL MEDICINE

## 2023-05-15 PROCEDURE — 1125F PR PAIN SEVERITY QUANTIFIED, PAIN PRESENT: ICD-10-PCS | Mod: CPTII,S$GLB,, | Performed by: INTERNAL MEDICINE

## 2023-05-15 PROCEDURE — 1159F PR MEDICATION LIST DOCUMENTED IN MEDICAL RECORD: ICD-10-PCS | Mod: CPTII,S$GLB,, | Performed by: INTERNAL MEDICINE

## 2023-05-15 PROCEDURE — 3051F HG A1C>EQUAL 7.0%<8.0%: CPT | Mod: CPTII,S$GLB,, | Performed by: INTERNAL MEDICINE

## 2023-05-15 PROCEDURE — 3288F FALL RISK ASSESSMENT DOCD: CPT | Mod: CPTII,S$GLB,, | Performed by: INTERNAL MEDICINE

## 2023-05-15 PROCEDURE — 99213 PR OFFICE/OUTPT VISIT, EST, LEVL III, 20-29 MIN: ICD-10-PCS | Mod: S$GLB,,, | Performed by: INTERNAL MEDICINE

## 2023-05-15 PROCEDURE — 3288F PR FALLS RISK ASSESSMENT DOCUMENTED: ICD-10-PCS | Mod: CPTII,S$GLB,, | Performed by: INTERNAL MEDICINE

## 2023-05-15 PROCEDURE — 73562 X-RAY EXAM OF KNEE 3: CPT | Mod: TC,PO,LT

## 2023-05-15 PROCEDURE — 1159F MED LIST DOCD IN RCRD: CPT | Mod: CPTII,S$GLB,, | Performed by: INTERNAL MEDICINE

## 2023-05-15 PROCEDURE — 3078F DIAST BP <80 MM HG: CPT | Mod: CPTII,S$GLB,, | Performed by: INTERNAL MEDICINE

## 2023-05-15 PROCEDURE — 4010F PR ACE/ARB THEARPY RXD/TAKEN: ICD-10-PCS | Mod: CPTII,S$GLB,, | Performed by: INTERNAL MEDICINE

## 2023-05-15 PROCEDURE — 3078F PR MOST RECENT DIASTOLIC BLOOD PRESSURE < 80 MM HG: ICD-10-PCS | Mod: CPTII,S$GLB,, | Performed by: INTERNAL MEDICINE

## 2023-05-15 PROCEDURE — 3008F BODY MASS INDEX DOCD: CPT | Mod: CPTII,S$GLB,, | Performed by: INTERNAL MEDICINE

## 2023-05-15 PROCEDURE — 3051F PR MOST RECENT HEMOGLOBIN A1C LEVEL 7.0 - < 8.0%: ICD-10-PCS | Mod: CPTII,S$GLB,, | Performed by: INTERNAL MEDICINE

## 2023-05-15 PROCEDURE — 99213 OFFICE O/P EST LOW 20 MIN: CPT | Mod: S$GLB,,, | Performed by: INTERNAL MEDICINE

## 2023-05-15 PROCEDURE — 1101F PT FALLS ASSESS-DOCD LE1/YR: CPT | Mod: CPTII,S$GLB,, | Performed by: INTERNAL MEDICINE

## 2023-05-15 PROCEDURE — 1160F PR REVIEW ALL MEDS BY PRESCRIBER/CLIN PHARMACIST DOCUMENTED: ICD-10-PCS | Mod: CPTII,S$GLB,, | Performed by: INTERNAL MEDICINE

## 2023-05-15 PROCEDURE — 4010F ACE/ARB THERAPY RXD/TAKEN: CPT | Mod: CPTII,S$GLB,, | Performed by: INTERNAL MEDICINE

## 2023-05-15 PROCEDURE — 3075F SYST BP GE 130 - 139MM HG: CPT | Mod: CPTII,S$GLB,, | Performed by: INTERNAL MEDICINE

## 2023-05-15 PROCEDURE — 1125F AMNT PAIN NOTED PAIN PRSNT: CPT | Mod: CPTII,S$GLB,, | Performed by: INTERNAL MEDICINE

## 2023-05-15 PROCEDURE — 1101F PR PT FALLS ASSESS DOC 0-1 FALLS W/OUT INJ PAST YR: ICD-10-PCS | Mod: CPTII,S$GLB,, | Performed by: INTERNAL MEDICINE

## 2023-05-15 RX ORDER — DICLOFENAC SODIUM 10 MG/G
2 GEL TOPICAL 2 TIMES DAILY
Qty: 100 G | Refills: 1 | Status: SHIPPED | OUTPATIENT
Start: 2023-05-15 | End: 2024-03-05 | Stop reason: SDUPTHER

## 2023-05-15 RX ORDER — GLIMEPIRIDE 1 MG/1
TABLET ORAL
Qty: 90 TABLET | Refills: 3 | Status: SHIPPED | OUTPATIENT
Start: 2023-05-15 | End: 2024-03-05 | Stop reason: SDUPTHER

## 2023-05-15 RX ORDER — CELECOXIB 50 MG/1
50 CAPSULE ORAL 2 TIMES DAILY
Qty: 60 CAPSULE | Refills: 2 | Status: SHIPPED | OUTPATIENT
Start: 2023-05-15 | End: 2024-03-05 | Stop reason: SDUPTHER

## 2023-05-15 RX ORDER — ROSUVASTATIN CALCIUM 5 MG/1
5 TABLET, COATED ORAL NIGHTLY
Qty: 90 TABLET | Refills: 3 | Status: SHIPPED | OUTPATIENT
Start: 2023-05-15 | End: 2024-02-13 | Stop reason: SDUPTHER

## 2023-05-15 NOTE — PROGRESS NOTES
Subjective:       Patient ID: Preet West is a 73 y.o. female.    Chief Complaint: Knee Pain (X 1 month)    Mrs. West is a 73-year-old female who comes for interim follow-up.  She complains of pain in the left knee with some difficulty walking.  Several years back she had a surgery in the right knee total replacement at [facility] after that she is been doing well.      No recent history of fall or injury.    Knee Pain   The incident occurred more than 1 week ago. There was no injury mechanism. The pain is present in the left knee and right knee. The quality of the pain is described as cramping. The pain is at a severity of 4/10. The pain is moderate. Pertinent negatives include no inability to bear weight.     Past Medical History:   Diagnosis Date    Allergy     aspirin itching    Anemia     Cancer     Diabetes mellitus, type 2     H/O colon cancer, stage III 1/1/2013    Treated in [facility]    History of colonoscopy 6/4/2021    Colonoscopy done by Dr. Rayshawn Adkins MD on 06/04/21.non bleeding internal hemorrhoids.  4 mm polyp in the sigmoid colon removed with cold snare.  Patent end-to-end colocolonic anastomosis characterized by healthy-appearing mucosa.  Examination was otherwise normal.  Repeat colonoscopy in 3 years for surveillance.    Malignant neoplasm of transverse colon (2013) - Stage III 4/4/2019    Osteoporosis     S/P laparoscopic colectomy (3/2013) 4/5/2019    Seasonal allergies      Social History     Socioeconomic History    Marital status:      Spouse name: Luis West    Number of children: 3   Occupational History    Occupation:    Tobacco Use    Smoking status: Never    Smokeless tobacco: Never   Substance and Sexual Activity    Alcohol use: No    Drug use: No    Sexual activity: Not Currently     Partners: Male     Birth control/protection: Post-menopausal   Social History Narrative    Speaks Gujarathi only     Past Surgical History:   Procedure  Laterality Date    COLON SURGERY      JOINT REPLACEMENT       Family History   Problem Relation Age of Onset    Stroke Mother     Heart disease Mother     Cancer Father         Reneaamsunny       Review of Systems   Constitutional:  Positive for activity change. Negative for diaphoresis and fever.   Respiratory: Negative.     Cardiovascular: Negative.    Gastrointestinal: Negative.    Musculoskeletal:  Positive for arthralgias.       Objective:      Blood pressure 132/75, pulse 95, height 5' (1.524 m), weight 61.2 kg (135 lb). Body mass index is 26.37 kg/m².  Physical Exam  Constitutional:       Appearance: Normal appearance. She is not ill-appearing or diaphoretic.   Cardiovascular:      Rate and Rhythm: Normal rate and regular rhythm.   Pulmonary:      Effort: Pulmonary effort is normal.      Breath sounds: Normal breath sounds.   Abdominal:      General: There is no distension.      Tenderness: There is no abdominal tenderness.   Musculoskeletal:        Legs:    Skin:     Coloration: Skin is not jaundiced or pale.   Neurological:      Mental Status: She is alert. Mental status is at baseline.   Psychiatric:      Comments: Constantly talking , anxious to get her complains across.         Assessment:             Arthritis of left knee  -     X-Ray Knee 3 View Left; Future; Expected date: 05/15/2023  -     diclofenac sodium (VOLTAREN) 1 % Gel; Apply 2 g topically 2 (two) times daily. Apply to the knee joint left side.  Dispense: 100 g; Refill: 1  -     celecoxib (CELEBREX) 50 MG capsule; Take 1 capsule (50 mg total) by mouth 2 (two) times daily.  Dispense: 60 capsule; Refill: 2    Type 2 diabetes mellitus without complication, without long-term current use of insulin  Comments:  Hemoglobin A1c is now 7.7.  Patient will be going to [facility] for 3 months and all the controls will be off now.  Orders:  -     glimepiride (AMARYL) 1 MG tablet; TAKE 1 TABLET BY MOUTH BEFORE BREAKFAST (PLEASE  DISCONTINUE  JARDIANCE  BECAUSE  OF   NON  TOLERANCE.  TRIAL  OF  GLIMEPIRIDE  AT  1  MG)  Dispense: 90 tablet; Refill: 3    Mixed hyperlipidemia  Comments:  Reasonably under control.  Orders:  -     rosuvastatin (CRESTOR) 5 MG tablet; Take 1 tablet (5 mg total) by mouth every evening.  Dispense: 90 tablet; Refill: 3         Lab Visit on 02/20/2023   Component Date Value Ref Range Status    WBC 02/20/2023 8.09  3.90 - 12.70 K/uL Final    RBC 02/20/2023 4.02  4.00 - 5.40 M/uL Final    Hemoglobin 02/20/2023 10.8 (L)  12.0 - 16.0 g/dL Final    Hematocrit 02/20/2023 34.7 (L)  37.0 - 48.5 % Final    MCV 02/20/2023 86  82 - 98 fL Final    MCH 02/20/2023 26.9 (L)  27.0 - 31.0 pg Final    MCHC 02/20/2023 31.1 (L)  32.0 - 36.0 g/dL Final    RDW 02/20/2023 14.7 (H)  11.5 - 14.5 % Final    Platelets 02/20/2023 229  150 - 450 K/uL Final    MPV 02/20/2023 10.4  9.2 - 12.9 fL Final    Immature Granulocytes 02/20/2023 0.4  0.0 - 0.5 % Final    Gran # (ANC) 02/20/2023 4.9  1.8 - 7.7 K/uL Final    Immature Grans (Abs) 02/20/2023 0.03  0.00 - 0.04 K/uL Final    Lymph # 02/20/2023 2.4  1.0 - 4.8 K/uL Final    Mono # 02/20/2023 0.6  0.3 - 1.0 K/uL Final    Eos # 02/20/2023 0.2  0.0 - 0.5 K/uL Final    Baso # 02/20/2023 0.03  0.00 - 0.20 K/uL Final    nRBC 02/20/2023 0  0 /100 WBC Final    Gran % 02/20/2023 59.9  38.0 - 73.0 % Final    Lymph % 02/20/2023 30.2  18.0 - 48.0 % Final    Mono % 02/20/2023 6.9  4.0 - 15.0 % Final    Eosinophil % 02/20/2023 2.2  0.0 - 8.0 % Final    Basophil % 02/20/2023 0.4  0.0 - 1.9 % Final    Differential Method 02/20/2023 Automated   Final    Sodium 02/20/2023 134 (L)  136 - 145 mmol/L Final    Potassium 02/20/2023 4.5  3.5 - 5.1 mmol/L Final    Chloride 02/20/2023 103  95 - 110 mmol/L Final    CO2 02/20/2023 20 (L)  23 - 29 mmol/L Final    Glucose 02/20/2023 90  70 - 110 mg/dL Final    BUN 02/20/2023 18  8 - 23 mg/dL Final    Creatinine 02/20/2023 1.0  0.5 - 1.4 mg/dL Final    Calcium 02/20/2023 9.2  8.7 - 10.5 mg/dL Final    Total Protein  02/20/2023 7.3  6.0 - 8.4 g/dL Final    Albumin 02/20/2023 3.7  3.5 - 5.2 g/dL Final    Total Bilirubin 02/20/2023 0.5  0.1 - 1.0 mg/dL Final    Alkaline Phosphatase 02/20/2023 42 (L)  55 - 135 U/L Final    AST 02/20/2023 19  10 - 40 U/L Final    ALT 02/20/2023 18  10 - 44 U/L Final    Anion Gap 02/20/2023 11  8 - 16 mmol/L Final    eGFR 02/20/2023 >60.0  >60 mL/min/1.73 m^2 Final    CEA 02/20/2023 2.9  0.0 - 5.0 ng/mL Final    Folate 02/20/2023 12.6  4.0 - 24.0 ng/mL Final   Lab Visit on 02/20/2023   Component Date Value Ref Range Status    Hemoglobin A1C 02/20/2023 7.7 (H)  4.5 - 6.2 % Final    Estimated Avg Glucose 02/20/2023 174 (H)  68 - 131 mg/dL Final    Sodium 02/20/2023 136  136 - 145 mmol/L Final    Potassium 02/20/2023 4.8  3.5 - 5.1 mmol/L Final    Chloride 02/20/2023 107  95 - 110 mmol/L Final    CO2 02/20/2023 20 (L)  23 - 29 mmol/L Final    Glucose 02/20/2023 90  70 - 110 mg/dL Final    BUN 02/20/2023 18  8 - 23 mg/dL Final    Creatinine 02/20/2023 1.0  0.5 - 1.4 mg/dL Final    Calcium 02/20/2023 9.3  8.7 - 10.5 mg/dL Final    Anion Gap 02/20/2023 9  8 - 16 mmol/L Final    eGFR 02/20/2023 >60.0  >60 mL/min/1.73 m^2 Final    ALT 02/20/2023 19  10 - 44 U/L Final     Narrative & Impression  3 views of the left knee     Clinical history is pain     There is moderate osteoarthritic changes of the medial joint space and patellofemoral joint. There are no fractures, dislocations or acute osseous abnormalities. The soft tissues are normal.     IMPRESSION: Osteoarthritic changes of the left knee with no acute osseous abnormality     Electronically signed by:  Drea Benton MD  5/15/2023 2:22 PM CDT Workstation: [id number]           Specimen Collected: 05/15/23 13:47 Last Resulted: 05/15/23 14:22                  Plan:           Arthritis of left knee  -     X-Ray Knee 3 View Left; Future; Expected date: 05/15/2023  -     diclofenac sodium (VOLTAREN) 1 % Gel; Apply 2 g topically 2 (two) times daily. Apply  to the knee joint left side.  Dispense: 100 g; Refill: 1  -     celecoxib (CELEBREX) 50 MG capsule; Take 1 capsule (50 mg total) by mouth 2 (two) times daily.  Dispense: 60 capsule; Refill: 2    Type 2 diabetes mellitus without complication, without long-term current use of insulin  Comments:  Hemoglobin A1c is now 7.7.  Patient will be going to [facility] for 3 months and all the controls will be off now.  Orders:  -     glimepiride (AMARYL) 1 MG tablet; TAKE 1 TABLET BY MOUTH BEFORE BREAKFAST (PLEASE  DISCONTINUE  JARDIANCE  BECAUSE  OF  NON  TOLERANCE.  TRIAL  OF  GLIMEPIRIDE  AT  1  MG)  Dispense: 90 tablet; Refill: 3    Mixed hyperlipidemia  Comments:  Reasonably under control.  Orders:  -     rosuvastatin (CRESTOR) 5 MG tablet; Take 1 tablet (5 mg total) by mouth every evening.  Dispense: 90 tablet; Refill: 3      Arthritis of the left knee which I suspect is osteoarthritis has been noted.  Thus far no history of gout.  No family history of arthritis that she can recall.      She did have osteoarthritis of the right knee which was treated with total knee replacement.    I did discuss about possible referral to orthopedics and getting a cortisone injection.  Both  and wife for vehemently opposed to any injections.  They would rather go for surgery if needed.      I will try to conservatively treat with Voltaren gel and Celebrex as needed.  They are very leery about taking any anti-inflammatory medications because of potential for any side effects.  They can take Tylenol in that case.  Massage might help.    They have a regular follow-up next month which will keep.    Refills have been sent for Lipids and DM though she has not been evaluated for the same.    X-ray of the left knee reports are back and it shows osteoarthritis which is expected.  I have sent a message to the patient's chart portal.      Current Outpatient Medications:     alendronate (FOSAMAX) 70 MG tablet, TAKE 1 TABLET BY MOUTH ONCE EVERY 7  DAYS, Disp: 12 tablet, Rfl: 3    blood sugar diagnostic (TRUE METRIX GLUCOSE TEST STRIP) Strp, USE 1 STRIP TO CHECK GLUCOSE TWICE DAILY, Disp: 200 strip, Rfl: 3    clotrimazole-betamethasone 1-0.05% (LOTRISONE) cream, Apply topically 2 (two) times daily., Disp: 15 g, Rfl: 1    fluticasone propionate (FLONASE) 50 mcg/actuation nasal spray, Use 1 spray(s) in each nostril once daily, Disp: 16 g, Rfl: 11    lancets (LANCETS,ULTRA THIN) 26 gauge Misc, 1 lancet by Misc.(Non-Drug; Combo Route) route once daily., Disp: 100 each, Rfl: 0    losartan (COZAAR) 100 MG tablet, Take 1 tablet (100 mg total) by mouth once daily., Disp: 90 tablet, Rfl: 4    metFORMIN (GLUCOPHAGE) 1000 MG tablet, Take 1 tablet (1,000 mg total) by mouth 2 (two) times daily with meals., Disp: 180 tablet, Rfl: 3    nystatin (MYCOSTATIN) cream, Apply topically 2 (two) times daily., Disp: 30 g, Rfl: 1    pantoprazole (PROTONIX) 40 MG tablet, Take 1 tablet (40 mg total) by mouth once daily., Disp: 90 tablet, Rfl: 0    polyethylene glycol (GLYCOLAX) 17 gram/dose powder, DISSOLVE 17 GRAMS IN WATER AND DRINK ONCE DAILY, Disp: 510 g, Rfl: 3    blood-glucose meter kit, One touch meter and matching lancets and strips., Disp: 1 each, Rfl: 0    celecoxib (CELEBREX) 50 MG capsule, Take 1 capsule (50 mg total) by mouth 2 (two) times daily., Disp: 60 capsule, Rfl: 2    diclofenac sodium (VOLTAREN) 1 % Gel, Apply 2 g topically 2 (two) times daily. Apply to the knee joint left side., Disp: 100 g, Rfl: 1    glimepiride (AMARYL) 1 MG tablet, TAKE 1 TABLET BY MOUTH BEFORE BREAKFAST (PLEASE  DISCONTINUE  JARDIANCE  BECAUSE  OF  NON  TOLERANCE.  TRIAL  OF  GLIMEPIRIDE  AT  1  MG), Disp: 90 tablet, Rfl: 3    rosuvastatin (CRESTOR) 5 MG tablet, Take 1 tablet (5 mg total) by mouth every evening., Disp: 90 tablet, Rfl: 3

## 2023-06-20 ENCOUNTER — PATIENT MESSAGE (OUTPATIENT)
Dept: FAMILY MEDICINE | Facility: CLINIC | Age: 73
End: 2023-06-20

## 2023-06-22 ENCOUNTER — LAB VISIT (OUTPATIENT)
Dept: LAB | Facility: HOSPITAL | Age: 73
End: 2023-06-22
Attending: INTERNAL MEDICINE
Payer: MEDICARE

## 2023-06-22 DIAGNOSIS — E78.2 MIXED HYPERLIPIDEMIA: ICD-10-CM

## 2023-06-22 DIAGNOSIS — E11.9 TYPE 2 DIABETES MELLITUS WITHOUT COMPLICATION, WITHOUT LONG-TERM CURRENT USE OF INSULIN: Chronic | ICD-10-CM

## 2023-06-22 LAB
ALBUMIN/CREAT UR: 14.5 UG/MG (ref 0–30)
CHOLEST SERPL-MCNC: 71 MG/DL (ref 120–199)
CHOLEST/HDLC SERPL: 1.9 {RATIO} (ref 2–5)
CREAT UR-MCNC: 208 MG/DL (ref 15–325)
ESTIMATED AVG GLUCOSE: 166 MG/DL (ref 68–131)
HBA1C MFR BLD: 7.4 % (ref 4.5–6.2)
HDLC SERPL-MCNC: 37 MG/DL (ref 40–75)
HDLC SERPL: 52.1 % (ref 20–50)
LDLC SERPL CALC-MCNC: 22.2 MG/DL (ref 63–159)
MICROALBUMIN UR DL<=1MG/L-MCNC: 30.2 UG/ML
NONHDLC SERPL-MCNC: 34 MG/DL
TRIGL SERPL-MCNC: 59 MG/DL (ref 30–150)

## 2023-06-22 PROCEDURE — 83036 HEMOGLOBIN GLYCOSYLATED A1C: CPT | Performed by: INTERNAL MEDICINE

## 2023-06-22 PROCEDURE — 80061 LIPID PANEL: CPT | Performed by: INTERNAL MEDICINE

## 2023-06-22 PROCEDURE — 36415 COLL VENOUS BLD VENIPUNCTURE: CPT | Performed by: INTERNAL MEDICINE

## 2023-06-22 PROCEDURE — 82570 ASSAY OF URINE CREATININE: CPT | Performed by: INTERNAL MEDICINE

## 2023-07-01 NOTE — PROGRESS NOTES
Subjective:       Patient ID: Preet West is a 73 y.o. female.    Chief Complaint: Leg Pain (1 month), Hypertension, Hyperlipidemia, Diabetes, and Osteoporosis    Mrs. Iesha West is a 73-year-old female for 4 month follow-up..  Today she is accompanied with her son and not her . They both help her interpret/understand medical issues; with her limited additional language skills (besides her native language) .    NEW MAIN COMPLAINT SEEMS TO BE A PAIN AND DISCOMFORT GOING AROUND THE THIGHS BILATERALLY.  THIS HAS BEEN GOING ON FOR ABOUT 15 DAYS.  SHE DOES NOT RECALL ANY HISTORY OF TRAUMA, FALL, INJURY, PULLING OR PUSHING.  SHE HAD STOPPED HER STATIN MEDICATIONS AND SHE FELT A LITTLE BETTER.  BEFORE STOPPING THE STATIN MEDICATION SHE WAS NOT ABLE TO CLIMB THE STAIRS.  NOW SHE IS ABLE TO CLIMB THE STAIRS.    As far as blood pressure is concerned, it is high in the office.  The degree of checking and reliability to monitoring blood pressures at home is not great.  She states that at home blood pressures are okay except when she walks and it goes slightly above 140.     Underlying medical issues include the following:-    1.-type 2 diabetes mellitus -hemoglobin A1c has come down to 7.4.  2.-hypertension  3.-hyperlipidemia  4.--anxiety though she is not on any medication for this this is probably inherent and to some extent culture also.  5.-osteoporosis currently on alendronate.     6. -nonspecific complains of fatigue and empirical treatment with vitamin B12 injections.  Please note that she was on vitamin B12 injections prior to coming to [place] and under my care from an [place]state physician.  No records were available which actually demonstrated a low vitamin-B-12 but some how this injection made her feel better and I am not sure if there was a psychological /placebo component to her feeling better or there is some real physical improvement.  On 1 occasion she did mentioned that as soon as the red  injection entered her arm, she felt strong.  (probably a conditioned response).  Previously herd  had remarked that she did not find any improvement with B12 injections and this was thereafter stopped.  However patient gives contradictory information and states that it helps her.  So this will be (vitamin B12) restarted again.  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  This was in 2013 outside [place]  8.-recent visit in the month of May for pain in the left knee considered to be osteoarthritis and treated conservatively with diclofenac gel and Celebrex.    Recent labs have shown total cholesterol of 71, LDL of 22, HDL 37.  Hemoglobin A1c is 7.4 which is somewhat better than 7.7 last time.  Urine microalbumin is trivially elevated at 30.2.    History of colon cancer also has been noted.  This was in 2013     She recently saw Dr. Mi also from oncology services.      Colectomy -  3/12/2013:  - low grade adenocarcinoma with invasion of the pericolonic adipose tissue and johnny metastasis  - 5.3cm size tumor  - T3 N1a (1 out of 12 LN's were positive)  - histologic freatures of MSI present      Control of diabetes is somewhat borderline and I had wanted her to stop glimepiride in view of potential adverse effects from this sulfonylurea.  However she is psychologically and mentally unable to get off this medication.  She wants to continue with this.      Discussed about ophthalmology examination and COVID precautions and booster vaccine.    In past she had complained about itching from statin medications which were temporarily held and upon resumption she did not experience any symptoms.          Diabetes  She presents for her follow-up diabetic visit. She has type 2 diabetes mellitus. No MedicAlert identification noted. The initial diagnosis of diabetes was made 20 years ago. Her disease course has been fluctuating. Hypoglycemia symptoms include nervousness/anxiousness. Pertinent  negatives for hypoglycemia include no confusion, dizziness, pallor, seizures or tremors. Associated symptoms include fatigue (Getting better). Pertinent negatives for diabetes include no chest pain, no polydipsia, no polyphagia and no polyuria. There are no hypoglycemic complications. Risk factors for coronary artery disease include sedentary lifestyle, post-menopausal, hypertension, dyslipidemia and diabetes mellitus. Current diabetic treatment includes oral agent (monotherapy). She is compliant with treatment some of the time. She has not had a previous visit with a dietitian. She never participates in exercise. An ACE inhibitor/angiotensin II receptor blocker is being taken. She does not see a podiatrist.  Hypertension  This is a chronic problem. The current episode started more than 1 year ago. The problem is controlled. Associated symptoms include anxiety and peripheral edema. Pertinent negatives include no chest pain, neck pain, palpitations or shortness of breath. Risk factors for coronary artery disease include sedentary lifestyle. Past treatments include calcium channel blockers. The current treatment provides moderate improvement. Compliance problems include psychosocial issues.  There is no history of chronic renal disease, hyperaldosteronism, hypercortisolism or sleep apnea.   Gastroesophageal Reflux  She reports no abdominal pain, no chest pain, no nausea or no wheezing. This is a chronic problem. The current episode started more than 1 year ago. The problem occurs occasionally. Associated symptoms include fatigue (Getting better). She has tried a PPI for the symptoms. The treatment provided moderate relief.   Hyperlipidemia  This is a chronic problem. The current episode started more than 1 year ago. The problem is controlled. Exacerbating diseases include diabetes. She has no history of chronic renal disease, hypothyroidism, liver disease, obesity or nephrotic syndrome. Associated symptoms include leg  pain and myalgias. Pertinent negatives include no chest pain or shortness of breath. Current antihyperlipidemic treatment includes statins. The current treatment provides moderate improvement of lipids. Compliance problems include psychosocial issues.  Risk factors for coronary artery disease include hypertension, diabetes mellitus, dyslipidemia, post-menopausal and a sedentary lifestyle.   Leg Pain   The incident occurred more than 1 week ago. There was no injury mechanism. The pain is present in the left thigh and right thigh. The quality of the pain is described as cramping and aching. The pain is at a severity of 5/10. The pain is moderate. The pain has been Fluctuating (More worse towards night when she is resting.) since onset. Pertinent negatives include no inability to bear weight or muscle weakness. She reports no foreign bodies present. She has tried non-weight bearing (Walking seems to help her.) for the symptoms. The treatment provided no relief.     Past Medical History:   Diagnosis Date    Allergy     aspirin itching    Anemia     Cancer     Diabetes mellitus, type 2     H/O colon cancer, stage III 1/1/2013    Treated in [facility]    History of colonoscopy 6/4/2021    Colonoscopy done by Dr. Rayshawn Adkins MD on 06/04/21.non bleeding internal hemorrhoids.  4 mm polyp in the sigmoid colon removed with cold snare.  Patent end-to-end colocolonic anastomosis characterized by healthy-appearing mucosa.  Examination was otherwise normal.  Repeat colonoscopy in 3 years for surveillance.    Malignant neoplasm of transverse colon (2013) - Stage III 4/4/2019    Osteoporosis     S/P laparoscopic colectomy (3/2013) 4/5/2019    Seasonal allergies      Social History     Socioeconomic History    Marital status:      Spouse name: Luis West    Number of children: 3   Occupational History    Occupation:    Tobacco Use    Smoking status: Never    Smokeless tobacco: Never   Substance and Sexual  Activity    Alcohol use: No    Drug use: No    Sexual activity: Not Currently     Partners: Male     Birth control/protection: Post-menopausal   Social History Narrative    Speaks Gujarathi only     Past Surgical History:   Procedure Laterality Date    COLON SURGERY      JOINT REPLACEMENT       Family History   Problem Relation Age of Onset    Stroke Mother     Heart disease Mother     Cancer Father         Soraya       Review of Systems   Constitutional:  Positive for fatigue (Getting better). Negative for activity change, appetite change, chills, fever and unexpected weight change (Gained 1 lb since the last documentation.  129--130 lb.).   HENT:  Negative for congestion, postnasal drip, sinus pressure and sneezing.         Uses Flonase for sinuses.   Eyes:  Negative for pain, discharge and visual disturbance (She had bilateral cataract surgery.).   Respiratory:  Negative for chest tightness, shortness of breath, wheezing and stridor.    Cardiovascular:  Negative for chest pain, palpitations and leg swelling.        Blood pressure was previously elevated now they are better on losartan 100 mg.   Gastrointestinal:  Negative for abdominal distention, abdominal pain, blood in stool, diarrhea and nausea.        GERD occasional use of pantoprazole.  History of colon cancer status post colectomy in 2013.  Probably regional johnny metastasis.  Details not available at this point.   Endocrine: Negative for cold intolerance, polydipsia, polyphagia and polyuria.        Past history of hyponatremia has been noted.      Underlying diabetes has been noted with improvement in blood sugars to 7.7.   Genitourinary:  Negative for flank pain.        Previously had complained of vaginal itching but no more.   Musculoskeletal:  Positive for arthralgias, gait problem and myalgias. Negative for joint swelling, neck pain and neck stiffness.        Complains of bilateral leg pains.   Skin:  Negative for color change and pallor.    Allergic/Immunologic: Negative for environmental allergies, food allergies and immunocompromised state.   Neurological:  Negative for dizziness, tremors, seizures, syncope and light-headedness.   Hematological:  Negative for adenopathy. Does not bruise/bleed easily.   Psychiatric/Behavioral:  Negative for agitation, confusion and dysphoric mood. The patient is nervous/anxious.         Somewhat anxious about her health issues.       Objective:      Blood pressure 135/85, pulse 77, weight 59.6 kg (131 lb 8 oz), SpO2 99 %. Body mass index is 25.68 kg/m².  Physical Exam  Constitutional:       Appearance: Normal appearance. She is not ill-appearing or diaphoretic.      Comments: BMI is 25.68   HENT:      Mouth/Throat:      Pharynx: No posterior oropharyngeal erythema.   Cardiovascular:      Rate and Rhythm: Normal rate and regular rhythm.   Pulmonary:      Effort: Pulmonary effort is normal. No respiratory distress.      Breath sounds: Normal breath sounds. No stridor.   Abdominal:      General: There is no distension.      Tenderness: There is no abdominal tenderness.   Musculoskeletal:      Cervical back: No rigidity.      Right lower leg: No edema.      Left lower leg: No edema.        Legs:       Comments: She did areas in the picture above show area of pain that she experiences.  On pressure in multiple muscle groups she experiences pain.   Skin:     Coloration: Skin is not jaundiced or pale.      Findings: No rash.   Neurological:      Mental Status: She is alert. Mental status is at baseline.   Psychiatric:      Comments: Chronically anxious.         Assessment:               Type 2 diabetes mellitus without complication, without long-term current use of insulin  -     metFORMIN (GLUCOPHAGE) 1000 MG tablet; Take 1 tablet (1,000 mg total) by mouth 2 (two) times daily with meals.  Dispense: 180 tablet; Refill: 3  -     Comprehensive Metabolic Panel; Future; Expected date: 07/03/2023    Mixed hyperlipidemia  -      Comprehensive Metabolic Panel; Future; Expected date: 07/03/2023    Essential hypertension  -     Comprehensive Metabolic Panel; Future; Expected date: 07/03/2023    Gastroesophageal reflux disease without esophagitis    Chronic idiopathic constipation    Fatigue, unspecified type  -     cyanocobalamin 1,000 mcg/mL injection; Inject 1 mL (1,000 mcg total) into the muscle every 30 days.  Dispense: 1 mL; Refill: 11    Vitamin B 12 deficiency    Statin myopathy  -     CK; Future; Expected date: 07/03/2023    At increased risk for social isolation  Comments:  Mostly confined to her home and limited community.  Language barriers.    Bilateral leg pain  -     Ambulatory referral/consult to Physical/Occupational Therapy; Future; Expected date: 07/10/2023  -     CK; Future; Expected date: 07/03/2023    Urinary tract infection symptoms  -     Urinalysis, Reflex to Urine Culture Urine, Clean Catch; Future; Expected date: 07/03/2023    Spinal stenosis of lumbar region without neurogenic claudication  -     Ambulatory referral/consult to Physical/Occupational Therapy; Future; Expected date: 07/10/2023    Mild vitamin D deficiency  -     Vitamin D; Future; Expected date: 07/03/2023       Lab Visit on 06/22/2023   Component Date Value Ref Range Status    Cholesterol 06/22/2023 71 (L)  120 - 199 mg/dL Final    Triglycerides 06/22/2023 59  30 - 150 mg/dL Final    HDL 06/22/2023 37 (L)  40 - 75 mg/dL Final    LDL Cholesterol 06/22/2023 22.2 (L)  63.0 - 159.0 mg/dL Final    HDL/Cholesterol Ratio 06/22/2023 52.1 (H)  20.0 - 50.0 % Final    Total Cholesterol/HDL Ratio 06/22/2023 1.9 (L)  2.0 - 5.0 Final    Non-HDL Cholesterol 06/22/2023 34  mg/dL Final    Hemoglobin A1C 06/22/2023 7.4 (H)  4.5 - 6.2 % Final    Estimated Avg Glucose 06/22/2023 166 (H)  68 - 131 mg/dL Final    Microalbumin, Urine 06/22/2023 30.2 (H)  <19.9 ug/mL Final    Creatinine, Urine 06/22/2023 208.0  15.0 - 325.0 mg/dL Final    Microalb/Creat Ratio 06/22/2023 14.5   0.0 - 30.0 ug/mg Final         Plan:           Type 2 diabetes mellitus without complication, without long-term current use of insulin  -     metFORMIN (GLUCOPHAGE) 1000 MG tablet; Take 1 tablet (1,000 mg total) by mouth 2 (two) times daily with meals.  Dispense: 180 tablet; Refill: 3  -     Comprehensive Metabolic Panel; Future; Expected date: 07/03/2023    Mixed hyperlipidemia  -     Comprehensive Metabolic Panel; Future; Expected date: 07/03/2023    Essential hypertension  -     Comprehensive Metabolic Panel; Future; Expected date: 07/03/2023    Gastroesophageal reflux disease without esophagitis    Chronic idiopathic constipation    Fatigue, unspecified type  -     cyanocobalamin 1,000 mcg/mL injection; Inject 1 mL (1,000 mcg total) into the muscle every 30 days.  Dispense: 1 mL; Refill: 11    Vitamin B 12 deficiency    Statin myopathy  -     CK; Future; Expected date: 07/03/2023    At increased risk for social isolation  Comments:  Mostly confined to her home and limited community.  Language barriers.    Bilateral leg pain  -     Ambulatory referral/consult to Physical/Occupational Therapy; Future; Expected date: 07/10/2023  -     CK; Future; Expected date: 07/03/2023    Urinary tract infection symptoms  -     Urinalysis, Reflex to Urine Culture Urine, Clean Catch; Future; Expected date: 07/03/2023    Spinal stenosis of lumbar region without neurogenic claudication  -     Ambulatory referral/consult to Physical/Occupational Therapy; Future; Expected date: 07/10/2023    Mild vitamin D deficiency  -     Vitamin D; Future; Expected date: 07/03/2023      Patient's medical management continues to be challenging.  Reliability of for complains is not very certain.    Her cholesterol numbers had come significantly down.  She has stopped her medications which led to some improvement at least in her ability to climb stairs though the pain in the muscle groups has not diminished.     PERHAPS A CONSIDERATION NOW NEEDS TO  BE MADE AS TO QUALITY OF LIFE VERSUS MEDICATION INTERVENTIONS OR LONGEVITY.      For the time being,I will reduce the statin to twice a week now.      She is aching in all the muscle groups.  Not sure if it is because of statins or something else.  Mild pressure on different muscle groups elicits discomfort and tenderness.        Check CPK, magnesium, electrolytes.      Check vitamin-D level also.      Evidently and apparently she felt better with B12.  Her  had thought otherwise that it did not help her at all and stop the medications.  The information provided by the patient tends to be very conflicting and fluctuant.  Will give it her empirically and order has been given.    She does have Celebrex.        1.-I have ordered some labs for her including urine, blood test.  To be done at [facility].    2.-I have sent a referral to [facility] physical therapy to call her for evaluation of her leg and thigh pain and if it is coming from lumbar spine.    3.-reduce rosuvastatin to only twice a week.  If it still bothers her or there is a suspicion if it is causing her myopathy then will stop it completely.  4.-take Celebrex 1 capsule twice a day as needed for pain.  5.-please do some stretch exercises.    Subjective labs and any interim developments of negative nature, I will see her back in 4 months.    Spent charmaine 38 minutes with patient which involved review of pts medical conditions, labs, medications and with 50% of time face-to-face discussion about medical problems, management and any applicable changes.    The chart has been reopened again with some corrections in spelling errors and dictation type hose.  Labs have also been reviewed    Sodium is again slightly low at 134 and will discuss with the son through the portal message that he should cut down on excess fluid intake.  Perhaps add a little bit of salt.    Urine test shows leukocyte esterase and some RBCs WBCs.  No bacteria.  Doubt this is  an infection but difficult to prove otherwise.  May give a small course of antibiotics.    CPK level is within normal range.  Liver function tests are normal.  BUN and creatinine are normal.      Component      Latest Ref Rng & Units 7/3/2023   Sodium      136 - 145 mmol/L 134 (L)   Potassium      3.5 - 5.1 mmol/L 4.8   Chloride      95 - 110 mmol/L 102   CO2      23 - 29 mmol/L 22 (L)   Glucose      70 - 110 mg/dL 147 (H)   BUN      8 - 23 mg/dL 14   Creatinine      0.5 - 1.4 mg/dL 0.9   Calcium      8.7 - 10.5 mg/dL 9.1   PROTEIN TOTAL      6.0 - 8.4 g/dL 7.3   Albumin      3.5 - 5.2 g/dL 3.6   BILIRUBIN TOTAL      0.1 - 1.0 mg/dL 0.4   Alkaline Phosphatase      55 - 135 U/L 53 (L)   AST      10 - 40 U/L 14   ALT      10 - 44 U/L 18   eGFR      >60 mL/min/1.73 m:2 >60.0   Anion Gap      8 - 16 mmol/L 10   Specimen UA       Urine, Clean Catch   Color, UA      Yellow, Straw, Pearl Yellow   Appearance, UA      Clear Clear   pH, UA      5.0 - 8.0 7.0   Specific Gravity, UA      1.005 - 1.030 1.010   Protein, UA      Negative Negative   Glucose, UA      Negative Negative   Ketones, UA      Negative Negative   Bilirubin (UA)      Negative Negative   Occult Blood UA      Negative Negative   NITRITE UA      Negative Negative   UROBILINOGEN UA      Negative EU/dL Negative   Leukocytes, UA      Negative 3+ (A)   RBC, UA      0 - 4 /hpf 6 (H)   WBC, UA      0 - 5 /hpf 26 (H)   Bacteria, UA      None-Occ /hpf Negative   Squam Epithel, UA      /hpf 2   Hyaline Casts, UA      0-1/lpf /lpf 1   Microscopic Comment       SEE COMMENT   CPK      20 - 180 U/L 48   Vit D, 25-Hydroxy      30 - 96 ng/mL 24 (L)         Current Outpatient Medications:     alendronate (FOSAMAX) 70 MG tablet, TAKE 1 TABLET BY MOUTH ONCE EVERY 7 DAYS, Disp: 12 tablet, Rfl: 3    blood sugar diagnostic (TRUE METRIX GLUCOSE TEST STRIP) Strp, USE 1 STRIP TO CHECK GLUCOSE TWICE DAILY, Disp: 200 strip, Rfl: 3    blood-glucose meter kit, One touch meter and  matching lancets and strips., Disp: 1 each, Rfl: 0    celecoxib (CELEBREX) 50 MG capsule, Take 1 capsule (50 mg total) by mouth 2 (two) times daily., Disp: 60 capsule, Rfl: 2    clotrimazole-betamethasone 1-0.05% (LOTRISONE) cream, Apply topically 2 (two) times daily., Disp: 15 g, Rfl: 1    diclofenac sodium (VOLTAREN) 1 % Gel, Apply 2 g topically 2 (two) times daily. Apply to the knee joint left side., Disp: 100 g, Rfl: 1    fluticasone propionate (FLONASE) 50 mcg/actuation nasal spray, Use 1 spray(s) in each nostril once daily, Disp: 16 g, Rfl: 11    glimepiride (AMARYL) 1 MG tablet, TAKE 1 TABLET BY MOUTH BEFORE BREAKFAST (PLEASE  DISCONTINUE  JARDIANCE  BECAUSE  OF  NON  TOLERANCE.  TRIAL  OF  GLIMEPIRIDE  AT  1  MG), Disp: 90 tablet, Rfl: 3    lancets (LANCETS,ULTRA THIN) 26 gauge Misc, 1 lancet by Misc.(Non-Drug; Combo Route) route once daily., Disp: 100 each, Rfl: 0    losartan (COZAAR) 100 MG tablet, Take 1 tablet (100 mg total) by mouth once daily., Disp: 90 tablet, Rfl: 4    nystatin (MYCOSTATIN) cream, Apply topically 2 (two) times daily., Disp: 30 g, Rfl: 1    polyethylene glycol (GLYCOLAX) 17 gram/dose powder, DISSOLVE 17 GRAMS IN WATER AND DRINK ONCE DAILY, Disp: 510 g, Rfl: 3    rosuvastatin (CRESTOR) 5 MG tablet, Take 1 tablet (5 mg total) by mouth every evening., Disp: 90 tablet, Rfl: 3    cyanocobalamin 1,000 mcg/mL injection, Inject 1 mL (1,000 mcg total) into the muscle every 30 days., Disp: 1 mL, Rfl: 11    metFORMIN (GLUCOPHAGE) 1000 MG tablet, Take 1 tablet (1,000 mg total) by mouth 2 (two) times daily with meals., Disp: 180 tablet, Rfl: 3

## 2023-07-03 ENCOUNTER — LAB VISIT (OUTPATIENT)
Dept: LAB | Facility: HOSPITAL | Age: 73
End: 2023-07-03
Attending: INTERNAL MEDICINE
Payer: MEDICARE

## 2023-07-03 ENCOUNTER — OFFICE VISIT (OUTPATIENT)
Dept: FAMILY MEDICINE | Facility: CLINIC | Age: 73
End: 2023-07-03
Payer: MEDICARE

## 2023-07-03 VITALS
DIASTOLIC BLOOD PRESSURE: 85 MMHG | WEIGHT: 131.5 LBS | HEART RATE: 77 BPM | OXYGEN SATURATION: 99 % | SYSTOLIC BLOOD PRESSURE: 135 MMHG | BODY MASS INDEX: 25.68 KG/M2

## 2023-07-03 DIAGNOSIS — Z91.89 AT INCREASED RISK FOR SOCIAL ISOLATION: ICD-10-CM

## 2023-07-03 DIAGNOSIS — E78.2 MIXED HYPERLIPIDEMIA: ICD-10-CM

## 2023-07-03 DIAGNOSIS — M79.605 BILATERAL LEG PAIN: ICD-10-CM

## 2023-07-03 DIAGNOSIS — I10 ESSENTIAL HYPERTENSION: ICD-10-CM

## 2023-07-03 DIAGNOSIS — T46.6X5A STATIN MYOPATHY: ICD-10-CM

## 2023-07-03 DIAGNOSIS — M79.604 BILATERAL LEG PAIN: ICD-10-CM

## 2023-07-03 DIAGNOSIS — E55.9 MILD VITAMIN D DEFICIENCY: ICD-10-CM

## 2023-07-03 DIAGNOSIS — K59.04 CHRONIC IDIOPATHIC CONSTIPATION: ICD-10-CM

## 2023-07-03 DIAGNOSIS — R39.9 URINARY TRACT INFECTION SYMPTOMS: ICD-10-CM

## 2023-07-03 DIAGNOSIS — R53.83 FATIGUE, UNSPECIFIED TYPE: ICD-10-CM

## 2023-07-03 DIAGNOSIS — G72.0 STATIN MYOPATHY: ICD-10-CM

## 2023-07-03 DIAGNOSIS — K21.9 GASTROESOPHAGEAL REFLUX DISEASE WITHOUT ESOPHAGITIS: ICD-10-CM

## 2023-07-03 DIAGNOSIS — E11.9 TYPE 2 DIABETES MELLITUS WITHOUT COMPLICATION, WITHOUT LONG-TERM CURRENT USE OF INSULIN: ICD-10-CM

## 2023-07-03 DIAGNOSIS — E11.9 TYPE 2 DIABETES MELLITUS WITHOUT COMPLICATION, WITHOUT LONG-TERM CURRENT USE OF INSULIN: Chronic | ICD-10-CM

## 2023-07-03 DIAGNOSIS — E53.8 VITAMIN B 12 DEFICIENCY: ICD-10-CM

## 2023-07-03 DIAGNOSIS — E11.9 TYPE 2 DIABETES MELLITUS WITHOUT COMPLICATION, WITHOUT LONG-TERM CURRENT USE OF INSULIN: Primary | ICD-10-CM

## 2023-07-03 DIAGNOSIS — M48.061 SPINAL STENOSIS OF LUMBAR REGION WITHOUT NEUROGENIC CLAUDICATION: ICD-10-CM

## 2023-07-03 LAB
25(OH)D3+25(OH)D2 SERPL-MCNC: 24 NG/ML (ref 30–96)
ALBUMIN SERPL BCP-MCNC: 3.6 G/DL (ref 3.5–5.2)
ALP SERPL-CCNC: 53 U/L (ref 55–135)
ALT SERPL W/O P-5'-P-CCNC: 18 U/L (ref 10–44)
ANION GAP SERPL CALC-SCNC: 10 MMOL/L (ref 8–16)
AST SERPL-CCNC: 14 U/L (ref 10–40)
BACTERIA #/AREA URNS HPF: NEGATIVE /HPF
BILIRUB SERPL-MCNC: 0.4 MG/DL (ref 0.1–1)
BILIRUB UR QL STRIP: NEGATIVE
BUN SERPL-MCNC: 14 MG/DL (ref 8–23)
CALCIUM SERPL-MCNC: 9.1 MG/DL (ref 8.7–10.5)
CHLORIDE SERPL-SCNC: 102 MMOL/L (ref 95–110)
CK SERPL-CCNC: 48 U/L (ref 20–180)
CLARITY UR: CLEAR
CO2 SERPL-SCNC: 22 MMOL/L (ref 23–29)
COLOR UR: YELLOW
CREAT SERPL-MCNC: 0.9 MG/DL (ref 0.5–1.4)
EST. GFR  (NO RACE VARIABLE): >60 ML/MIN/1.73 M^2
GLUCOSE SERPL-MCNC: 147 MG/DL (ref 70–110)
GLUCOSE UR QL STRIP: NEGATIVE
HGB UR QL STRIP: NEGATIVE
HYALINE CASTS #/AREA URNS LPF: 1 /LPF
KETONES UR QL STRIP: NEGATIVE
LEUKOCYTE ESTERASE UR QL STRIP: ABNORMAL
MICROSCOPIC COMMENT: ABNORMAL
NITRITE UR QL STRIP: NEGATIVE
PH UR STRIP: 7 [PH] (ref 5–8)
POTASSIUM SERPL-SCNC: 4.8 MMOL/L (ref 3.5–5.1)
PROT SERPL-MCNC: 7.3 G/DL (ref 6–8.4)
PROT UR QL STRIP: NEGATIVE
RBC #/AREA URNS HPF: 6 /HPF (ref 0–4)
SODIUM SERPL-SCNC: 134 MMOL/L (ref 136–145)
SP GR UR STRIP: 1.01 (ref 1–1.03)
SQUAMOUS #/AREA URNS HPF: 2 /HPF
URN SPEC COLLECT METH UR: ABNORMAL
UROBILINOGEN UR STRIP-ACNC: NEGATIVE EU/DL
WBC #/AREA URNS HPF: 26 /HPF (ref 0–5)

## 2023-07-03 PROCEDURE — 3060F POS MICROALBUMINURIA REV: CPT | Mod: CPTII,S$GLB,, | Performed by: INTERNAL MEDICINE

## 2023-07-03 PROCEDURE — 4010F PR ACE/ARB THEARPY RXD/TAKEN: ICD-10-PCS | Mod: CPTII,S$GLB,, | Performed by: INTERNAL MEDICINE

## 2023-07-03 PROCEDURE — 82550 ASSAY OF CK (CPK): CPT | Performed by: INTERNAL MEDICINE

## 2023-07-03 PROCEDURE — 3060F PR POS MICROALBUMINURIA RESULT DOCUMENTED/REVIEW: ICD-10-PCS | Mod: CPTII,S$GLB,, | Performed by: INTERNAL MEDICINE

## 2023-07-03 PROCEDURE — 3079F PR MOST RECENT DIASTOLIC BLOOD PRESSURE 80-89 MM HG: ICD-10-PCS | Mod: CPTII,S$GLB,, | Performed by: INTERNAL MEDICINE

## 2023-07-03 PROCEDURE — 1125F PR PAIN SEVERITY QUANTIFIED, PAIN PRESENT: ICD-10-PCS | Mod: CPTII,S$GLB,, | Performed by: INTERNAL MEDICINE

## 2023-07-03 PROCEDURE — 4010F ACE/ARB THERAPY RXD/TAKEN: CPT | Mod: CPTII,S$GLB,, | Performed by: INTERNAL MEDICINE

## 2023-07-03 PROCEDURE — 3008F PR BODY MASS INDEX (BMI) DOCUMENTED: ICD-10-PCS | Mod: CPTII,S$GLB,, | Performed by: INTERNAL MEDICINE

## 2023-07-03 PROCEDURE — 99214 OFFICE O/P EST MOD 30 MIN: CPT | Mod: S$GLB,,, | Performed by: INTERNAL MEDICINE

## 2023-07-03 PROCEDURE — 1125F AMNT PAIN NOTED PAIN PRSNT: CPT | Mod: CPTII,S$GLB,, | Performed by: INTERNAL MEDICINE

## 2023-07-03 PROCEDURE — 3051F HG A1C>EQUAL 7.0%<8.0%: CPT | Mod: CPTII,S$GLB,, | Performed by: INTERNAL MEDICINE

## 2023-07-03 PROCEDURE — 3066F NEPHROPATHY DOC TX: CPT | Mod: CPTII,S$GLB,, | Performed by: INTERNAL MEDICINE

## 2023-07-03 PROCEDURE — 36415 COLL VENOUS BLD VENIPUNCTURE: CPT | Performed by: INTERNAL MEDICINE

## 2023-07-03 PROCEDURE — 1160F PR REVIEW ALL MEDS BY PRESCRIBER/CLIN PHARMACIST DOCUMENTED: ICD-10-PCS | Mod: CPTII,S$GLB,, | Performed by: INTERNAL MEDICINE

## 2023-07-03 PROCEDURE — 99214 PR OFFICE/OUTPT VISIT, EST, LEVL IV, 30-39 MIN: ICD-10-PCS | Mod: S$GLB,,, | Performed by: INTERNAL MEDICINE

## 2023-07-03 PROCEDURE — 3075F SYST BP GE 130 - 139MM HG: CPT | Mod: CPTII,S$GLB,, | Performed by: INTERNAL MEDICINE

## 2023-07-03 PROCEDURE — 3288F FALL RISK ASSESSMENT DOCD: CPT | Mod: CPTII,S$GLB,, | Performed by: INTERNAL MEDICINE

## 2023-07-03 PROCEDURE — 1160F RVW MEDS BY RX/DR IN RCRD: CPT | Mod: CPTII,S$GLB,, | Performed by: INTERNAL MEDICINE

## 2023-07-03 PROCEDURE — 3008F BODY MASS INDEX DOCD: CPT | Mod: CPTII,S$GLB,, | Performed by: INTERNAL MEDICINE

## 2023-07-03 PROCEDURE — 1159F PR MEDICATION LIST DOCUMENTED IN MEDICAL RECORD: ICD-10-PCS | Mod: CPTII,S$GLB,, | Performed by: INTERNAL MEDICINE

## 2023-07-03 PROCEDURE — 3079F DIAST BP 80-89 MM HG: CPT | Mod: CPTII,S$GLB,, | Performed by: INTERNAL MEDICINE

## 2023-07-03 PROCEDURE — 82306 VITAMIN D 25 HYDROXY: CPT | Performed by: INTERNAL MEDICINE

## 2023-07-03 PROCEDURE — 80053 COMPREHEN METABOLIC PANEL: CPT | Performed by: INTERNAL MEDICINE

## 2023-07-03 PROCEDURE — 3075F PR MOST RECENT SYSTOLIC BLOOD PRESS GE 130-139MM HG: ICD-10-PCS | Mod: CPTII,S$GLB,, | Performed by: INTERNAL MEDICINE

## 2023-07-03 PROCEDURE — 81001 URINALYSIS AUTO W/SCOPE: CPT | Performed by: INTERNAL MEDICINE

## 2023-07-03 PROCEDURE — 1101F PR PT FALLS ASSESS DOC 0-1 FALLS W/OUT INJ PAST YR: ICD-10-PCS | Mod: CPTII,S$GLB,, | Performed by: INTERNAL MEDICINE

## 2023-07-03 PROCEDURE — 3066F PR DOCUMENTATION OF TREATMENT FOR NEPHROPATHY: ICD-10-PCS | Mod: CPTII,S$GLB,, | Performed by: INTERNAL MEDICINE

## 2023-07-03 PROCEDURE — 3288F PR FALLS RISK ASSESSMENT DOCUMENTED: ICD-10-PCS | Mod: CPTII,S$GLB,, | Performed by: INTERNAL MEDICINE

## 2023-07-03 PROCEDURE — 3051F PR MOST RECENT HEMOGLOBIN A1C LEVEL 7.0 - < 8.0%: ICD-10-PCS | Mod: CPTII,S$GLB,, | Performed by: INTERNAL MEDICINE

## 2023-07-03 PROCEDURE — 1101F PT FALLS ASSESS-DOCD LE1/YR: CPT | Mod: CPTII,S$GLB,, | Performed by: INTERNAL MEDICINE

## 2023-07-03 PROCEDURE — 1159F MED LIST DOCD IN RCRD: CPT | Mod: CPTII,S$GLB,, | Performed by: INTERNAL MEDICINE

## 2023-07-03 RX ORDER — METFORMIN HYDROCHLORIDE 1000 MG/1
1000 TABLET ORAL 2 TIMES DAILY WITH MEALS
Qty: 180 TABLET | Refills: 3 | Status: SHIPPED | OUTPATIENT
Start: 2023-07-03 | End: 2024-03-05 | Stop reason: SDUPTHER

## 2023-07-03 RX ORDER — CYANOCOBALAMIN 1000 UG/ML
1000 INJECTION, SOLUTION INTRAMUSCULAR; SUBCUTANEOUS
Qty: 1 ML | Refills: 11 | Status: SHIPPED | OUTPATIENT
Start: 2023-07-03

## 2023-07-05 DIAGNOSIS — E11.9 TYPE 2 DIABETES MELLITUS WITHOUT COMPLICATION, WITHOUT LONG-TERM CURRENT USE OF INSULIN: Chronic | ICD-10-CM

## 2023-07-21 ENCOUNTER — CLINICAL SUPPORT (OUTPATIENT)
Dept: REHABILITATION | Facility: HOSPITAL | Age: 73
End: 2023-07-21
Payer: MEDICARE

## 2023-07-21 DIAGNOSIS — M54.50 ACUTE BILATERAL LOW BACK PAIN WITHOUT SCIATICA: ICD-10-CM

## 2023-07-21 DIAGNOSIS — M79.605 BILATERAL LEG PAIN: ICD-10-CM

## 2023-07-21 DIAGNOSIS — G89.29 CHRONIC PAIN OF LEFT KNEE: ICD-10-CM

## 2023-07-21 DIAGNOSIS — M48.061 SPINAL STENOSIS OF LUMBAR REGION WITHOUT NEUROGENIC CLAUDICATION: ICD-10-CM

## 2023-07-21 DIAGNOSIS — M62.81 MUSCLE WEAKNESS OF LOWER EXTREMITY: ICD-10-CM

## 2023-07-21 DIAGNOSIS — M25.562 CHRONIC PAIN OF LEFT KNEE: ICD-10-CM

## 2023-07-21 DIAGNOSIS — M79.604 BILATERAL LEG PAIN: ICD-10-CM

## 2023-07-21 PROBLEM — M54.9 ACUTE BILATERAL BACK PAIN: Status: ACTIVE | Noted: 2023-07-21

## 2023-07-21 PROCEDURE — 97162 PT EVAL MOD COMPLEX 30 MIN: CPT | Mod: PN

## 2023-07-21 PROCEDURE — 97530 THERAPEUTIC ACTIVITIES: CPT | Mod: PN

## 2023-07-21 NOTE — PLAN OF CARE
[facility]   Physical Therapy Initial Evaluation     Name: Preet Fulton West  Clinic Number: [id number]    Therapy Diagnosis:   Encounter Diagnoses   Name Primary?    Bilateral leg pain     Spinal stenosis of lumbar region without neurogenic claudication     Acute bilateral low back pain without sciatica     Chronic pain of left knee     Muscle weakness of lower extremity      Physician: Pal Fong MD     Physician Orders: PT Eval and Treat  Medical Diagnosis from Referral:   M79.604,M79.605 (ICD-10-CM) - Bilateral leg pain   M48.061 (ICD-10-CM) - Spinal stenosis of lumbar region without neurogenic claudication     Evaluation Date: 7/21/2023  Authorization Period Expiration: 12/31/23  Plan of Care Expiration: 9/15/2023    Progress Update: 8/22/2023    Visit # / Visits authorized: 1 / 8     FOTO: Visit #1 - 1 / 3     PRECAUTIONS: Standard Precautions     MD Visit: /2023    Time In: 1100  Time Out: 1200  Total Appointment Time (timed & untimed codes): 60 minutes    SUBJECTIVE     Date of onset: 1 month    History of current condition - Iesha is a 73 y.o. female whom reports exacerbation of pain in back from Lower cervical down  and into both legs, for 1 month. Discomfort is equal side to side.  Prior Total Knee Arthroplasty on the Right will need the other one on the left, this is some of her leg pain. .  Difficulty walking low energy. Biggest complaint is not able to sleep at night due to leg pain. .  Iesha's current exercise regiment includes: none.  Seeking Physical Therapy for less pain, able to sleep.    KAYLA: Gradual  Falls: none  Physician Instructions (per patient): none  Other concerns: none    Imaging: No updated imaging for this episode of care:     Prior Therapy: N/A  Social History: Pt lives with their family  Living Environment: 1 story   ADLs unable to complete: none, just increased time effort and discomfort   Gym/Home Equipment: none  Occupation: Pt is none  Prior  Level of Function: Independent with all ADLs      Pain:  Current 3 /10, worst 9 /10, best 3 /10   Location: Back and Bilateral legs bottom foot   Description: Aching, Tight, and Sharp  Aggravating Factors: Walking  Easing Factors:  Rest     Pts goals: Pt reported goals are to be able to sleep without pain waking her up    _______________________________________________________    Medical History:   Past Medical History:   Diagnosis Date    Allergy     aspirin itching    Anemia     Cancer     Diabetes mellitus, type 2     H/O colon cancer, stage III 1/1/2013    Treated in [facility]    History of colonoscopy 6/4/2021    Colonoscopy done by Dr. Rayshawn Adkins MD on 06/04/21.non bleeding internal hemorrhoids.  4 mm polyp in the sigmoid colon removed with cold snare.  Patent end-to-end colocolonic anastomosis characterized by healthy-appearing mucosa.  Examination was otherwise normal.  Repeat colonoscopy in 3 years for surveillance.    Malignant neoplasm of transverse colon (2013) - Stage III 4/4/2019    Osteoporosis     S/P laparoscopic colectomy (3/2013) 4/5/2019    Seasonal allergies        Surgical History:   Preet West  has a past surgical history that includes Colon surgery and Joint replacement.    Medications:   Laxmibegregoria has a current medication list which includes the following prescription(s): alendronate, blood sugar diagnostic, blood-glucose meter, celecoxib, clotrimazole-betamethasone 1-0.05%, cyanocobalamin, diclofenac sodium, fluticasone propionate, glimepiride, lancets, losartan, metformin, nystatin, polyethylene glycol, and rosuvastatin.    Allergies:   Review of patient's allergies indicates:   Allergen Reactions    Tradjenta [linagliptin]      Itching and gastric discomfort > difficult to assess nature of side effect    Aspirin Itching    Opioids - morphine analogues Other (See Comments)     Confusion    Hydrochlorothiazide Other (See Comments)     Suspect Low sodium by hospital ist. Not  confirmed    Jardiance [empagliflozin] Other (See Comments)     Caused burning sensation in the feet.        OBJECTIVE   (x = not tested due to pain and/or inability to obtain test position)    RANGE OF MOTION:    Lumbar AROM/PROM Right  (spine) Left   Goal   Lumbar Flexion (60) 35  55     Lumbar Extension (30) 15  30     Lumbar Side Bending (25) 20 20 20     Lumbar Rotation 5 5        Hip AROM/PROM Right   Left   Goal   Hip Flexion (120) 100 90 115     Hip IR (45) 35 35 40     Hip ER (45) 35 35 40       Knee AROM/PROM Right   Left   Goal   Hyper - Zero - Flexion  -5 to 115 0-0-135       Ankle/Foot AROM/PROM Right   Left   Goal   Dorsiflexion (20) 3 3 15     Plantarflexion (50) 40 40 45       STRENGTH:    L/E MMT Right   Goal   Hip Flexion  4/5 5/5 B    Hip Extension  4-/5 5/5 B   Hip Abduction  4-/5 5/5 B   Hip IR 4-/5 5/5 B   Hip ER 4-/5 5/5 B   Knee Flexion 4/5 5/5 B   Knee Extension 4/5 5/5 B   Ankle DF 4/5 5/5 B   Ankle PF 5/5 5/5 B     L/E MMT Left   Goal   Hip Flexion  4/5 5/5 B    Hip Extension  4-/5 5/5 B   Hip Abduction  4-/5 5/5 B   Hip IR 4-/5 5/5 B   Hip ER 4-/5 5/5 B   Knee Flexion 4/5 5/5 B   Knee Extension 4/5 5/5 B   Ankle DF 4/5 5/5 B   Ankle PF 5/5 5/5 B     MUSCLE LENGTH:     Muscle Tested  Right   Left    Goal   Hip Flexors  decreased decreased Normal B   Quadriceps decreased decreased Normal B   Hamstrings  decreased decreased Normal B   Piriformis  decreased decreased Normal B   Gastrocnemius  decreased decreased Normal B   Soleus  decreased decreased Normal B       SPECIAL TESTS:     Right Left    Goal   Straight leg raise  negative negative Negative B    Slump negative negative Negative B       Negative B        Sensation:  Sensation is intact to light touch    Palpation: Increased tone and tenderness noted with palpation to: none    Posture:  Pt presents with postural abnormalities which include: forward head, rounded shoulders , increased lumbar lordosis, genu valgum , ankle/foot pronation  , rounded shoulders, and ankle/foot pronation    Forward Round Rounded Shoulder and Increased Thoracic Kyphosis    Gait Analysis: The patient ambulated with the following assistive device: none; the pt presents with the following gait abnormalities: decreased step length, arie, and arm swing; increased forward flexed posture, trunk sway -     Movement Analysis:   Functional Test  Outcome   Double Leg Squat Lack of depth, minimal knee flexion, mostly trunk flexing forward   Single Leg Step Down NT         Balance: Balance:    RIGHT   LEFT   Goal   Closed NT --- 60 seconds     Tandem   20 seconds     Single Leg 3 1 20 seconds         FUNCTION:     CMS Impairment/Limitation/Restriction for FOTO Oswestry Survey    Therapist reviewed FOTO scores for Preet West on 7/21/2023.   FOTO documents entered into [email or web address] - see Media section.    Limitation Score: 51%         TREATMENT   Total Treatment time separate from Evaluation: (20) minutes     Iesha received the treatments listed below:      THERAPEUTIC EXERCISES: to develop strength, endurance, ROM, flexibility, posture and core stabilization for (20)  minutes including: x = performed today    TherEx 7/21/2023    Hip ADD ball     Hip Abduction Green TheraBand      Hamstring stretch     Piriformis stretch pulling       BOLD = new this visit  Plan for Next Visit:     Bike   Lower trunk rotation   Posterior pelvic tilt?  Bridges   Short arc quad 2#   Straight leg raise     PhysioBall rollout     HR/TR  Hip Abduction   Hip Extensions  Cable Column Walkouts          PATIENT EDUCATION AND HOME EXERCISES     Education/Self-Care provided:  (included in treatment) minutes   Patient educated on the impairments noted above and the effects of physical therapy intervention to improve overall condition and QOL.   Patient was educated on all the above exercise prior/during/after for proper posture, positioning, and execution for safe performance with home exercise program.    Exercise/Activity modifications:   7/21/2023: EVAL:     Written Home Exercises Provided: yes. Prefers: Electronic Copies  Exercises were reviewed and Iesha was able to demonstrate them prior to the end of the session.  Iesha demonstrated good understanding of the education provided. See EMR under Patient Instructions for exercises provided during therapy sessions.    ASSESSMENT     Preet is a 73 y.o. female referred to outpatient Physical Therapy with a medical diagnosis of Lumbar Degenerative Disc Disease . Preet presents with clinical signs and symptoms that support this diagnosis with . decreased Lumbar ROM, decreased lower extremity strength, Lumbar joint(s) hypomobility, lower extremity neural tension, and impaired functional mobility. Radicular symptoms are not currently present.decreased knee and hip ROM, decreased hip girdle, quad, and hamstring Strength, patellar joint hypomobility, increased joint and soft tissue edema, and impaired functional mobility.decreased foot and ankle ROM, decreased lower extremity strength, impaired joint mobility of talocural, subtalar, and forefoot joints, impaired balance, and impaired functional mobility.    The above impairments will be addressed through manual therapy techniques, therapeutic exercises, functional training, and modalities as necessary. Patient was treated and educated on exercises for home program, progression of therapy, and benefits of therapy to achieve full functional mobility. Patient will benefit from skilled physical therapy to meet short and long term goals and return to prior level of function.    Pt prognosis is Guarded.   Pt will benefit from skilled outpatient Physical Therapy to address the deficits stated above and in the chart below, provide pt/family education, and to maximize pt's level of independence.     Plan of care discussed with patient: Yes  Pt's spiritual, cultural and educational needs considered and patient is agreeable to  the plan of care and goals as stated below:     Anticipated Barriers for therapy: sedentary lifestyle and chronicity of condition  Medical Necessity is demonstrated by the following  History  Co-morbidities and personal factors that may impact the plan of care [] LOW: no personal factors / co-morbidities  [x] MODERATE: 1-2 personal factors / co-morbidities  [] HIGH: 3+ personal factors / co-morbidities    Moderate / High Support Documentation:      Examination  Body Structures and Functions, activity limitations and participation restrictions that may impact the plan of care [] LOW: addressing 1-2 elements  [x] MODERATE: 3+ elements  [] HIGH: 4+ elements (please support below)    Moderate / High Support Documentation:      Clinical Presentation [] LOW: stable  [x] MODERATE: Evolving  [] HIGH: Unstable     Decision Making/ Complexity Score: moderate       GOALS:  SHORT TERM GOALS:  2 weeks  Progress Date met   Recent signs and systems trend is improving in order to progress towards LTG's. [] Met  [] Not Met  [x] Progressing     Patient will be independent with HEP in order to further progress and return to maximal function. [] Met  [] Not Met  [x] Progressing     Pain rating at Worst: 5/10 in order to progress towards increased independence with activity. [] Met  [] Not Met  [x] Progressing     Patient will be able to correct postural deviations in sitting and standing, to decrease pain and promote postural awareness for injury prevention.  [] Met  [] Not Met  [x] Progressing         LONG TERM GOALS: 4 weeks  Progress Date met   Patient will return to normal ADL, recreational, and work related activities with less pain and limitation.  [] Met  [] Not Met  [x] Progressing     Patient will improve AROM to stated goals in order to return to maximal functional potential.  [] Met  [] Not Met  [x] Progressing     Patient will improve Strength to stated goals of appropriate musculature in order to improve functional  independence.  [] Met  [] Not Met  [x] Progressing     Pain Rating at Best: 1/10 to improve Quality of Life. [] Met  [] Not Met  [x] Progressing     Patient will meet predicted functional outcome (FOTO) score: 32% to increase self-worth & perceived functional ability. [] Met  [] Not Met  [x] Progressing     Patient will have met/partially met personal goal of: fall asleep without pain and not to wake up in pain [] Met  [] Not Met  [x] Progressing           PLAN   Plan of care Certification: 7/21/2023 to 9/15/2023    Outpatient Physical Therapy 2 times weekly for 4 weeks to include any combination of the following interventions: virtual visits, dry needling, modalities, electrical stimulation (IFC, Pre-Mod, Attended with Functional Dry Needling), Cervical/Lumbar Traction, Manual Therapy, Moist Heat/ Ice, Neuromuscular Re-ed, Patient Education, Self Care, Therapeutic Activities, Therapeutic Exercise, Functional Training, and Therapeutic Activites     Thank you for this referral.    Albin Mcconnell, PT      I CERTIFY THE NEED FOR THESE SERVICES FURNISHED UNDER THIS PLAN OF TREATMENT AND WHILE UNDER MY CARE   Physician's comments:     Physician's Signature: ___________________________________________________

## 2023-07-24 ENCOUNTER — CLINICAL SUPPORT (OUTPATIENT)
Dept: REHABILITATION | Facility: HOSPITAL | Age: 73
End: 2023-07-24
Payer: MEDICARE

## 2023-07-24 DIAGNOSIS — G89.29 CHRONIC PAIN OF LEFT KNEE: Primary | ICD-10-CM

## 2023-07-24 DIAGNOSIS — M25.562 CHRONIC PAIN OF LEFT KNEE: Primary | ICD-10-CM

## 2023-07-24 DIAGNOSIS — M62.81 MUSCLE WEAKNESS OF LOWER EXTREMITY: ICD-10-CM

## 2023-07-24 PROCEDURE — 97112 NEUROMUSCULAR REEDUCATION: CPT | Mod: PN,CQ

## 2023-07-24 PROCEDURE — 97110 THERAPEUTIC EXERCISES: CPT | Mod: PN,CQ

## 2023-07-24 NOTE — PROGRESS NOTES
[facility]   Physical Therapy Treatment Note      Name: Preet Fulton West  Clinic Number: [id number]    Therapy Diagnosis:   Encounter Diagnoses   Name Primary?    Chronic pain of left knee Yes    Muscle weakness of lower extremity      Physician: Pal Fong MD    Visit Date: 7/24/2023    Physician Orders: PT Eval and Treat  Medical Diagnosis from Referral:   M79.604,M79.605 (ICD-10-CM) - Bilateral leg pain   M48.061 (ICD-10-CM) - Spinal stenosis of lumbar region without neurogenic claudication      Evaluation Date: 7/21/2023  Authorization Period Expiration: 12/31/23  Plan of Care Expiration: 9/15/2023                Progress Update: 8/22/2023               Visit # / Visits authorized: 1 / 8                       FOTO: Visit #1 - 1 / 3      PRECAUTIONS: Standard Precautions      MD Visit: /2023    PTA Visit #: 1/5     Time in: 1030   Time out: 1125  Total billable minutes: 55 minutes       Subjective     Pt reports: no back pain at the moment.   She was compliant with home exercise program.  Response to previous treatment: no issues   Functional change: ongoing     Pain: 0/10  Location: bilateral back  and lower legs     Objective      Objective Measures updated at progress report unless specified.     Treatment     Iesha received the treatments listed below:      therapeutic exercises to develop strength, ROM, flexibility, and core stabilization for 30 minutes including:    Bike x 5 minutes     Seated hamstring stretch 2 x 30 sec Bilateral   Seated piriformis stretch pulling 2 x 30 sec Bilateral   Seated PhysioBall rollout 20x     Lower trunk rotation 20x   Posterior pelvic tilt  Hip adduction 20x   Supine clams Red Theraband 20x   Bridges 20x   Short arc quad 2# 20x Bilateral   Straight leg raise 20x Bilateral         neuromuscular re-education activities to improve: Coordination and Posture for 23 minutes. The following activities were included:    HR/TR 20x   Hip Abduction 20x  Bilateral   Hip Extensions 20x Bilateral   Mini squats 20x   Cable Column Walkouts       therapeutic activities to improve functional performance for 0 minutes, including:        Patient Education and Home Exercises       Education provided:   - home exercises     Written Home Exercises Provided: Patient instructed to cont prior HEP. Exercises were reviewed and Iesha was able to demonstrate them prior to the end of the session.  Iesha demonstrated fair  understanding of the education provided. See EMR under Patient Instructions for exercises provided during therapy sessions    Assessment     Patient arrived to session with  present to interpret throughout session per her request. Tolerated session fair with focus on flexibility and strength. Minor limitation due to left knee pain and noted more weakness with Left Lower Extremity. Iesha challenged with equal weight shift due to left knee discomfort and decreased hip mobility. Continue to progress as tolerated.     Iesha Is progressing well towards her goals.   Pt prognosis is Guarded.     Pt will continue to benefit from skilled outpatient physical therapy to address the deficits listed in the problem list box on initial evaluation, provide pt/family education and to maximize pt's level of independence in the home and community environment.     Pt's spiritual, cultural and educational needs considered and pt agreeable to plan of care and goals.     Anticipated barriers to physical therapy: sedentary lifestyle and chronicity of condition     Goals:   SHORT TERM GOALS:  2 weeks  Progress Date met   Recent signs and systems trend is improving in order to progress towards LTG's. [] Met  [] Not Met  [x] Progressing     Patient will be independent with HEP in order to further progress and return to maximal function. [] Met  [] Not Met  [x] Progressing     Pain rating at Worst: 5/10 in order to progress towards increased independence with activity. [] Met  [] Not  Met  [x] Progressing     Patient will be able to correct postural deviations in sitting and standing, to decrease pain and promote postural awareness for injury prevention.  [] Met  [] Not Met  [x] Progressing           LONG TERM GOALS: 4 weeks  Progress Date met   Patient will return to normal ADL, recreational, and work related activities with less pain and limitation.  [] Met  [] Not Met  [x] Progressing     Patient will improve AROM to stated goals in order to return to maximal functional potential.  [] Met  [] Not Met  [x] Progressing     Patient will improve Strength to stated goals of appropriate musculature in order to improve functional independence.  [] Met  [] Not Met  [x] Progressing     Pain Rating at Best: 1/10 to improve Quality of Life. [] Met  [] Not Met  [x] Progressing     Patient will meet predicted functional outcome (FOTO) score: 32% to increase self-worth & perceived functional ability. [] Met  [] Not Met  [x] Progressing     Patient will have met/partially met personal goal of: fall asleep without pain and not to wake up in pain [] Met  [] Not Met  [x] Progressing                Plan     Continue per Plan of Care     Angela Marina, PTA

## 2023-07-27 ENCOUNTER — CLINICAL SUPPORT (OUTPATIENT)
Dept: REHABILITATION | Facility: HOSPITAL | Age: 73
End: 2023-07-27
Payer: MEDICARE

## 2023-07-27 DIAGNOSIS — M62.81 MUSCLE WEAKNESS OF LOWER EXTREMITY: ICD-10-CM

## 2023-07-27 DIAGNOSIS — M54.50 ACUTE BILATERAL LOW BACK PAIN WITHOUT SCIATICA: Primary | ICD-10-CM

## 2023-07-27 DIAGNOSIS — M25.562 CHRONIC PAIN OF LEFT KNEE: ICD-10-CM

## 2023-07-27 DIAGNOSIS — G89.29 CHRONIC PAIN OF LEFT KNEE: ICD-10-CM

## 2023-07-27 PROCEDURE — 97110 THERAPEUTIC EXERCISES: CPT | Mod: PN,CQ

## 2023-07-27 PROCEDURE — 97530 THERAPEUTIC ACTIVITIES: CPT | Mod: PN,CQ

## 2023-07-27 PROCEDURE — 97140 MANUAL THERAPY 1/> REGIONS: CPT | Mod: PN,CQ

## 2023-07-27 NOTE — PROGRESS NOTES
[facility]   Physical Therapy Treatment Note      Name: Preet Fulton West  Clinic Number: [id number]    Therapy Diagnosis:   Encounter Diagnoses   Name Primary?    Acute bilateral low back pain without sciatica Yes    Chronic pain of left knee     Muscle weakness of lower extremity      Physician: Pal Fong MD    Visit Date: 7/27/2023    Physician Orders: PT Eval and Treat  Medical Diagnosis from Referral:   M79.604,M79.605 (ICD-10-CM) - Bilateral leg pain   M48.061 (ICD-10-CM) - Spinal stenosis of lumbar region without neurogenic claudication      Evaluation Date: 7/21/2023  Authorization Period Expiration: 12/31/23  Plan of Care Expiration: 9/15/2023                Progress Update: 8/22/2023               Visit # / Visits authorized: 2 / 8  ([id number])                     FOTO: Visit #1 - 1 / 3      PRECAUTIONS: Standard Precautions      MD Visit: /2023    Time in: 1104  Time out: 1155  Total billable minutes: 50 minutes       Subjective     Pt reports: L knee pain which hurts more at night, trouble sleeping, no Low back pain today  She was compliant with home exercise program.  Response to previous treatment: good   Functional change: ongoing     Pain: 5/10 L knee region  Location: bilateral back  and lower legs     Objective      Objective Measures updated at progress report unless specified.     Treatment     Iesha received the treatments listed below:      therapeutic exercises to develop strength, ROM, flexibility, and core stabilization for 29 minutes including:    Bike x 5 minutes     Seated hamstring stretch 2 x 30 sec Bilateral   Seated piriformis stretch pulling 2 x 30 sec Bilateral   Seated PhysioBall rollout 20x     Lower trunk rotation 10x  Bilateral   Short arc quad 0# 30x Bilateral   Straight leg raise 30x Bilateral         neuromuscular re-education activities to improve: Coordination and Posture for 3 minutes. The following activities were included:     NOT  PERFORMED below  Posterior pelvic tilt    Hip adduction 20x      Supine clams Red Theraband 20x      Bridges 20x       Hip Abduction 20x Bilateral   Hip Extensions 20x Bilateral   NOT PERFORMED     Cable Column Walkouts   NOT PERFORMED     therapeutic activities to improve functional performance for 8 minutes, including:  Mini squats 20x   HR 20x    Manual therapy: x 10 minutes  Myofacial Release with therapy bar to L ITB, L quad, L medial thigh region    Patient Education and Home Exercises       Education provided:   - home exercises     Written Home Exercises Provided: Patient instructed to cont prior HEP. Exercises were reviewed and Iesha was able to demonstrate them prior to the end of the session.  Iesha demonstrated fair  understanding of the education provided. See EMR under Patient Instructions for exercises provided during therapy sessions    Assessment     Iesha presented with L knee pain along with thigh pain which keeps her up at night.  Performed Manual Therapy to decrease muscle tightness and decrease her pain.  Pt reported decreased pain after therapy was concluded.  Pt in therapy with  who interpreted for her, per pt's request.     Iesha Is progressing well towards her goals.   Pt prognosis is Guarded.     Pt will continue to benefit from skilled outpatient physical therapy to address the deficits listed in the problem list box on initial evaluation, provide pt/family education and to maximize pt's level of independence in the home and community environment.     Pt's spiritual, cultural and educational needs considered and pt agreeable to plan of care and goals.     Anticipated barriers to physical therapy: sedentary lifestyle and chronicity of condition     Goals:   SHORT TERM GOALS:  2 weeks  Progress Date met   Recent signs and systems trend is improving in order to progress towards LTG's. [] Met  [] Not Met  [x] Progressing     Patient will be independent with HEP in order to further  progress and return to maximal function. [] Met  [] Not Met  [x] Progressing     Pain rating at Worst: 5/10 in order to progress towards increased independence with activity. [] Met  [] Not Met  [x] Progressing     Patient will be able to correct postural deviations in sitting and standing, to decrease pain and promote postural awareness for injury prevention.  [] Met  [] Not Met  [x] Progressing           LONG TERM GOALS: 4 weeks  Progress Date met   Patient will return to normal ADL, recreational, and work related activities with less pain and limitation.  [] Met  [] Not Met  [x] Progressing     Patient will improve AROM to stated goals in order to return to maximal functional potential.  [] Met  [] Not Met  [x] Progressing     Patient will improve Strength to stated goals of appropriate musculature in order to improve functional independence.  [] Met  [] Not Met  [x] Progressing     Pain Rating at Best: 1/10 to improve Quality of Life. [] Met  [] Not Met  [x] Progressing     Patient will meet predicted functional outcome (FOTO) score: 32% to increase self-worth & perceived functional ability. [] Met  [] Not Met  [x] Progressing     Patient will have met/partially met personal goal of: fall asleep without pain and not to wake up in pain [] Met  [] Not Met  [x] Progressing                Plan     Continue per Plan of Care     Eileen Erazo, PTA

## 2023-07-28 ENCOUNTER — DOCUMENTATION ONLY (OUTPATIENT)
Dept: REHABILITATION | Facility: HOSPITAL | Age: 73
End: 2023-07-28
Payer: MEDICARE

## 2023-07-28 NOTE — PROGRESS NOTES
PT/PTA met face to face to discuss pt's treatment plan and progress towards established goals. Pt will be seen by a physical therapist minimally every 6th visit or every 30 days.    Eileen Erazo PTA

## 2023-07-31 ENCOUNTER — CLINICAL SUPPORT (OUTPATIENT)
Dept: REHABILITATION | Facility: HOSPITAL | Age: 73
End: 2023-07-31
Payer: MEDICARE

## 2023-07-31 DIAGNOSIS — M25.562 CHRONIC PAIN OF LEFT KNEE: Primary | ICD-10-CM

## 2023-07-31 DIAGNOSIS — M62.81 MUSCLE WEAKNESS OF LOWER EXTREMITY: ICD-10-CM

## 2023-07-31 DIAGNOSIS — G89.29 CHRONIC PAIN OF LEFT KNEE: Primary | ICD-10-CM

## 2023-07-31 PROCEDURE — 97112 NEUROMUSCULAR REEDUCATION: CPT | Mod: PN,CQ

## 2023-07-31 PROCEDURE — 97530 THERAPEUTIC ACTIVITIES: CPT | Mod: PN,CQ

## 2023-07-31 PROCEDURE — 97110 THERAPEUTIC EXERCISES: CPT | Mod: PN,CQ

## 2023-07-31 NOTE — PROGRESS NOTES
[facility]   Physical Therapy Treatment Note      Name: Preet Fulton West  Clinic Number: [id number]    Therapy Diagnosis:   Encounter Diagnoses   Name Primary?    Chronic pain of left knee Yes    Muscle weakness of lower extremity      Physician: Pal Fong MD    Visit Date: 7/31/2023    Physician Orders: PT Eval and Treat  Medical Diagnosis from Referral:   M79.604,M79.605 (ICD-10-CM) - Bilateral leg pain   M48.061 (ICD-10-CM) - Spinal stenosis of lumbar region without neurogenic claudication      Evaluation Date: 7/21/2023  Authorization Period Expiration: 12/31/23  Plan of Care Expiration: 9/15/2023                Progress Update: 8/22/2023               Visit # / Visits authorized: 3 / 8  ([id number])                     FOTO: Visit #1 - 1 / 3      PRECAUTIONS: Standard Precautions      MD Visit: /2023    Time in: 945  Time out: 1045  Total billable minutes: 55 minutes       Subjective     Pt reports: her knee is bothering her, especially at night.   She was compliant with home exercise program.  Response to previous treatment: no issues stated   Functional change: ongoing     Pain: 5/10 L knee region  Location: bilateral back  and lower legs     Objective      Objective Measures updated at progress report unless specified.     Treatment     Iesha received the treatments listed below:      therapeutic exercises to develop strength, ROM, flexibility, and core stabilization for 29 minutes including:    Bike x 5 minutes     Seated hamstring stretch 2 x 30 sec Bilateral   Seated piriformis stretch pulling 2 x 30 sec Bilateral   Seated PhysioBall rollout 20x     Lower trunk rotation 20x  Bilateral   Short arc quad 0# 30x Bilateral   Straight leg raise 30x Bilateral       neuromuscular re-education activities to improve: Coordination and Posture for 15 minutes. The following activities were included:     Hip adduction 20x      Supine clams Red Theraband 20x      Bridges 20x        Hip Abduction 20x Bilateral   Hip Extensions 20x Bilateral   NOT PERFORMED       therapeutic activities to improve functional performance for 8 minutes, including:  Mini squats 20x   HR 20x    Manual therapy: x 0 minutes  Myofacial Release with therapy bar to L ITB, L quad, L medial thigh region    Patient Education and Home Exercises       Education provided:   - home exercises     Written Home Exercises Provided: Patient instructed to cont prior HEP. Exercises were reviewed and Iesha was able to demonstrate them prior to the end of the session.  Iesha demonstrated fair  understanding of the education provided. See EMR under Patient Instructions for exercises provided during therapy sessions    Assessment     Patient tolerated session well, limitation due to left knee pain, but has good effort with all exercises. Improved gait mechanics upon arrival with more upright posture. Continue to progress as tolerated with focus on reducing knee pain.      Iesha Is progressing well towards her goals.   Pt prognosis is Guarded.     Pt will continue to benefit from skilled outpatient physical therapy to address the deficits listed in the problem list box on initial evaluation, provide pt/family education and to maximize pt's level of independence in the home and community environment.     Pt's spiritual, cultural and educational needs considered and pt agreeable to plan of care and goals.     Anticipated barriers to physical therapy: sedentary lifestyle and chronicity of condition     Goals:   SHORT TERM GOALS:  2 weeks  Progress Date met   Recent signs and systems trend is improving in order to progress towards LTG's. [] Met  [] Not Met  [x] Progressing     Patient will be independent with HEP in order to further progress and return to maximal function. [] Met  [] Not Met  [x] Progressing     Pain rating at Worst: 5/10 in order to progress towards increased independence with activity. [] Met  [] Not Met  [x] Progressing      Patient will be able to correct postural deviations in sitting and standing, to decrease pain and promote postural awareness for injury prevention.  [] Met  [] Not Met  [x] Progressing           LONG TERM GOALS: 4 weeks  Progress Date met   Patient will return to normal ADL, recreational, and work related activities with less pain and limitation.  [] Met  [] Not Met  [x] Progressing     Patient will improve AROM to stated goals in order to return to maximal functional potential.  [] Met  [] Not Met  [x] Progressing     Patient will improve Strength to stated goals of appropriate musculature in order to improve functional independence.  [] Met  [] Not Met  [x] Progressing     Pain Rating at Best: 1/10 to improve Quality of Life. [] Met  [] Not Met  [x] Progressing     Patient will meet predicted functional outcome (FOTO) score: 32% to increase self-worth & perceived functional ability. [] Met  [] Not Met  [x] Progressing     Patient will have met/partially met personal goal of: fall asleep without pain and not to wake up in pain [] Met  [] Not Met  [x] Progressing                Plan     Continue per Plan of Care     Angela Marina, PTA

## 2023-08-03 ENCOUNTER — CLINICAL SUPPORT (OUTPATIENT)
Dept: REHABILITATION | Facility: HOSPITAL | Age: 73
End: 2023-08-03
Payer: MEDICARE

## 2023-08-03 DIAGNOSIS — M54.50 ACUTE BILATERAL LOW BACK PAIN WITHOUT SCIATICA: Primary | ICD-10-CM

## 2023-08-03 DIAGNOSIS — G89.29 CHRONIC PAIN OF LEFT KNEE: ICD-10-CM

## 2023-08-03 DIAGNOSIS — M25.562 CHRONIC PAIN OF LEFT KNEE: ICD-10-CM

## 2023-08-03 DIAGNOSIS — M62.81 MUSCLE WEAKNESS OF LOWER EXTREMITY: ICD-10-CM

## 2023-08-03 PROCEDURE — 97112 NEUROMUSCULAR REEDUCATION: CPT | Mod: PN

## 2023-08-03 PROCEDURE — 97530 THERAPEUTIC ACTIVITIES: CPT | Mod: PN

## 2023-08-03 PROCEDURE — 97110 THERAPEUTIC EXERCISES: CPT | Mod: PN

## 2023-08-03 NOTE — PROGRESS NOTES
[facility]   Physical Therapy Treatment Note      Name: Preet Fulton [facility]  Clinic Number: [id number]    Therapy Diagnosis:   Encounter Diagnoses   Name Primary?    Acute bilateral low back pain without sciatica Yes    Chronic pain of left knee     Muscle weakness of lower extremity      Physician: Pal Fong MD    Visit Date: 8/3/2023    Physician Orders: PT Eval and Treat  Medical Diagnosis from Referral:   M79.604,M79.605 (ICD-10-CM) - Bilateral leg pain   M48.061 (ICD-10-CM) - Spinal stenosis of lumbar region without neurogenic claudication      Evaluation Date: 7/21/2023  Authorization Period Expiration: 12/31/23  Plan of Care Expiration: 9/15/2023                Progress Update: 8/22/2023               Visit # / Visits authorized: 4 / 8  ([id number])                     FOTO: Visit #1 - 1 / 3      PRECAUTIONS: Standard Precautions      MD Visit: /2023    Time in: 1000  Time out: 1055  Total billable minutes: 55 minutes       Subjective     Pt reports: left knee and thigh pain  She was compliant with home exercise program.  Response to previous treatment: no issues stated   Functional change: ongoing     Pain: 5/10 L knee region  Location: bilateral back  and lower legs     Objective      Objective Measures updated at progress report unless specified.     Treatment     Iesha received the treatments listed below:      therapeutic exercises to develop strength, ROM, flexibility, and core stabilization for 29 minutes including:    Bike x 5 minutes     Seated hamstring stretch 2 x 30 sec Bilateral   Seated piriformis stretch pulling 2 x 30 sec Bilateral   Seated PhysioBall rollout 20x     Lower trunk rotation 20x  Bilateral   Short arc quad 2# 30x Bilateral   Straight leg raise 30x Bilateral       neuromuscular re-education activities to improve: Coordination and Posture for 15 minutes. The following activities were included:     Hip adduction 30x      Supine clams Green   Theraband 20x      Bridges 20x       Hip Abduction 20x Bilateral   Hip Extensions 20x Bilateral   NOT PERFORMED       therapeutic activities to improve functional performance for 8 minutes, including:  Mini squats 20x   HR 20x    Manual therapy: x 0 minutes  Myofacial Release with therapy bar to L ITB, L quad, L medial thigh region    Patient Education and Home Exercises       Education provided:   - home exercises     Written Home Exercises Provided: Patient instructed to cont prior HEP. Exercises were reviewed and Iesha was able to demonstrate them prior to the end of the session.  Iesha demonstrated fair  understanding of the education provided. See EMR under Patient Instructions for exercises provided during therapy sessions    Assessment     Patient tolerated session. She has pain, lack of motion left knee due to Osteoarthritis, limiting her improvements. Improved gait mechanics upon arrival with more upright posture. Continue to progress as tolerated with focus on reducing knee pain.      Iesha Is progressing well towards her goals.   Pt prognosis is Guarded.     Pt will continue to benefit from skilled outpatient physical therapy to address the deficits listed in the problem list box on initial evaluation, provide pt/family education and to maximize pt's level of independence in the home and community environment.     Pt's spiritual, cultural and educational needs considered and pt agreeable to plan of care and goals.     Anticipated barriers to physical therapy: sedentary lifestyle and chronicity of condition     Goals:   SHORT TERM GOALS:  2 weeks  Progress Date met   Recent signs and systems trend is improving in order to progress towards LTG's. [] Met  [] Not Met  [x] Progressing     Patient will be independent with HEP in order to further progress and return to maximal function. [] Met  [] Not Met  [x] Progressing     Pain rating at Worst: 5/10 in order to progress towards increased independence with  activity. [] Met  [] Not Met  [x] Progressing     Patient will be able to correct postural deviations in sitting and standing, to decrease pain and promote postural awareness for injury prevention.  [] Met  [] Not Met  [x] Progressing           LONG TERM GOALS: 4 weeks  Progress Date met   Patient will return to normal ADL, recreational, and work related activities with less pain and limitation.  [] Met  [] Not Met  [x] Progressing     Patient will improve AROM to stated goals in order to return to maximal functional potential.  [] Met  [] Not Met  [x] Progressing     Patient will improve Strength to stated goals of appropriate musculature in order to improve functional independence.  [] Met  [] Not Met  [x] Progressing     Pain Rating at Best: 1/10 to improve Quality of Life. [] Met  [] Not Met  [x] Progressing     Patient will meet predicted functional outcome (FOTO) score: 32% to increase self-worth & perceived functional ability. [] Met  [] Not Met  [x] Progressing     Patient will have met/partially met personal goal of: fall asleep without pain and not to wake up in pain [] Met  [] Not Met  [x] Progressing                Plan     Continue per Plan of Care     Albin Mcconnell, PT

## 2023-08-08 ENCOUNTER — CLINICAL SUPPORT (OUTPATIENT)
Dept: REHABILITATION | Facility: HOSPITAL | Age: 73
End: 2023-08-08
Payer: MEDICARE

## 2023-08-08 DIAGNOSIS — G89.29 CHRONIC PAIN OF LEFT KNEE: ICD-10-CM

## 2023-08-08 DIAGNOSIS — M62.81 MUSCLE WEAKNESS OF LOWER EXTREMITY: ICD-10-CM

## 2023-08-08 DIAGNOSIS — M25.562 CHRONIC PAIN OF LEFT KNEE: ICD-10-CM

## 2023-08-08 DIAGNOSIS — M54.50 ACUTE BILATERAL LOW BACK PAIN WITHOUT SCIATICA: Primary | ICD-10-CM

## 2023-08-08 PROCEDURE — 97530 THERAPEUTIC ACTIVITIES: CPT | Mod: PN

## 2023-08-08 PROCEDURE — 97112 NEUROMUSCULAR REEDUCATION: CPT | Mod: PN

## 2023-08-08 PROCEDURE — 97110 THERAPEUTIC EXERCISES: CPT | Mod: PN

## 2023-08-08 NOTE — PROGRESS NOTES
[facility]   Physical Therapy Treatment Note      Name: Preet Fulton [facility]  Clinic Number: [id number]    Therapy Diagnosis:   Encounter Diagnoses   Name Primary?    Acute bilateral low back pain without sciatica Yes    Chronic pain of left knee     Muscle weakness of lower extremity      Physician: Pal Fong MD    Visit Date: 8/8/2023    Physician Orders: PT Eval and Treat  Medical Diagnosis from Referral:   M79.604,M79.605 (ICD-10-CM) - Bilateral leg pain   M48.061 (ICD-10-CM) - Spinal stenosis of lumbar region without neurogenic claudication      Evaluation Date: 7/21/2023  Authorization Period Expiration: 12/31/23  Plan of Care Expiration: 9/15/2023                Progress Update: 8/22/2023               Visit # / Visits authorized: 4 / 8  ([id number])                     FOTO: Visit #1 - 1 / 3      PRECAUTIONS: Standard Precautions      MD Visit: /2023    Time in: 1100  Time out: 1155  Total billable minutes: 55 minutes       Subjective     Pt reports: left knee pain with walking and bending  She was compliant with home exercise program.  Response to previous treatment: no issues stated   Functional change: ongoing     Pain: 5/10 L knee region  Location: bilateral back  and lower legs     Objective      Objective Measures updated at progress report unless specified.     Treatment     Iesha received the treatments listed below:      therapeutic exercises to develop strength, ROM, flexibility, and core stabilization for 29 minutes including:    Bike x 5 minutes     Seated hamstring stretch 2 x 30 sec Bilateral   Seated piriformis stretch pulling 2 x 30 sec Bilateral   Seated PhysioBall rollout 20x     Lower trunk rotation 20x  Bilateral   Short arc quad 2# 30x Bilateral   Straight leg raise 30x Bilateral       neuromuscular re-education activities to improve: Coordination and Posture for 15 minutes. The following activities were included:     Hip adduction 30x      Supine clams  Green  Theraband 20x      Bridges 20x       Hip Abduction 20x Bilateral   Hip Extensions 20x Bilateral         therapeutic activities to improve functional performance for 8 minutes, including:  Mini squats 20x   HR 20x    Manual therapy: x 0 minutes  Myofacial Release with therapy bar to L ITB, L quad, L medial thigh region    Patient Education and Home Exercises       Education provided:   - home exercises     Written Home Exercises Provided: Patient instructed to cont prior HEP. Exercises were reviewed and Iesha was able to demonstrate them prior to the end of the session.  Iesha demonstrated fair  understanding of the education provided. See EMR under Patient Instructions for exercises provided during therapy sessions    Assessment     Patient tolerated session. She has pain, lack of motion left knee due to Osteoarthritis, limiting her improvements. Improved gait mechanics upon arrival with more upright posture. Continue to progress as tolerated with focus on reducing knee pain.      Iesha Is progressing well towards her goals.   Pt prognosis is Guarded.     Pt will continue to benefit from skilled outpatient physical therapy to address the deficits listed in the problem list box on initial evaluation, provide pt/family education and to maximize pt's level of independence in the home and community environment.     Pt's spiritual, cultural and educational needs considered and pt agreeable to plan of care and goals.     Anticipated barriers to physical therapy: sedentary lifestyle and chronicity of condition     Goals:   SHORT TERM GOALS:  2 weeks  Progress Date met   Recent signs and systems trend is improving in order to progress towards LTG's. [] Met  [] Not Met  [x] Progressing     Patient will be independent with HEP in order to further progress and return to maximal function. [] Met  [] Not Met  [x] Progressing     Pain rating at Worst: 5/10 in order to progress towards increased independence with activity.  [] Met  [] Not Met  [x] Progressing     Patient will be able to correct postural deviations in sitting and standing, to decrease pain and promote postural awareness for injury prevention.  [] Met  [] Not Met  [x] Progressing           LONG TERM GOALS: 4 weeks  Progress Date met   Patient will return to normal ADL, recreational, and work related activities with less pain and limitation.  [] Met  [] Not Met  [x] Progressing     Patient will improve AROM to stated goals in order to return to maximal functional potential.  [] Met  [] Not Met  [x] Progressing     Patient will improve Strength to stated goals of appropriate musculature in order to improve functional independence.  [] Met  [] Not Met  [x] Progressing     Pain Rating at Best: 1/10 to improve Quality of Life. [] Met  [] Not Met  [x] Progressing     Patient will meet predicted functional outcome (FOTO) score: 32% to increase self-worth & perceived functional ability. [] Met  [] Not Met  [x] Progressing     Patient will have met/partially met personal goal of: fall asleep without pain and not to wake up in pain [] Met  [] Not Met  [x] Progressing                Plan     Continue per Plan of Care     Albin Mcconnell, PT

## 2023-08-09 ENCOUNTER — TELEPHONE (OUTPATIENT)
Dept: FAMILY MEDICINE | Facility: CLINIC | Age: 73
End: 2023-08-09

## 2023-08-09 ENCOUNTER — PATIENT MESSAGE (OUTPATIENT)
Dept: FAMILY MEDICINE | Facility: CLINIC | Age: 73
End: 2023-08-09

## 2023-08-09 DIAGNOSIS — K21.9 GASTROESOPHAGEAL REFLUX DISEASE WITHOUT ESOPHAGITIS: Primary | ICD-10-CM

## 2023-08-09 RX ORDER — PANTOPRAZOLE SODIUM 40 MG/1
40 TABLET, DELAYED RELEASE ORAL DAILY
Qty: 30 TABLET | Refills: 11 | Status: SHIPPED | OUTPATIENT
Start: 2023-08-09 | End: 2024-08-08

## 2023-08-10 ENCOUNTER — CLINICAL SUPPORT (OUTPATIENT)
Dept: REHABILITATION | Facility: HOSPITAL | Age: 73
End: 2023-08-10
Payer: MEDICARE

## 2023-08-10 ENCOUNTER — TELEPHONE (OUTPATIENT)
Dept: FAMILY MEDICINE | Facility: CLINIC | Age: 73
End: 2023-08-10

## 2023-08-10 DIAGNOSIS — M62.81 MUSCLE WEAKNESS OF LOWER EXTREMITY: ICD-10-CM

## 2023-08-10 DIAGNOSIS — M25.562 CHRONIC PAIN OF LEFT KNEE: ICD-10-CM

## 2023-08-10 DIAGNOSIS — G89.29 CHRONIC PAIN OF LEFT KNEE: ICD-10-CM

## 2023-08-10 DIAGNOSIS — M54.50 ACUTE BILATERAL LOW BACK PAIN WITHOUT SCIATICA: Primary | ICD-10-CM

## 2023-08-10 PROCEDURE — 97530 THERAPEUTIC ACTIVITIES: CPT | Mod: PN

## 2023-08-10 PROCEDURE — 97112 NEUROMUSCULAR REEDUCATION: CPT | Mod: PN

## 2023-08-10 PROCEDURE — 97110 THERAPEUTIC EXERCISES: CPT | Mod: PN

## 2023-08-10 NOTE — PROGRESS NOTES
[facility]   Physical Therapy Treatment Note      Name: Preet Fulton [facility]  Clinic Number: [id number]    Therapy Diagnosis:   Encounter Diagnoses   Name Primary?    Acute bilateral low back pain without sciatica Yes    Chronic pain of left knee     Muscle weakness of lower extremity      Physician: Pal Fong MD    Visit Date: 8/10/2023    Physician Orders: PT Eval and Treat  Medical Diagnosis from Referral:   M79.604,M79.605 (ICD-10-CM) - Bilateral leg pain   M48.061 (ICD-10-CM) - Spinal stenosis of lumbar region without neurogenic claudication      Evaluation Date: 7/21/2023  Authorization Period Expiration: 12/31/23  Plan of Care Expiration: 9/15/2023                Progress Update: 8/22/2023               Visit # / Visits authorized: 5 / 8  ([id number])                     FOTO: Visit #1 - 1 / 3      PRECAUTIONS: Standard Precautions      MD Visit: /2023    Time in: 1100  Time out: 1155  Total billable minutes: 55 minutes       Subjective     Pt reports: continued left knee and thigh pain  She was compliant with home exercise program.  Response to previous treatment: no issues stated   Functional change: ongoing     Pain: 5/10 L knee region  Location: bilateral back  and lower legs     Objective      Objective Measures updated at progress report unless specified.     Treatment     Iesha received the treatments listed below:      therapeutic exercises to develop strength, ROM, flexibility, and core stabilization for 29 minutes including:    Bike x 5 minutes     Lower trunk rotation 20x  Bilateral   Short arc quad 2# 30x Bilateral   Straight leg raise 30x Bilateral     Seated hamstring stretch 2 x 30 sec Bilateral   Seated piriformis stretch pulling 2 x 30 sec Bilateral   Seated PhysioBall rollout 20x    neuromuscular re-education activities to improve: Coordination and Posture for 15 minutes. The following activities were included:     Hip adduction 30x      Supine clams Green   Theraband 20x      Bridges 20x       Hip Abduction 20x Bilateral   Hip Extensions 20x Bilateral       therapeutic activities to improve functional performance for 8 minutes, including:  Mini squats 20x   HR 20x    Manual therapy: x 0 minutes  Myofacial Release with therapy bar to L ITB, L quad, L medial thigh region    Patient Education and Home Exercises       Education provided:   - home exercises     Written Home Exercises Provided: Patient instructed to cont prior HEP. Exercises were reviewed and Iesha was able to demonstrate them prior to the end of the session.  Iesha demonstrated fair  understanding of the education provided. See EMR under Patient Instructions for exercises provided during therapy sessions    Assessment     Patient tolerated session. She continues with pain, lack of motion and swelling in her knee. Continue to progress as tolerated with focus on reducing knee pain.  She may need to follow up with Orthopedic MD after Therapy    Iesha Is progressing well towards her goals.   Pt prognosis is Guarded.     Pt will continue to benefit from skilled outpatient physical therapy to address the deficits listed in the problem list box on initial evaluation, provide pt/family education and to maximize pt's level of independence in the home and community environment.     Pt's spiritual, cultural and educational needs considered and pt agreeable to plan of care and goals.     Anticipated barriers to physical therapy: sedentary lifestyle and chronicity of condition     Goals:   SHORT TERM GOALS:  2 weeks  Progress Date met   Recent signs and systems trend is improving in order to progress towards LTG's. [] Met  [] Not Met  [x] Progressing     Patient will be independent with HEP in order to further progress and return to maximal function. [] Met  [] Not Met  [x] Progressing     Pain rating at Worst: 5/10 in order to progress towards increased independence with activity. [] Met  [] Not Met  [x] Progressing      Patient will be able to correct postural deviations in sitting and standing, to decrease pain and promote postural awareness for injury prevention.  [] Met  [] Not Met  [x] Progressing           LONG TERM GOALS: 4 weeks  Progress Date met   Patient will return to normal ADL, recreational, and work related activities with less pain and limitation.  [] Met  [] Not Met  [x] Progressing     Patient will improve AROM to stated goals in order to return to maximal functional potential.  [] Met  [] Not Met  [x] Progressing     Patient will improve Strength to stated goals of appropriate musculature in order to improve functional independence.  [] Met  [] Not Met  [x] Progressing     Pain Rating at Best: 1/10 to improve Quality of Life. [] Met  [] Not Met  [x] Progressing     Patient will meet predicted functional outcome (FOTO) score: 32% to increase self-worth & perceived functional ability. [] Met  [] Not Met  [x] Progressing     Patient will have met/partially met personal goal of: fall asleep without pain and not to wake up in pain [] Met  [] Not Met  [x] Progressing                Plan     Continue per Plan of Care     Albin Mcconnell, PT

## 2023-08-10 NOTE — TELEPHONE ENCOUNTER
----- Message from Pal Fong MD sent at 8/9/2023 10:24 PM CDT -----  Regarding: FW: med refill  Please notify patient  or son that I have sent the pantoprazole.  Do not use it for long-term because of potential long-term complications.  ----- Message -----  From: Ana Pedersen MA  Sent: 8/8/2023  11:05 AM CDT  To: Pal Fong MD  Subject: FW: med refill                                     ----- Message -----  From: Cherry Sibley  Sent: 8/8/2023  10:55 AM CDT  To: [facility] Staff  Subject: med refill                                       Pts  stopped in the office requesting pantoprazole (PROTONIX) 40 MG tablet be refilled and sent to [facility] on [address]. Please advise.    Thank you

## 2023-08-10 NOTE — TELEPHONE ENCOUNTER
----- Message from Ana Pedersen MA sent at 8/8/2023 11:05 AM CDT -----  Regarding: FW: med refill    ----- Message -----  From: Cherry Sibley  Sent: 8/8/2023  10:55 AM CDT  To: [facility] Staff  Subject: med refill                                       Pts  stopped in the office requesting pantoprazole (PROTONIX) 40 MG tablet be refilled and sent to [facility] on [address]. Please advise.    Thank you        Please notify kodi madera that pantoprazole has been sent.  Is she having significant heartburn?  Long-term use of pantoprazole has its own risks and disadvantage.

## 2023-08-15 ENCOUNTER — CLINICAL SUPPORT (OUTPATIENT)
Dept: REHABILITATION | Facility: HOSPITAL | Age: 73
End: 2023-08-15
Payer: MEDICARE

## 2023-08-15 DIAGNOSIS — G89.29 CHRONIC PAIN OF LEFT KNEE: ICD-10-CM

## 2023-08-15 DIAGNOSIS — M62.81 MUSCLE WEAKNESS OF LOWER EXTREMITY: ICD-10-CM

## 2023-08-15 DIAGNOSIS — M25.562 CHRONIC PAIN OF LEFT KNEE: ICD-10-CM

## 2023-08-15 DIAGNOSIS — M54.50 ACUTE BILATERAL LOW BACK PAIN WITHOUT SCIATICA: Primary | ICD-10-CM

## 2023-08-15 PROCEDURE — 97110 THERAPEUTIC EXERCISES: CPT | Mod: PN

## 2023-08-15 PROCEDURE — 97112 NEUROMUSCULAR REEDUCATION: CPT | Mod: PN

## 2023-08-15 PROCEDURE — 97530 THERAPEUTIC ACTIVITIES: CPT | Mod: PN

## 2023-08-15 NOTE — PROGRESS NOTES
[facility]   Physical Therapy Treatment Note      Name: Preet Fulton West  Clinic Number: [id number]    Therapy Diagnosis:   Encounter Diagnoses   Name Primary?    Acute bilateral low back pain without sciatica Yes    Chronic pain of left knee     Muscle weakness of lower extremity      Physician: Pal Fong MD    Visit Date: 8/15/2023    Physician Orders: PT Eval and Treat  Medical Diagnosis from Referral:   M79.604,M79.605 (ICD-10-CM) - Bilateral leg pain   M48.061 (ICD-10-CM) - Spinal stenosis of lumbar region without neurogenic claudication      Evaluation Date: 7/21/2023  Authorization Period Expiration: 12/31/23  Plan of Care Expiration: 9/15/2023                Progress Update: 8/22/2023               Visit # / Visits authorized: 6 / 8  ([id number])                     FOTO: Visit #1 - 1 / 3      PRECAUTIONS: Standard Precautions      MD Visit: /2023    Time in: 1100  Time out: 1155  Total billable minutes: 55 minutes       Subjective     Pt reports: pain and stiff left knee  She was compliant with home exercise program.  Response to previous treatment: no issues stated   Functional change: ongoing     Pain: 5/10 L knee region  Location: bilateral back  and lower legs     Objective      Objective Measures updated at progress report unless specified.     Treatment     Iesha received the treatments listed below:      therapeutic exercises to develop strength, ROM, flexibility, and core stabilization for 29 minutes including:    Bike x 5 minutes     Lower trunk rotation 20x  Bilateral   Short arc quad 2# 30x Bilateral   Straight leg raise 30x Bilateral     Seated hamstring stretch 2 x 30 sec Bilateral   Seated piriformis stretch pulling 2 x 30 sec Bilateral   Seated PhysioBall rollout 20x    neuromuscular re-education activities to improve: Coordination and Posture for 15 minutes. The following activities were included:     Hip adduction 30x      Supine clams Green  Theraband  20x      Bridges 20x       Hip Abduction 20x Bilateral   Hip Extensions 20x Bilateral       therapeutic activities to improve functional performance for 8 minutes, including:  Mini squats 20x   HR 20x    Manual therapy: x 0 minutes  Myofacial Release with therapy bar to L ITB, L quad, L medial thigh region    Patient Education and Home Exercises       Education provided:   - home exercises     Written Home Exercises Provided: Patient instructed to cont prior HEP. Exercises were reviewed and Iesha was able to demonstrate them prior to the end of the session.  Iesha demonstrated fair  understanding of the education provided. See EMR under Patient Instructions for exercises provided during therapy sessions    Assessment     Patient tolerated session. She continues with pain, lack of motion and swelling in her knee. Continue to progress as tolerated with focus on reducing knee pain.  She is not progressing much and will need to see Orthopedic    Iesha Is progressing well towards her goals.   Pt prognosis is Guarded.     Pt will continue to benefit from skilled outpatient physical therapy to address the deficits listed in the problem list box on initial evaluation, provide pt/family education and to maximize pt's level of independence in the home and community environment.     Pt's spiritual, cultural and educational needs considered and pt agreeable to plan of care and goals.     Anticipated barriers to physical therapy: sedentary lifestyle and chronicity of condition     Goals:   SHORT TERM GOALS:  2 weeks  Progress Date met   Recent signs and systems trend is improving in order to progress towards LTG's. [] Met  [] Not Met  [x] Progressing     Patient will be independent with HEP in order to further progress and return to maximal function. [] Met  [] Not Met  [x] Progressing     Pain rating at Worst: 5/10 in order to progress towards increased independence with activity. [] Met  [] Not Met  [x] Progressing     Patient  will be able to correct postural deviations in sitting and standing, to decrease pain and promote postural awareness for injury prevention.  [] Met  [] Not Met  [x] Progressing           LONG TERM GOALS: 4 weeks  Progress Date met   Patient will return to normal ADL, recreational, and work related activities with less pain and limitation.  [] Met  [] Not Met  [x] Progressing     Patient will improve AROM to stated goals in order to return to maximal functional potential.  [] Met  [] Not Met  [x] Progressing     Patient will improve Strength to stated goals of appropriate musculature in order to improve functional independence.  [] Met  [] Not Met  [x] Progressing     Pain Rating at Best: 1/10 to improve Quality of Life. [] Met  [] Not Met  [x] Progressing     Patient will meet predicted functional outcome (FOTO) score: 32% to increase self-worth & perceived functional ability. [] Met  [] Not Met  [x] Progressing     Patient will have met/partially met personal goal of: fall asleep without pain and not to wake up in pain [] Met  [] Not Met  [x] Progressing                Plan     Continue per Plan of Care     Albin Mcconnell, PT

## 2023-08-17 ENCOUNTER — CLINICAL SUPPORT (OUTPATIENT)
Dept: REHABILITATION | Facility: HOSPITAL | Age: 73
End: 2023-08-17
Payer: MEDICARE

## 2023-08-17 DIAGNOSIS — G89.29 CHRONIC PAIN OF LEFT KNEE: ICD-10-CM

## 2023-08-17 DIAGNOSIS — M62.81 MUSCLE WEAKNESS OF LOWER EXTREMITY: ICD-10-CM

## 2023-08-17 DIAGNOSIS — M25.562 CHRONIC PAIN OF LEFT KNEE: ICD-10-CM

## 2023-08-17 DIAGNOSIS — M54.50 ACUTE BILATERAL LOW BACK PAIN WITHOUT SCIATICA: Primary | ICD-10-CM

## 2023-08-17 PROCEDURE — 97110 THERAPEUTIC EXERCISES: CPT | Mod: PN

## 2023-08-17 PROCEDURE — 97112 NEUROMUSCULAR REEDUCATION: CPT | Mod: PN

## 2023-08-17 NOTE — PROGRESS NOTES
[facility]   Physical Therapy Treatment Note      Name: Preet Fulton West  Clinic Number: [id number]    Therapy Diagnosis:   Encounter Diagnoses   Name Primary?    Acute bilateral low back pain without sciatica Yes    Chronic pain of left knee     Muscle weakness of lower extremity      Physician: Pal Fong MD    Visit Date: 8/17/2023    Physician Orders: PT Eval and Treat  Medical Diagnosis from Referral:   M79.604,M79.605 (ICD-10-CM) - Bilateral leg pain   M48.061 (ICD-10-CM) - Spinal stenosis of lumbar region without neurogenic claudication      Evaluation Date: 7/21/2023  Authorization Period Expiration: 12/31/23  Plan of Care Expiration: 9/15/2023                Progress Update: 8/22/2023               Visit # / Visits authorized: 7 / 8  ([id number])                     FOTO: Visit #1 - 1 / 3      PRECAUTIONS: Standard Precautions      MD Visit: /2023    Time in: 1100  Time out: 1155  Total billable minutes: 55 minutes       Subjective     Pt reports: Still having some thigh tightness and discomfort. She is doing her Home Exercise Program daily   She was compliant with home exercise program.  Response to previous treatment: no issues stated   Functional change: ongoing     Pain: 3/10 L knee region  Location: bilateral back  and lower legs left more     Objective      Objective Measures updated at progress report unless specified.     Treatment     Iesha received the treatments listed below:      therapeutic exercises to develop strength, ROM, flexibility, and core stabilization for 29 minutes including:    Bike x 5 minutes     Lower trunk rotation 20x  Bilateral   Short arc quad 3# 30x Bilateral   Straight leg raise 30x Bilateral     Seated hamstring stretch 2 x 30 sec Bilateral   Seated piriformis stretch pulling 2 x 30 sec Bilateral   Seated PhysioBall rollout 20x    neuromuscular re-education activities to improve: Coordination and Posture for 15 minutes. The following  activities were included:     Hip adduction 30x      Supine clams Green Theraband 20x      Bridges 20x       Hip Abduction 20x Bilateral   Hip Extensions 20x Bilateral       therapeutic activities to improve functional performance for 8 minutes, including:  Mini squats 20x   HR 20x    Manual therapy: x 0 minutes  Myofacial Release with therapy bar to L ITB, L quad, L medial thigh region    Patient Education and Home Exercises       Education provided:   - home exercises     Written Home Exercises Provided: Patient instructed to cont prior HEP. Exercises were reviewed and Iesha was able to demonstrate them prior to the end of the session.  Iesha demonstrated fair  understanding of the education provided. See EMR under Patient Instructions for exercises provided during therapy sessions    Assessment     Patient tolerated session. She still has some discomfort, has Osteoarthritis causing this. Continue to progress as tolerated with focus on reducing knee pain.  She is doing a Home Exercise Program and at this point she can continue on her own.     Iesha Is progressing well towards her goals.   Pt prognosis is Guarded.     Pt will continue to benefit from skilled outpatient physical therapy to address the deficits listed in the problem list box on initial evaluation, provide pt/family education and to maximize pt's level of independence in the home and community environment.     Pt's spiritual, cultural and educational needs considered and pt agreeable to plan of care and goals.     Anticipated barriers to physical therapy: sedentary lifestyle and chronicity of condition     Goals:   SHORT TERM GOALS:  2 weeks  Progress Date met   Recent signs and systems trend is improving in order to progress towards LTG's. [] Met  [] Not Met  [x] Progressing     Patient will be independent with HEP in order to further progress and return to maximal function. [] Met  [] Not Met  [x] Progressing     Pain rating at Worst: 5/10 in order  to progress towards increased independence with activity. [] Met  [] Not Met  [x] Progressing     Patient will be able to correct postural deviations in sitting and standing, to decrease pain and promote postural awareness for injury prevention.  [] Met  [] Not Met  [x] Progressing           LONG TERM GOALS: 4 weeks  Progress Date met   Patient will return to normal ADL, recreational, and work related activities with less pain and limitation.  [] Met  [] Not Met  [x] Progressing     Patient will improve AROM to stated goals in order to return to maximal functional potential.  [] Met  [] Not Met  [x] Progressing     Patient will improve Strength to stated goals of appropriate musculature in order to improve functional independence.  [] Met  [] Not Met  [x] Progressing     Pain Rating at Best: 1/10 to improve Quality of Life. [] Met  [] Not Met  [x] Progressing     Patient will meet predicted functional outcome (FOTO) score: 32% to increase self-worth & perceived functional ability. [] Met  [] Not Met  [x] Progressing     Patient will have met/partially met personal goal of: fall asleep without pain and not to wake up in pain [] Met  [] Not Met  [x] Progressing                Plan     Continue per Plan of Care     Albin Mcconnell, PT

## 2023-08-22 LAB
LEFT EYE DM RETINOPATHY: NEGATIVE
RIGHT EYE DM RETINOPATHY: NEGATIVE

## 2023-08-23 ENCOUNTER — PATIENT OUTREACH (OUTPATIENT)
Dept: ADMINISTRATIVE | Facility: HOSPITAL | Age: 73
End: 2023-08-23
Payer: MEDICARE

## 2023-08-23 PROBLEM — Z13.5 SCREENING FOR DIABETIC RETINOPATHY: Status: ACTIVE | Noted: 2019-06-12

## 2023-08-23 NOTE — PROGRESS NOTES
Population Health Chart Review & Patient Outreach Details:     Reason for Outreach Encounter:     [x]  Non-Compliant Report   []  Payor Report ([employer], PHN, BCBS, MSSP, MCIP, UHC, etc.)   []  Pre-Visit Chart Review     Updates Requested / Reviewed:     []  Care Everywhere    []     []  External Sources ([facility], [facility], [employer], etc.)   [x]  Care Team Updated    Patient Outreach Method:    []  Telephone Outreach Completed   [] Successful   [] Left Voicemail   [] Unable to Contact (wrong number, no voicemail)  []  [employer] Outreach Sent  []  Letter Outreach Mailed  []  Fax Sent for External Records  [x]  External Records Upload    Health Maintenance Topics Addressed and Outreach Outcomes / Actions Taken:        []      Breast Cancer Screening []  Mammo Scheduled      []  External Records Requested     []  Added Reminder to Complete to Upcoming Primary Care Appt Notes     []  Patient Declined     []  Patient Will Call Back to Schedule     []  Patient Will Schedule with External Provider / Order Routed if Applicable             []       Cervical Cancer Screening []  Pap Scheduled      []  External Records Requested     []  Added Reminder to Complete to Upcoming Primary Care Appt Notes     []  Patient Declined     []  Patient Will Call Back to Schedule     []  Patient Will Schedule with External Provider               []          Colorectal Cancer Screening []  Colonoscopy Case Request or Referral Placed     []  External Records Requested     []  Added Reminder to Complete to Upcoming Primary Care Appt Notes     []  Patient Declined     []  Patient Will Call Back to Schedule     []  Patient Will Schedule with External Provider     []  Fit Kit Mailed (add the SmartPhrase under additional notes)     []  Reminded Patient to Complete Home Test             [x]      Diabetic Eye Exam []  Eye Camera Scheduled or Optometry Referral Placed     []  External Records Requested     []  Added Reminder to Complete to  Upcoming Primary Care Appt Notes     []  Patient Declined     []  Patient Will Call Back to Schedule     []  Patient Will Schedule with External Provider             []      Blood Pressure Control []  Primary Care Follow Up Visit Scheduled     []  Remote Blood Pressure Reading Captured     []  Added Reminder to Complete to Upcoming Primary Care Appt Notes     []  Patient Declined     []  Patient Will Call Back / Patient Will Send Portal Message with Reading     []  Patient Will Call Back to Schedule Provider Visit             []       HbA1c & Other Labs []  Lab Appt Scheduled for Due Labs     []  Primary Care Follow Up Visit Scheduled      []  Reminded Patient to Complete Home Test     []  Added Reminder to Complete to Upcoming Primary Care Appt Notes     []  Patient Declined     []  Patient Will Call Back to Schedule     []  Patient Will Schedule with External Provider / Order Routed if Applicable           []    Schedule Primary Care Appt []  Primary Care Appt Scheduled     []  Patient Declined     []  Patient Will Call Back to Schedule     []  Pt Established with External Provider & Updated Care Team             []      Medication Adherence []  Primary Care Appointment Scheduled     []  Added Reminder to Upcoming Primary Care Appt Notes     []  Patient Reminded to  Prescription     []  Patient Declined, Provider Notified if Needed     []  Sent Provider Message to Review and/or Add Exclusion to Problem List             []      Osteoporosis Screening []  DXA Appointment Scheduled     []  External Records Requested     []  Added Reminder to Complete to Upcoming Primary Care Appt Notes     []  Patient Declined     []  Patient Will Call Back to Schedule     []  Patient Will Schedule with External Provider / Order Routed if Applicable     Additional Care Coordinator Notes:         Further Action Needed If Patient Returns Outreach:

## 2023-10-05 ENCOUNTER — TELEPHONE (OUTPATIENT)
Dept: FAMILY MEDICINE | Facility: CLINIC | Age: 73
End: 2023-10-05

## 2023-10-05 DIAGNOSIS — K59.01 SLOW TRANSIT CONSTIPATION: ICD-10-CM

## 2023-10-05 RX ORDER — POLYETHYLENE GLYCOL 3350 17 G/17G
POWDER, FOR SOLUTION ORAL
Qty: 510 G | Refills: 5 | Status: SHIPPED | OUTPATIENT
Start: 2023-10-05

## 2023-10-05 NOTE — TELEPHONE ENCOUNTER
Patient's  here for visit.  Requests refill on Glycolax for wife.  They are both planning to go to [place].    Slow transit constipation  -     polyethylene glycol (GLYCOLAX) 17 gram/dose powder; DISSOLVE 17 GRAMS IN WATER AND DRINK ONCE DAILY  Dispense: 510 g; Refill: 5

## 2023-11-01 ENCOUNTER — OFFICE VISIT (OUTPATIENT)
Dept: FAMILY MEDICINE | Facility: CLINIC | Age: 73
End: 2023-11-01
Payer: MEDICARE

## 2023-11-01 ENCOUNTER — PATIENT MESSAGE (OUTPATIENT)
Dept: FAMILY MEDICINE | Facility: CLINIC | Age: 73
End: 2023-11-01

## 2023-11-01 VITALS
DIASTOLIC BLOOD PRESSURE: 90 MMHG | WEIGHT: 133.13 LBS | OXYGEN SATURATION: 99 % | HEIGHT: 62 IN | HEART RATE: 92 BPM | BODY MASS INDEX: 24.5 KG/M2 | SYSTOLIC BLOOD PRESSURE: 148 MMHG

## 2023-11-01 DIAGNOSIS — R51.9 FRONTAL HEADACHE: ICD-10-CM

## 2023-11-01 DIAGNOSIS — E87.1 LOW SODIUM LEVELS: ICD-10-CM

## 2023-11-01 DIAGNOSIS — E11.9 TYPE 2 DIABETES MELLITUS WITHOUT COMPLICATION, WITHOUT LONG-TERM CURRENT USE OF INSULIN: Primary | ICD-10-CM

## 2023-11-01 DIAGNOSIS — E78.2 MIXED HYPERLIPIDEMIA: ICD-10-CM

## 2023-11-01 DIAGNOSIS — I10 ESSENTIAL HYPERTENSION: ICD-10-CM

## 2023-11-01 PROCEDURE — 3060F POS MICROALBUMINURIA REV: CPT | Mod: CPTII,S$GLB,, | Performed by: INTERNAL MEDICINE

## 2023-11-01 PROCEDURE — 3066F NEPHROPATHY DOC TX: CPT | Mod: CPTII,S$GLB,, | Performed by: INTERNAL MEDICINE

## 2023-11-01 PROCEDURE — 3051F PR MOST RECENT HEMOGLOBIN A1C LEVEL 7.0 - < 8.0%: ICD-10-PCS | Mod: CPTII,S$GLB,, | Performed by: INTERNAL MEDICINE

## 2023-11-01 PROCEDURE — 3008F BODY MASS INDEX DOCD: CPT | Mod: CPTII,S$GLB,, | Performed by: INTERNAL MEDICINE

## 2023-11-01 PROCEDURE — 4010F PR ACE/ARB THEARPY RXD/TAKEN: ICD-10-PCS | Mod: CPTII,S$GLB,, | Performed by: INTERNAL MEDICINE

## 2023-11-01 PROCEDURE — 99214 PR OFFICE/OUTPT VISIT, EST, LEVL IV, 30-39 MIN: ICD-10-PCS | Mod: S$GLB,,, | Performed by: INTERNAL MEDICINE

## 2023-11-01 PROCEDURE — 1160F PR REVIEW ALL MEDS BY PRESCRIBER/CLIN PHARMACIST DOCUMENTED: ICD-10-PCS | Mod: CPTII,S$GLB,, | Performed by: INTERNAL MEDICINE

## 2023-11-01 PROCEDURE — 1101F PR PT FALLS ASSESS DOC 0-1 FALLS W/OUT INJ PAST YR: ICD-10-PCS | Mod: CPTII,S$GLB,, | Performed by: INTERNAL MEDICINE

## 2023-11-01 PROCEDURE — 4010F ACE/ARB THERAPY RXD/TAKEN: CPT | Mod: CPTII,S$GLB,, | Performed by: INTERNAL MEDICINE

## 2023-11-01 PROCEDURE — 3288F FALL RISK ASSESSMENT DOCD: CPT | Mod: CPTII,S$GLB,, | Performed by: INTERNAL MEDICINE

## 2023-11-01 PROCEDURE — 3288F PR FALLS RISK ASSESSMENT DOCUMENTED: ICD-10-PCS | Mod: CPTII,S$GLB,, | Performed by: INTERNAL MEDICINE

## 2023-11-01 PROCEDURE — 1160F RVW MEDS BY RX/DR IN RCRD: CPT | Mod: CPTII,S$GLB,, | Performed by: INTERNAL MEDICINE

## 2023-11-01 PROCEDURE — 1159F MED LIST DOCD IN RCRD: CPT | Mod: CPTII,S$GLB,, | Performed by: INTERNAL MEDICINE

## 2023-11-01 PROCEDURE — 1159F PR MEDICATION LIST DOCUMENTED IN MEDICAL RECORD: ICD-10-PCS | Mod: CPTII,S$GLB,, | Performed by: INTERNAL MEDICINE

## 2023-11-01 PROCEDURE — 3051F HG A1C>EQUAL 7.0%<8.0%: CPT | Mod: CPTII,S$GLB,, | Performed by: INTERNAL MEDICINE

## 2023-11-01 PROCEDURE — 1101F PT FALLS ASSESS-DOCD LE1/YR: CPT | Mod: CPTII,S$GLB,, | Performed by: INTERNAL MEDICINE

## 2023-11-01 PROCEDURE — 99214 OFFICE O/P EST MOD 30 MIN: CPT | Mod: S$GLB,,, | Performed by: INTERNAL MEDICINE

## 2023-11-01 PROCEDURE — 1126F AMNT PAIN NOTED NONE PRSNT: CPT | Mod: CPTII,S$GLB,, | Performed by: INTERNAL MEDICINE

## 2023-11-01 PROCEDURE — 3060F PR POS MICROALBUMINURIA RESULT DOCUMENTED/REVIEW: ICD-10-PCS | Mod: CPTII,S$GLB,, | Performed by: INTERNAL MEDICINE

## 2023-11-01 PROCEDURE — 1126F PR PAIN SEVERITY QUANTIFIED, NO PAIN PRESENT: ICD-10-PCS | Mod: CPTII,S$GLB,, | Performed by: INTERNAL MEDICINE

## 2023-11-01 PROCEDURE — 3077F SYST BP >= 140 MM HG: CPT | Mod: CPTII,S$GLB,, | Performed by: INTERNAL MEDICINE

## 2023-11-01 PROCEDURE — 3066F PR DOCUMENTATION OF TREATMENT FOR NEPHROPATHY: ICD-10-PCS | Mod: CPTII,S$GLB,, | Performed by: INTERNAL MEDICINE

## 2023-11-01 PROCEDURE — 3080F PR MOST RECENT DIASTOLIC BLOOD PRESSURE >= 90 MM HG: ICD-10-PCS | Mod: CPTII,S$GLB,, | Performed by: INTERNAL MEDICINE

## 2023-11-01 PROCEDURE — 3077F PR MOST RECENT SYSTOLIC BLOOD PRESSURE >= 140 MM HG: ICD-10-PCS | Mod: CPTII,S$GLB,, | Performed by: INTERNAL MEDICINE

## 2023-11-01 PROCEDURE — 3008F PR BODY MASS INDEX (BMI) DOCUMENTED: ICD-10-PCS | Mod: CPTII,S$GLB,, | Performed by: INTERNAL MEDICINE

## 2023-11-01 PROCEDURE — 3080F DIAST BP >= 90 MM HG: CPT | Mod: CPTII,S$GLB,, | Performed by: INTERNAL MEDICINE

## 2023-11-01 RX ORDER — AMLODIPINE BESYLATE 5 MG/1
5 TABLET ORAL NIGHTLY
Qty: 90 TABLET | Refills: 3 | Status: SHIPPED | OUTPATIENT
Start: 2023-11-01 | End: 2024-10-31

## 2023-11-01 NOTE — PROGRESS NOTES
Subjective:       Patient ID: Peret West is a 73 y.o. female.    Chief Complaint: Hypertension, Headache, Diabetes, and Gastroesophageal Reflux    Patient is a 73-year-old female comes follow-up.  Today she is accompanied with her son Mr Espinoza West  for interpretation who does an adequate job.      She is a somewhat limited historian.    She comes with complains of elevated blood pressures and headaches.    She had a dental extraction recently and after that she started experiencing headaches.  The headaches are frontal in between the sinuses and eyes.  They fluctuate.  She can not determine any causative reason or timing or connection with anything.  They seem to be getting better at this point.      Her blood pressures are somewhat high and she has noted them to be high after the dental extraction.    Chronic medical issues are as below:-    Underlying medical issues include the following:-    1.-type 2 diabetes mellitus -hemoglobin A1c has come down to 7.4.  2.-hypertension  3.-hyperlipidemia  4.--anxiety though she is not on any medication for this this is probably inherent and to some extent cultural also.  5.-osteoporosis currently on alendronate.     6. -nonspecific complains of fatigue and empirical treatment with vitamin B12 injections.  Please note that she was on vitamin B12 injections prior to coming to [place] and under my care from an [place]state physician.  No records were available which actually demonstrated a low vitamin-B-12 but some how this injection made her feel better and I am not sure if there was a psychological /placebo component to her feeling better or there is some real physical improvement.  On 1 occasion she did mentioned that as soon as the red injection entered her arm, she felt strong.  (probably a conditioned response).  Previously herd  had remarked that she did not find any improvement with B12 injections and this was thereafter stopped.  However patient  gives contradictory information and states that it helps her.  So this will be (vitamin B12) restarted again.  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  This was in 2013 outside [place]  8.-recent visit in the month of May for pain in the left knee considered to be osteoarthritis and treated conservatively with diclofenac gel and Celebrex.      9.-frequent complains of different types of issues and pains and aches in different parts of the body which do not last long      Diabetes  She presents for her follow-up diabetic visit. She has type 2 diabetes mellitus. No MedicAlert identification noted. The initial diagnosis of diabetes was made 20 years ago. Her disease course has been fluctuating. Hypoglycemia symptoms include nervousness/anxiousness. Pertinent negatives for hypoglycemia include no confusion, dizziness, pallor, seizures or tremors. Associated symptoms include fatigue (Getting better). Pertinent negatives for diabetes include no chest pain, no polydipsia, no polyphagia and no polyuria. There are no hypoglycemic complications. Risk factors for coronary artery disease include sedentary lifestyle, post-menopausal, hypertension, dyslipidemia and diabetes mellitus. Current diabetic treatment includes oral agent (monotherapy). She is compliant with treatment some of the time. She has not had a previous visit with a dietitian. She never participates in exercise. An ACE inhibitor/angiotensin II receptor blocker is being taken. She does not see a podiatrist.  Hypertension  This is a chronic problem. The current episode started more than 1 year ago. The problem is controlled. Associated symptoms include anxiety and peripheral edema. Pertinent negatives include no chest pain, neck pain, palpitations or shortness of breath. Risk factors for coronary artery disease include sedentary lifestyle. Past treatments include calcium channel blockers. The current treatment provides moderate  improvement. Compliance problems include psychosocial issues.  There is no history of chronic renal disease, hyperaldosteronism, hypercortisolism or sleep apnea.   Gastroesophageal Reflux  She reports no abdominal pain, no chest pain, no nausea or no wheezing. This is a chronic problem. The current episode started more than 1 year ago. The problem occurs occasionally. Associated symptoms include fatigue (Getting better). She has tried a PPI for the symptoms. The treatment provided moderate relief.   Hyperlipidemia  This is a chronic problem. The current episode started more than 1 year ago. The problem is controlled. Exacerbating diseases include diabetes. She has no history of chronic renal disease, hypothyroidism, liver disease, obesity or nephrotic syndrome. Associated symptoms include myalgias. Pertinent negatives include no chest pain or shortness of breath. Current antihyperlipidemic treatment includes statins. The current treatment provides moderate improvement of lipids. Compliance problems include psychosocial issues.  Risk factors for coronary artery disease include hypertension, diabetes mellitus, dyslipidemia, post-menopausal and a sedentary lifestyle.     No leg pain today which had started last time.  Past Medical History:   Diagnosis Date    Allergy     aspirin itching    Anemia     Cancer     Diabetes mellitus, type 2     H/O colon cancer, stage III 1/1/2013    Treated in [facility]    History of colonoscopy 6/4/2021    Colonoscopy done by Dr. Rayshawn Adkins MD on 06/04/21.non bleeding internal hemorrhoids.  4 mm polyp in the sigmoid colon removed with cold snare.  Patent end-to-end colocolonic anastomosis characterized by healthy-appearing mucosa.  Examination was otherwise normal.  Repeat colonoscopy in 3 years for surveillance.    Malignant neoplasm of transverse colon (2013) - Stage III 4/4/2019    Osteoporosis     S/P laparoscopic colectomy (3/2013) 4/5/2019    Seasonal allergies      Social  History     Socioeconomic History    Marital status:      Spouse name: Luis West    Number of children: 3   Occupational History    Occupation:    Tobacco Use    Smoking status: Never    Smokeless tobacco: Never   Substance and Sexual Activity    Alcohol use: No    Drug use: No    Sexual activity: Not Currently     Partners: Male     Birth control/protection: Post-menopausal   Social History Narrative    Speaks Vianney only     Social Determinants of Health     Financial Resource Strain: Low Risk  (5/5/2022)    Overall Financial Resource Strain (CARDIA)     Difficulty of Paying Living Expenses: Not very hard   Food Insecurity: No Food Insecurity (5/5/2022)    Hunger Vital Sign     Worried About Running Out of Food in the Last Year: Never true     Ran Out of Food in the Last Year: Never true   Transportation Needs: No Transportation Needs (5/5/2022)    PRAPARE - Transportation     Lack of Transportation (Medical): No     Lack of Transportation (Non-Medical): No   Physical Activity: Insufficiently Active (5/5/2022)    Exercise Vital Sign     Days of Exercise per Week: 7 days     Minutes of Exercise per Session: 20 min   Stress: Stress Concern Present (5/5/2022)    [employer] [place] [employer] - Occupational Stress Questionnaire     Feeling of Stress : To some extent   Social Connections: Moderately Integrated (5/5/2022)    Social Connection and Isolation Panel [NHANES]     Frequency of Communication with Friends and Family: More than three times a week     Attends Yazidism Services: 1 to 4 times per year     Active Member of Clubs or Organizations: No     Attends Club or Organization Meetings: Never     Marital Status:    Housing Stability: Low Risk  (5/5/2022)    Housing Stability Vital Sign     Unable to Pay for Housing in the Last Year: No     Number of Places Lived in the Last Year: 1     Unstable Housing in the Last Year: No     Past Surgical History:   Procedure  Laterality Date    COLON SURGERY      JOINT REPLACEMENT       Family History   Problem Relation Age of Onset    Stroke Mother     Heart disease Mother     Cancer Father         Soraya       Review of Systems   Constitutional:  Positive for fatigue (Getting better). Negative for activity change, appetite change, chills, fever and unexpected weight change (Gained 1 lb since the last documentation.  129--130 lb.).   HENT:  Negative for congestion, postnasal drip, sinus pressure and sneezing.         Uses Flonase for sinuses.   Eyes:  Negative for pain, discharge and visual disturbance (She had bilateral cataract surgery.).   Respiratory:  Negative for chest tightness, shortness of breath, wheezing and stridor.    Cardiovascular:  Negative for chest pain, palpitations and leg swelling.        Blood pressure was previously elevated now they are better on losartan 100 mg.   Gastrointestinal:  Negative for abdominal distention, abdominal pain, blood in stool, diarrhea and nausea.        GERD occasional use of pantoprazole.  History of colon cancer status post colectomy in 2013.  Probably regional johnny metastasis.  Details not available at this point.   Endocrine: Negative for cold intolerance, polydipsia, polyphagia and polyuria.        Past history of hyponatremia has been noted.      Underlying diabetes has been noted with improvement in blood sugars to 7.7.   Genitourinary:  Negative for flank pain.        Previously had complained of vaginal itching but no more.   Musculoskeletal:  Positive for arthralgias, gait problem and myalgias. Negative for joint swelling, neck pain and neck stiffness.        Complains of bilateral leg pains.   Skin:  Negative for color change and pallor.   Allergic/Immunologic: Negative for environmental allergies, food allergies and immunocompromised state.   Neurological:  Negative for dizziness, tremors, seizures, syncope and light-headedness.   Hematological:  Negative for adenopathy. Does  "not bruise/bleed easily.   Psychiatric/Behavioral:  Negative for agitation, confusion and dysphoric mood. The patient is nervous/anxious.         Somewhat anxious about her health issues.         Objective:      Blood pressure (!) 148/90, pulse 92, height 5' 2" (1.575 m), weight 60.4 kg (133 lb 1.6 oz), SpO2 99 %. Body mass index is 24.34 kg/m².  Physical Exam  Constitutional:       Appearance: Normal appearance. She is not ill-appearing or diaphoretic.      Comments: BMI is 24.34   HENT:      Head:        Comments: No reproducible tenderness on palpation on the scalp or sinuses.     Mouth/Throat:      Dentition: Abnormal dentition.      Pharynx: No posterior oropharyngeal erythema.        Comments: No socket tenderness in the dental area of extraction  Cardiovascular:      Rate and Rhythm: Normal rate and regular rhythm.   Pulmonary:      Effort: Pulmonary effort is normal. No respiratory distress.      Breath sounds: Normal breath sounds. No stridor.   Abdominal:      General: There is no distension.      Tenderness: There is no abdominal tenderness.   Musculoskeletal:      Cervical back: No rigidity.      Right lower leg: No edema.      Left lower leg: No edema.   Skin:     Coloration: Skin is not jaundiced or pale.      Findings: No rash.   Neurological:      Mental Status: She is alert. Mental status is at baseline.      Cranial Nerves: No cranial nerve deficit or dysarthria.      Sensory: No sensory deficit.      Motor: Motor function is intact.      Coordination: Coordination normal.      Deep Tendon Reflexes: Reflexes normal.   Psychiatric:      Comments: Chronically anxious.           Assessment:               Type 2 diabetes mellitus without complication, without long-term current use of insulin  Comments:  Blood sugars are reasonable  Orders:  -     Hemoglobin A1C; Future; Expected date: 11/01/2023    Essential hypertension  Comments:  Blood pressures are elevated and will add amlodipine 5 mg in p.m..  " Monitor blood pressures at home.  Orders:  -     Basic Metabolic Panel; Future; Expected date: 11/01/2023  -     amLODIPine (NORVASC) 5 MG tablet; Take 1 tablet (5 mg total) by mouth every evening.  Dispense: 90 tablet; Refill: 3    Frontal headache  Comments:  These headaches seem to have started after dental extraction on the right #  1. Or # 2.  I did feel the socket and no specific pain or evidence of abscess.  Orders:  -     Sedimentation rate; Future; Expected date: 11/01/2023    Mixed hyperlipidemia  Comments:  She is taking rosuvastatin.  Check lipid panel also.    Low sodium levels  Comments:  Apparently this low sodium problem seems to be running in family.  Patient's father had low sodium.  The cause is unknown.  Probably too much free water.       Patient Outreach on 08/23/2023   Component Date Value Ref Range Status    Left Eye DM Retinopathy 08/22/2023 Negative   Final    Right Eye DM Retinopathy 08/22/2023 Negative   Final     Component      Latest Ref Rng 6/22/2023 7/3/2023   Sodium      136 - 145 mmol/L  134 (L)    Potassium      3.5 - 5.1 mmol/L  4.8    Chloride      95 - 110 mmol/L  102    CO2      23 - 29 mmol/L  22 (L)    Glucose      70 - 110 mg/dL  147 (H)    BUN      8 - 23 mg/dL  14    Creatinine      0.5 - 1.4 mg/dL  0.9    Calcium      8.7 - 10.5 mg/dL  9.1    PROTEIN TOTAL      6.0 - 8.4 g/dL  7.3    Albumin      3.5 - 5.2 g/dL  3.6    BILIRUBIN TOTAL      0.1 - 1.0 mg/dL  0.4    ALP      55 - 135 U/L  53 (L)    AST      10 - 40 U/L  14    ALT      10 - 44 U/L  18    eGFR      >60 mL/min/1.73 m^2  >60.0    Anion Gap      8 - 16 mmol/L  10    Cholesterol Total      120 - 199 mg/dL 71 (L)     Triglycerides      30 - 150 mg/dL 59     HDL      40 - 75 mg/dL 37 (L)     LDL Cholesterol External      63.0 - 159.0 mg/dL 22.2 (L)     HDL/Cholesterol Ratio      20.0 - 50.0 % 52.1 (H)     Total Cholesterol/HDL Ratio      2.0 - 5.0  1.9 (L)     Non-HDL Cholesterol      mg/dL 34     Hemoglobin A1C  External      4.5 - 6.2 % 7.4 (H)     Estimated Avg Glucose      68 - 131 mg/dL 166 (H)     Vit D, 25-Hydroxy      30 - 96 ng/mL  24 (L)       Legend:  (L) Low  (H) High    Plan:           Type 2 diabetes mellitus without complication, without long-term current use of insulin  Comments:  Blood sugars are reasonable  Orders:  -     Hemoglobin A1C; Future; Expected date: 11/01/2023    Essential hypertension  Comments:  Blood pressures are elevated and will add amlodipine 5 mg in p.m..  Monitor blood pressures at home.  Orders:  -     Basic Metabolic Panel; Future; Expected date: 11/01/2023  -     amLODIPine (NORVASC) 5 MG tablet; Take 1 tablet (5 mg total) by mouth every evening.  Dispense: 90 tablet; Refill: 3    Frontal headache  Comments:  These headaches seem to have started after dental extraction on the right #  1. Or # 2.  I did feel the socket and no specific pain or evidence of abscess.  Orders:  -     Sedimentation rate; Future; Expected date: 11/01/2023    Mixed hyperlipidemia  Comments:  She is taking rosuvastatin.  Check lipid panel also.    Low sodium levels  Comments:  Apparently this low sodium problem seems to be running in family.  Patient's father had low sodium.  The cause is unknown.  Probably too much free water.      Her headaches after the dental procedure have been noted.  Her blood pressures are also elevated.  I am not sure of a relevant connection between these 2.  She does not seem to have any socket pain.  Hopefully this gets better in the next few days.    It is difficult to pay significant credence to her new complains since they usually hugh or dissipate by the time there is next visit.  Hopefully this headache is also following that pattern.  It is any case getting better and I do not see any urgency to do any imaging procedure at this point.  No neurological deficits.    Will check her labs also including a sed rate to be sure that we are not seen dealing with giant cell  arteritis.    Will check a chemistry also given her low sodium levels.  This low sodium was seen in her father also.  The cause was unknown.      I will add amlodipine 5 mg to the regimen to be taken at nighttime.  She will continue with losartan 100 mg in the morning.  Her son will monitor her blood pressures and give me an update by this Sunday.    Family is planning to travel to [facility] later this month and they will be in [facility] for 3-4 months.  Please be sure that you carry all the medications and prescriptions and if you need any refills or handwritten prescription for local pickup let me know earlier in advance.      Continue with flu and COVID precautions.    Spent charm[place] 30 minutes with patient which involved review of pts medical conditions, labs, medications and with 50% of time face-to-face discussion about medical problems, management and any applicable changes.        Current Outpatient Medications:     alendronate (FOSAMAX) 70 MG tablet, TAKE 1 TABLET BY MOUTH ONCE EVERY 7 DAYS, Disp: 12 tablet, Rfl: 3    blood sugar diagnostic (TRUE METRIX GLUCOSE TEST STRIP) Strp, Inject 1 strip into the skin once daily., Disp: 50 each, Rfl: 5    blood-glucose meter kit, One touch meter and matching lancets and strips., Disp: 1 each, Rfl: 0    celecoxib (CELEBREX) 50 MG capsule, Take 1 capsule (50 mg total) by mouth 2 (two) times daily., Disp: 60 capsule, Rfl: 2    clotrimazole-betamethasone 1-0.05% (LOTRISONE) cream, Apply topically 2 (two) times daily., Disp: 15 g, Rfl: 1    cyanocobalamin 1,000 mcg/mL injection, Inject 1 mL (1,000 mcg total) into the muscle every 30 days., Disp: 1 mL, Rfl: 11    diclofenac sodium (VOLTAREN) 1 % Gel, Apply 2 g topically 2 (two) times daily. Apply to the knee joint left side., Disp: 100 g, Rfl: 1    fluticasone propionate (FLONASE) 50 mcg/actuation nasal spray, Use 1 spray(s) in each nostril once daily, Disp: 16 g, Rfl: 11    glimepiride (AMARYL) 1 MG tablet, TAKE 1 TABLET BY MOUTH  BEFORE BREAKFAST (PLEASE  DISCONTINUE  JARDIANCE  BECAUSE  OF  NON  TOLERANCE.  TRIAL  OF  GLIMEPIRIDE  AT  1  MG), Disp: 90 tablet, Rfl: 3    lancets (LANCETS,ULTRA THIN) 26 gauge Misc, 1 lancet by Misc.(Non-Drug; Combo Route) route once daily., Disp: 100 each, Rfl: 0    losartan (COZAAR) 100 MG tablet, Take 1 tablet (100 mg total) by mouth once daily., Disp: 90 tablet, Rfl: 4    metFORMIN (GLUCOPHAGE) 1000 MG tablet, Take 1 tablet (1,000 mg total) by mouth 2 (two) times daily with meals., Disp: 180 tablet, Rfl: 3    nystatin (MYCOSTATIN) cream, Apply topically 2 (two) times daily., Disp: 30 g, Rfl: 1    pantoprazole (PROTONIX) 40 MG tablet, Take 1 tablet (40 mg total) by mouth once daily., Disp: 30 tablet, Rfl: 11    polyethylene glycol (GLYCOLAX) 17 gram/dose powder, DISSOLVE 17 GRAMS IN WATER AND DRINK ONCE DAILY, Disp: 510 g, Rfl: 5    rosuvastatin (CRESTOR) 5 MG tablet, Take 1 tablet (5 mg total) by mouth every evening., Disp: 90 tablet, Rfl: 3    amLODIPine (NORVASC) 5 MG tablet, Take 1 tablet (5 mg total) by mouth every evening., Disp: 90 tablet, Rfl: 3

## 2023-11-02 ENCOUNTER — LAB VISIT (OUTPATIENT)
Dept: LAB | Facility: HOSPITAL | Age: 73
End: 2023-11-02
Attending: INTERNAL MEDICINE
Payer: MEDICARE

## 2023-11-02 DIAGNOSIS — R51.9 FRONTAL HEADACHE: ICD-10-CM

## 2023-11-02 DIAGNOSIS — E11.9 TYPE 2 DIABETES MELLITUS WITHOUT COMPLICATION, WITHOUT LONG-TERM CURRENT USE OF INSULIN: ICD-10-CM

## 2023-11-02 DIAGNOSIS — I10 ESSENTIAL HYPERTENSION: ICD-10-CM

## 2023-11-02 LAB
ANION GAP SERPL CALC-SCNC: 9 MMOL/L (ref 8–16)
BUN SERPL-MCNC: 13 MG/DL (ref 8–23)
CALCIUM SERPL-MCNC: 9.5 MG/DL (ref 8.7–10.5)
CHLORIDE SERPL-SCNC: 104 MMOL/L (ref 95–110)
CO2 SERPL-SCNC: 23 MMOL/L (ref 23–29)
CREAT SERPL-MCNC: 1 MG/DL (ref 0.5–1.4)
ERYTHROCYTE [SEDIMENTATION RATE] IN BLOOD BY WESTERGREN METHOD: 4 MM/HR (ref 0–20)
EST. GFR  (NO RACE VARIABLE): >60 ML/MIN/1.73 M^2
ESTIMATED AVG GLUCOSE: 171 MG/DL (ref 68–131)
GLUCOSE SERPL-MCNC: 151 MG/DL (ref 70–110)
HBA1C MFR BLD: 7.6 % (ref 4.5–6.2)
POTASSIUM SERPL-SCNC: 4.1 MMOL/L (ref 3.5–5.1)
SODIUM SERPL-SCNC: 136 MMOL/L (ref 136–145)

## 2023-11-02 PROCEDURE — 83036 HEMOGLOBIN GLYCOSYLATED A1C: CPT | Performed by: INTERNAL MEDICINE

## 2023-11-02 PROCEDURE — 85651 RBC SED RATE NONAUTOMATED: CPT | Performed by: INTERNAL MEDICINE

## 2023-11-02 PROCEDURE — 36415 COLL VENOUS BLD VENIPUNCTURE: CPT | Performed by: INTERNAL MEDICINE

## 2023-11-02 PROCEDURE — 80048 BASIC METABOLIC PNL TOTAL CA: CPT | Performed by: INTERNAL MEDICINE

## 2023-11-07 ENCOUNTER — TELEPHONE (OUTPATIENT)
Dept: FAMILY MEDICINE | Facility: CLINIC | Age: 73
End: 2023-11-07

## 2023-11-07 VITALS — SYSTOLIC BLOOD PRESSURE: 117 MMHG | DIASTOLIC BLOOD PRESSURE: 72 MMHG

## 2023-11-27 PROBLEM — Z13.5 SCREENING FOR DIABETIC RETINOPATHY: Status: RESOLVED | Noted: 2019-06-12 | Resolved: 2023-11-27

## 2024-02-13 DIAGNOSIS — E78.2 MIXED HYPERLIPIDEMIA: Chronic | ICD-10-CM

## 2024-02-14 RX ORDER — ROSUVASTATIN CALCIUM 5 MG/1
5 TABLET, COATED ORAL NIGHTLY
Qty: 90 TABLET | Refills: 3 | Status: SHIPPED | OUTPATIENT
Start: 2024-02-14 | End: 2025-02-13

## 2024-02-27 ENCOUNTER — LAB VISIT (OUTPATIENT)
Dept: LAB | Facility: HOSPITAL | Age: 74
End: 2024-02-27
Attending: INTERNAL MEDICINE
Payer: MEDICARE

## 2024-02-27 DIAGNOSIS — Z00.00 ENCOUNTER FOR MEDICARE ANNUAL WELLNESS EXAM: ICD-10-CM

## 2024-02-27 DIAGNOSIS — Z85.038 H/O COLON CANCER, STAGE III: ICD-10-CM

## 2024-02-27 DIAGNOSIS — C18.4 MALIGNANT NEOPLASM OF TRANSVERSE COLON: ICD-10-CM

## 2024-02-27 LAB
ALBUMIN SERPL BCP-MCNC: 3.9 G/DL (ref 3.5–5.2)
ALP SERPL-CCNC: 56 U/L (ref 55–135)
ALT SERPL W/O P-5'-P-CCNC: 13 U/L (ref 10–44)
ANION GAP SERPL CALC-SCNC: 9 MMOL/L (ref 8–16)
AST SERPL-CCNC: 15 U/L (ref 10–40)
BASOPHILS # BLD AUTO: 0.05 K/UL (ref 0–0.2)
BASOPHILS NFR BLD: 0.7 % (ref 0–1.9)
BILIRUB SERPL-MCNC: 0.4 MG/DL (ref 0.1–1)
BUN SERPL-MCNC: 17 MG/DL (ref 8–23)
CALCIUM SERPL-MCNC: 8.7 MG/DL (ref 8.7–10.5)
CEA SERPL-MCNC: 2.4 NG/ML (ref 0–5)
CHLORIDE SERPL-SCNC: 106 MMOL/L (ref 95–110)
CO2 SERPL-SCNC: 21 MMOL/L (ref 23–29)
CREAT SERPL-MCNC: 1 MG/DL (ref 0.5–1.4)
DIFFERENTIAL METHOD BLD: ABNORMAL
EOSINOPHIL # BLD AUTO: 0.3 K/UL (ref 0–0.5)
EOSINOPHIL NFR BLD: 4.5 % (ref 0–8)
ERYTHROCYTE [DISTWIDTH] IN BLOOD BY AUTOMATED COUNT: 14.8 % (ref 11.5–14.5)
EST. GFR  (NO RACE VARIABLE): 59.9 ML/MIN/1.73 M^2
GLUCOSE SERPL-MCNC: 123 MG/DL (ref 70–110)
HCT VFR BLD AUTO: 34.2 % (ref 37–48.5)
HGB BLD-MCNC: 10.4 G/DL (ref 12–16)
IMM GRANULOCYTES # BLD AUTO: 0.02 K/UL (ref 0–0.04)
IMM GRANULOCYTES NFR BLD AUTO: 0.3 % (ref 0–0.5)
LYMPHOCYTES # BLD AUTO: 2 K/UL (ref 1–4.8)
LYMPHOCYTES NFR BLD: 26.6 % (ref 18–48)
MCH RBC QN AUTO: 26.7 PG (ref 27–31)
MCHC RBC AUTO-ENTMCNC: 30.4 G/DL (ref 32–36)
MCV RBC AUTO: 88 FL (ref 82–98)
MONOCYTES # BLD AUTO: 0.6 K/UL (ref 0.3–1)
MONOCYTES NFR BLD: 8.4 % (ref 4–15)
NEUTROPHILS # BLD AUTO: 4.4 K/UL (ref 1.8–7.7)
NEUTROPHILS NFR BLD: 59.5 % (ref 38–73)
NRBC BLD-RTO: 0 /100 WBC
PLATELET # BLD AUTO: 268 K/UL (ref 150–450)
PMV BLD AUTO: 10.9 FL (ref 9.2–12.9)
POTASSIUM SERPL-SCNC: 4.8 MMOL/L (ref 3.5–5.1)
PROT SERPL-MCNC: 7 G/DL (ref 6–8.4)
RBC # BLD AUTO: 3.89 M/UL (ref 4–5.4)
SODIUM SERPL-SCNC: 136 MMOL/L (ref 136–145)
WBC # BLD AUTO: 7.38 K/UL (ref 3.9–12.7)

## 2024-02-27 PROCEDURE — 82378 CARCINOEMBRYONIC ANTIGEN: CPT | Performed by: INTERNAL MEDICINE

## 2024-02-27 PROCEDURE — 80053 COMPREHEN METABOLIC PANEL: CPT | Performed by: INTERNAL MEDICINE

## 2024-02-27 PROCEDURE — 85025 COMPLETE CBC W/AUTO DIFF WBC: CPT | Performed by: INTERNAL MEDICINE

## 2024-02-27 PROCEDURE — 36415 COLL VENOUS BLD VENIPUNCTURE: CPT | Performed by: INTERNAL MEDICINE

## 2024-03-02 DIAGNOSIS — M81.6 LOCALIZED OSTEOPOROSIS WITHOUT CURRENT PATHOLOGICAL FRACTURE: Chronic | ICD-10-CM

## 2024-03-04 RX ORDER — ALENDRONATE SODIUM 70 MG/1
70 TABLET ORAL
Qty: 12 TABLET | Refills: 3 | Status: SHIPPED | OUTPATIENT
Start: 2024-03-04 | End: 2024-05-28 | Stop reason: SDUPTHER

## 2024-03-05 ENCOUNTER — OFFICE VISIT (OUTPATIENT)
Dept: FAMILY MEDICINE | Facility: CLINIC | Age: 74
End: 2024-03-05
Payer: MEDICARE

## 2024-03-05 VITALS
RESPIRATION RATE: 16 BRPM | BODY MASS INDEX: 24.48 KG/M2 | HEART RATE: 64 BPM | SYSTOLIC BLOOD PRESSURE: 150 MMHG | DIASTOLIC BLOOD PRESSURE: 80 MMHG | WEIGHT: 133 LBS | HEIGHT: 62 IN

## 2024-03-05 DIAGNOSIS — M17.12 ARTHRITIS OF LEFT KNEE: Chronic | ICD-10-CM

## 2024-03-05 DIAGNOSIS — I10 ESSENTIAL HYPERTENSION: Primary | Chronic | ICD-10-CM

## 2024-03-05 DIAGNOSIS — R07.89 FEELING OF CHEST TIGHTNESS: ICD-10-CM

## 2024-03-05 DIAGNOSIS — E53.8 VITAMIN B 12 DEFICIENCY: Chronic | ICD-10-CM

## 2024-03-05 DIAGNOSIS — K59.04 CHRONIC IDIOPATHIC CONSTIPATION: ICD-10-CM

## 2024-03-05 DIAGNOSIS — R09.81 CONGESTION OF NASAL SINUS: ICD-10-CM

## 2024-03-05 DIAGNOSIS — R06.09 EXERTIONAL DYSPNEA: ICD-10-CM

## 2024-03-05 DIAGNOSIS — K59.01 SLOW TRANSIT CONSTIPATION: ICD-10-CM

## 2024-03-05 DIAGNOSIS — R53.83 FATIGUE, UNSPECIFIED TYPE: Chronic | ICD-10-CM

## 2024-03-05 DIAGNOSIS — E11.9 TYPE 2 DIABETES MELLITUS WITHOUT COMPLICATION, WITHOUT LONG-TERM CURRENT USE OF INSULIN: Chronic | ICD-10-CM

## 2024-03-05 DIAGNOSIS — Z85.038 HISTORY OF COLON CANCER: ICD-10-CM

## 2024-03-05 DIAGNOSIS — E78.2 MIXED HYPERLIPIDEMIA: ICD-10-CM

## 2024-03-05 DIAGNOSIS — T46.6X5A STATIN MYOPATHY: ICD-10-CM

## 2024-03-05 DIAGNOSIS — K21.9 GASTROESOPHAGEAL REFLUX DISEASE WITHOUT ESOPHAGITIS: ICD-10-CM

## 2024-03-05 DIAGNOSIS — G72.0 STATIN MYOPATHY: ICD-10-CM

## 2024-03-05 PROCEDURE — 3008F BODY MASS INDEX DOCD: CPT | Mod: HCNC,CPTII,S$GLB, | Performed by: INTERNAL MEDICINE

## 2024-03-05 PROCEDURE — 99214 OFFICE O/P EST MOD 30 MIN: CPT | Mod: HCNC,S$GLB,, | Performed by: INTERNAL MEDICINE

## 2024-03-05 PROCEDURE — 3077F SYST BP >= 140 MM HG: CPT | Mod: HCNC,CPTII,S$GLB, | Performed by: INTERNAL MEDICINE

## 2024-03-05 PROCEDURE — 1101F PT FALLS ASSESS-DOCD LE1/YR: CPT | Mod: HCNC,CPTII,S$GLB, | Performed by: INTERNAL MEDICINE

## 2024-03-05 PROCEDURE — 3079F DIAST BP 80-89 MM HG: CPT | Mod: HCNC,CPTII,S$GLB, | Performed by: INTERNAL MEDICINE

## 2024-03-05 PROCEDURE — 3288F FALL RISK ASSESSMENT DOCD: CPT | Mod: HCNC,CPTII,S$GLB, | Performed by: INTERNAL MEDICINE

## 2024-03-05 PROCEDURE — 4010F ACE/ARB THERAPY RXD/TAKEN: CPT | Mod: HCNC,CPTII,S$GLB, | Performed by: INTERNAL MEDICINE

## 2024-03-05 PROCEDURE — 1159F MED LIST DOCD IN RCRD: CPT | Mod: HCNC,CPTII,S$GLB, | Performed by: INTERNAL MEDICINE

## 2024-03-05 PROCEDURE — 1126F AMNT PAIN NOTED NONE PRSNT: CPT | Mod: HCNC,CPTII,S$GLB, | Performed by: INTERNAL MEDICINE

## 2024-03-05 PROCEDURE — 99999 PR PBB SHADOW E&M-EST. PATIENT-LVL IV: CPT | Mod: PBBFAC,HCNC,, | Performed by: INTERNAL MEDICINE

## 2024-03-05 PROCEDURE — 1160F RVW MEDS BY RX/DR IN RCRD: CPT | Mod: HCNC,CPTII,S$GLB, | Performed by: INTERNAL MEDICINE

## 2024-03-05 RX ORDER — CELECOXIB 50 MG/1
50 CAPSULE ORAL 2 TIMES DAILY
Qty: 60 CAPSULE | Refills: 2 | Status: SHIPPED | OUTPATIENT
Start: 2024-03-05

## 2024-03-05 RX ORDER — GLIMEPIRIDE 1 MG/1
TABLET ORAL
Qty: 90 TABLET | Refills: 3 | Status: SHIPPED | OUTPATIENT
Start: 2024-03-05

## 2024-03-05 RX ORDER — LANCETS
1 EACH MISCELLANEOUS DAILY
Qty: 100 EACH | Refills: 0 | Status: SHIPPED | OUTPATIENT
Start: 2024-03-05

## 2024-03-05 RX ORDER — FLUTICASONE PROPIONATE 50 MCG
1 SPRAY, SUSPENSION (ML) NASAL DAILY
Qty: 16 G | Refills: 11 | Status: SHIPPED | OUTPATIENT
Start: 2024-03-05

## 2024-03-05 RX ORDER — CYANOCOBALAMIN 1000 UG/ML
1000 INJECTION, SOLUTION INTRAMUSCULAR; SUBCUTANEOUS
Qty: 1 ML | Refills: 11 | Status: SHIPPED | OUTPATIENT
Start: 2024-03-05

## 2024-03-05 RX ORDER — LOSARTAN POTASSIUM 100 MG/1
100 TABLET ORAL DAILY
Qty: 90 TABLET | Refills: 4 | Status: SHIPPED | OUTPATIENT
Start: 2024-03-05

## 2024-03-05 RX ORDER — ISOSORBIDE MONONITRATE 30 MG/1
30 TABLET, EXTENDED RELEASE ORAL DAILY
Qty: 30 TABLET | Refills: 11 | Status: SHIPPED | OUTPATIENT
Start: 2024-03-05 | End: 2025-03-05

## 2024-03-05 RX ORDER — DICLOFENAC SODIUM 10 MG/G
2 GEL TOPICAL 2 TIMES DAILY
Qty: 100 G | Refills: 1 | Status: SHIPPED | OUTPATIENT
Start: 2024-03-05

## 2024-03-05 RX ORDER — METFORMIN HYDROCHLORIDE 1000 MG/1
1000 TABLET ORAL 2 TIMES DAILY WITH MEALS
Qty: 180 TABLET | Refills: 3 | Status: SHIPPED | OUTPATIENT
Start: 2024-03-05

## 2024-03-05 NOTE — PROGRESS NOTES
Subjective:       Patient ID: Preet West is a 74 y.o. female.    Chief Complaint: Hypertension, Diabetes, Hyperlipidemia, and Chest Pain    Patient is a 74-year-old female comes follow-up.  Today she is accompanied with her son Mr Espinoza West  for interpretation who does an adequate  Interpretation       Patient herself is somewhat limited historian and most of the details are rather fuzzy and subjective.    Recently she had been to [facility] for a few months and started experiencing some nonspecific chest discomforts and exertional intolerance with shortness of breath.  I could not differentiate whether this shortness of breath was due to some cold or cough as her review of system seems to be positive for everything.    Mostly it seem to be related to exertion and she would get short winded.    Again details are patchy and not available in the epic system, she had some cardiac workup 4 years back    Previously she had complained of headaches and they have dissipated at least at this point.  Probably dental related.    Underlying medical issues include the following:-    1.-type 2 diabetes mellitus -hemoglobin A1c has come down to 7.4.  2.-hypertension  3.-hyperlipidemia  4.--anxiety though she is not on any medication for this this is probably inherent and to some extent cultural also.  5.-osteoporosis currently on alendronate.     6. -nonspecific complains of fatigue and empirical treatment with vitamin B12 injections.  Please note that she was on vitamin B12 injections prior to coming to [place] and under my care from an [place]state physician.  No records were available which actually demonstrated a low vitamin-B-12 but some how this injection made her feel better and I am not sure if there was a psychological /placebo component to her feeling better or there is some real physical improvement.  On 1 occasion she did mention that as soon as the red injection entered her arm, she felt strong.  (probably a  conditioned response).  Previously her  had remarked that she did not find any improvement with B12 injections and this was thereafter stopped.  However patient gave contradictory information and states that it helps her.   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  This was in 2013 outside [place]  8.-Pain in the left knee considered to be osteoarthritis and treated conservatively with diclofenac gel and Celebrex.   9.-frequent complains of different types of issues and pains and aches in different parts of the body which do not last long          Hypertension  This is a chronic problem. The current episode started more than 1 year ago. The problem is controlled. Associated symptoms include anxiety and peripheral edema. Pertinent negatives include no chest pain, neck pain, palpitations or shortness of breath. Risk factors for coronary artery disease include sedentary lifestyle. Past treatments include calcium channel blockers. The current treatment provides moderate improvement. Compliance problems include psychosocial issues.  There is no history of chronic renal disease, hyperaldosteronism, hypercortisolism or sleep apnea.   Diabetes  She presents for her follow-up diabetic visit. She has type 2 diabetes mellitus. No MedicAlert identification noted. The initial diagnosis of diabetes was made 20 years ago. Her disease course has been fluctuating. Hypoglycemia symptoms include nervousness/anxiousness. Pertinent negatives for hypoglycemia include no confusion, dizziness, pallor, seizures or tremors. Associated symptoms include fatigue (Getting better). Pertinent negatives for diabetes include no chest pain, no polydipsia, no polyphagia and no polyuria. There are no hypoglycemic complications. Risk factors for coronary artery disease include sedentary lifestyle, post-menopausal, hypertension, dyslipidemia and diabetes mellitus. Current diabetic treatment includes oral agent  (monotherapy). She is compliant with treatment some of the time. She has not had a previous visit with a dietitian. She never participates in exercise. An ACE inhibitor/angiotensin II receptor blocker is being taken. She does not see a podiatrist.  Hyperlipidemia  This is a chronic problem. The current episode started more than 1 year ago. The problem is uncontrolled. Exacerbating diseases include diabetes. She has no history of chronic renal disease, hypothyroidism, liver disease, obesity or nephrotic syndrome. Associated symptoms include myalgias. Pertinent negatives include no chest pain or shortness of breath. Current antihyperlipidemic treatment includes statins. The current treatment provides moderate improvement of lipids. Compliance problems include psychosocial issues.  Risk factors for coronary artery disease include hypertension, diabetes mellitus, dyslipidemia, post-menopausal and a sedentary lifestyle.   Gastroesophageal Reflux  She reports no abdominal pain, no chest pain, no nausea or no wheezing. This is a chronic problem. The current episode started more than 1 year ago. The problem occurs occasionally. Associated symptoms include fatigue (Getting better). She has tried a PPI for the symptoms. The treatment provided moderate relief.   Sinus Problem  This is a chronic problem. The current episode started more than 1 year ago. The problem has been waxing and waning since onset. There has been no fever. Pertinent negatives include no chills, congestion, neck pain, shortness of breath, sinus pressure or sneezing. Treatments tried: flonase.   Knee Pain   Incident onset: Chronic pain for years. There was no injury mechanism. The pain is present in the left knee. The pain is at a severity of 2/10. The pain is mild. The pain has been Fluctuating since onset. Pertinent negatives include no inability to bear weight, loss of motion or numbness.       Past Medical History:   Diagnosis Date    Allergy      aspirin itching    Anemia     Cancer     Diabetes mellitus, type 2     H/O colon cancer, stage III 1/1/2013    Treated in [facility]    History of colonoscopy 6/4/2021    Colonoscopy done by Dr. Rayshawn Adkins MD on 06/04/21.non bleeding internal hemorrhoids.  4 mm polyp in the sigmoid colon removed with cold snare.  Patent end-to-end colocolonic anastomosis characterized by healthy-appearing mucosa.  Examination was otherwise normal.  Repeat colonoscopy in 3 years for surveillance.    Malignant neoplasm of transverse colon (2013) - Stage III 4/4/2019    Osteoporosis     S/P laparoscopic colectomy (3/2013) 4/5/2019    Seasonal allergies      Social History     Socioeconomic History    Marital status:      Spouse name: Luis West    Number of children: 3   Occupational History    Occupation:    Tobacco Use    Smoking status: Never    Smokeless tobacco: Never   Substance and Sexual Activity    Alcohol use: No    Drug use: No    Sexual activity: Not Currently     Partners: Male     Birth control/protection: Post-menopausal   Social History Narrative    [facility] only     Social Determinants of Health     Financial Resource Strain: Low Risk  (5/5/2022)    Overall Financial Resource Strain (CARDIA)     Difficulty of Paying Living Expenses: Not very hard   Food Insecurity: No Food Insecurity (5/5/2022)    Hunger Vital Sign     Worried About Running Out of Food in the Last Year: Never true     Ran Out of Food in the Last Year: Never true   Transportation Needs: No Transportation Needs (5/5/2022)    PRAPARE - Transportation     Lack of Transportation (Medical): No     Lack of Transportation (Non-Medical): No   Physical Activity: Insufficiently Active (5/5/2022)    Exercise Vital Sign     Days of Exercise per Week: 7 days     Minutes of Exercise per Session: 20 min   Stress: Stress Concern Present (5/5/2022)    [employer] [place] [employer] - Occupational Stress Questionnaire      Feeling of Stress : To some extent   Social Connections: Moderately Integrated (5/5/2022)    Social Connection and Isolation Panel [NHANES]     Frequency of Communication with Friends and Family: More than three times a week     Attends Bahai Services: 1 to 4 times per year     Active Member of Clubs or Organizations: No     Attends Club or Organization Meetings: Never     Marital Status:    Housing Stability: Low Risk  (5/5/2022)    Housing Stability Vital Sign     Unable to Pay for Housing in the Last Year: No     Number of Places Lived in the Last Year: 1     Unstable Housing in the Last Year: No     Past Surgical History:   Procedure Laterality Date    COLON SURGERY      JOINT REPLACEMENT       Family History   Problem Relation Age of Onset    Stroke Mother     Heart disease Mother     Cancer Father         [place]       Review of Systems   Constitutional:  Positive for fatigue (Getting better). Negative for activity change, appetite change, chills, fever and unexpected weight change (Gained 1 lb since the last documentation.  129--130 lb.).   HENT:  Negative for congestion, postnasal drip, sinus pressure and sneezing.         Uses Flonase for sinuses.   Eyes:  Negative for pain, discharge and visual disturbance (She had bilateral cataract surgery.).   Respiratory:  Negative for chest tightness, shortness of breath, wheezing and stridor.    Cardiovascular:  Negative for chest pain, palpitations and leg swelling.        Blood pressure was previously elevated now they are better on losartan 100 mg.   Gastrointestinal:  Negative for abdominal distention, abdominal pain, blood in stool, diarrhea and nausea.        GERD occasional use of pantoprazole.  History of colon cancer status post colectomy in 2013.  Probably regional johnny metastasis.  Details not available at this point.   Endocrine: Negative for cold intolerance, polydipsia, polyphagia and polyuria.        Past history of hyponatremia has been  "noted.      Underlying diabetes has been noted with improvement in blood sugars to 7.7.   Genitourinary:  Negative for flank pain.        Previously had complained of vaginal itching but no more.   Musculoskeletal:  Positive for arthralgias, gait problem and myalgias. Negative for joint swelling, neck pain and neck stiffness.        Complains of bilateral leg pains.   Skin:  Negative for color change and pallor.   Allergic/Immunologic: Negative for environmental allergies, food allergies and immunocompromised state.   Neurological:  Negative for dizziness, tremors, seizures, syncope, light-headedness and numbness.   Hematological:  Negative for adenopathy. Does not bruise/bleed easily.   Psychiatric/Behavioral:  Negative for agitation, confusion and dysphoric mood. The patient is nervous/anxious.         Somewhat anxious about her health issues.         Objective:      Blood pressure (!) 150/80, pulse 64, resp. rate 16, height 5' 2" (1.575 m), weight 60.3 kg (133 lb). Body mass index is 24.33 kg/m².  Physical Exam  Constitutional:       Appearance: Normal appearance. She is not ill-appearing or diaphoretic.      Comments: BMI is 24.34   Cardiovascular:      Rate and Rhythm: Normal rate and regular rhythm.   Pulmonary:      Effort: Pulmonary effort is normal. No respiratory distress.      Breath sounds: Normal breath sounds. No stridor.   Abdominal:      General: There is no distension.      Tenderness: There is no abdominal tenderness.   Musculoskeletal:      Cervical back: No rigidity.      Right lower leg: No edema.      Left lower leg: No edema.   Feet:      Right foot:      Protective Sensation: 5 sites tested.  5 sites sensed.      Skin integrity: No ulcer or blister.      Toenail Condition: Right toenails are normal.      Left foot:      Protective Sensation: 5 sites tested.  5 sites sensed.      Skin integrity: No ulcer or blister.      Toenail Condition: Left toenails are normal.   Skin:     Coloration: Skin " is not jaundiced or pale.      Findings: No rash.   Neurological:      Mental Status: She is alert. Mental status is at baseline.      Cranial Nerves: No cranial nerve deficit or dysarthria.      Sensory: No sensory deficit.      Motor: Motor function is intact.      Coordination: Coordination normal.      Deep Tendon Reflexes: Reflexes normal.   Psychiatric:      Comments: Chronically anxious.           Assessment:       Lab Visit on 02/27/2024   Component Date Value Ref Range Status    WBC 02/27/2024 7.38  3.90 - 12.70 K/uL Final    RBC 02/27/2024 3.89 (L)  4.00 - 5.40 M/uL Final    Hemoglobin 02/27/2024 10.4 (L)  12.0 - 16.0 g/dL Final    Hematocrit 02/27/2024 34.2 (L)  37.0 - 48.5 % Final    MCV 02/27/2024 88  82 - 98 fL Final    MCH 02/27/2024 26.7 (L)  27.0 - 31.0 pg Final    MCHC 02/27/2024 30.4 (L)  32.0 - 36.0 g/dL Final    RDW 02/27/2024 14.8 (H)  11.5 - 14.5 % Final    Platelets 02/27/2024 268  150 - 450 K/uL Final    MPV 02/27/2024 10.9  9.2 - 12.9 fL Final    Immature Granulocytes 02/27/2024 0.3  0.0 - 0.5 % Final    Gran # (ANC) 02/27/2024 4.4  1.8 - 7.7 K/uL Final    Immature Grans (Abs) 02/27/2024 0.02  0.00 - 0.04 K/uL Final    Lymph # 02/27/2024 2.0  1.0 - 4.8 K/uL Final    Mono # 02/27/2024 0.6  0.3 - 1.0 K/uL Final    Eos # 02/27/2024 0.3  0.0 - 0.5 K/uL Final    Baso # 02/27/2024 0.05  0.00 - 0.20 K/uL Final    nRBC 02/27/2024 0  0 /100 WBC Final    Gran % 02/27/2024 59.5  38.0 - 73.0 % Final    Lymph % 02/27/2024 26.6  18.0 - 48.0 % Final    Mono % 02/27/2024 8.4  4.0 - 15.0 % Final    Eosinophil % 02/27/2024 4.5  0.0 - 8.0 % Final    Basophil % 02/27/2024 0.7  0.0 - 1.9 % Final    Differential Method 02/27/2024 Automated   Final    Sodium 02/27/2024 136  136 - 145 mmol/L Final    Potassium 02/27/2024 4.8  3.5 - 5.1 mmol/L Final    Chloride 02/27/2024 106  95 - 110 mmol/L Final    CO2 02/27/2024 21 (L)  23 - 29 mmol/L Final    Glucose 02/27/2024 123 (H)  70 - 110 mg/dL Final    BUN 02/27/2024  17  8 - 23 mg/dL Final    Creatinine 02/27/2024 1.0  0.5 - 1.4 mg/dL Final    Calcium 02/27/2024 8.7  8.7 - 10.5 mg/dL Final    Total Protein 02/27/2024 7.0  6.0 - 8.4 g/dL Final    Albumin 02/27/2024 3.9  3.5 - 5.2 g/dL Final    Total Bilirubin 02/27/2024 0.4  0.1 - 1.0 mg/dL Final    Alkaline Phosphatase 02/27/2024 56  55 - 135 U/L Final    AST 02/27/2024 15  10 - 40 U/L Final    ALT 02/27/2024 13  10 - 44 U/L Final    eGFR 02/27/2024 59.9 (A)  >60 mL/min/1.73 m^2 Final    Anion Gap 02/27/2024 9  8 - 16 mmol/L Final    CEA 02/27/2024 2.4  0.0 - 5.0 ng/mL Final       1. Uncontrolled hypertension  Comments:  Add isosorbide to the regimen because of anginal equivalent pain  Orders:  -     isosorbide mononitrate (IMDUR) 30 MG 24 hr tablet; Take 1 tablet (30 mg total) by mouth once daily.  Dispense: 30 tablet; Refill: 11  -     losartan (COZAAR) 100 MG tablet; Take 1 tablet (100 mg total) by mouth once daily.  Dispense: 90 tablet; Refill: 4    2. Feeling of chest tightness  Comments:  Get Cardiology evaluation.  Prior EKG nonspecific T changes.  Orders:  -     Ambulatory referral/consult to Cardiology; Future; Expected date: 04/05/2024  -     isosorbide mononitrate (IMDUR) 30 MG 24 hr tablet; Take 1 tablet (30 mg total) by mouth once daily.  Dispense: 30 tablet; Refill: 11    3. Mixed hyperlipidemia    4. Slow transit constipation    5. Gastroesophageal reflux disease without esophagitis    6. Chronic idiopathic constipation    7. Fatigue, unspecified type  Comments:  Chronic in nature and nonspecific.  Probably will not get better as she ages.  Orders:  -     cyanocobalamin 1,000 mcg/mL injection; Inject 1 mL (1,000 mcg total) into the muscle every 30 days.  Dispense: 1 mL; Refill: 11    8. Vitamin B 12 deficiency  Comments:  Diagnosed several years back and currently on vitamin B12 supplementation injectable form.  Feels better with it.  Orders:  -     cyanocobalamin 1,000 mcg/mL injection; Inject 1 mL (1,000 mcg  total) into the muscle every 30 days.  Dispense: 1 mL; Refill: 11    9. Statin myopathy  Comments:  Can not tolerate statins.    10. Exertional dyspnea  -     Ambulatory referral/consult to Cardiology; Future; Expected date: 04/05/2024    11. History of colon cancer  Comments:  Status post colectomy.    12. Type 2 diabetes mellitus without complication, without long-term current use of insulin  Comments:  Mild rise in hemoglobin A1c has been noted. Medications have been noted.  Compliance to diet has been urged again.  Orders:  -     blood sugar diagnostic (TRUE METRIX GLUCOSE TEST STRIP) Strp; Inject 1 strip into the skin once daily.  Dispense: 50 each; Refill: 5  -     glimepiride (AMARYL) 1 MG tablet; TAKE 1 TABLET BY MOUTH BEFORE BREAKFAST (PLEASE  DISCONTINUE  JARDIANCE  BECAUSE  OF  NON  TOLERANCE.  TRIAL  OF  GLIMEPIRIDE  AT  1  MG)  Dispense: 90 tablet; Refill: 3  -     lancets (LANCETS,ULTRA THIN) 26 gauge Misc; 1 lancet  by Misc.(Non-Drug; Combo Route) route once daily.  Dispense: 100 each; Refill: 0  -     metFORMIN (GLUCOPHAGE) 1000 MG tablet; Take 1 tablet (1,000 mg total) by mouth 2 (two) times daily with meals.  Dispense: 180 tablet; Refill: 3  -     Hemoglobin A1C; Future; Expected date: 06/05/2024  -     Microalbumin/creatinine urine ratio; Future; Expected date: 06/05/2024    13. Arthritis of left knee  Comments:  Uses diclofenac gel and Celebrex p.r.n..  Orders:  -     celecoxib (CELEBREX) 50 MG capsule; Take 1 capsule (50 mg total) by mouth 2 (two) times daily.  Dispense: 60 capsule; Refill: 2  -     diclofenac sodium (VOLTAREN) 1 % Gel; Apply 2 g topically 2 (two) times daily. Apply to the knee joint left side.  Dispense: 100 g; Refill: 1    14. Congestion of nasal sinus  -     fluticasone propionate (FLONASE) 50 mcg/actuation nasal spray; 1 spray (50 mcg total) by Each Nostril route once daily.  Dispense: 16 g; Refill: 11             Plan:   Uncontrolled hypertension  Comments:  Add isosorbide  to the regimen because of anginal equivalent pain  Orders:  -     isosorbide mononitrate (IMDUR) 30 MG 24 hr tablet; Take 1 tablet (30 mg total) by mouth once daily.  Dispense: 30 tablet; Refill: 11  -     losartan (COZAAR) 100 MG tablet; Take 1 tablet (100 mg total) by mouth once daily.  Dispense: 90 tablet; Refill: 4    Feeling of chest tightness  Comments:  Get Cardiology evaluation.  Prior EKG nonspecific T changes.  Orders:  -     Ambulatory referral/consult to Cardiology; Future; Expected date: 04/05/2024  -     isosorbide mononitrate (IMDUR) 30 MG 24 hr tablet; Take 1 tablet (30 mg total) by mouth once daily.  Dispense: 30 tablet; Refill: 11    Mixed hyperlipidemia    Slow transit constipation    Gastroesophageal reflux disease without esophagitis    Chronic idiopathic constipation    Fatigue, unspecified type  Comments:  Chronic in nature and nonspecific.  Probably will not get better as she ages.  Orders:  -     cyanocobalamin 1,000 mcg/mL injection; Inject 1 mL (1,000 mcg total) into the muscle every 30 days.  Dispense: 1 mL; Refill: 11    Vitamin B 12 deficiency  Comments:  Diagnosed several years back and currently on vitamin B12 supplementation injectable form.  Feels better with it.  Orders:  -     cyanocobalamin 1,000 mcg/mL injection; Inject 1 mL (1,000 mcg total) into the muscle every 30 days.  Dispense: 1 mL; Refill: 11    Statin myopathy  Comments:  Can not tolerate statins.    Exertional dyspnea  -     Ambulatory referral/consult to Cardiology; Future; Expected date: 04/05/2024    History of colon cancer  Comments:  Status post colectomy.    Type 2 diabetes mellitus without complication, without long-term current use of insulin  Comments:  Mild rise in hemoglobin A1c has been noted. Medications have been noted.  Compliance to diet has been urged again.  Orders:  -     blood sugar diagnostic (TRUE METRIX GLUCOSE TEST STRIP) Strp; Inject 1 strip into the skin once daily.  Dispense: 50 each; Refill:  5  -     glimepiride (AMARYL) 1 MG tablet; TAKE 1 TABLET BY MOUTH BEFORE BREAKFAST (PLEASE  DISCONTINUE  JARDIANCE  BECAUSE  OF  NON  TOLERANCE.  TRIAL  OF  GLIMEPIRIDE  AT  1  MG)  Dispense: 90 tablet; Refill: 3  -     lancets (LANCETS,ULTRA THIN) 26 gauge Misc; 1 lancet  by Misc.(Non-Drug; Combo Route) route once daily.  Dispense: 100 each; Refill: 0  -     metFORMIN (GLUCOPHAGE) 1000 MG tablet; Take 1 tablet (1,000 mg total) by mouth 2 (two) times daily with meals.  Dispense: 180 tablet; Refill: 3  -     Hemoglobin A1C; Future; Expected date: 06/05/2024  -     Microalbumin/creatinine urine ratio; Future; Expected date: 06/05/2024    Arthritis of left knee  Comments:  Uses diclofenac gel and Celebrex p.r.n..  Orders:  -     celecoxib (CELEBREX) 50 MG capsule; Take 1 capsule (50 mg total) by mouth 2 (two) times daily.  Dispense: 60 capsule; Refill: 2  -     diclofenac sodium (VOLTAREN) 1 % Gel; Apply 2 g topically 2 (two) times daily. Apply to the knee joint left side.  Dispense: 100 g; Refill: 1    Congestion of nasal sinus  -     fluticasone propionate (FLONASE) 50 mcg/actuation nasal spray; 1 spray (50 mcg total) by Each Nostril route once daily.  Dispense: 16 g; Refill: 11      Patient's new onset of chest pains, exertional dyspnea have been noted after she was come back from [place].  She did not have any infection there.  It is unclear she had any episode of bronchitis or URI due to poor history.    She states that she had a cardiac workup 3 or 4 years but I could not find that in the epic system.  Not sure if it was outside epic system.    At that time she was told that her cardiac workup was unremarkable.    Risk factors for coronary artery disease include blood pressure, diabetes and family history of heart condition.  She is nonsmoker.    EVIDENTLY ABOUT 10 YEARS BACK SHE HAD TAKEN ASPIRIN AND HAD SOME SWELLING IN THE LIPS AND SHE STOPPED TAKING ASPIRIN.  SHE ALSO HAD ITCHING ALL OVER HER BODY.    SHE CAN  NOT TOLERATE JARDIANCE ALSO BECAUSE SHE HAD ITCHING UNDER HER FEET.    CAUTIOUSLY I WILL GIVE HER SOME ISOSORBIDE AND SEE HOW SHE DOES.  IT IS STILL POSSIBLE THAT SHE MAY HAVE A SIDE EFFECT TO THIS MEDICATION BUT IT IS DIFFICULT TO DISTINGUISH IF THE SIDE EFFECTS PSYCHOLOGICAL OR REAL.    CARDIOLOGY APPOINTMENT ALSO HAS BEEN SET AT THIS POINT.  Follow up in about 19 weeks (around 7/16/2024), or if symptoms worsen or fail to improve, for Diabetes/HTN/Lipids.  Spent charmaine 38 minutes with patient which involved review of pts medical conditions, labs, medications and with 50% of time face-to-face discussion about medical problems, management and any applicable changes.      Current Outpatient Medications:     alendronate (FOSAMAX) 70 MG tablet, Take 1 tablet (70 mg total) by mouth every 7 days. Take this medication once a week on an empty stomach with a cup of water and do not lie down for 30 minutes after taking med., Disp: 12 tablet, Rfl: 3    amLODIPine (NORVASC) 5 MG tablet, Take 1 tablet (5 mg total) by mouth every evening., Disp: 90 tablet, Rfl: 3    clotrimazole-betamethasone 1-0.05% (LOTRISONE) cream, Apply topically 2 (two) times daily., Disp: 15 g, Rfl: 1    nystatin (MYCOSTATIN) cream, Apply topically 2 (two) times daily., Disp: 30 g, Rfl: 1    pantoprazole (PROTONIX) 40 MG tablet, Take 1 tablet (40 mg total) by mouth once daily., Disp: 30 tablet, Rfl: 11    polyethylene glycol (GLYCOLAX) 17 gram/dose powder, DISSOLVE 17 GRAMS IN WATER AND DRINK ONCE DAILY, Disp: 510 g, Rfl: 5    rosuvastatin (CRESTOR) 5 MG tablet, Take 1 tablet (5 mg total) by mouth every evening., Disp: 90 tablet, Rfl: 3    blood sugar diagnostic (TRUE METRIX GLUCOSE TEST STRIP) Strp, Inject 1 strip into the skin once daily., Disp: 50 each, Rfl: 5    blood-glucose meter kit, One touch meter and matching lancets and strips., Disp: 1 each, Rfl: 0    celecoxib (CELEBREX) 50 MG capsule, Take 1 capsule (50 mg total) by mouth 2 (two) times daily.,  Disp: 60 capsule, Rfl: 2    cyanocobalamin 1,000 mcg/mL injection, Inject 1 mL (1,000 mcg total) into the muscle every 30 days., Disp: 1 mL, Rfl: 11    diclofenac sodium (VOLTAREN) 1 % Gel, Apply 2 g topically 2 (two) times daily. Apply to the knee joint left side., Disp: 100 g, Rfl: 1    fluticasone propionate (FLONASE) 50 mcg/actuation nasal spray, 1 spray (50 mcg total) by Each Nostril route once daily., Disp: 16 g, Rfl: 11    glimepiride (AMARYL) 1 MG tablet, TAKE 1 TABLET BY MOUTH BEFORE BREAKFAST (PLEASE  DISCONTINUE  JARDIANCE  BECAUSE  OF  NON  TOLERANCE.  TRIAL  OF  GLIMEPIRIDE  AT  1  MG), Disp: 90 tablet, Rfl: 3    isosorbide mononitrate (IMDUR) 30 MG 24 hr tablet, Take 1 tablet (30 mg total) by mouth once daily., Disp: 30 tablet, Rfl: 11    lancets (LANCETS,ULTRA THIN) 26 gauge Misc, 1 lancet  by Misc.(Non-Drug; Combo Route) route once daily., Disp: 100 each, Rfl: 0    losartan (COZAAR) 100 MG tablet, Take 1 tablet (100 mg total) by mouth once daily., Disp: 90 tablet, Rfl: 4    metFORMIN (GLUCOPHAGE) 1000 MG tablet, Take 1 tablet (1,000 mg total) by mouth 2 (two) times daily with meals., Disp: 180 tablet, Rfl: 3    Pal Hetal

## 2024-03-12 NOTE — PROGRESS NOTES
[facility] Hematology/Oncology  PROGRESS NOTE - Follow-up Visit      Subjective:       Patient ID:   NAME: Preet West : 1950     74 y.o. female    Referring Doc: Hetal  Other Physicians: Lucille    Chief Complaint:  Colon ca f/u    History of Present Illness:     Patient returns today for a regularly scheduled follow-up visit.  The patient is here today to go over the results of the recently ordered labs, tests and studies.  She is here with her daughter-in-law.     She recently saw Dr Fong last on 3/5/202      She denies any CP, SOB, HA's ir N/V. Her bowels are moving adequately.             Discussed covid19 precautions - she had  her vaccinations            ROS:   GEN: normal without any fever, night sweats or weight loss;    HEENT: normal with no HA's, sore throat, stiff neck, changes in vision  CV: normal with no CP, SOB, PND, FISHER or orthopnea  PULM: normal with no SOB, cough, hemoptysis, sputum or pleuritic pain  GI: normal with no abdominal pain, nausea, vomiting, constipation, diarrhea, melanotic stools, BRBPR, or hematemesis  : normal with no hematuria, dysuria  BREAST: normal with no mass, discharge, pain  SKIN: normal with no rash, erythema, bruising, or swelling    Allergies:  Review of patient's allergies indicates:   Allergen Reactions    Tradjenta [linagliptin]      Itching and gastric discomfort > difficult to assess nature of side effect    Aspirin Itching    Opioids - morphine analogues Other (See Comments)     Confusion    Hydrochlorothiazide Other (See Comments)     Suspect Low sodium by hospital ist. Not confirmed    Jardiance [empagliflozin] Other (See Comments)     Caused burning sensation in the feet.       Medications:    Current Outpatient Medications:     alendronate (FOSAMAX) 70 MG tablet, Take 1 tablet (70 mg total) by mouth every 7 days. Take this medication once a week on an empty stomach with a cup of water and do not lie down for 30 minutes after taking med., Disp: 12  tablet, Rfl: 3    amLODIPine (NORVASC) 5 MG tablet, Take 1 tablet (5 mg total) by mouth every evening., Disp: 90 tablet, Rfl: 3    blood sugar diagnostic (TRUE METRIX GLUCOSE TEST STRIP) Strp, Inject 1 strip into the skin once daily., Disp: 50 each, Rfl: 5    celecoxib (CELEBREX) 50 MG capsule, Take 1 capsule (50 mg total) by mouth 2 (two) times daily., Disp: 60 capsule, Rfl: 2    clotrimazole-betamethasone 1-0.05% (LOTRISONE) cream, Apply topically 2 (two) times daily., Disp: 15 g, Rfl: 1    cyanocobalamin 1,000 mcg/mL injection, Inject 1 mL (1,000 mcg total) into the muscle every 30 days., Disp: 1 mL, Rfl: 11    cyanocobalamin 1,000 mcg/mL injection, Inject 1 mL (1,000 mcg total) into the muscle every 30 days., Disp: 1 mL, Rfl: 11    diclofenac sodium (VOLTAREN) 1 % Gel, Apply 2 g topically 2 (two) times daily. Apply to the knee joint left side., Disp: 100 g, Rfl: 1    fluticasone propionate (FLONASE) 50 mcg/actuation nasal spray, 1 spray (50 mcg total) by Each Nostril route once daily., Disp: 16 g, Rfl: 11    glimepiride (AMARYL) 1 MG tablet, TAKE 1 TABLET BY MOUTH BEFORE BREAKFAST (PLEASE  DISCONTINUE  JARDIANCE  BECAUSE  OF  NON  TOLERANCE.  TRIAL  OF  GLIMEPIRIDE  AT  1  MG), Disp: 90 tablet, Rfl: 3    isosorbide mononitrate (IMDUR) 30 MG 24 hr tablet, Take 1 tablet (30 mg total) by mouth once daily., Disp: 30 tablet, Rfl: 11    lancets (LANCETS,ULTRA THIN) 26 gauge Misc, 1 lancet  by Misc.(Non-Drug; Combo Route) route once daily., Disp: 100 each, Rfl: 0    losartan (COZAAR) 100 MG tablet, Take 1 tablet (100 mg total) by mouth once daily., Disp: 90 tablet, Rfl: 4    metFORMIN (GLUCOPHAGE) 1000 MG tablet, Take 1 tablet (1,000 mg total) by mouth 2 (two) times daily with meals., Disp: 180 tablet, Rfl: 3    nystatin (MYCOSTATIN) cream, Apply topically 2 (two) times daily., Disp: 30 g, Rfl: 1    pantoprazole (PROTONIX) 40 MG tablet, Take 1 tablet (40 mg total) by mouth once daily., Disp: 30 tablet, Rfl: 11     "polyethylene glycol (GLYCOLAX) 17 gram/dose powder, DISSOLVE 17 GRAMS IN WATER AND DRINK ONCE DAILY, Disp: 510 g, Rfl: 5    rosuvastatin (CRESTOR) 5 MG tablet, Take 1 tablet (5 mg total) by mouth every evening., Disp: 90 tablet, Rfl: 3    blood-glucose meter kit, One touch meter and matching lancets and strips., Disp: 1 each, Rfl: 0    PMHx/PSHx Updates:  See patient's last visit with me on  2/23/2024  See H&P on 5/12/2019        Pathology:  Cancer Staging    Colectomy -  3/12/2013:  - 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 rate 18, height 5' 2" (1.575 m), weight 61.7 kg (136 lb).    Physical Examination:   GEN: no apparent distress, comfortable; AAOx3  HEAD: atraumatic and normocephalic  EYES: no pallor, no icterus, PERRLA  ENT: OMM, no pharyngeal erythema, external ears WNL; no nasal discharge; no thrush  NECK: no masses, thyroid normal, trachea midline, no LAD/LN's, supple  CV: RRR with no murmur; normal pulse; normal S1 and S2; no pedal edema  CHEST: Normal respiratory effort; CTAB; normal breath sounds; no wheeze or crackles  ABDOM: nontender and nondistended; soft; normal bowel sounds; no rebound/guarding  MUSC/Skeletal:  prior right knee surgery; arthropathy  EXTREM: no clubbing, cyanosis, inflammation or swelling  SKIN: no rashes, lesions, ulcers, petechiae or subcutaneous nodules  : no rocha  NEURO: grossly intact; motor/sensory WNL; AAOx3; no tremors  PSYCH: normal mood, affect and behavior  LYMPH: normal cervical, supraclavicular, axillary and groin LN's            Labs:        Lab Results   Component Value Date    WBC 7.38 02/27/2024    HGB 10.4 (L) 02/27/2024    HCT 34.2 (L) 02/27/2024    MCV 88 02/27/2024     02/27/2024 "     BMP  Lab Results   Component Value Date     02/27/2024    K 4.8 02/27/2024     02/27/2024    CO2 21 (L) 02/27/2024    BUN 17 02/27/2024    CREATININE 1.0 02/27/2024    CALCIUM 8.7 02/27/2024    ANIONGAP 9 02/27/2024    ESTGFRAFRICA >60.0 01/05/2022    EGFRNONAA >60.0 01/05/2022        Lab Results   Component Value Date    IRON 40 12/29/2020    TIBC 337 12/29/2020    FERRITIN 111 12/29/2020       Lab Results   Component Value Date    CEA 2.4 02/27/2024       Lab Results   Component Value Date    PQQQFITT10 557 12/29/2020     Lab Results   Component Value Date    FOLATE 12.6 02/20/2023         Radiology/Diagnostic Studies:    CT Abdom/pelvis: 5/9/2019  IMPRESSION:  Postsurgical changes of the mid transverse colon without evidence of recurrent  or metastatic disease    Prior hysterectomy        X-ray Chest Pa And Lateral    Result Date: 5/9/2019  MDI CHEST, 2 VIEWS XRAY Indication: Personal history of other malignant neoplasm of large intestine. Comparison: June 27, 2018 Findings: Cardiomediastinal silhouette is within normal limits. There is no confluent airspace disease. There is no pleural effusion or pneumothorax. No acute osseous abnormality.     IMPRESSION: No acute pulmonary process. Read and electronically signed by: Richard Frost MD on 5/9/2019 9:23 AM CDT RICHARD FROST MD      I have reviewed all available lab results and radiology reports.    Assessment/Plan:   (1) 74 y.o. female with diagnosis of colon cancer who has been referred by Pal Fong MD for evaluation by medical hematology/oncology.   - diagnosed in Jan 2013  - s/p transverse colon resection 3/12/2013 followed by adjuvant chemotherapy with FOLFOX for 6 months in [place], [place]  - T3 N1a - 1 LN was positive  - Dr Sofia West was her oncologist in [place]  - she had a follow-up colonoscopy in Apr 2017  - latest CXR was negative; - CT scans in may 2019 were stable  - she saw Dr Adkins with GI since last visit and had scopes  in [place] 2019    12/30/2020:  - latest CEA was stable at 2.5  - she had last scopes in Oct 2021    2/8/2022:  - latest labs with CEA at 2.6    2/23/2023:  - latest CEA at 2.9  - she has not seen GI in awhile    3/13/2024:  - CEA at 2.4  - she has not followed up with GI - due for repeat scope in couple of years per her own report  - last scope was in Oct 2021  - Last scans were in June 2021     (2) HTN and hypercholesterolemia     (3) DM II     (4) Osteoporosis     (5) OA    (6) B12 deficiency    (7) Mild chronic anemia - hgb at 10.6 - NCNC indices; normal iron panel    2/8/2022:   - latest hgb relatively adequate at 11.2  - iron panel adequate  - B12 and folate adequate    2/23/2023:  - latest hgb relatively stable at 10.8  - iron panel, B12/folate all adequate    3/13/2024:  - latest hgb at 10.4  - no BRBPR  - on oral iron  - iron panel adequate  - continued on B12 monthly          VISIT DIAGNOSES:      H/O colon cancer, stage III    History of colon cancer    Mild anemia    Vitamin B 12 deficiency    S/P laparoscopic colectomy (3/2013)          PLAN:  1. Check up to date labs incl. CEA every 6 months  2. Encouraged compliance with referrals, studies and labs  3. F/u with GI as directed by them   4. F/u with PCP  RTC in 12 months    Fax note to   Pal Fong MD,  Lucille    Discussion:     COVID-19 Discussion:    I had long discussion with patient and any applicable family about the COVID-19 coronavirus epidemic and the recommended precautions with regard to cancer and/or hematology patients. I have re-iterated the CDC recommendations for adequate hand washing, use of hand -like products, and coughing into elbow, etc. In addition, especially for our patients who are on chemotherapy and/or our otherwise immunocompromised patients, I have recommended avoidance of crowds, including movie theaters, restaurants, churches, etc. I have recommended avoidance of any sick or symptomatic family members and/or  friends. I have also recommended avoidance of any raw and unwashed food products, and general avoidance of food items that have not been prepared by themselves. The patient has been asked to call us immediately with any symptom developments, issues, questions or other general concerns.       I spent over 25 mins of time with the patient. Reviewed results of the recently ordered labs, tests and studies; made directives with regards to the results. Over half of this time was spent couseling and coordinating care.    I have explained all of the above in detail and the patient understands all of the current recommendation(s). I have answered all of their questions to the best of my ability and to their complete satisfaction.   The patient is to continue with the current management plan.            Electronically signed by Clemente Mi MD

## 2024-03-13 ENCOUNTER — OFFICE VISIT (OUTPATIENT)
Dept: HEMATOLOGY/ONCOLOGY | Facility: CLINIC | Age: 74
End: 2024-03-13
Payer: MEDICARE

## 2024-03-13 VITALS
DIASTOLIC BLOOD PRESSURE: 74 MMHG | HEART RATE: 70 BPM | TEMPERATURE: 97 F | WEIGHT: 136 LBS | BODY MASS INDEX: 25.03 KG/M2 | SYSTOLIC BLOOD PRESSURE: 156 MMHG | HEIGHT: 62 IN | RESPIRATION RATE: 18 BRPM

## 2024-03-13 DIAGNOSIS — E53.8 VITAMIN B 12 DEFICIENCY: ICD-10-CM

## 2024-03-13 DIAGNOSIS — Z85.038 HISTORY OF COLON CANCER: ICD-10-CM

## 2024-03-13 DIAGNOSIS — Z85.038 H/O COLON CANCER, STAGE III: Primary | ICD-10-CM

## 2024-03-13 DIAGNOSIS — Z90.49 S/P LAPAROSCOPIC COLECTOMY: ICD-10-CM

## 2024-03-13 DIAGNOSIS — D64.9 MILD ANEMIA: ICD-10-CM

## 2024-03-13 PROCEDURE — 3077F SYST BP >= 140 MM HG: CPT | Mod: CPTII,S$GLB,, | Performed by: INTERNAL MEDICINE

## 2024-03-13 PROCEDURE — 1101F PT FALLS ASSESS-DOCD LE1/YR: CPT | Mod: CPTII,S$GLB,, | Performed by: INTERNAL MEDICINE

## 2024-03-13 PROCEDURE — 1160F RVW MEDS BY RX/DR IN RCRD: CPT | Mod: CPTII,S$GLB,, | Performed by: INTERNAL MEDICINE

## 2024-03-13 PROCEDURE — 1126F AMNT PAIN NOTED NONE PRSNT: CPT | Mod: CPTII,S$GLB,, | Performed by: INTERNAL MEDICINE

## 2024-03-13 PROCEDURE — 99214 OFFICE O/P EST MOD 30 MIN: CPT | Mod: S$GLB,,, | Performed by: INTERNAL MEDICINE

## 2024-03-13 PROCEDURE — 3288F FALL RISK ASSESSMENT DOCD: CPT | Mod: CPTII,S$GLB,, | Performed by: INTERNAL MEDICINE

## 2024-03-13 PROCEDURE — 3078F DIAST BP <80 MM HG: CPT | Mod: CPTII,S$GLB,, | Performed by: INTERNAL MEDICINE

## 2024-03-13 PROCEDURE — 4010F ACE/ARB THERAPY RXD/TAKEN: CPT | Mod: CPTII,S$GLB,, | Performed by: INTERNAL MEDICINE

## 2024-03-13 PROCEDURE — 3008F BODY MASS INDEX DOCD: CPT | Mod: CPTII,S$GLB,, | Performed by: INTERNAL MEDICINE

## 2024-03-13 PROCEDURE — 1159F MED LIST DOCD IN RCRD: CPT | Mod: CPTII,S$GLB,, | Performed by: INTERNAL MEDICINE

## 2024-03-19 ENCOUNTER — OFFICE VISIT (OUTPATIENT)
Dept: CARDIOLOGY | Facility: CLINIC | Age: 74
End: 2024-03-19
Payer: MEDICARE

## 2024-03-19 VITALS
BODY MASS INDEX: 25.03 KG/M2 | HEART RATE: 70 BPM | WEIGHT: 136 LBS | SYSTOLIC BLOOD PRESSURE: 140 MMHG | OXYGEN SATURATION: 98 % | DIASTOLIC BLOOD PRESSURE: 70 MMHG | RESPIRATION RATE: 16 BRPM | HEIGHT: 62 IN

## 2024-03-19 DIAGNOSIS — R07.89 FEELING OF CHEST TIGHTNESS: Primary | ICD-10-CM

## 2024-03-19 DIAGNOSIS — R06.09 EXERTIONAL DYSPNEA: ICD-10-CM

## 2024-03-19 DIAGNOSIS — R07.9 CHEST PAIN, UNSPECIFIED TYPE: ICD-10-CM

## 2024-03-19 DIAGNOSIS — M79.18 MUSCULOSKELETAL PAIN: ICD-10-CM

## 2024-03-19 DIAGNOSIS — E11.9 TYPE 2 DIABETES MELLITUS WITHOUT COMPLICATION, WITHOUT LONG-TERM CURRENT USE OF INSULIN: ICD-10-CM

## 2024-03-19 DIAGNOSIS — I10 ESSENTIAL HYPERTENSION: ICD-10-CM

## 2024-03-19 DIAGNOSIS — Z85.038 H/O COLON CANCER, STAGE III: ICD-10-CM

## 2024-03-19 DIAGNOSIS — E78.2 MIXED HYPERLIPIDEMIA: ICD-10-CM

## 2024-03-19 PROCEDURE — 1159F MED LIST DOCD IN RCRD: CPT | Mod: HCNC,CPTII,S$GLB, | Performed by: INTERNAL MEDICINE

## 2024-03-19 PROCEDURE — 4010F ACE/ARB THERAPY RXD/TAKEN: CPT | Mod: HCNC,CPTII,S$GLB, | Performed by: INTERNAL MEDICINE

## 2024-03-19 PROCEDURE — 3008F BODY MASS INDEX DOCD: CPT | Mod: HCNC,CPTII,S$GLB, | Performed by: INTERNAL MEDICINE

## 2024-03-19 PROCEDURE — 93000 ELECTROCARDIOGRAM COMPLETE: CPT | Mod: HCNC,S$GLB,, | Performed by: INTERNAL MEDICINE

## 2024-03-19 PROCEDURE — 1125F AMNT PAIN NOTED PAIN PRSNT: CPT | Mod: HCNC,CPTII,S$GLB, | Performed by: INTERNAL MEDICINE

## 2024-03-19 PROCEDURE — 99205 OFFICE O/P NEW HI 60 MIN: CPT | Mod: HCNC,S$GLB,, | Performed by: INTERNAL MEDICINE

## 2024-03-19 PROCEDURE — 3288F FALL RISK ASSESSMENT DOCD: CPT | Mod: HCNC,CPTII,S$GLB, | Performed by: INTERNAL MEDICINE

## 2024-03-19 PROCEDURE — 3078F DIAST BP <80 MM HG: CPT | Mod: HCNC,CPTII,S$GLB, | Performed by: INTERNAL MEDICINE

## 2024-03-19 PROCEDURE — 3077F SYST BP >= 140 MM HG: CPT | Mod: HCNC,CPTII,S$GLB, | Performed by: INTERNAL MEDICINE

## 2024-03-19 PROCEDURE — 1101F PT FALLS ASSESS-DOCD LE1/YR: CPT | Mod: HCNC,CPTII,S$GLB, | Performed by: INTERNAL MEDICINE

## 2024-03-19 PROCEDURE — 1160F RVW MEDS BY RX/DR IN RCRD: CPT | Mod: HCNC,CPTII,S$GLB, | Performed by: INTERNAL MEDICINE

## 2024-03-19 PROCEDURE — 99999 PR PBB SHADOW E&M-EST. PATIENT-LVL V: CPT | Mod: PBBFAC,HCNC,, | Performed by: INTERNAL MEDICINE

## 2024-03-19 RX ORDER — NEBIVOLOL 5 MG/1
5 TABLET ORAL DAILY
Qty: 30 TABLET | Refills: 11 | Status: SHIPPED | OUTPATIENT
Start: 2024-03-19 | End: 2025-03-19

## 2024-03-19 RX ORDER — CLOPIDOGREL BISULFATE 75 MG/1
75 TABLET ORAL DAILY
Qty: 30 TABLET | Refills: 11 | Status: SHIPPED | OUTPATIENT
Start: 2024-03-19 | End: 2025-03-19

## 2024-03-19 NOTE — PROGRESS NOTES
Subjective:    Patient ID:  Preet West is a 74 y.o. female     Chief Complaint   Patient presents with    Chest Pain    Establish Care       HPI:  Ms Preet West is a 74 y.o. female here for initial consultation.    Patient was recently in [place] and at that time she started having chest heaviness and also shortness breath with exertion.  And usually magnified when she is climbing the stairs.  She has been having intermittent episodes and even when she came here she has been having sensation of heaviness in the chest after walking for short distances.  She also has increased shortness breath on activities of daily living.  Sometimes is magnified.    Her blood pressure was also fluctuating quite significantly and has been going up and down.  On an average her blood pressure in the systolic is around 152 160.  She is active at home she walks inside the house and also does gardening.  Patient at times does get stressed out because she recently lost some family members.    She has a past history of colon cancer and has been doing well.  She also has been diagnosed with having diabetes for the past 14 years and she is under the care of Dr. Pickering.    Patient is also allergic to aspirin she does have significant reaction with aspirin.  She also complains of neck pain all along the trapezius into the superior nuchal line and into the base of the skull.    Review of patient's allergies indicates:   Allergen Reactions    Tradjenta [linagliptin]      Itching and gastric discomfort > difficult to assess nature of side effect    Aspirin Itching    Opioids - morphine analogues Other (See Comments)     Confusion    Hydrochlorothiazide Other (See Comments)     Suspect Low sodium by hospital ist. Not confirmed    Jardiance [empagliflozin] Other (See Comments)     Caused burning sensation in the feet.       Past Medical History:   Diagnosis Date    Allergy     aspirin itching    Anemia     Cancer     Diabetes  mellitus, type 2     H/O colon cancer, stage III 1/1/2013    Treated in [facility]    History of colonoscopy 6/4/2021    Colonoscopy done by Dr. Rayshawn Adkins MD on 06/04/21.non bleeding internal hemorrhoids.  4 mm polyp in the sigmoid colon removed with cold snare.  Patent end-to-end colocolonic anastomosis characterized by healthy-appearing mucosa.  Examination was otherwise normal.  Repeat colonoscopy in 3 years for surveillance.    Malignant neoplasm of transverse colon (2013) - Stage III 4/4/2019    Osteoporosis     S/P laparoscopic colectomy (3/2013) 4/5/2019    Seasonal allergies      Past Surgical History:   Procedure Laterality Date    COLON SURGERY      JOINT REPLACEMENT       Social History     Tobacco Use    Smoking status: Never    Smokeless tobacco: Never   Substance Use Topics    Alcohol use: No    Drug use: No     Family History   Problem Relation Age of Onset    Stroke Mother     Heart disease Mother     Cancer Father         [place]        Review of Systems:   Constitution: Negative for diaphoresis and fever.   HEENT: Negative for nosebleeds.    Cardiovascular:  Positive for chest pain       Intermittent dyspnea on exertion       No leg swelling        No palpitations  Respiratory: Negative for shortness of breath and wheezing.    Hematologic/Lymphatic: Negative for bleeding problem. Does not bruise/bleed easily.   Skin: Negative for color change and rash.   Musculoskeletal: Negative for falls and myalgias.   Gastrointestinal: Negative for hematemesis and hematochezia.   Genitourinary: Negative for hematuria.   Neurological: Negative for dizziness and light-headedness.   Psychiatric/Behavioral: Negative for altered mental status and memory loss.          Objective:        Vitals:    03/19/24 1323   BP: (!) 140/70   Pulse: 70   Resp: 16       Lab Results   Component Value Date    WBC 7.38 02/27/2024    HGB 10.4 (L) 02/27/2024    HCT 34.2 (L) 02/27/2024     02/27/2024    CHOL 71 (L) 06/22/2023     TRIG 59 06/22/2023    HDL 37 (L) 06/22/2023    ALT 13 02/27/2024    AST 15 02/27/2024     02/27/2024    K 4.8 02/27/2024     02/27/2024    CREATININE 1.0 02/27/2024    BUN 17 02/27/2024    CO2 21 (L) 02/27/2024    TSH 2.910 11/16/2020    HGBA1C 7.6 (H) 11/02/2023        ECHOCARDIOGRAM RESULTS  Results for orders placed during the hospital encounter of 12/11/20    Echo Color Flow Doppler? Yes    Interpretation Summary  · The left ventricle is normal in size with normal systolic function. The estimated ejection fraction is 60%.  · Grade I left ventricular diastolic dysfunction.  · Normal right ventricular size with normal right ventricular systolic function.  · Normal central venous pressure (3 mmHg).  · The estimated PA systolic pressure is 19 mmHg.        CURRENT/PREVIOUS VISIT EKG  Results for orders placed or performed during the hospital encounter of 06/08/21   EKG 12-lead    Collection Time: 06/08/21  7:18 AM    Narrative    Test Reason : R07.9,    Vent. Rate : 087 BPM     Atrial Rate : 087 BPM     P-R Int : 174 ms          QRS Dur : 072 ms      QT Int : 386 ms       P-R-T Axes : 025 056 033 degrees     QTc Int : 464 ms    Normal sinus rhythm  Normal ECG  When compared with ECG of 12-NOV-2020 12:12,  No significant change was found  Confirmed by Osei Rosenberg MD ([id number]) on 6/9/2021 9:20:43 AM    Referred By: [email or web address]   SELF           Confirmed By:Osei Rosenberg MD     No valid procedures specified.   No results found for this or any previous visit.      Physical Exam:  CONSTITUTIONAL: No fever, no chills  HEENT: Normocephalic, atraumatic,pupils reactive to light                 NECK:  No JVD no carotid bruit, she has significant muscle spasm along the muscles of the neck.  CVS: S1S2+, RRR, systolic murmurs,   LUNGS: Clear  ABDOMEN: Soft, NT, BS+  EXTREMITIES: No cyanosis, edema  : No rocha catheter  NEURO: AAO X 3  PSY: Normal affect      Medication List with Changes/Refills   Current  Medications    ALENDRONATE (FOSAMAX) 70 MG TABLET    Take 1 tablet (70 mg total) by mouth every 7 days. Take this medication once a week on an empty stomach with a cup of water and do not lie down for 30 minutes after taking med.    AMLODIPINE (NORVASC) 5 MG TABLET    Take 1 tablet (5 mg total) by mouth every evening.    BLOOD SUGAR DIAGNOSTIC (TRUE METRIX GLUCOSE TEST STRIP) STRP    Inject 1 strip into the skin once daily.    BLOOD-GLUCOSE METER KIT    One touch meter and matching lancets and strips.    CELECOXIB (CELEBREX) 50 MG CAPSULE    Take 1 capsule (50 mg total) by mouth 2 (two) times daily.    CLOTRIMAZOLE-BETAMETHASONE 1-0.05% (LOTRISONE) CREAM    Apply topically 2 (two) times daily.    CYANOCOBALAMIN 1,000 MCG/ML INJECTION    Inject 1 mL (1,000 mcg total) into the muscle every 30 days.    CYANOCOBALAMIN 1,000 MCG/ML INJECTION    Inject 1 mL (1,000 mcg total) into the muscle every 30 days.    DICLOFENAC SODIUM (VOLTAREN) 1 % GEL    Apply 2 g topically 2 (two) times daily. Apply to the knee joint left side.    FLUTICASONE PROPIONATE (FLONASE) 50 MCG/ACTUATION NASAL SPRAY    1 spray (50 mcg total) by Each Nostril route once daily.    GLIMEPIRIDE (AMARYL) 1 MG TABLET    TAKE 1 TABLET BY MOUTH BEFORE BREAKFAST (PLEASE  DISCONTINUE  JARDIANCE  BECAUSE  OF  NON  TOLERANCE.  TRIAL  OF  GLIMEPIRIDE  AT  1  MG)    ISOSORBIDE MONONITRATE (IMDUR) 30 MG 24 HR TABLET    Take 1 tablet (30 mg total) by mouth once daily.    LANCETS (LANCETS,ULTRA THIN) 26 GAUGE MISC    1 lancet  by Misc.(Non-Drug; Combo Route) route once daily.    LOSARTAN (COZAAR) 100 MG TABLET    Take 1 tablet (100 mg total) by mouth once daily.    METFORMIN (GLUCOPHAGE) 1000 MG TABLET    Take 1 tablet (1,000 mg total) by mouth 2 (two) times daily with meals.    NYSTATIN (MYCOSTATIN) CREAM    Apply topically 2 (two) times daily.    PANTOPRAZOLE (PROTONIX) 40 MG TABLET    Take 1 tablet (40 mg total) by mouth once daily.    POLYETHYLENE GLYCOL (GLYCOLAX)  17 GRAM/DOSE POWDER    DISSOLVE 17 GRAMS IN WATER AND DRINK ONCE DAILY    ROSUVASTATIN (CRESTOR) 5 MG TABLET    Take 1 tablet (5 mg total) by mouth every evening.             Assessment:       1. Feeling of chest tightness    2. Chest pain, unspecified type    3. Exertional dyspnea    4. Essential hypertension    5. Mixed hyperlipidemia    6. H/O colon cancer, stage III    7. Type 2 diabetes mellitus without complication, without long-term current use of insulin    8. Musculoskeletal pain         Plan:   1. Chest tightness and feeling of discomfort on exertion.    Patient has been having intermittent episodes of chest discomfort.  Especially when she is climbing up the stairs.    Etiology is unclear will schedule her for Lexiscan Cardiolite study to evaluate for ischemia.  2. Exertional dyspnea   She has also been having increased shortness of breath and dyspnea on exertion.  She had in the past myalgia from statin therapy.    Will also do a 2D echocardiogram for LV function ejection fraction wall motion abnormality.  On a previous echo she had normal LV function and diastolic dysfunction.  Will also do a chest x-ray PA and lateral views.  3. Essential hypertension   Her current blood pressure is 140/70.  And she is currently on losartan 100 mg p.o. daily continue the same and she is also on amlodipine 5 mg p.o. daily.  Patient was started on isosorbide mononitrate and patient is yet to  the medication.    Will also start her on Bystolic 2.5 mg p.o. daily to help control her blood pressure.  And also discussed with patient that she needs to time her medication and not to take all the medication at 1 time.    4. Mixed hyperlipidemia   She is currently on rosuvastatin 5 mg p.o. daily continue the same and her cholesterol is well controlled her total cholesterol is 71 HDL is 37 LDL is 72 and triglycerides of 59.  5. Colon cancer stage III   Patient is under the care of her primary physician as well as the  oncologist.  6. Diabetes mellitus   She is currently on glimepiride 1 mg p.o. daily and she is also on metformin 1000 mg p.o. b.i.d. continue the same.  And she follows up with the primary physician in regards with a A1c.  7. Musculoskeletal pain.    Patient has significant pain along the trapezius and the superior nuchal line and she has significant spasm in that area.  Patient can apply local creams.  8. Patient to check her blood pressure as soon as she wakes up, before breakfast, before lunch, before dinner and at bedtime and to provide us with 1 week's worth of blood pressure reading.  9. EKG   Reviewed EKG independently patient is in normal sinus rhythm with a heart rate of 70 beats per minute normal intervals normal axis and no acute ST T-wave changes essentially within normal limits.  10. I will see her back in the office after the testing has been completed.    11. Patient is allergic to aspirin and she has significant reaction with it will start her on Plavix 75 mg p.o. daily.  And patient needs to be extremely careful not to hurt her head or any bodily injury as it can cause significant bleeding.                Problem List Items Addressed This Visit          Cardiac/Vascular    Essential hypertension    Mixed hyperlipidemia       Oncology    H/O colon cancer, stage III    Overview     Treated in [facility]            Endocrine    Type 2 diabetes mellitus without complication, without long-term current use of insulin     Other Visit Diagnoses       Feeling of chest tightness    -  Primary    Get Cardiology evaluation.  Prior EKG nonspecific T changes.    Relevant Orders    IN OFFICE EKG 12-LEAD (to Muse)    Chest pain, unspecified type        Exertional dyspnea        Musculoskeletal pain                No follow-ups on file.

## 2024-04-12 ENCOUNTER — TELEPHONE (OUTPATIENT)
Dept: CARDIOLOGY | Facility: HOSPITAL | Age: 74
End: 2024-04-12

## 2024-04-12 NOTE — TELEPHONE ENCOUNTER
Patient advised, test will be at [facility] ([address]   Will need to register on the first floor at the main entrance.   Patient advised that arrival time is 6:40am.  Patient advised that she may be here about 3.5-4 hours, and may want to bring something to occupy their time, as there will be periods of waiting.    Patient advised, may take her medications prior to testing if you need to.  Patient should HOLD Isosorbide. Advised if she needs to eat to take her medications, please keep it light, like toast and juice.    Patient advised to avoid all caffeine 12 hours prior to testing.  This includes decaf tea and coffee.    Will provide peanut butter crackers for a snack after stress test.  If patient would prefer something else, please bring a snack from home.    Wear comfortable clothing.   No lotions, oils, or powders to the upper chest area. May wear deodorant.    No metal jewelry, buttons, or zippers to the upper body.  Patient verbalizes understanding of instructions.

## 2024-04-15 ENCOUNTER — HOSPITAL ENCOUNTER (OUTPATIENT)
Dept: RADIOLOGY | Facility: HOSPITAL | Age: 74
Discharge: HOME | End: 2024-04-15
Attending: INTERNAL MEDICINE
Payer: MEDICARE

## 2024-04-15 ENCOUNTER — HOSPITAL ENCOUNTER (OUTPATIENT)
Dept: CARDIOLOGY | Facility: HOSPITAL | Age: 74
Discharge: HOME | End: 2024-04-15
Attending: INTERNAL MEDICINE
Payer: MEDICARE

## 2024-04-15 VITALS — WEIGHT: 136 LBS | HEIGHT: 62 IN | BODY MASS INDEX: 25.03 KG/M2

## 2024-04-15 DIAGNOSIS — E78.2 MIXED HYPERLIPIDEMIA: ICD-10-CM

## 2024-04-15 DIAGNOSIS — R07.89 FEELING OF CHEST TIGHTNESS: ICD-10-CM

## 2024-04-15 DIAGNOSIS — Z85.038 H/O COLON CANCER, STAGE III: ICD-10-CM

## 2024-04-15 DIAGNOSIS — R06.09 EXERTIONAL DYSPNEA: ICD-10-CM

## 2024-04-15 DIAGNOSIS — M79.18 MUSCULOSKELETAL PAIN: ICD-10-CM

## 2024-04-15 DIAGNOSIS — I10 ESSENTIAL HYPERTENSION: ICD-10-CM

## 2024-04-15 DIAGNOSIS — R07.9 CHEST PAIN, UNSPECIFIED TYPE: ICD-10-CM

## 2024-04-15 DIAGNOSIS — E11.9 TYPE 2 DIABETES MELLITUS WITHOUT COMPLICATION, WITHOUT LONG-TERM CURRENT USE OF INSULIN: ICD-10-CM

## 2024-04-15 LAB
AORTIC ROOT ANNULUS: 2.9 CM
AORTIC VALVE CUSP SEPERATION: 1.7 CM
AV INDEX (PROSTH): 0.8
AV MEAN GRADIENT: 4 MMHG
AV PEAK GRADIENT: 7 MMHG
AV VALVE AREA BY VELOCITY RATIO: 2.14 CM²
AV VALVE AREA: 2.51 CM²
AV VELOCITY RATIO: 0.68
BSA FOR ECHO PROCEDURE: 1.64 M2
CV ECHO LV RWT: 0.62 CM
DOP CALC AO PEAK VEL: 1.32 M/S
DOP CALC AO VTI: 29 CM
DOP CALC LVOT AREA: 3.1 CM2
DOP CALC LVOT DIAMETER: 2 CM
DOP CALC LVOT PEAK VEL: 0.9 M/S
DOP CALC LVOT STROKE VOLUME: 72.85 CM3
DOP CALCLVOT PEAK VEL VTI: 23.2 CM
E WAVE DECELERATION TIME: 259 MSEC
E/A RATIO: 0.74
E/E' RATIO: 10.24 M/S
ECHO LV POSTERIOR WALL: 1.07 CM (ref 0.6–1.1)
FRACTIONAL SHORTENING: 37 % (ref 28–44)
INTERVENTRICULAR SEPTUM: 1.06 CM (ref 0.6–1.1)
IVRT: 95 MSEC
LEFT ATRIUM SIZE: 3.7 CM
LEFT INTERNAL DIMENSION IN SYSTOLE: 2.18 CM (ref 2.1–4)
LEFT VENTRICLE DIASTOLIC VOLUME INDEX: 30.56 ML/M2
LEFT VENTRICLE DIASTOLIC VOLUME: 49.5 ML
LEFT VENTRICLE MASS INDEX: 69 G/M2
LEFT VENTRICLE SYSTOLIC VOLUME INDEX: 9.8 ML/M2
LEFT VENTRICLE SYSTOLIC VOLUME: 15.8 ML
LEFT VENTRICULAR INTERNAL DIMENSION IN DIASTOLE: 3.46 CM (ref 3.5–6)
LEFT VENTRICULAR MASS: 111.47 G
LV LATERAL E/E' RATIO: 10.88 M/S
LV SEPTAL E/E' RATIO: 9.67 M/S
LVOT MG: 2 MMHG
LVOT MV: 0.62 CM/S
MV PEAK A VEL: 1.17 M/S
MV PEAK E VEL: 0.87 M/S
MV STENOSIS PRESSURE HALF TIME: 59 MS
MV VALVE AREA P 1/2 METHOD: 3.73 CM2
RA PRESSURE ESTIMATED: 3 MMHG
RIGHT VENTRICULAR END-DIASTOLIC DIMENSION: 2.46 CM
TDI LATERAL: 0.08 M/S
TDI SEPTAL: 0.09 M/S
TDI: 0.09 M/S
Z-SCORE OF LEFT VENTRICULAR DIMENSION IN END DIASTOLE: -2.81
Z-SCORE OF LEFT VENTRICULAR DIMENSION IN END SYSTOLE: -2.1

## 2024-04-15 PROCEDURE — 93017 CV STRESS TEST TRACING ONLY: CPT

## 2024-04-15 PROCEDURE — 93306 TTE W/DOPPLER COMPLETE: CPT

## 2024-04-15 PROCEDURE — 93016 CV STRESS TEST SUPVJ ONLY: CPT | Mod: ,,, | Performed by: NURSE PRACTITIONER

## 2024-04-15 PROCEDURE — A9502 TC99M TETROFOSMIN: HCPCS | Performed by: INTERNAL MEDICINE

## 2024-04-15 PROCEDURE — 93018 CV STRESS TEST I&R ONLY: CPT | Mod: ,,, | Performed by: INTERNAL MEDICINE

## 2024-04-15 PROCEDURE — 63600175 PHARM REV CODE 636 W HCPCS: Performed by: INTERNAL MEDICINE

## 2024-04-15 PROCEDURE — 93306 TTE W/DOPPLER COMPLETE: CPT | Mod: 26,,, | Performed by: INTERNAL MEDICINE

## 2024-04-15 PROCEDURE — 78452 HT MUSCLE IMAGE SPECT MULT: CPT | Mod: 26,,, | Performed by: INTERNAL MEDICINE

## 2024-04-15 RX ORDER — REGADENOSON 0.08 MG/ML
0.4 INJECTION, SOLUTION INTRAVENOUS ONCE
Status: COMPLETED | OUTPATIENT
Start: 2024-04-15 | End: 2024-04-15

## 2024-04-15 RX ADMIN — TETROFOSMIN 25.6 MILLICURIE: 1.38 INJECTION, POWDER, LYOPHILIZED, FOR SOLUTION INTRAVENOUS at 08:04

## 2024-04-15 RX ADMIN — REGADENOSON 0.4 MG: 0.08 INJECTION, SOLUTION INTRAVENOUS at 08:04

## 2024-04-15 RX ADMIN — TETROFOSMIN 12.2 MILLICURIE: 1.38 INJECTION, POWDER, LYOPHILIZED, FOR SOLUTION INTRAVENOUS at 06:04

## 2024-04-16 LAB
CV PHARM DOSE: 0.4 MG
CV STRESS BASE HR: 72 BPM
DIASTOLIC BLOOD PRESSURE: 68 MMHG
EJECTION FRACTION- HIGH: 65 %
END DIASTOLIC INDEX-HIGH: 153 ML/M2
END DIASTOLIC INDEX-LOW: 93 ML/M2
END SYSTOLIC INDEX-HIGH: 71 ML/M2
END SYSTOLIC INDEX-LOW: 31 ML/M2
NUC REST DIASTOLIC VOLUME INDEX: 34
NUC REST EJECTION FRACTION: 85
NUC REST SYSTOLIC VOLUME INDEX: 5
NUC STRESS DIASTOLIC VOLUME INDEX: 29
NUC STRESS EJECTION FRACTION: 90 %
NUC STRESS SYSTOLIC VOLUME INDEX: 3
OHS CV CPX 1 MINUTE RECOVERY HEART RATE: 105 BPM
OHS CV CPX 85 PERCENT MAX PREDICTED HEART RATE MALE: 126
OHS CV CPX MAX PREDICTED HEART RATE: 148
OHS CV CPX PATIENT IS FEMALE: 1
OHS CV CPX PATIENT IS MALE: 0
OHS CV CPX PEAK DIASTOLIC BLOOD PRESSURE: 65 MMHG
OHS CV CPX PEAK HEAR RATE: 105 BPM
OHS CV CPX PEAK RATE PRESSURE PRODUCT: NORMAL
OHS CV CPX PEAK SYSTOLIC BLOOD PRESSURE: 144 MMHG
OHS CV CPX PERCENT MAX PREDICTED HEART RATE ACHIEVED: 74
OHS CV CPX RATE PRESSURE PRODUCT PRESENTING: NORMAL
RETIRED EF AND QEF - SEE NOTES: 53 %
SYSTOLIC BLOOD PRESSURE: 159 MMHG

## 2024-04-25 ENCOUNTER — OFFICE VISIT (OUTPATIENT)
Dept: CARDIOLOGY | Facility: CLINIC | Age: 74
End: 2024-04-25
Payer: MEDICARE

## 2024-04-25 VITALS
DIASTOLIC BLOOD PRESSURE: 74 MMHG | BODY MASS INDEX: 24.87 KG/M2 | WEIGHT: 136 LBS | SYSTOLIC BLOOD PRESSURE: 145 MMHG | HEART RATE: 79 BPM | OXYGEN SATURATION: 99 %

## 2024-04-25 DIAGNOSIS — I10 ESSENTIAL HYPERTENSION: ICD-10-CM

## 2024-04-25 DIAGNOSIS — E11.9 TYPE 2 DIABETES MELLITUS WITHOUT COMPLICATION, WITHOUT LONG-TERM CURRENT USE OF INSULIN: ICD-10-CM

## 2024-04-25 DIAGNOSIS — E78.2 MIXED HYPERLIPIDEMIA: ICD-10-CM

## 2024-04-25 DIAGNOSIS — R07.89 FEELING OF CHEST TIGHTNESS: Primary | ICD-10-CM

## 2024-04-25 DIAGNOSIS — R06.09 EXERTIONAL DYSPNEA: ICD-10-CM

## 2024-04-25 PROCEDURE — 1160F RVW MEDS BY RX/DR IN RCRD: CPT | Mod: HCNC,CPTII,S$GLB, | Performed by: INTERNAL MEDICINE

## 2024-04-25 PROCEDURE — 99214 OFFICE O/P EST MOD 30 MIN: CPT | Mod: HCNC,S$GLB,, | Performed by: INTERNAL MEDICINE

## 2024-04-25 PROCEDURE — 99999 PR PBB SHADOW E&M-EST. PATIENT-LVL III: CPT | Mod: PBBFAC,HCNC,, | Performed by: INTERNAL MEDICINE

## 2024-04-25 PROCEDURE — 4010F ACE/ARB THERAPY RXD/TAKEN: CPT | Mod: HCNC,CPTII,S$GLB, | Performed by: INTERNAL MEDICINE

## 2024-04-25 PROCEDURE — 1159F MED LIST DOCD IN RCRD: CPT | Mod: HCNC,CPTII,S$GLB, | Performed by: INTERNAL MEDICINE

## 2024-04-25 PROCEDURE — 3078F DIAST BP <80 MM HG: CPT | Mod: HCNC,CPTII,S$GLB, | Performed by: INTERNAL MEDICINE

## 2024-04-25 PROCEDURE — 3077F SYST BP >= 140 MM HG: CPT | Mod: HCNC,CPTII,S$GLB, | Performed by: INTERNAL MEDICINE

## 2024-04-25 PROCEDURE — 1101F PT FALLS ASSESS-DOCD LE1/YR: CPT | Mod: HCNC,CPTII,S$GLB, | Performed by: INTERNAL MEDICINE

## 2024-04-25 PROCEDURE — 3008F BODY MASS INDEX DOCD: CPT | Mod: HCNC,CPTII,S$GLB, | Performed by: INTERNAL MEDICINE

## 2024-04-25 PROCEDURE — 3288F FALL RISK ASSESSMENT DOCD: CPT | Mod: HCNC,CPTII,S$GLB, | Performed by: INTERNAL MEDICINE

## 2024-04-25 NOTE — PROGRESS NOTES
Subjective:    Patient ID:  Preet West is a 74 y.o. female     Chief Complaint   Patient presents with    Results       HPI:  Ms Preet West is a 74 y.o. female here for follow-up.    Patient is doing well no specific complaints at the present time she is here for her stress test and echocardiogram results.    She is taking her medications regularly.    Unable to tolerate full dose of Plavix on a daily basis.        Review of patient's allergies indicates:   Allergen Reactions    Tradjenta [linagliptin]      Itching and gastric discomfort > difficult to assess nature of side effect    Aspirin Itching    Opioids - morphine analogues Other (See Comments)     Confusion    Hydrochlorothiazide Other (See Comments)     Suspect Low sodium by hospital ist. Not confirmed    Jardiance [empagliflozin] Other (See Comments)     Caused burning sensation in the feet.       Past Medical History:   Diagnosis Date    Allergy     aspirin itching    Anemia     Cancer     Diabetes mellitus, type 2     H/O colon cancer, stage III 1/1/2013    Treated in [facility]    History of colonoscopy 6/4/2021    Colonoscopy done by Dr. Rayshawn Adkins MD on 06/04/21.non bleeding internal hemorrhoids.  4 mm polyp in the sigmoid colon removed with cold snare.  Patent end-to-end colocolonic anastomosis characterized by healthy-appearing mucosa.  Examination was otherwise normal.  Repeat colonoscopy in 3 years for surveillance.    Malignant neoplasm of transverse colon (2013) - Stage III 4/4/2019    Osteoporosis     S/P laparoscopic colectomy (3/2013) 4/5/2019    Seasonal allergies      Past Surgical History:   Procedure Laterality Date    COLON SURGERY      JOINT REPLACEMENT       Social History     Tobacco Use    Smoking status: Never    Smokeless tobacco: Never   Substance Use Topics    Alcohol use: No    Drug use: No     Family History   Problem Relation Name Age of Onset    Stroke Mother Anel Christian     Heart disease Mother  Anel Gonzales     Cancer Father Kwesi Lira        Review of Systems:   Constitution: Negative for diaphoresis and fever.   HEENT: Negative for nosebleeds.    Cardiovascular: Negative for chest pain       No dyspnea on exertion       No leg swelling        No palpitations  Respiratory: Negative for shortness of breath and wheezing.    Hematologic/Lymphatic: Negative for bleeding problem. Does not bruise/bleed easily.   Skin: Negative for color change and rash.   Musculoskeletal: Negative for falls and myalgias.   Gastrointestinal: Negative for hematemesis and hematochezia.   Genitourinary: Negative for hematuria.   Neurological: Negative for dizziness and light-headedness.   Psychiatric/Behavioral: Negative for altered mental status and memory loss.          Objective:        Vitals:    04/25/24 1335   BP: (!) 145/74   Pulse: 79       Lab Results   Component Value Date    WBC 7.38 02/27/2024    HGB 10.4 (L) 02/27/2024    HCT 34.2 (L) 02/27/2024     02/27/2024    CHOL 71 (L) 06/22/2023    TRIG 59 06/22/2023    HDL 37 (L) 06/22/2023    ALT 13 02/27/2024    AST 15 02/27/2024     02/27/2024    K 4.8 02/27/2024     02/27/2024    CREATININE 1.0 02/27/2024    BUN 17 02/27/2024    CO2 21 (L) 02/27/2024    TSH 2.910 11/16/2020    HGBA1C 7.6 (H) 11/02/2023        ECHOCARDIOGRAM RESULTS  Results for orders placed during the hospital encounter of 04/15/24    Echo    Interpretation Summary    Left Ventricle: There is normal systolic function with a visually estimated ejection fraction of 60 - 65%. There is normal diastolic function.    Right Ventricle: Normal right ventricular cavity size. Wall thickness is normal. Right ventricle wall motion  is normal. Systolic function is normal.    Left Atrium: Left atrium is mildly dilated.    IVC/SVC: Normal venous pressure at 3 mmHg.        CURRENT/PREVIOUS VISIT EKG  Results for orders placed or performed in visit on 03/19/24   IN OFFICE EKG 12-LEAD (to  [facility])    Collection Time: 03/19/24  1:19 PM   Result Value Ref Range    QRS Duration 66 ms    [facility] QTC Calculation 414 ms    Narrative    Test Reason : R07.89,    Vent. Rate : 070 BPM     Atrial Rate : 070 BPM     P-R Int : 158 ms          QRS Dur : 066 ms      QT Int : 384 ms       P-R-T Axes : 050 069 034 degrees     QTc Int : 414 ms    Normal sinus rhythm  Normal ECG  When compared with ECG of 08-JUN-2021 07:18,  No significant change was found    Referred By: SUNIL GARZA           Confirmed By:      No valid procedures specified.   Results for orders placed during the hospital encounter of 04/15/24    Nuclear Stress - Cardiology Interpreted    Interpretation Summary    Normal myocardial perfusion scan. There is no evidence of myocardial ischemia or infarction.    The gated perfusion images showed an ejection fraction of 85% at rest. The gated perfusion images showed an ejection fraction of 90% post stress. Normal ejection fraction is greater than 53%.    There is normal wall motion at rest and post stress.    LV cavity size is normal at rest and normal at stress.    The ECG portion of the study is negative for ischemia.    The patient reported no chest pain during the stress test.    There were no arrhythmias during stress.      Physical Exam:  CONSTITUTIONAL: No fever, no chills  HEENT: Normocephalic, atraumatic,pupils reactive to light                 NECK:  No JVD no carotid bruit  CVS: S1S2+, RRR, systolic murmurs,   LUNGS: Clear  ABDOMEN: Soft, NT, BS+  EXTREMITIES: No cyanosis, edema  : No rocha catheter  NEURO: AAO X 3  PSY: Normal affect      Medication List with Changes/Refills   Current Medications    ALENDRONATE (FOSAMAX) 70 MG TABLET    Take 1 tablet (70 mg total) by mouth every 7 days. Take this medication once a week on an empty stomach with a cup of water and do not lie down for 30 minutes after taking med.    AMLODIPINE (NORVASC) 5 MG TABLET    Take 1 tablet (5 mg total) by mouth every evening.     BLOOD SUGAR DIAGNOSTIC (TRUE METRIX GLUCOSE TEST STRIP) STRP    Inject 1 strip into the skin once daily.    BLOOD-GLUCOSE METER KIT    One touch meter and matching lancets and strips.    CELECOXIB (CELEBREX) 50 MG CAPSULE    Take 1 capsule (50 mg total) by mouth 2 (two) times daily.    CLOPIDOGREL (PLAVIX) 75 MG TABLET    Take 1 tablet (75 mg total) by mouth once daily.    CLOTRIMAZOLE-BETAMETHASONE 1-0.05% (LOTRISONE) CREAM    Apply topically 2 (two) times daily.    CYANOCOBALAMIN 1,000 MCG/ML INJECTION    Inject 1 mL (1,000 mcg total) into the muscle every 30 days.    CYANOCOBALAMIN 1,000 MCG/ML INJECTION    Inject 1 mL (1,000 mcg total) into the muscle every 30 days.    DICLOFENAC SODIUM (VOLTAREN) 1 % GEL    Apply 2 g topically 2 (two) times daily. Apply to the knee joint left side.    FLUTICASONE PROPIONATE (FLONASE) 50 MCG/ACTUATION NASAL SPRAY    1 spray (50 mcg total) by Each Nostril route once daily.    GLIMEPIRIDE (AMARYL) 1 MG TABLET    TAKE 1 TABLET BY MOUTH BEFORE BREAKFAST (PLEASE  DISCONTINUE  JARDIANCE  BECAUSE  OF  NON  TOLERANCE.  TRIAL  OF  GLIMEPIRIDE  AT  1  MG)    ISOSORBIDE MONONITRATE (IMDUR) 30 MG 24 HR TABLET    Take 1 tablet (30 mg total) by mouth once daily.    LANCETS (LANCETS,ULTRA THIN) 26 GAUGE MISC    1 lancet  by Misc.(Non-Drug; Combo Route) route once daily.    LOSARTAN (COZAAR) 100 MG TABLET    Take 1 tablet (100 mg total) by mouth once daily.    METFORMIN (GLUCOPHAGE) 1000 MG TABLET    Take 1 tablet (1,000 mg total) by mouth 2 (two) times daily with meals.    NEBIVOLOL (BYSTOLIC) 5 MG TAB    Take 1 tablet (5 mg total) by mouth once daily.    NYSTATIN (MYCOSTATIN) CREAM    Apply topically 2 (two) times daily.    PANTOPRAZOLE (PROTONIX) 40 MG TABLET    Take 1 tablet (40 mg total) by mouth once daily.    POLYETHYLENE GLYCOL (GLYCOLAX) 17 GRAM/DOSE POWDER    DISSOLVE 17 GRAMS IN WATER AND DRINK ONCE DAILY    ROSUVASTATIN (CRESTOR) 5 MG TABLET    Take 1 tablet (5 mg total) by mouth  every evening.             Assessment:       1. Feeling of chest tightness    2. Exertional dyspnea    3. Essential hypertension    4. Mixed hyperlipidemia    5. Type 2 diabetes mellitus without complication, without long-term current use of insulin         Plan:   1. Feeling of chest tightness   Patient underwent Lexiscan stress myocardial perfusion study and she has normal myocardial perfusion and normal EKG and no chest pain during the testing.    2. Exertional dyspnea   Patient has had exertional dyspnea some of it is probably disuse atrophy and deconditioning.  Patient would also benefit from pulmonary function tests to evaluate her respiratory status.    3. Essential hypertension    Currently she is on amlodipine 5 mg p.o. daily she is currently off of Bystolic.  Apparently she is on losartan also.    She follows up with the primary physician in regards with it.    4. Mixed hyperlipidemia   She is currently on rosuvastatin 5 mg p.o. daily continue the same on her last blood work her total cholesterol was 71 HDL was 37 LDL was 22 and triglycerides were 59.  Continue the same medication.  She follows up with the primary physician in regards with the blood work.  5. Discussed with patient and son in regards with stress test and echocardiogram results.  6. Diabetes mellitus   She is currently on metformin and Amaryl.  And she follows up with the primary physician.    7. Patient to continue current management and I will see her back in the office in a year's time.  8. Patient is allergic to aspirin she is going to try taking Plavix half a tablet every other day and see if it helps her.          Problem List Items Addressed This Visit          Cardiac/Vascular    Essential hypertension    Mixed hyperlipidemia       Endocrine    Type 2 diabetes mellitus without complication, without long-term current use of insulin     Other Visit Diagnoses       Feeling of chest tightness    -  Primary    Exertional dyspnea                 No follow-ups on file.

## 2024-05-02 LAB
OHS QRS DURATION: 66 MS
OHS QTC CALCULATION: 414 MS

## 2024-05-28 DIAGNOSIS — E11.9 TYPE 2 DIABETES MELLITUS WITHOUT COMPLICATION, WITHOUT LONG-TERM CURRENT USE OF INSULIN: Chronic | ICD-10-CM

## 2024-05-28 DIAGNOSIS — M81.6 LOCALIZED OSTEOPOROSIS WITHOUT CURRENT PATHOLOGICAL FRACTURE: Chronic | ICD-10-CM

## 2024-05-28 RX ORDER — ALENDRONATE SODIUM 70 MG/1
70 TABLET ORAL
Qty: 12 TABLET | Refills: 3 | Status: SHIPPED | OUTPATIENT
Start: 2024-05-28

## 2024-06-22 DIAGNOSIS — M17.12 ARTHRITIS OF LEFT KNEE: Chronic | ICD-10-CM

## 2024-06-24 RX ORDER — CELECOXIB 50 MG/1
50 CAPSULE ORAL 2 TIMES DAILY
Qty: 60 CAPSULE | Refills: 2 | Status: SHIPPED | OUTPATIENT
Start: 2024-06-24

## 2024-06-27 DIAGNOSIS — K21.9 GASTROESOPHAGEAL REFLUX DISEASE WITHOUT ESOPHAGITIS: ICD-10-CM

## 2024-06-28 RX ORDER — PANTOPRAZOLE SODIUM 40 MG/1
40 TABLET, DELAYED RELEASE ORAL
Qty: 30 TABLET | Refills: 5 | Status: SHIPPED | OUTPATIENT
Start: 2024-06-28

## 2024-07-08 ENCOUNTER — LAB VISIT (OUTPATIENT)
Dept: LAB | Facility: HOSPITAL | Age: 74
End: 2024-07-08
Attending: INTERNAL MEDICINE
Payer: MEDICARE

## 2024-07-08 DIAGNOSIS — E11.9 TYPE 2 DIABETES MELLITUS WITHOUT COMPLICATION, WITHOUT LONG-TERM CURRENT USE OF INSULIN: Chronic | ICD-10-CM

## 2024-07-08 LAB
ALBUMIN/CREAT UR: 19.6 UG/MG (ref 0–30)
CREAT UR-MCNC: 54.7 MG/DL (ref 15–325)
ESTIMATED AVG GLUCOSE: 177 MG/DL (ref 68–131)
HBA1C MFR BLD: 7.8 % (ref 4.5–6.2)
MICROALBUMIN UR DL<=1MG/L-MCNC: 10.7 UG/ML

## 2024-07-08 PROCEDURE — 83036 HEMOGLOBIN GLYCOSYLATED A1C: CPT | Performed by: INTERNAL MEDICINE

## 2024-07-08 PROCEDURE — 36415 COLL VENOUS BLD VENIPUNCTURE: CPT | Performed by: INTERNAL MEDICINE

## 2024-07-08 PROCEDURE — 82570 ASSAY OF URINE CREATININE: CPT | Performed by: INTERNAL MEDICINE

## 2024-07-08 PROCEDURE — 82043 UR ALBUMIN QUANTITATIVE: CPT | Performed by: INTERNAL MEDICINE

## 2024-07-16 ENCOUNTER — OFFICE VISIT (OUTPATIENT)
Dept: FAMILY MEDICINE | Facility: CLINIC | Age: 74
End: 2024-07-16
Payer: MEDICARE

## 2024-07-16 VITALS
HEART RATE: 98 BPM | BODY MASS INDEX: 24.71 KG/M2 | OXYGEN SATURATION: 100 % | WEIGHT: 134.31 LBS | HEIGHT: 62 IN | DIASTOLIC BLOOD PRESSURE: 76 MMHG | SYSTOLIC BLOOD PRESSURE: 126 MMHG

## 2024-07-16 DIAGNOSIS — L29.9 ITCHING: ICD-10-CM

## 2024-07-16 DIAGNOSIS — M17.12 ARTHRITIS OF LEFT KNEE: ICD-10-CM

## 2024-07-16 DIAGNOSIS — R53.83 FATIGUE, UNSPECIFIED TYPE: ICD-10-CM

## 2024-07-16 DIAGNOSIS — E78.2 MIXED HYPERLIPIDEMIA: ICD-10-CM

## 2024-07-16 DIAGNOSIS — M17.12 ARTHRITIS OF LEFT KNEE: Chronic | ICD-10-CM

## 2024-07-16 DIAGNOSIS — K21.9 GASTROESOPHAGEAL REFLUX DISEASE WITHOUT ESOPHAGITIS: ICD-10-CM

## 2024-07-16 DIAGNOSIS — E53.8 VITAMIN B 12 DEFICIENCY: ICD-10-CM

## 2024-07-16 DIAGNOSIS — Z12.31 ENCOUNTER FOR SCREENING MAMMOGRAM FOR BREAST CANCER: ICD-10-CM

## 2024-07-16 DIAGNOSIS — M81.6 LOCALIZED OSTEOPOROSIS WITHOUT CURRENT PATHOLOGICAL FRACTURE: ICD-10-CM

## 2024-07-16 DIAGNOSIS — K59.01 SLOW TRANSIT CONSTIPATION: ICD-10-CM

## 2024-07-16 DIAGNOSIS — E11.9 TYPE 2 DIABETES MELLITUS WITHOUT COMPLICATION, WITHOUT LONG-TERM CURRENT USE OF INSULIN: Primary | ICD-10-CM

## 2024-07-16 DIAGNOSIS — I10 ESSENTIAL HYPERTENSION: ICD-10-CM

## 2024-07-16 DIAGNOSIS — R51.9 NONINTRACTABLE EPISODIC HEADACHE, UNSPECIFIED HEADACHE TYPE: ICD-10-CM

## 2024-07-16 DIAGNOSIS — K63.5 POLYP OF COLON, UNSPECIFIED PART OF COLON, UNSPECIFIED TYPE: ICD-10-CM

## 2024-07-16 DIAGNOSIS — K59.04 CHRONIC IDIOPATHIC CONSTIPATION: ICD-10-CM

## 2024-07-16 PROCEDURE — 3074F SYST BP LT 130 MM HG: CPT | Mod: HCNC,CPTII,S$GLB, | Performed by: INTERNAL MEDICINE

## 2024-07-16 PROCEDURE — 99213 OFFICE O/P EST LOW 20 MIN: CPT | Mod: HCNC,S$GLB,, | Performed by: INTERNAL MEDICINE

## 2024-07-16 PROCEDURE — 3061F NEG MICROALBUMINURIA REV: CPT | Mod: HCNC,CPTII,S$GLB, | Performed by: INTERNAL MEDICINE

## 2024-07-16 PROCEDURE — 3008F BODY MASS INDEX DOCD: CPT | Mod: HCNC,CPTII,S$GLB, | Performed by: INTERNAL MEDICINE

## 2024-07-16 PROCEDURE — 4010F ACE/ARB THERAPY RXD/TAKEN: CPT | Mod: HCNC,CPTII,S$GLB, | Performed by: INTERNAL MEDICINE

## 2024-07-16 PROCEDURE — 99999 PR PBB SHADOW E&M-EST. PATIENT-LVL V: CPT | Mod: PBBFAC,HCNC,, | Performed by: INTERNAL MEDICINE

## 2024-07-16 PROCEDURE — 1101F PT FALLS ASSESS-DOCD LE1/YR: CPT | Mod: HCNC,CPTII,S$GLB, | Performed by: INTERNAL MEDICINE

## 2024-07-16 PROCEDURE — 3066F NEPHROPATHY DOC TX: CPT | Mod: HCNC,CPTII,S$GLB, | Performed by: INTERNAL MEDICINE

## 2024-07-16 PROCEDURE — 1160F RVW MEDS BY RX/DR IN RCRD: CPT | Mod: HCNC,CPTII,S$GLB, | Performed by: INTERNAL MEDICINE

## 2024-07-16 PROCEDURE — 1126F AMNT PAIN NOTED NONE PRSNT: CPT | Mod: HCNC,CPTII,S$GLB, | Performed by: INTERNAL MEDICINE

## 2024-07-16 PROCEDURE — 3051F HG A1C>EQUAL 7.0%<8.0%: CPT | Mod: HCNC,CPTII,S$GLB, | Performed by: INTERNAL MEDICINE

## 2024-07-16 PROCEDURE — 3288F FALL RISK ASSESSMENT DOCD: CPT | Mod: HCNC,CPTII,S$GLB, | Performed by: INTERNAL MEDICINE

## 2024-07-16 PROCEDURE — 1159F MED LIST DOCD IN RCRD: CPT | Mod: HCNC,CPTII,S$GLB, | Performed by: INTERNAL MEDICINE

## 2024-07-16 PROCEDURE — 3078F DIAST BP <80 MM HG: CPT | Mod: HCNC,CPTII,S$GLB, | Performed by: INTERNAL MEDICINE

## 2024-07-16 RX ORDER — IBUPROFEN 400 MG/1
400 TABLET ORAL 3 TIMES DAILY PRN
Qty: 20 TABLET | Refills: 1 | Status: SHIPPED | OUTPATIENT
Start: 2024-07-16

## 2024-07-16 RX ORDER — TRIAMCINOLONE ACETONIDE 1 MG/G
CREAM TOPICAL 2 TIMES DAILY
Qty: 30 G | Refills: 0 | Status: SHIPPED | OUTPATIENT
Start: 2024-07-16

## 2024-07-16 RX ORDER — DICLOFENAC SODIUM 10 MG/G
2 GEL TOPICAL 2 TIMES DAILY
Qty: 100 G | Refills: 2 | Status: SHIPPED | OUTPATIENT
Start: 2024-07-16

## 2024-07-16 NOTE — PROGRESS NOTES
Subjective:       Patient ID: Preet West is a 74 y.o. female.    Chief Complaint: Hypertension, Hyperlipidemia, and lab review    Patient is a 74-year-old female comes follow-up.  Today she is accompanied with her  Mr Aster West  for interpretation who does an adequate  Interpretation       Patient herself is somewhat limited historian .    A1c is 7.8    Reports that sometimes her blood sugars go low in the morning.  Most of the night she eats foods and sometimes she either eats half the food or misses the meals but still takes the full metformin which might be causing her the low sugar reaction.    I have advised her regularity and compliance to diet also.  Underlying medical issues include the following:-    1.-type 2 diabetes mellitus -hemoglobin A1c has come down to 7.4.  2.-hypertension  3.-hyperlipidemia  4.--anxiety though she is not on any medication for this this is probably inherent and to some extent cultural also.  5.-osteoporosis currently on alendronate.     6. -nonspecific complains of fatigue and empirical treatment with vitamin B12 injections.  Please note that she was on vitamin B12 injections prior to coming to [place] and under my care from an [place]state physician.  No records were available which actually demonstrated a low vitamin-B-12 but some how this injection made her feel better and I am not sure if there was a psychological /placebo component to her feeling better or there is some real physical improvement.  On 1 occasion she did mention that as soon as the red injection entered her arm, she felt strong.  (probably a conditioned response).  Previously her  had remarked that she did not find any improvement with B12 injections and this was thereafter stopped.  However patient gave contradictory information and states that it helps her.   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  This was in 2013 outside [place]  8.-Pain in the left knee  considered to be osteoarthritis and treated conservatively with diclofenac gel and Celebrex.   9.-frequent complains of different types of issues and pains and aches in different parts of the body which do not last long          Hypertension  This is a chronic problem. The current episode started more than 1 year ago. The problem is controlled. Associated symptoms include anxiety and peripheral edema. Pertinent negatives include no chest pain, neck pain, palpitations or shortness of breath. Risk factors for coronary artery disease include sedentary lifestyle. Past treatments include calcium channel blockers. The current treatment provides moderate improvement. Compliance problems include psychosocial issues.  There is no history of chronic renal disease, hyperaldosteronism, hypercortisolism or sleep apnea.   Diabetes  She presents for her follow-up diabetic visit. She has type 2 diabetes mellitus. No MedicAlert identification noted. The initial diagnosis of diabetes was made 20 years ago. Her disease course has been fluctuating. Hypoglycemia symptoms include nervousness/anxiousness. Pertinent negatives for hypoglycemia include no confusion, dizziness, pallor, seizures or tremors. Associated symptoms include fatigue (Getting better). Pertinent negatives for diabetes include no chest pain, no polydipsia, no polyphagia and no polyuria. (Probably morning hypoglycemia after missing a meal the previous night and taking metformin.) Risk factors for coronary artery disease include sedentary lifestyle, post-menopausal, hypertension, dyslipidemia and diabetes mellitus. Current diabetic treatment includes oral agent (monotherapy). She is compliant with treatment some of the time. She has not had a previous visit with a dietitian. She never participates in exercise. An ACE inhibitor/angiotensin II receptor blocker is being taken. She does not see a podiatrist.  Hyperlipidemia  This is a chronic problem. The current episode  started more than 1 year ago. The problem is uncontrolled. Exacerbating diseases include diabetes. She has no history of chronic renal disease, hypothyroidism, liver disease, obesity or nephrotic syndrome. Associated symptoms include myalgias. Pertinent negatives include no chest pain or shortness of breath. Current antihyperlipidemic treatment includes statins. The current treatment provides moderate improvement of lipids. Compliance problems include psychosocial issues.  Risk factors for coronary artery disease include hypertension, diabetes mellitus, dyslipidemia, post-menopausal and a sedentary lifestyle.   Gastroesophageal Reflux  She reports no abdominal pain, no chest pain, no nausea or no wheezing. This is a chronic problem. The current episode started more than 1 year ago. The problem occurs occasionally. Associated symptoms include fatigue (Getting better). She has tried a PPI for the symptoms. The treatment provided moderate relief.   Sinus Problem  This is a chronic problem. The current episode started more than 1 year ago. The problem has been waxing and waning since onset. There has been no fever. Pertinent negatives include no chills, congestion, neck pain, shortness of breath, sinus pressure or sneezing. Treatments tried: flonase.   Knee Pain   Incident onset: Chronic pain for years. There was no injury mechanism. The pain is present in the left knee. The pain is at a severity of 2/10. The pain is mild. The pain has been Fluctuating since onset. Pertinent negatives include no inability to bear weight, loss of motion or numbness.       Past Medical History:   Diagnosis Date    Allergy     aspirin itching    Anemia     Cancer     Diabetes mellitus, type 2     H/O colon cancer, stage III 1/1/2013    Treated in [facility]    History of colonoscopy 6/4/2021    Colonoscopy done by Dr. Rayshawn Adkins MD on 06/04/21.non bleeding internal hemorrhoids.  4 mm polyp in the sigmoid colon removed with cold snare.   Patent end-to-end colocolonic anastomosis characterized by healthy-appearing mucosa.  Examination was otherwise normal.  Repeat colonoscopy in 3 years for surveillance.    Malignant neoplasm of transverse colon (2013) - Stage III 4/4/2019    Osteoporosis     S/P laparoscopic colectomy (3/2013) 4/5/2019    Seasonal allergies      Social History     Socioeconomic History    Marital status:      Spouse name: Luis Wset    Number of children: 3   Occupational History    Occupation:    Tobacco Use    Smoking status: Never    Smokeless tobacco: Never   Substance and Sexual Activity    Alcohol use: No    Drug use: No    Sexual activity: Not Currently     Partners: Male     Birth control/protection: Post-menopausal   Social History Narrative    Speaks Gujarathi only     Social Determinants of Health     Financial Resource Strain: Low Risk  (5/5/2022)    Overall Financial Resource Strain (CARDIA)     Difficulty of Paying Living Expenses: Not very hard   Food Insecurity: No Food Insecurity (5/5/2022)    Hunger Vital Sign     Worried About Running Out of Food in the Last Year: Never true     Ran Out of Food in the Last Year: Never true   Transportation Needs: No Transportation Needs (5/5/2022)    PRAPARE - Transportation     Lack of Transportation (Medical): No     Lack of Transportation (Non-Medical): No   Physical Activity: Insufficiently Active (5/5/2022)    Exercise Vital Sign     Days of Exercise per Week: 7 days     Minutes of Exercise per Session: 20 min   Stress: Stress Concern Present (5/5/2022)    [employer] [place] [employer] - Occupational Stress Questionnaire     Feeling of Stress : To some extent   Housing Stability: Low Risk  (5/5/2022)    Housing Stability Vital Sign     Unable to Pay for Housing in the Last Year: No     Number of Places Lived in the Last Year: 1     Unstable Housing in the Last Year: No     Past Surgical History:   Procedure Laterality Date    COLON SURGERY       JOINT REPLACEMENT       Family History   Problem Relation Name Age of Onset    Stroke Mother Anel Gonzales     Heart disease Mother Anel Gonzales     Cancer Father Kwesi Lira       Review of Systems   Constitutional:  Positive for fatigue (Getting better). Negative for activity change, appetite change, chills, fever and unexpected weight change (Gained 1 lb since the last documentation.  129--130 lb.).   HENT:  Negative for congestion, postnasal drip, sinus pressure and sneezing.         Uses Flonase for sinuses.   Eyes:  Negative for pain, discharge and visual disturbance (She had bilateral cataract surgery.).   Respiratory:  Negative for chest tightness, shortness of breath, wheezing and stridor.    Cardiovascular:  Negative for chest pain, palpitations and leg swelling.        Blood pressure was previously elevated now they are better on losartan 100 mg.   Gastrointestinal:  Negative for abdominal distention, abdominal pain, blood in stool, diarrhea and nausea.        GERD occasional use of pantoprazole.  History of colon cancer status post colectomy in 2013.  Probably regional johnny metastasis.  Details not available at this point.   Endocrine: Negative for cold intolerance, polydipsia, polyphagia and polyuria.        Past history of hyponatremia has been noted.      Underlying diabetes has been noted with improvement in blood sugars to 7.7.   Genitourinary:  Negative for flank pain.        Previously had complained of vaginal itching but no more.   Musculoskeletal:  Positive for arthralgias, gait problem and myalgias. Negative for joint swelling, neck pain and neck stiffness.        Complains of bilateral leg pains.   Skin:  Negative for color change and pallor.   Allergic/Immunologic: Negative for environmental allergies, food allergies and immunocompromised state.   Neurological:  Negative for dizziness, tremors, seizures, syncope, light-headedness and numbness.   Hematological:  Negative for  "adenopathy. Does not bruise/bleed easily.   Psychiatric/Behavioral:  Negative for agitation, confusion and dysphoric mood. The patient is nervous/anxious.         Somewhat anxious about her health issues.         Objective:      Blood pressure 126/76, pulse 98, height 5' 2" (1.575 m), weight 60.9 kg (134 lb 4.8 oz), SpO2 100%. Body mass index is 24.56 kg/m².  Physical Exam  Constitutional:       Appearance: Normal appearance. She is not ill-appearing or diaphoretic.      Comments: BMI is 24.34   Eyes:      General: No scleral icterus.  Cardiovascular:      Rate and Rhythm: Normal rate and regular rhythm.   Pulmonary:      Effort: Pulmonary effort is normal. No respiratory distress.      Breath sounds: Normal breath sounds. No stridor.   Abdominal:      General: There is no distension.      Tenderness: There is no abdominal tenderness.   Musculoskeletal:      Cervical back: No rigidity.      Right lower leg: No edema.      Left lower leg: No edema.   Feet:      Right foot:      Protective Sensation: 5 sites tested.  5 sites sensed.      Skin integrity: No ulcer or blister.      Toenail Condition: Right toenails are normal.      Left foot:      Protective Sensation: 5 sites tested.  5 sites sensed.      Skin integrity: No ulcer or blister.      Toenail Condition: Left toenails are normal.   Skin:     Coloration: Skin is not jaundiced or pale.      Findings: No rash.   Neurological:      Mental Status: She is alert. Mental status is at baseline.      Cranial Nerves: No cranial nerve deficit or dysarthria.      Sensory: No sensory deficit.      Motor: Motor function is intact.      Coordination: Coordination normal.      Deep Tendon Reflexes: Reflexes normal.   Psychiatric:      Comments: Chronically anxious.           Assessment:       Lab Visit on 07/08/2024   Component Date Value Ref Range Status    Hemoglobin A1C 07/08/2024 7.8 (H)  4.5 - 6.2 % Final    Estimated Avg Glucose 07/08/2024 177 (H)  68 - 131 mg/dL Final "    Microalbumin, Urine 07/08/2024 10.7  <19.9 ug/mL Final    Creatinine, Urine 07/08/2024 54.7  15.0 - 325.0 mg/dL Final    Microalb/Creat Ratio 07/08/2024 19.6  0.0 - 30.0 ug/mg Final       1. Type 2 diabetes mellitus without complication, without long-term current use of insulin  -     Hemoglobin A1C; Future; Expected date: 10/16/2024  -     Comprehensive Metabolic Panel; Future; Expected date: 10/16/2024    2. Uncontrolled hypertension  -     Comprehensive Metabolic Panel; Future; Expected date: 10/16/2024    3. Mixed hyperlipidemia  -     Lipid Panel; Future; Expected date: 10/16/2024  -     Comprehensive Metabolic Panel; Future; Expected date: 10/16/2024    4. Slow transit constipation    5. Chronic idiopathic constipation    6. Fatigue, unspecified type    7. Arthritis of left knee  -     diclofenac sodium (VOLTAREN) 1 % Gel; Apply 2 g topically 2 (two) times daily. Apply to the knee joint left side.  Dispense: 100 g; Refill: 2    8. Localized osteoporosis without current pathological fracture    9. Gastroesophageal reflux disease without esophagitis    10. Vitamin B 12 deficiency    11. Arthritis of left knee  Comments:  Uses diclofenac gel and Celebrex p.r.n..  Orders:  -     diclofenac sodium (VOLTAREN) 1 % Gel; Apply 2 g topically 2 (two) times daily. Apply to the knee joint left side.  Dispense: 100 g; Refill: 2    12. Itching  -     triamcinolone acetonide 0.1% (KENALOG) 0.1 % cream; Apply topically 2 (two) times daily. Apply sparingly on areas of itching  Dispense: 30 g; Refill: 0    13. Encounter for screening mammogram for breast cancer  -     Mammo Digital Screening Bilat; Future; Expected date: 10/16/2024    14. Nonintractable episodic headache, unspecified headache type  -     ibuprofen (ADVIL,MOTRIN) 400 MG tablet; Take 1 tablet (400 mg total) by mouth 3 (three) times daily as needed for Other (For right-sided headache). Take half to 1 tablet as needed  Dispense: 20 tablet; Refill: 1    15. Polyp  of colon, unspecified part of colon, unspecified type  -     Ambulatory referral/consult to Gastroenterology; Future; Expected date: 10/16/2024             Plan:   Type 2 diabetes mellitus without complication, without long-term current use of insulin  -     Hemoglobin A1C; Future; Expected date: 10/16/2024  -     Comprehensive Metabolic Panel; Future; Expected date: 10/16/2024    Uncontrolled hypertension  -     Comprehensive Metabolic Panel; Future; Expected date: 10/16/2024    Mixed hyperlipidemia  -     Lipid Panel; Future; Expected date: 10/16/2024  -     Comprehensive Metabolic Panel; Future; Expected date: 10/16/2024    Slow transit constipation    Chronic idiopathic constipation    Fatigue, unspecified type    Arthritis of left knee  -     diclofenac sodium (VOLTAREN) 1 % Gel; Apply 2 g topically 2 (two) times daily. Apply to the knee joint left side.  Dispense: 100 g; Refill: 2    Localized osteoporosis without current pathological fracture    Gastroesophageal reflux disease without esophagitis    Vitamin B 12 deficiency    Arthritis of left knee  Comments:  Uses diclofenac gel and Celebrex p.r.n..  Orders:  -     diclofenac sodium (VOLTAREN) 1 % Gel; Apply 2 g topically 2 (two) times daily. Apply to the knee joint left side.  Dispense: 100 g; Refill: 2    Itching  -     triamcinolone acetonide 0.1% (KENALOG) 0.1 % cream; Apply topically 2 (two) times daily. Apply sparingly on areas of itching  Dispense: 30 g; Refill: 0    Encounter for screening mammogram for breast cancer  -     Mammo Digital Screening Bilat; Future; Expected date: 10/16/2024    Nonintractable episodic headache, unspecified headache type  -     ibuprofen (ADVIL,MOTRIN) 400 MG tablet; Take 1 tablet (400 mg total) by mouth 3 (three) times daily as needed for Other (For right-sided headache). Take half to 1 tablet as needed  Dispense: 20 tablet; Refill: 1    Polyp of colon, unspecified part of colon, unspecified type  -     Ambulatory  referral/consult to Gastroenterology; Future; Expected date: 10/16/2024    Patient's medical issues have been noted.      Hemoglobin A1c has gone up.    She experiences somewhat low sugar reaction in the morning and it seems on the prior night she might be taking half the meal or she maybe skipping the meal and taking the metformin regardless.  I have advised her to cut down metformin to half a pill if she takes half a meal and no metformin if she does not eat anything at night.  She does not perform any fasting for Buddhism reasons.      Nonspecific aches and pains continue and every part of the body itches or hurts.  Needs medications for everything.  Given a prescription for Voltaren gel and also ibuprofen 400 for nonspecific headache on the right side.    Other medications have been reviewed.    May consider discontinuing glimepiride but it seems to help her more and it is generic and affordable.    Order for mammogram given at this point.    Consideration for repeat colonoscopy and letter given for Dr. Rayshawn Adkins.  Polyps found in past.  Follow up in about 3 months (around 10/16/2024), or if symptoms worsen or fail to improve, for Diabetes/HTN/Lipids.      Current Outpatient Medications:     alendronate (FOSAMAX) 70 MG tablet, Take 1 tablet (70 mg total) by mouth every 7 days. Take this medication once a week on an empty stomach with a cup of water and do not lie down for 30 minutes after taking med., Disp: 12 tablet, Rfl: 3    amLODIPine (NORVASC) 5 MG tablet, Take 1 tablet (5 mg total) by mouth every evening., Disp: 90 tablet, Rfl: 3    blood sugar diagnostic (TRUE METRIX GLUCOSE TEST STRIP) Strp, Inject 1 strip into the skin once daily., Disp: 50 each, Rfl: 5    blood-glucose meter kit, One touch meter and matching lancets and strips., Disp: 1 each, Rfl: 0    celecoxib (CELEBREX) 50 MG capsule, Take 1 capsule (50 mg total) by mouth 2 (two) times daily., Disp: 60 capsule, Rfl: 2    clopidogreL (PLAVIX) 75  mg tablet, Take 1 tablet (75 mg total) by mouth once daily., Disp: 30 tablet, Rfl: 11    clotrimazole-betamethasone 1-0.05% (LOTRISONE) cream, Apply topically 2 (two) times daily., Disp: 15 g, Rfl: 1    cyanocobalamin 1,000 mcg/mL injection, Inject 1 mL (1,000 mcg total) into the muscle every 30 days., Disp: 1 mL, Rfl: 11    cyanocobalamin 1,000 mcg/mL injection, Inject 1 mL (1,000 mcg total) into the muscle every 30 days., Disp: 1 mL, Rfl: 11    fluticasone propionate (FLONASE) 50 mcg/actuation nasal spray, 1 spray (50 mcg total) by Each Nostril route once daily., Disp: 16 g, Rfl: 11    glimepiride (AMARYL) 1 MG tablet, TAKE 1 TABLET BY MOUTH BEFORE BREAKFAST (PLEASE  DISCONTINUE  JARDIANCE  BECAUSE  OF  NON  TOLERANCE.  TRIAL  OF  GLIMEPIRIDE  AT  1  MG), Disp: 90 tablet, Rfl: 3    isosorbide mononitrate (IMDUR) 30 MG 24 hr tablet, Take 1 tablet (30 mg total) by mouth once daily., Disp: 30 tablet, Rfl: 11    lancets (LANCETS,ULTRA THIN) 26 gauge Misc, 1 lancet  by Misc.(Non-Drug; Combo Route) route once daily., Disp: 100 each, Rfl: 0    losartan (COZAAR) 100 MG tablet, Take 1 tablet (100 mg total) by mouth once daily., Disp: 90 tablet, Rfl: 4    metFORMIN (GLUCOPHAGE) 1000 MG tablet, Take 1 tablet (1,000 mg total) by mouth 2 (two) times daily with meals., Disp: 180 tablet, Rfl: 3    nystatin (MYCOSTATIN) cream, Apply topically 2 (two) times daily., Disp: 30 g, Rfl: 1    pantoprazole (PROTONIX) 40 MG tablet, Take 1 tablet by mouth once daily, Disp: 30 tablet, Rfl: 5    polyethylene glycol (GLYCOLAX) 17 gram/dose powder, DISSOLVE 17 GRAMS IN WATER AND DRINK ONCE DAILY, Disp: 510 g, Rfl: 5    rosuvastatin (CRESTOR) 5 MG tablet, Take 1 tablet (5 mg total) by mouth every evening., Disp: 90 tablet, Rfl: 3    diclofenac sodium (VOLTAREN) 1 % Gel, Apply 2 g topically 2 (two) times daily. Apply to the knee joint left side., Disp: 100 g, Rfl: 2    ibuprofen (ADVIL,MOTRIN) 400 MG tablet, Take 1 tablet (400 mg total) by mouth  3 (three) times daily as needed for Other (For right-sided headache). Take half to 1 tablet as needed, Disp: 20 tablet, Rfl: 1    nebivoloL (BYSTOLIC) 5 MG Tab, Take 1 tablet (5 mg total) by mouth once daily. (Patient not taking: Reported on 4/25/2024), Disp: 30 tablet, Rfl: 11    triamcinolone acetonide 0.1% (KENALOG) 0.1 % cream, Apply topically 2 (two) times daily. Apply sparingly on areas of itching, Disp: 30 g, Rfl: 0    Pal Fong

## 2024-07-29 LAB
LEFT EYE DM RETINOPATHY: NEGATIVE
RIGHT EYE DM RETINOPATHY: NEGATIVE

## 2024-08-08 ENCOUNTER — HOSPITAL ENCOUNTER (OUTPATIENT)
Dept: RADIOLOGY | Facility: HOSPITAL | Age: 74
Discharge: HOME | End: 2024-08-08
Attending: INTERNAL MEDICINE
Payer: MEDICARE

## 2024-08-08 VITALS — BODY MASS INDEX: 24.66 KG/M2 | WEIGHT: 134 LBS | HEIGHT: 62 IN

## 2024-08-08 DIAGNOSIS — Z12.31 ENCOUNTER FOR SCREENING MAMMOGRAM FOR BREAST CANCER: ICD-10-CM

## 2024-08-08 PROCEDURE — 77067 SCR MAMMO BI INCL CAD: CPT | Mod: TC,PO

## 2024-08-08 PROCEDURE — 77063 BREAST TOMOSYNTHESIS BI: CPT | Mod: 26,,, | Performed by: RADIOLOGY

## 2024-08-08 PROCEDURE — 77067 SCR MAMMO BI INCL CAD: CPT | Mod: 26,,, | Performed by: RADIOLOGY

## 2024-08-08 PROCEDURE — 77063 BREAST TOMOSYNTHESIS BI: CPT | Mod: TC,PO

## 2024-08-19 DIAGNOSIS — E11.9 TYPE 2 DIABETES MELLITUS WITHOUT COMPLICATION, WITHOUT LONG-TERM CURRENT USE OF INSULIN: Chronic | ICD-10-CM

## 2024-09-30 LAB — CRC RECOMMENDATION EXT: NORMAL

## 2024-10-01 ENCOUNTER — PATIENT OUTREACH (OUTPATIENT)
Dept: ADMINISTRATIVE | Facility: HOSPITAL | Age: 74
End: 2024-10-01
Payer: MEDICARE

## 2024-10-08 ENCOUNTER — LAB VISIT (OUTPATIENT)
Dept: LAB | Facility: HOSPITAL | Age: 74
End: 2024-10-08
Attending: INTERNAL MEDICINE
Payer: MEDICARE

## 2024-10-08 DIAGNOSIS — E78.2 MIXED HYPERLIPIDEMIA: ICD-10-CM

## 2024-10-08 DIAGNOSIS — E11.9 TYPE 2 DIABETES MELLITUS WITHOUT COMPLICATION, WITHOUT LONG-TERM CURRENT USE OF INSULIN: ICD-10-CM

## 2024-10-08 DIAGNOSIS — I10 ESSENTIAL HYPERTENSION: ICD-10-CM

## 2024-10-08 LAB
ALBUMIN SERPL BCP-MCNC: 4.1 G/DL (ref 3.5–5.2)
ALP SERPL-CCNC: 50 U/L (ref 55–135)
ALT SERPL W/O P-5'-P-CCNC: 14 U/L (ref 10–44)
ANION GAP SERPL CALC-SCNC: 9 MMOL/L (ref 8–16)
AST SERPL-CCNC: 15 U/L (ref 10–40)
BILIRUB SERPL-MCNC: 0.4 MG/DL (ref 0.1–1)
BUN SERPL-MCNC: 15 MG/DL (ref 8–23)
CALCIUM SERPL-MCNC: 9 MG/DL (ref 8.7–10.5)
CHLORIDE SERPL-SCNC: 108 MMOL/L (ref 95–110)
CHOLEST SERPL-MCNC: 130 MG/DL (ref 120–199)
CHOLEST/HDLC SERPL: 2.8 {RATIO} (ref 2–5)
CO2 SERPL-SCNC: 22 MMOL/L (ref 23–29)
CREAT SERPL-MCNC: 1 MG/DL (ref 0.5–1.4)
EST. GFR  (NO RACE VARIABLE): 59.9 ML/MIN/1.73 M^2
ESTIMATED AVG GLUCOSE: 171 MG/DL (ref 68–131)
GLUCOSE SERPL-MCNC: 122 MG/DL (ref 70–110)
HBA1C MFR BLD: 7.6 % (ref 4.5–6.2)
HDLC SERPL-MCNC: 46 MG/DL (ref 40–75)
HDLC SERPL: 35.4 % (ref 20–50)
LDLC SERPL CALC-MCNC: 67.8 MG/DL (ref 63–159)
NONHDLC SERPL-MCNC: 84 MG/DL
POTASSIUM SERPL-SCNC: 4.5 MMOL/L (ref 3.5–5.1)
PROT SERPL-MCNC: 7 G/DL (ref 6–8.4)
SODIUM SERPL-SCNC: 139 MMOL/L (ref 136–145)
TRIGL SERPL-MCNC: 81 MG/DL (ref 30–150)

## 2024-10-08 PROCEDURE — 80061 LIPID PANEL: CPT | Performed by: INTERNAL MEDICINE

## 2024-10-08 PROCEDURE — 83036 HEMOGLOBIN GLYCOSYLATED A1C: CPT | Performed by: INTERNAL MEDICINE

## 2024-10-08 PROCEDURE — 80053 COMPREHEN METABOLIC PANEL: CPT | Performed by: INTERNAL MEDICINE

## 2024-10-08 PROCEDURE — 36415 COLL VENOUS BLD VENIPUNCTURE: CPT | Performed by: INTERNAL MEDICINE

## 2024-10-10 ENCOUNTER — TELEPHONE (OUTPATIENT)
Dept: FAMILY MEDICINE | Facility: CLINIC | Age: 74
End: 2024-10-10
Payer: MEDICARE

## 2024-10-10 NOTE — TELEPHONE ENCOUNTER
----- Message from Pal Fong MD sent at 10/9/2024 10:53 PM CDT -----  Results are somewhat abnormal. Please keep regular follow up.

## 2024-10-17 DIAGNOSIS — Z78.0 MENOPAUSE: ICD-10-CM

## 2024-10-18 ENCOUNTER — PATIENT OUTREACH (OUTPATIENT)
Dept: ADMINISTRATIVE | Facility: HOSPITAL | Age: 74
End: 2024-10-18
Payer: MEDICARE

## 2024-10-18 NOTE — PROGRESS NOTES
Population Health Chart Review & Patient Outreach Details      Additional [facility] Health Notes:               Updates Requested / Reviewed:      Updated Care Coordination Note, Care Everywhere, , Care Team Updated, and Immunizations Reconciliation Completed or Queried: [facility] Topics Overdue:      [facility] Score: 2     Osteoporosis Screening  Eye Exam    RSV Vaccine                  Health Maintenance Topic(s) Outreach Outcomes & Actions Taken:    Colorectal Cancer Screening - Outreach Outcomes & Actions Taken  : External Records Uploaded, Care Team Updated, & History Updated if Applicable    Eye Exam - Outreach Outcomes & Actions Taken  : External Records Requested & Care Team Updated if Applicable

## 2024-10-18 NOTE — LETTER
AUTHORIZATION FOR RELEASE OF   CONFIDENTIAL INFORMATION    Dear Dr. Oleksandr Swan V,     We are seeing Preet West, date of birth 1950, in the clinic at [facility]. Pal Fong MD is the patient's PCP. Preet West has an outstanding lab/procedure at the time we reviewed her chart. In order to help keep her health information updated, she has authorized us to request the following medical record(s):             ( x )  EYE EXAM              Please fax records to [facility], Raina, Sanjay, MD, [phone]     If you have any questions, please contact      Komal Villarreal  Nurse Clinical Care Coordinator  [facility]  Phone: [phone]  Fax: [phone]    Patient Name: Preet West  : 1950  Patient Phone #: [phone]                Preet West  MRN: [id number]  : 1950  Age: 74 y.o.  Sex: female         Patient/Legal Guardian Signature  This signature was collected at 2024    Preet       _______________________________   Printed Name/Relationship to Patient      Consent for Examination and Treatment: I hereby authorize the providers and employees of [facility] ([facility]) to provide medical treatment/services which includes, but is not limited to, performing and administering tests and diagnostic procedures that are deemed necessary, including, but not limited to, imaging examinations, blood tests and other laboratory procedures as may be required by the hospital, clinic, or may be ordered by my physician(s) or persons working under the general and/or special instructions of my physician(s).      I understand and agree that this consent covers all authorized persons, including but not limited to physicians, residents, nurse practitioners, physicians' assistants, specialists, consultants, student nurses, and independently contracted physicians, who are called upon by the physician in  charge, to carry out the diagnostic procedures and medical or surgical treatment.     I hereby authorize [facility] to retain or dispose of any specimens or tissue, should there be such remaining from any test or procedure.     I hereby authorize and give consent for [facility] providers and employees to take photographs, images or videotapes of such diagnostic, surgical or treatment procedures of Patient as may be required by [facility] or as may be ordered by a physician. I further acknowledge and agree that [facility] may use cameras or other devices for patient monitoring.     I am aware that the practice of medicine is not an exact science, and I acknowledge that no guarantees have been made to me as to the outcome of any tests, procedures or treatment.     Authorization for Release of Information: I understand that my insurance company and/or their agents may need information necessary to make determinations about payment/reimbursement. I hereby provide authorization to release to all insurance companies, their successors, assignees, other parties with whom they may have contracted, or others acting on their behalf, that are involved with payment for any hospital and/or clinic charges incurred by the patient, any information that they request and deem necessary for payment/reimbursement, and/or quality review.  I further authorize the release of my health information to physicians or other health care practitioners on staff who are involved in my health care now and in the future, and to other health care providers, entities, or institutions for the purpose of my continued care and treatment, including referrals.     REGISTRATION AUTHORIZATION  Form No. 14534 (Rev. 7/13/2022)       Medicare Patient's Certification and Authorization to Release Information and Payment Request:  I certify that the information given by me in applying for payment under Title XVIII of the Social Security Act is correct. I authorize any galan  of medical or other information about me to release to the Social [facility], or its intermediaries or carriers, any information needed for this or a related Medicare claim. I request that payment of authorized benefits be made on my behalf.     Assignment of Insurance Benefits:   I hereby authorize any and all insurance companies, health plans, defined   benefit plans, health insurers or any entity that is or may be responsible for payment of my medical expenses to pay all hospital and medical benefits now due, and to become due and payable to me under any hospital benefits, sick benefits, injury benefits or any other benefit for services rendered to me, including Major Medical Benefits, direct to [facility] and all independently contracted physicians. I assign any and all rights that I may have against any and all insurance companies, health plans, defined benefit plans, health insurers or any entity that is or may be responsible for payment of my medical expenses, including, but not limited to any right to appeal a denial of a claim, any right to bring any action, lawsuit, administrative proceeding, or other cause of action on my behalf. I specifically assign my right to pursue litigation against any and all insurance companies, health plans, defined benefit plans, health insurers or any entity that is or may be responsible for payment of my medical expenses based upon a refusal to pay charges.            E. Valuables: It is understood and agreed that [facility] is not liable for the damage to or loss of any money, jewelry,   documents, dentures, eye glasses, hearing aids, prosthetics, or other property of value.     F. Computer Equipment: I understand and agree that should I choose to use computer equipment owned by [facility] or if I choose to access the Internet via [facility]s network, I do so at my own risk. [facility] is not responsible for any damage to my computer equipment or to any damages of any type  that might arise from my loss of equipment or data.     G. Acceptance of Financial Responsibility:  I agree that in consideration of the services and   supplies that have been   or will be furnished to the patient, I am hereby obligated to pay all charges made for or on the account of the patient according to the standard rates (in effect at the time the services and supplies are delivered) established by [facility], including its Patient Financial Assistance Policy to the extent it is applicable. I understand that I am responsible for all charges, or portions thereof, not covered by insurance or other sources. Patient refunds will be distributed only after balances at all [facility] facilities are paid.     H. Communication Authorization:  I hereby authorize [facility] and its representatives, along with any billing service   or  who may work on their behalf, to contact me on   my cell phone and/or home phone using pre- recorded messages, artificial voice messages, automatic telephone dialing devices or other computer assisted technology, or by electronic      mail, text messaging, or by any other form of electronic communication. This includes, but is not limited to, appointment reminders, yearly physical exam reminders, preventive care reminders, patient campaigns, welcome calls, and calls about account balances on my account or any account on which I am listed as a guarantor. I understand I have the right to opt out of these communications at any time.      Relationship  Between  Facility and  Provider:      I understand that some, but not all, providers furnishing services to the patient are not employees or agents of [facility]. The patient is under the care and supervision of his/her attending physician, and it is the responsibility of the facility and its nursing staff to carry out the instructions of such physicians. It is the responsibility of the patient's physician/designee to obtain the patient's  informed consent, when required, for medical or surgical treatment, special diagnostic or therapeutic procedures, or hospital services rendered for the patient under the special instructions of the physician/designee.     REGISTRATION AUTHORIZATION  Form No. 85084 (Rev. 7/13/2022)      Notice of Privacy Practices: I acknowledge I have received a copy of [facility]'s Notice of Privacy Practices.     Facility  Directory: I have discussed with the organization my desire to be either included or excluded  in the facility directory in the event of my being an inpatient at an [facility] facility. I understand that if my choice is to opt-out of being identified in the facility directory that the facility will not provide any information about me such as my condition (e.g. fair, stable, etc.) or my location in the facility (e.g., room number, department).     Immunizations: [facility] shares immunization information with state sponsored health departments to help you and your doctor keep track of your immunization records. By signing, you consent to have this information shared with the health department in your state:                                [place] - LINKS ([place] Immunization Network for Kids Statewide)                                [place] - MIIX ([place] Immunization Information eXchange)                                [place] - ImmPRINT (Immunization Patient Registry with Integrated Technology)     TERM: This authorization is valid for this and subsequent care/treatment I receive at [facility] and will remain valid unless/until revoked in writing by me.     [facility]: As used in this document, [facility] means all [facility] owned and managed facilities, including, but not limited to, all health centers, surgery centers, clinics, urgent care centers, and hospitals.         [facility] complies with applicable Federal civil rights laws and does not discriminate on the basis of race,  color, national origin, age, disability, or sex.  ATENCIÓN: si habla español, tiene a krishnan disposición servicios gratuitos de asistencia lingüística. Garret al [phone].  CHÚ Ý: N?u b?n nói Ti?ng Vi?t, có các d?ch v? h? tr? ngôn ng? mi?n phí dành cho b?n. G?i s? [phone].        REGISTRATION AUTHORIZATION  Form No. 07846 (Rev. 7/13/2022)

## 2024-10-21 ENCOUNTER — OFFICE VISIT (OUTPATIENT)
Dept: FAMILY MEDICINE | Facility: CLINIC | Age: 74
End: 2024-10-21
Payer: MEDICARE

## 2024-10-21 VITALS
DIASTOLIC BLOOD PRESSURE: 75 MMHG | HEART RATE: 70 BPM | SYSTOLIC BLOOD PRESSURE: 136 MMHG | WEIGHT: 131 LBS | HEIGHT: 62 IN | BODY MASS INDEX: 24.11 KG/M2

## 2024-10-21 DIAGNOSIS — K21.9 GASTROESOPHAGEAL REFLUX DISEASE WITHOUT ESOPHAGITIS: ICD-10-CM

## 2024-10-21 DIAGNOSIS — Z55.0 LOW-LEVEL OF LITERACY: ICD-10-CM

## 2024-10-21 DIAGNOSIS — Z60.3 LANGUAGE BARRIER AFFECTING HEALTH CARE: ICD-10-CM

## 2024-10-21 DIAGNOSIS — R53.83 FATIGUE, UNSPECIFIED TYPE: ICD-10-CM

## 2024-10-21 DIAGNOSIS — L29.9 ITCHING: ICD-10-CM

## 2024-10-21 DIAGNOSIS — K59.01 SLOW TRANSIT CONSTIPATION: Chronic | ICD-10-CM

## 2024-10-21 DIAGNOSIS — Z78.0 ENCOUNTER FOR OSTEOPOROSIS SCREENING IN ASYMPTOMATIC POSTMENOPAUSAL PATIENT: ICD-10-CM

## 2024-10-21 DIAGNOSIS — I10 ESSENTIAL HYPERTENSION: Chronic | ICD-10-CM

## 2024-10-21 DIAGNOSIS — Z13.820 ENCOUNTER FOR OSTEOPOROSIS SCREENING IN ASYMPTOMATIC POSTMENOPAUSAL PATIENT: ICD-10-CM

## 2024-10-21 DIAGNOSIS — E53.8 VITAMIN B 12 DEFICIENCY: ICD-10-CM

## 2024-10-21 DIAGNOSIS — M81.6 LOCALIZED OSTEOPOROSIS WITHOUT CURRENT PATHOLOGICAL FRACTURE: Chronic | ICD-10-CM

## 2024-10-21 DIAGNOSIS — Z75.8 LANGUAGE BARRIER AFFECTING HEALTH CARE: ICD-10-CM

## 2024-10-21 DIAGNOSIS — E11.9 TYPE 2 DIABETES MELLITUS WITHOUT COMPLICATION, WITHOUT LONG-TERM CURRENT USE OF INSULIN: Primary | Chronic | ICD-10-CM

## 2024-10-21 DIAGNOSIS — E78.2 MIXED HYPERLIPIDEMIA: Chronic | ICD-10-CM

## 2024-10-21 PROCEDURE — 3288F FALL RISK ASSESSMENT DOCD: CPT | Mod: HCNC,CPTII,S$GLB, | Performed by: INTERNAL MEDICINE

## 2024-10-21 PROCEDURE — 3075F SYST BP GE 130 - 139MM HG: CPT | Mod: HCNC,CPTII,S$GLB, | Performed by: INTERNAL MEDICINE

## 2024-10-21 PROCEDURE — 1160F RVW MEDS BY RX/DR IN RCRD: CPT | Mod: HCNC,CPTII,S$GLB, | Performed by: INTERNAL MEDICINE

## 2024-10-21 PROCEDURE — 1159F MED LIST DOCD IN RCRD: CPT | Mod: HCNC,CPTII,S$GLB, | Performed by: INTERNAL MEDICINE

## 2024-10-21 PROCEDURE — 3061F NEG MICROALBUMINURIA REV: CPT | Mod: HCNC,CPTII,S$GLB, | Performed by: INTERNAL MEDICINE

## 2024-10-21 PROCEDURE — 1101F PT FALLS ASSESS-DOCD LE1/YR: CPT | Mod: HCNC,CPTII,S$GLB, | Performed by: INTERNAL MEDICINE

## 2024-10-21 PROCEDURE — 3008F BODY MASS INDEX DOCD: CPT | Mod: HCNC,CPTII,S$GLB, | Performed by: INTERNAL MEDICINE

## 2024-10-21 PROCEDURE — 99999 PR PBB SHADOW E&M-EST. PATIENT-LVL III: CPT | Mod: PBBFAC,HCNC,, | Performed by: INTERNAL MEDICINE

## 2024-10-21 PROCEDURE — 99214 OFFICE O/P EST MOD 30 MIN: CPT | Mod: HCNC,S$GLB,, | Performed by: INTERNAL MEDICINE

## 2024-10-21 PROCEDURE — 3066F NEPHROPATHY DOC TX: CPT | Mod: HCNC,CPTII,S$GLB, | Performed by: INTERNAL MEDICINE

## 2024-10-21 PROCEDURE — 4010F ACE/ARB THERAPY RXD/TAKEN: CPT | Mod: HCNC,CPTII,S$GLB, | Performed by: INTERNAL MEDICINE

## 2024-10-21 PROCEDURE — 3078F DIAST BP <80 MM HG: CPT | Mod: HCNC,CPTII,S$GLB, | Performed by: INTERNAL MEDICINE

## 2024-10-21 PROCEDURE — 3051F HG A1C>EQUAL 7.0%<8.0%: CPT | Mod: HCNC,CPTII,S$GLB, | Performed by: INTERNAL MEDICINE

## 2024-10-21 RX ORDER — TRIAMCINOLONE ACETONIDE 1 MG/G
CREAM TOPICAL 2 TIMES DAILY
Qty: 30 G | Refills: 0 | Status: SHIPPED | OUTPATIENT
Start: 2024-10-21

## 2024-10-21 RX ORDER — TERAZOSIN 1 MG/1
1 CAPSULE ORAL NIGHTLY
Qty: 90 CAPSULE | Refills: 3 | Status: SHIPPED | OUTPATIENT
Start: 2024-10-21 | End: 2025-10-21

## 2024-10-21 SDOH — SOCIAL DETERMINANTS OF HEALTH (SDOH): ILITERACY AND LOW LEVEL LITERACY: Z55.0

## 2024-10-21 SDOH — SOCIAL DETERMINANTS OF HEALTH (SDOH): ACCULTURATION DIFFICULTY: Z60.3

## 2024-10-21 NOTE — PROGRESS NOTES
Subjective:       Patient ID: Preet West is a 74 y.o. female.    Chief Complaint: Hypertension, Hyperlipidemia, Diabetes, Labs Only, Chronic itching, and Constipation    Patient is a 74-year-old female comes for 3 months follow-up.  As usual she is accompanied with her  Mr Aster weller who himself is a patient and an effective  for her.  Patient herself is somewhat limited historian with language barriers.    Last visit we had noted a elevated hemoglobin A1c and some hypoglycemic reactions.  Or understanding and rigor of compliance with diabetes seems to be waning as she ages.    Given her low sugar reaction and skipping meals in between, it was felt that we should be less aggressive with her diabetes and I had advised her to cut down metformin to perhaps no pill or half a pill based upon if she misses her me low takes-meal.    Last visit also order for colonoscopy and referral to Dr. Rayshawn Adkins was given.    It was also noted that she was particularly anxious and sensitive about every little aches and pains in her body and wanted a pill for every ill.    Recent A1c level is 7.6 indicating continued difficulty controlling her diabetes.  Because of her language barriers, it might be very difficult for her to follow on insulin protocol.    Current management for diabetes include glimepiride 1 mg and metformin    Previous treatments with Tradjenta Jardiance created problems.  She had complained of itching.  Patient and probably her  have limited insight that is stopping the medications did not stop her itching which continues.  However they do not want to go back on those medications which could be valuable    Compliance, understanding and stewardship of insulin maybe difficult in this patient.    At this point referral for endocrinology is a consideration to focus exclusively on diabetes    Underlying medical issues include the following:-    1.-type 2 diabetes mellitus  -hemoglobin A1c has come down to 7.6  2.-hypertension currently on losartan 100 mg and amlodipine 5 mg.  Attributes now amlodipine to be causing her itching.  3.-hyperlipidemia-currently on rosuvastatin.  Thus far not blaming this medication for anything.  4.--anxiety though she is not on any medication for this this is probably inherent and to some extent cultural also.  5.-osteoporosis currently on alendronate.  -not blaming alendronate for any itching   6. -nonspecific complains of fatigue and empirical treatment with vitamin B12 injections.  Please note that she was on vitamin B12 injections prior to coming to [place] and under my care from an [place] physician.  No records were available which actually demonstrated a low vitamin-B-12 but some how this injection made her feel better and I am not sure if there was a psychological /placebo component to her feeling better or there is some real physical improvement.  On 1 occasion she did mention that as soon as the red injection entered her arm, she felt strong.  (probably a conditioned response).  Previously her  had remarked that she did not find any improvement with B12 injections and this was thereafter stopped.  However patient gave contradictory information and states that it helps her.   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  This was in 2013 outside [place].  Records not available  8.-Pain in the left knee considered to be osteoarthritis and treated conservatively with diclofenac gel and Celebrex.   9.-frequent complains of different types of issues and pains and aches in different parts of the body which do not last long    She also complains of pain bilaterally in the hips which radiates to 3 thigh.  She attributes this to constipation relief constipation helps with that.  I have advised her to take MiraLax as needed perhaps 2 or 3 times a week and at least at a dosage which helps her move her bowels.  She has been  advised that MiraLax is not 1 dosage medication which will cure her constipation for life.  Increasing hydration and regular exercise and adding fiber to the diet will also be helpful.          Hypertension  This is a chronic problem. The current episode started more than 1 year ago. The problem is controlled. Associated symptoms include anxiety and peripheral edema. Pertinent negatives include no chest pain or palpitations. Risk factors for coronary artery disease include sedentary lifestyle. Past treatments include calcium channel blockers and angiotensin blockers (Losartan 100 mg and amlodipine 5 mg.). The current treatment provides moderate improvement. Compliance problems include psychosocial issues and medication side effects (COMPLAINS THAT AMLODIPINE IS CAUSING HER ITCHING NOW.).  There is no history of chronic renal disease, hyperaldosteronism, hypercortisolism or sleep apnea.   Diabetes  She presents for her follow-up diabetic visit. She has type 2 diabetes mellitus. No MedicAlert identification noted. The initial diagnosis of diabetes was made 20 years ago. Her disease course has been fluctuating. Hypoglycemia symptoms include nervousness/anxiousness. Pertinent negatives for hypoglycemia include no confusion, dizziness, pallor, seizures or tremors. Associated symptoms include fatigue (Getting better). Pertinent negatives for diabetes include no chest pain, no polydipsia, no polyphagia and no polyuria. (Probably morning hypoglycemia after missing a meal the previous night and taking metformin.) Risk factors for coronary artery disease include sedentary lifestyle, post-menopausal, hypertension, dyslipidemia and diabetes mellitus. Current diabetic treatment includes oral agent (dual therapy) (METFORMIN AND GLIMEPIRIDE). She is compliant with treatment some of the time. She has not had a previous visit with a dietitian. She never participates in exercise. There is no change in her home blood glucose trend. An  ACE inhibitor/angiotensin II receptor blocker is being taken. She does not see a podiatrist.  Hyperlipidemia  This is a chronic problem. The current episode started more than 1 year ago. The problem is uncontrolled. Exacerbating diseases include diabetes. She has no history of chronic renal disease, hypothyroidism, liver disease, obesity or nephrotic syndrome. Associated symptoms include myalgias. Pertinent negatives include no chest pain. Current antihyperlipidemic treatment includes statins. The current treatment provides moderate improvement of lipids. Compliance problems include psychosocial issues.  Risk factors for coronary artery disease include hypertension, diabetes mellitus, dyslipidemia, post-menopausal and a sedentary lifestyle.   Gastroesophageal Reflux  She reports no abdominal pain, no chest pain, no nausea or no wheezing. This is a chronic problem. The current episode started more than 1 year ago. The problem occurs occasionally. Associated symptoms include fatigue (Getting better). She has tried a PPI for the symptoms. The treatment provided moderate relief.       Past Medical History:   Diagnosis Date    Allergy     aspirin itching    Anemia     Cancer     Diabetes mellitus, type 2     H/O colon cancer, stage III 01/01/2013    Treated in [facility]    History of colonic polyps 09/30/2024    History of colonoscopy 06/04/2021    Colonoscopy done by Dr. Rayshawn Adkins MD on 06/04/21.non bleeding internal hemorrhoids.  4 mm polyp in the sigmoid colon removed with cold snare.  Patent end-to-end colocolonic anastomosis characterized by healthy-appearing mucosa.  Examination was otherwise normal.  Repeat colonoscopy in 3 years for surveillance.    Malignant neoplasm of transverse colon (2013) - Stage III 04/04/2019    Osteoporosis     S/P laparoscopic colectomy (3/2013) 04/05/2019    Seasonal allergies      Social History     Socioeconomic History    Marital status:      Spouse name: Luis Figueroa  Brett    Number of children: 3   Occupational History    Occupation:    Tobacco Use    Smoking status: Never    Smokeless tobacco: Never   Substance and Sexual Activity    Alcohol use: No    Drug use: No    Sexual activity: Not Currently     Partners: Male     Birth control/protection: Post-menopausal   Social History Narrative    Speaks [place] only     Social Drivers of Health     Financial Resource Strain: Low Risk  (5/5/2022)    Overall Financial Resource Strain (CARDIA)     Difficulty of Paying Living Expenses: Not very hard   Food Insecurity: No Food Insecurity (5/5/2022)    Hunger Vital Sign     Worried About Running Out of Food in the Last Year: Never true     Ran Out of Food in the Last Year: Never true   Transportation Needs: No Transportation Needs (5/5/2022)    PRAPARE - Transportation     Lack of Transportation (Medical): No     Lack of Transportation (Non-Medical): No   Physical Activity: Insufficiently Active (5/5/2022)    Exercise Vital Sign     Days of Exercise per Week: 7 days     Minutes of Exercise per Session: 20 min   Stress: Stress Concern Present (5/5/2022)    [employer] [place] [employer] - Occupational Stress Questionnaire     Feeling of Stress : To some extent   Housing Stability: Low Risk  (5/5/2022)    Housing Stability Vital Sign     Unable to Pay for Housing in the Last Year: No     Number of Places Lived in the Last Year: 1     Unstable Housing in the Last Year: No     Past Surgical History:   Procedure Laterality Date    COLON SURGERY      JOINT REPLACEMENT       Family History   Problem Relation Name Age of Onset    Stroke Mother Anel Gonzales     Heart disease Mother Anel Gonzales     Cancer Father Kwesi Lira       Review of Systems   Constitutional:  Positive for fatigue (Getting better). Negative for activity change, appetite change, fever and unexpected weight change (Gained 1 lb since the last documentation.  129--130 lb.).   HENT:  Negative  for postnasal drip.         Uses Flonase for sinuses.   Eyes:  Negative for pain, discharge and visual disturbance (She had bilateral cataract surgery.).   Respiratory:  Negative for chest tightness, wheezing and stridor.    Cardiovascular:  Negative for chest pain, palpitations and leg swelling.        Blood pressure was previously elevated now they are better on losartan 100 mg.   Gastrointestinal:  Positive for constipation. Negative for abdominal distention, abdominal pain, blood in stool, diarrhea and nausea.        GERD occasional use of pantoprazole.  History of colon cancer status post colectomy in 2013.  Probably regional johnny metastasis.  Details not available at this point.   Endocrine: Negative for cold intolerance, polydipsia, polyphagia and polyuria.        Past history of hyponatremia has been noted.      Underlying diabetes has been noted with improvement in blood sugars to 7.7.   Genitourinary:  Negative for flank pain.        Previously had complained of vaginal itching but no more.   Musculoskeletal:  Positive for arthralgias, gait problem and myalgias. Negative for joint swelling and neck stiffness.        Complains of bilateral leg pains.  STARTS FROM THE HIPS BILATERALLY AND GOES DOWN THE LEGS.  I SUSPECT SHE MIGHT HAVE LUMBAR CANAL STENOSIS BUT PATIENT ATTRIBUTES THIS TO CONSTIPATION WHICH MIGHT BE PLAUSIBLE GIVEN FULLNESS IN PELVIS AND PRESSURE ON THE NERVES.   Skin:  Negative for color change and pallor.        CHRONIC PRURITUS AND ITCHING.   Allergic/Immunologic: Negative for environmental allergies, food allergies and immunocompromised state.   Neurological:  Negative for dizziness, tremors, seizures, syncope and light-headedness.   Hematological:  Negative for adenopathy. Does not bruise/bleed easily.   Psychiatric/Behavioral:  Negative for agitation, confusion and dysphoric mood. The patient is nervous/anxious.         Somewhat anxious about her health issues.         Objective:     "  Blood pressure 136/75, pulse 70, height 5' 2" (1.575 m), weight 59.4 kg (131 lb). Body mass index is 23.96 kg/m².  Physical Exam  Constitutional:       Appearance: Normal appearance. She is not ill-appearing or diaphoretic.      Comments: BMI is 23.96   Eyes:      General: No scleral icterus.  Cardiovascular:      Rate and Rhythm: Normal rate and regular rhythm.   Pulmonary:      Effort: Pulmonary effort is normal. No respiratory distress.      Breath sounds: Normal breath sounds. No stridor.   Abdominal:      General: There is no distension.      Tenderness: There is no abdominal tenderness.   Musculoskeletal:      Cervical back: No rigidity.      Right lower leg: No edema.      Left lower leg: No edema.   Feet:      Right foot:      Protective Sensation: 5 sites tested.  5 sites sensed.      Skin integrity: No ulcer or blister.      Toenail Condition: Right toenails are normal.      Left foot:      Protective Sensation: 5 sites tested.  5 sites sensed.      Skin integrity: No ulcer or blister.      Toenail Condition: Left toenails are normal.   Skin:     Coloration: Skin is not jaundiced or pale.      Findings: No rash.   Neurological:      Mental Status: She is alert. Mental status is at baseline.      Cranial Nerves: No cranial nerve deficit.      Motor: Motor function is intact.      Coordination: Coordination normal.   Psychiatric:      Comments: Chronically anxious.           Assessment:       Lab Visit on 10/08/2024   Component Date Value Ref Range Status    Cholesterol 10/08/2024 130  120 - 199 mg/dL Final    Triglycerides 10/08/2024 81  30 - 150 mg/dL Final    HDL 10/08/2024 46  40 - 75 mg/dL Final    LDL Cholesterol 10/08/2024 67.8  63.0 - 159.0 mg/dL Final    HDL/Cholesterol Ratio 10/08/2024 35.4  20.0 - 50.0 % Final    Total Cholesterol/HDL Ratio 10/08/2024 2.8  2.0 - 5.0 Final    Non-HDL Cholesterol 10/08/2024 84  mg/dL Final    Hemoglobin A1C 10/08/2024 7.6 (H)  4.5 - 6.2 % Final    Estimated Avg " Glucose 10/08/2024 171 (H)  68 - 131 mg/dL Final    Sodium 10/08/2024 139  136 - 145 mmol/L Final    Potassium 10/08/2024 4.5  3.5 - 5.1 mmol/L Final    Chloride 10/08/2024 108  95 - 110 mmol/L Final    CO2 10/08/2024 22 (L)  23 - 29 mmol/L Final    Glucose 10/08/2024 122 (H)  70 - 110 mg/dL Final    BUN 10/08/2024 15  8 - 23 mg/dL Final    Creatinine 10/08/2024 1.0  0.5 - 1.4 mg/dL Final    Calcium 10/08/2024 9.0  8.7 - 10.5 mg/dL Final    Total Protein 10/08/2024 7.0  6.0 - 8.4 g/dL Final    Albumin 10/08/2024 4.1  3.5 - 5.2 g/dL Final    Total Bilirubin 10/08/2024 0.4  0.1 - 1.0 mg/dL Final    Alkaline Phosphatase 10/08/2024 50 (L)  55 - 135 U/L Final    AST 10/08/2024 15  10 - 40 U/L Final    ALT 10/08/2024 14  10 - 44 U/L Final    eGFR 10/08/2024 59.9 (A)  >60 mL/min/1.73 m^2 Final    Anion Gap 10/08/2024 9  8 - 16 mmol/L Final   Patient Outreach on 10/01/2024   Component Date Value Ref Range Status    CRC Recommendation External 09/30/2024 Repeat colonoscopy in 5 years   Final       1. Type 2 diabetes mellitus without complication, without long-term current use of insulin  Comments:  LESS THAN OPTIMAL CONTROL.  LIMITED UNDERSTANDING.  Question of intolerance to several group of medications including Jardiance and Tradjenta.Referral endocrine  Orders:  -     Ambulatory referral/consult to Endocrinology; Future; Expected date: 01/21/2025    2. Mixed hyperlipidemia  Comments:  Continues on rosuvastatin.  Doing okay.    3. HTN  Comments:  Complains of amlodipine causing her itching.  DC amlodipine.  Continue losartan 100 and add terazosin 1 mg at bedtime.  Continue nebivolol 5 mg once a day.  Orders:  -     terazosin (HYTRIN) 1 MG capsule; Take 1 capsule (1 mg total) by mouth every evening.  Dispense: 90 capsule; Refill: 3    4. Slow transit constipation  Comments:  Cause of constipation could be multifactorial.  I am okay if she takes MiraLax every day.  Titrate the dose accordingly.    5. Fatigue, unspecified  type  Comments:  Nonspecific fatigue.    6. Localized osteoporosis without current pathological fracture  Comments:  Continue alendronate and calcium and vitamin D3 supplements.  Calcium can cause constipation.  Perhaps she may not be taking it at all.    7. Gastroesophageal reflux disease without esophagitis  Comments:  Continue to watch diet.  Takes pantoprazole 40 mg per day.    8. Vitamin B 12 deficiency  Comments:  Diagnosed several decades back by another doctor in another clinic.  Wants to continue with B12.  Probably psychological benefit.    9. Encounter for osteoporosis screening in asymptomatic postmenopausal patient  Comments:  Order for bone density given.  She will do it after she comes back from [place].  Orders:  -     DXA Bone Density Axial Skeleton 1 or more sites; Future; Expected date: 03/03/2025    10. Itching  Comments:  Dry skin.  Not sure if any allergic reaction.  Orders:  -     triamcinolone acetonide 0.1% (KENALOG) 0.1 % cream; Apply topically 2 (two) times daily. Apply sparingly on areas of itching  Dispense: 30 g; Refill: 0  -     Sedimentation rate; Future; Expected date: 10/22/2024  -     C-Reactive Protein; Future; Expected date: 10/22/2024    11. Language barrier affecting health care  Comments:   does reasonable translation.    12. Low-level of literacy  Comments:  Understanding complex or moderately difficult concept might be difficult.  Black and white answers           1 Result Note       1 Follow-up Encounter       1  Topic             Component Ref Range & Units 13 d ago  (10/8/24) 3 mo ago  (7/8/24) 11 mo ago  (11/2/23) 1 yr ago  (6/22/23) 1 yr ago  (2/20/23) 2 yr ago  (10/20/22) 2 yr ago  (8/11/22)   Hemoglobin A1C 4.5 - 6.2 % 7.6 High  7.8 High  CM 7.6 High  CM 7.4 High  CM 7.7 High  CM 7.7 High  CM 7.9 High             Plan:   Type 2 diabetes mellitus without complication, without long-term current use of insulin  Comments:  LESS THAN OPTIMAL CONTROL.  LIMITED  UNDERSTANDING.  Question of intolerance to several group of medications including Jardiance and Tradjenta.Referral endocrine  Orders:  -     Ambulatory referral/consult to Endocrinology; Future; Expected date: 01/21/2025    Mixed hyperlipidemia  Comments:  Continues on rosuvastatin.  Doing okay.    HTN  Comments:  Complains of amlodipine causing her itching.  DC amlodipine.  Continue losartan 100 and add terazosin 1 mg at bedtime.  Continue nebivolol 5 mg once a day.  Orders:  -     terazosin (HYTRIN) 1 MG capsule; Take 1 capsule (1 mg total) by mouth every evening.  Dispense: 90 capsule; Refill: 3    Slow transit constipation  Comments:  Cause of constipation could be multifactorial.  I am okay if she takes MiraLax every day.  Titrate the dose accordingly.    Fatigue, unspecified type  Comments:  Nonspecific fatigue.    Localized osteoporosis without current pathological fracture  Comments:  Continue alendronate and calcium and vitamin D3 supplements.  Calcium can cause constipation.  Perhaps she may not be taking it at all.    Gastroesophageal reflux disease without esophagitis  Comments:  Continue to watch diet.  Takes pantoprazole 40 mg per day.    Vitamin B 12 deficiency  Comments:  Diagnosed several decades back by another doctor in another clinic.  Wants to continue with B12.  Probably psychological benefit.    Encounter for osteoporosis screening in asymptomatic postmenopausal patient  Comments:  Order for bone density given.  She will do it after she comes back from [place].  Orders:  -     DXA Bone Density Axial Skeleton 1 or more sites; Future; Expected date: 03/03/2025    Itching  Comments:  Dry skin.  Not sure if any allergic reaction.  Orders:  -     triamcinolone acetonide 0.1% (KENALOG) 0.1 % cream; Apply topically 2 (two) times daily. Apply sparingly on areas of itching  Dispense: 30 g; Refill: 0  -     Sedimentation rate; Future; Expected date: 10/22/2024  -     C-Reactive Protein; Future; Expected  date: 10/22/2024    Language barrier affecting health care  Comments:   does reasonable translation.    Low-level of literacy  Comments:  Understanding complex or moderately difficult concept might be difficult.  Black and white answers      She states that amlodipine causes her itching at night.  She can stop it and let us know.  She has blamed quite a few medications for causing her itching including Tradjenta and Jardiance.  On stopping those medications, it  did not help her itching.    She should see a dermatologist also in future.  Probably dry skin.  No rashes visible.  She was on multiple medications and it might be difficult challenge for her to trial and omit various medications at a time due to limited comprehension and understanding.    Her  may be there to help but at this point she was busy setting visit to [facility].  Will address this issue at follow-up.  There was no critical issue with the itching at this point.  She keeps on rubbing steroid creams with potential harm for long-term management of diabetes.    She wants only prescription cream and nothing over-the-counter.  I have advised her to avoid duplication with other steroid creams like Lotrisone and that will be discontinued.    Bone density will be checked at follow-up.      She also points out to constipation causing her back pain and hip pain radiating to both the thighs.  This may have a plausible explanation in or crowding of the pelvis leading to strain on her back ligaments and joints.  She can stop the amlodipine and will try terazosin and see how she does.  She was already on Nebivolol and losartan and though addition of other medication may not be necessary,  thinks that since we stopping medication she definitely needs another medication.    Follow-up in 4 months time.    Follow up in about 20 weeks (around 3/10/2025) for Diabetes/HTN/Lipids.  Spent charmaine 30 minutes with patient which involved review of pts medical  conditions, labs, medications and with 50% of time face-to-face discussion about medical problems, management and any applicable changes.      Current Outpatient Medications:     alendronate (FOSAMAX) 70 MG tablet, Take 1 tablet (70 mg total) by mouth every 7 days. Take this medication once a week on an empty stomach with a cup of water and do not lie down for 30 minutes after taking med., Disp: 12 tablet, Rfl: 3    blood sugar diagnostic (TRUE METRIX GLUCOSE TEST STRIP) Strp, Inject 1 strip into the skin once daily., Disp: 50 each, Rfl: 5    celecoxib (CELEBREX) 50 MG capsule, Take 1 capsule (50 mg total) by mouth 2 (two) times daily., Disp: 60 capsule, Rfl: 2    clopidogreL (PLAVIX) 75 mg tablet, Take 1 tablet (75 mg total) by mouth once daily., Disp: 30 tablet, Rfl: 11    cyanocobalamin 1,000 mcg/mL injection, Inject 1 mL (1,000 mcg total) into the muscle every 30 days., Disp: 1 mL, Rfl: 11    cyanocobalamin 1,000 mcg/mL injection, Inject 1 mL (1,000 mcg total) into the muscle every 30 days., Disp: 1 mL, Rfl: 5    diclofenac sodium (VOLTAREN) 1 % Gel, Apply 2 g topically 2 (two) times daily. Apply to the knee joint left side., Disp: 100 g, Rfl: 2    fluticasone propionate (FLONASE) 50 mcg/actuation nasal spray, 1 spray (50 mcg total) by Each Nostril route once daily., Disp: 16 g, Rfl: 11    glimepiride (AMARYL) 1 MG tablet, TAKE 1 TABLET BY MOUTH BEFORE BREAKFAST (PLEASE  DISCONTINUE  JARDIANCE  BECAUSE  OF  NON  TOLERANCE.  TRIAL  OF  GLIMEPIRIDE  AT  1  MG), Disp: 90 tablet, Rfl: 3    ibuprofen (ADVIL,MOTRIN) 400 MG tablet, Take 1 tablet (400 mg total) by mouth 3 (three) times daily as needed for Other (For right-sided headache). Take half to 1 tablet as needed, Disp: 20 tablet, Rfl: 1    isosorbide mononitrate (IMDUR) 30 MG 24 hr tablet, Take 1 tablet (30 mg total) by mouth once daily., Disp: 30 tablet, Rfl: 11    lancets (LANCETS,ULTRA THIN) 26 gauge Misc, 1 lancet  by Misc.(Non-Drug; Combo Route) route once  daily., Disp: 100 each, Rfl: 0    losartan (COZAAR) 100 MG tablet, Take 1 tablet (100 mg total) by mouth once daily., Disp: 90 tablet, Rfl: 4    metFORMIN (GLUCOPHAGE) 1000 MG tablet, Take 1 tablet (1,000 mg total) by mouth 2 (two) times daily with meals., Disp: 180 tablet, Rfl: 3    nebivoloL (BYSTOLIC) 5 MG Tab, Take 1 tablet (5 mg total) by mouth once daily., Disp: 30 tablet, Rfl: 11    nystatin (MYCOSTATIN) cream, Apply topically 2 (two) times daily., Disp: 30 g, Rfl: 1    pantoprazole (PROTONIX) 40 MG tablet, Take 1 tablet by mouth once daily, Disp: 30 tablet, Rfl: 5    polyethylene glycol (GLYCOLAX) 17 gram/dose powder, DISSOLVE 17 GRAMS IN WATER AND DRINK ONCE DAILY, Disp: 510 g, Rfl: 5    rosuvastatin (CRESTOR) 5 MG tablet, Take 1 tablet (5 mg total) by mouth every evening., Disp: 90 tablet, Rfl: 3    blood-glucose meter kit, One touch meter and matching lancets and strips., Disp: 1 each, Rfl: 0    terazosin (HYTRIN) 1 MG capsule, Take 1 capsule (1 mg total) by mouth every evening., Disp: 90 capsule, Rfl: 3    triamcinolone acetonide 0.1% (KENALOG) 0.1 % cream, Apply topically 2 (two) times daily. Apply sparingly on areas of itching, Disp: 30 g, Rfl: 0    Pal Fong

## 2024-10-23 ENCOUNTER — PATIENT OUTREACH (OUTPATIENT)
Dept: ADMINISTRATIVE | Facility: HOSPITAL | Age: 74
End: 2024-10-23
Payer: MEDICARE

## 2024-12-02 DIAGNOSIS — I10 ESSENTIAL HYPERTENSION: ICD-10-CM

## 2024-12-02 RX ORDER — AMLODIPINE BESYLATE 5 MG/1
5 TABLET ORAL NIGHTLY
Qty: 90 TABLET | Refills: 3 | Status: SHIPPED | OUTPATIENT
Start: 2024-12-02

## 2024-12-05 DIAGNOSIS — K21.9 GASTROESOPHAGEAL REFLUX DISEASE WITHOUT ESOPHAGITIS: ICD-10-CM

## 2024-12-05 RX ORDER — PANTOPRAZOLE SODIUM 40 MG/1
40 TABLET, DELAYED RELEASE ORAL
Qty: 30 TABLET | Refills: 0 | Status: SHIPPED | OUTPATIENT
Start: 2024-12-05

## 2025-01-01 DIAGNOSIS — K21.9 GASTROESOPHAGEAL REFLUX DISEASE WITHOUT ESOPHAGITIS: ICD-10-CM

## 2025-01-02 RX ORDER — PANTOPRAZOLE SODIUM 40 MG/1
40 TABLET, DELAYED RELEASE ORAL
Qty: 30 TABLET | Refills: 5 | Status: SHIPPED | OUTPATIENT
Start: 2025-01-02

## 2025-01-13 DIAGNOSIS — Z00.00 ENCOUNTER FOR MEDICARE ANNUAL WELLNESS EXAM: ICD-10-CM

## 2025-02-28 ENCOUNTER — RESULTS FOLLOW-UP (OUTPATIENT)
Dept: FAMILY MEDICINE | Facility: CLINIC | Age: 75
End: 2025-02-28

## 2025-02-28 ENCOUNTER — HOSPITAL ENCOUNTER (OUTPATIENT)
Dept: RADIOLOGY | Facility: HOSPITAL | Age: 75
Discharge: HOME | End: 2025-02-28
Attending: INTERNAL MEDICINE
Payer: MEDICARE

## 2025-02-28 DIAGNOSIS — Z78.0 ENCOUNTER FOR OSTEOPOROSIS SCREENING IN ASYMPTOMATIC POSTMENOPAUSAL PATIENT: ICD-10-CM

## 2025-02-28 DIAGNOSIS — Z13.820 ENCOUNTER FOR OSTEOPOROSIS SCREENING IN ASYMPTOMATIC POSTMENOPAUSAL PATIENT: ICD-10-CM

## 2025-02-28 PROCEDURE — 77080 DXA BONE DENSITY AXIAL: CPT | Mod: 26,,, | Performed by: RADIOLOGY

## 2025-02-28 PROCEDURE — 77080 DXA BONE DENSITY AXIAL: CPT | Mod: TC,PO

## 2025-03-06 ENCOUNTER — TELEPHONE (OUTPATIENT)
Dept: FAMILY MEDICINE | Facility: CLINIC | Age: 75
End: 2025-03-06
Payer: MEDICARE

## 2025-03-06 NOTE — TELEPHONE ENCOUNTER
----- Message from Trino sent at 3/6/2025 12:52 PM CST -----  Regarding: Would like to be seen sooner  Alisson West came in regarding pt, pt would like to be seen sooner that what is already scheduled. Mentioned something about going back to [place] and really needing to be seen. Requests that Dr. oFng call pt, or nurse call. Phone number on file [phone] is good for contact.

## 2025-03-13 ENCOUNTER — LAB VISIT (OUTPATIENT)
Dept: LAB | Facility: HOSPITAL | Age: 75
End: 2025-03-13
Attending: INTERNAL MEDICINE
Payer: MEDICARE

## 2025-03-13 DIAGNOSIS — E11.9 TYPE 2 DIABETES MELLITUS WITHOUT COMPLICATION, WITHOUT LONG-TERM CURRENT USE OF INSULIN: Chronic | ICD-10-CM

## 2025-03-13 DIAGNOSIS — L29.9 ITCHING: ICD-10-CM

## 2025-03-13 LAB
CRP SERPL-MCNC: 0.2 MG/DL
ERYTHROCYTE [SEDIMENTATION RATE] IN BLOOD BY WESTERGREN METHOD: 7 MM/HR (ref 0–20)

## 2025-03-13 PROCEDURE — 85651 RBC SED RATE NONAUTOMATED: CPT | Performed by: INTERNAL MEDICINE

## 2025-03-13 PROCEDURE — 86140 C-REACTIVE PROTEIN: CPT | Performed by: INTERNAL MEDICINE

## 2025-03-13 PROCEDURE — 36415 COLL VENOUS BLD VENIPUNCTURE: CPT | Performed by: INTERNAL MEDICINE

## 2025-03-13 NOTE — TELEPHONE ENCOUNTER
Care Due:                  Date            Visit Type   Department     Provider  --------------------------------------------------------------------------------                                EP - [facility]      [address]  Last Visit: 10-      CARE ([facility])   FAMILY Jose Harpera EP - [facility] [address]  Next Visit: 03-      [facility] ([facility])   [facility]  [facility]                                                            Last  Test          Frequency    Reason                     Performed    Due Date  --------------------------------------------------------------------------------    CBC.........  12 months..  celecoxib, diclofenac,     02- 02-                             ibuprofen................    HBA1C.......  6 months...  glimepiride, metFORMIN...  10-   04-    Mg Level....  12 months..  alendronate..............  Not Found    Overdue    Phosphate...  12 months..  alendronate..............  Not Found    Overdue    [employer] Embedded Care Due Messages. Reference number: [id number].   3/13/2025 6:27:40 PM CDT

## 2025-03-14 NOTE — TELEPHONE ENCOUNTER
LV 10/21/2024  NV 03/19/2025  LF 08/20/2024 50 EA 5 REFILLS   Your recent test show your hemoglobin A1c is slightly worse compared to last time.  Make sure you increase your insulin at 15 units as recommended.  Vitamin B12 is normal.  Liver and kidney functions are normal.

## 2025-03-14 NOTE — TELEPHONE ENCOUNTER
Provider Staff:  Action required for this patient    Requires labs      Please see care gap opportunities below in Care Due Message.    Thanks!  [facility]     Appointments      Date Provider   Last Visit   10/21/2024 Pal Fong MD   Next Visit   3/19/2025 Pal Fong MD     Refill Decision Note   Preet West  is requesting a refill authorization.  Brief Assessment and Rationale for Refill:  Approve     Medication Therapy Plan:         Comments:     Note composed:9:54 AM 03/14/2025

## 2025-03-18 PROBLEM — D64.9 NORMOCHROMIC NORMOCYTIC ANEMIA: Status: ACTIVE | Noted: 2025-03-18

## 2025-03-18 NOTE — PROGRESS NOTES
[facility] Hematology/Oncology  PROGRESS NOTE - Follow-up Visit      Subjective:       Patient ID:   NAME: Preet West : 1950     75 y.o. female    Referring Doc: Hetal  Other Physicians: Lucille    Chief Complaint:  Colon ca f/u    History of Present Illness:     Patient returns today for a regularly scheduled follow-up visit.  The patient is here today to go over the results of the recently ordered labs, tests and studies.  She is here with her .     She last saw Dr Fong in Oct 2024 and sees him again later today       She denies any CP, SOB, HA's ir N/V. Her bowels are moving adequately. Mild constipation      she had colonoscopy 6 months ago               ROS:   GEN: normal without any fever, night sweats or weight loss;    HEENT: normal with no HA's, sore throat, stiff neck, changes in vision  CV: normal with no CP, SOB, PND, FISHER or orthopnea  PULM: normal with no SOB, cough, hemoptysis, sputum or pleuritic pain  GI: normal with no abdominal pain, nausea, vomiting, constipation, diarrhea, melanotic stools, BRBPR, or hematemesis  : normal with no hematuria, dysuria  BREAST: normal with no mass, discharge, pain  SKIN: normal with no rash, erythema, bruising, or swelling    Allergies:  Review of patient's allergies indicates:   Allergen Reactions    Tradjenta [linagliptin]      Itching and gastric discomfort > difficult to assess nature of side effect    Aspirin Itching    Opioids - morphine analogues Other (See Comments)     Confusion    Hydrochlorothiazide Other (See Comments)     Suspect Low sodium by hospital ist. Not confirmed    Jardiance [empagliflozin] Other (See Comments)     Caused burning sensation in the feet.       Medications:    Current Outpatient Medications:     alendronate (FOSAMAX) 70 MG tablet, Take 1 tablet (70 mg total) by mouth every 7 days. Take this medication once a week on an empty stomach with a cup of water and do not lie down for 30 minutes after taking med., Disp: 12  tablet, Rfl: 3    amLODIPine (NORVASC) 5 MG tablet, TAKE 1 TABLET BY MOUTH ONCE DAILY IN THE EVENING, Disp: 90 tablet, Rfl: 3    blood sugar diagnostic (TRUE METRIX GLUCOSE TEST STRIP) Strp, Inject 1 strip into the skin once daily., Disp: 100 each, Rfl: 3    celecoxib (CELEBREX) 50 MG capsule, Take 1 capsule (50 mg total) by mouth 2 (two) times daily., Disp: 60 capsule, Rfl: 2    clopidogreL (PLAVIX) 75 mg tablet, Take 1 tablet (75 mg total) by mouth once daily., Disp: 30 tablet, Rfl: 11    cyanocobalamin 1,000 mcg/mL injection, Inject 1 mL (1,000 mcg total) into the muscle every 30 days., Disp: 1 mL, Rfl: 11    cyanocobalamin 1,000 mcg/mL injection, Inject 1 mL (1,000 mcg total) into the muscle every 30 days., Disp: 1 mL, Rfl: 5    diclofenac sodium (VOLTAREN) 1 % Gel, Apply 2 g topically 2 (two) times daily. Apply to the knee joint left side., Disp: 100 g, Rfl: 2    fluticasone propionate (FLONASE) 50 mcg/actuation nasal spray, 1 spray (50 mcg total) by Each Nostril route once daily., Disp: 16 g, Rfl: 11    glimepiride (AMARYL) 1 MG tablet, TAKE 1 TABLET BY MOUTH BEFORE BREAKFAST (PLEASE  DISCONTINUE  JARDIANCE  BECAUSE  OF  NON  TOLERANCE.  TRIAL  OF  GLIMEPIRIDE  AT  1  MG), Disp: 90 tablet, Rfl: 3    ibuprofen (ADVIL,MOTRIN) 400 MG tablet, Take 1 tablet (400 mg total) by mouth 3 (three) times daily as needed for Other (For right-sided headache). Take half to 1 tablet as needed, Disp: 20 tablet, Rfl: 1    lancets (LANCETS,ULTRA THIN) 26 gauge Misc, 1 lancet  by Misc.(Non-Drug; Combo Route) route once daily., Disp: 100 each, Rfl: 0    losartan (COZAAR) 100 MG tablet, Take 1 tablet (100 mg total) by mouth once daily., Disp: 90 tablet, Rfl: 4    metFORMIN (GLUCOPHAGE) 1000 MG tablet, Take 1 tablet (1,000 mg total) by mouth 2 (two) times daily with meals., Disp: 180 tablet, Rfl: 3    nebivoloL (BYSTOLIC) 5 MG Tab, Take 1 tablet (5 mg total) by mouth once daily., Disp: 30 tablet, Rfl: 11    nystatin (MYCOSTATIN)  "cream, Apply topically 2 (two) times daily., Disp: 30 g, Rfl: 1    pantoprazole (PROTONIX) 40 MG tablet, Take 1 tablet by mouth once daily, Disp: 30 tablet, Rfl: 5    polyethylene glycol (GLYCOLAX) 17 gram/dose powder, DISSOLVE 17 GRAMS IN WATER AND DRINK ONCE DAILY, Disp: 510 g, Rfl: 5    terazosin (HYTRIN) 1 MG capsule, Take 1 capsule (1 mg total) by mouth every evening., Disp: 90 capsule, Rfl: 3    triamcinolone acetonide 0.1% (KENALOG) 0.1 % cream, Apply topically 2 (two) times daily. 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 rate 16, height 5' 2" (1.575 m), weight 59.4 kg (131 lb), SpO2 100%.    Physical Examination:   GEN: no apparent distress, comfortable; AAOx3  HEAD: atraumatic and normocephalic  EYES: no pallor, no icterus, PERRLA  ENT: OMM, no pharyngeal erythema, external ears WNL; no nasal discharge; no thrush  NECK: no masses, thyroid normal, trachea midline, no LAD/LN's, supple  CV: RRR with no murmur; normal pulse; normal S1 and S2; no pedal edema  CHEST: Normal respiratory effort; CTAB; normal breath sounds; no wheeze or crackles  ABDOM: nontender and nondistended; " soft; normal bowel sounds; no rebound/guarding  MUSC/Skeletal:  prior right knee surgery; arthropathy  EXTREM: no clubbing, cyanosis, inflammation or swelling  SKIN: no rashes, lesions, ulcers, petechiae or subcutaneous nodules  : no rocha  NEURO: grossly intact; motor/sensory WNL; AAOx3; no tremors  PSYCH: normal mood, affect and behavior  LYMPH: normal cervical, supraclavicular, axillary and groin LN's            Labs:        Lab Results   Component Value Date    WBC 7.38 02/27/2024    HGB 10.4 (L) 02/27/2024    HCT 34.2 (L) 02/27/2024    MCV 88 02/27/2024     02/27/2024     BMP  Lab Results   Component Value Date     10/08/2024    K 4.5 10/08/2024     10/08/2024    CO2 22 (L) 10/08/2024    BUN 15 10/08/2024    CREATININE 1.0 10/08/2024    CALCIUM 9.0 10/08/2024    ANIONGAP 9 10/08/2024    ESTGFRAFRICA >60.0 01/05/2022    EGFRNONAA >60.0 01/05/2022        Lab Results   Component Value Date    IRON 40 12/29/2020    TIBC 337 12/29/2020    FERRITIN 111 12/29/2020       Lab Results   Component Value Date    CEA 2.4 02/27/2024       Lab Results   Component Value Date    XQZGTVTH90 557 12/29/2020     Lab Results   Component Value Date    FOLATE 12.6 02/20/2023         Radiology/Diagnostic Studies:    CT Abdom/pelvis: 5/9/2019  IMPRESSION:  Postsurgical changes of the mid transverse colon without evidence of recurrent  or metastatic disease    Prior hysterectomy        X-ray Chest Pa And Lateral    Result Date: 5/9/2019  MDI CHEST, 2 VIEWS XRAY Indication: Personal history of other malignant neoplasm of large intestine. Comparison: June 27, 2018 Findings: Cardiomediastinal silhouette is within normal limits. There is no confluent airspace disease. There is no pleural effusion or pneumothorax. No acute osseous abnormality.     IMPRESSION: No acute pulmonary process. Read and electronically signed by: Richard Frost MD on 5/9/2019 9:23 AM CDT RICHARD FROST MD      I have reviewed all available lab  results and radiology reports.    Assessment/Plan:   (1) 75 y.o. female with diagnosis of colon cancer who has been referred by Pal Fong MD for evaluation by medical hematology/oncology.   - diagnosed in Jan 2013  - s/p transverse colon resection 3/12/2013 followed by adjuvant chemotherapy with FOLFOX for 6 months in [place], [place]  - T3 N1a - 1 LN was positive  - Dr Sofia West was her oncologist in [place]  - she had a follow-up colonoscopy in Apr 2017  - latest CXR was negative; - CT scans in may 2019 were stable  - she saw Dr Adkins with GI since last visit and had scopes in [place] 2019    12/30/2020:  - latest CEA was stable at 2.5  - she had last scopes in Oct 2021    2/8/2022:  - latest labs with CEA at 2.6    2/23/2023:  - latest CEA at 2.9  - she has not seen GI in awhile    3/13/2024:  - CEA at 2.4  - she has not followed up with GI - due for repeat scope in couple of years per her own report  - last scope was in Oct 2021  - Last scans were in June 2021    3/19/2024:  - she is due for up to date CBc and CEA levels  - will check iron panel as well  - she is seeing Dr Fong later today  - she had colonoscopy 6 months ago     (2) HTN and hypercholesterolemia     (3) DM II     (4) Osteoporosis     (5) OA    (6) B12 deficiency    (7) Mild chronic anemia - hgb at 10.6 - NCNC indices; normal iron panel    2/8/2022:   - latest hgb relatively adequate at 11.2  - iron panel adequate  - B12 and folate adequate    2/23/2023:  - latest hgb relatively stable at 10.8  - iron panel, B12/folate all adequate    3/13/2024:  - latest hgb at 10.4  - no BRBPR  - on oral iron  - iron panel adequate  - continued on B12 monthly    3/19/2025:  - due for up to date labs  - she is continued on B12 injections monthly          VISIT DIAGNOSES:      H/O colon cancer, stage III    Mild anemia    Vitamin B 12 deficiency    S/P laparoscopic colectomy (3/2013)    Normochromic normocytic anemia          PLAN:  1. Check up to date labs  incl. CEA every 6 months  2. Encouraged compliance labs  3. F/u with GI as directed by them   4. F/u with PCP  RTC in 12 months    Fax note to   Pal Fong MD,  Lucille    Discussion:     COVID-19 Discussion:    I had long discussion with patient and any applicable family about the COVID-19 coronavirus epidemic and the recommended precautions with regard to cancer and/or hematology patients. I have re-iterated the CDC recommendations for adequate hand washing, use of hand -like products, and coughing into elbow, etc. In addition, especially for our patients who are on chemotherapy and/or our otherwise immunocompromised patients, I have recommended avoidance of crowds, including movie theaters, restaurants, churches, etc. I have recommended avoidance of any sick or symptomatic family members and/or friends. I have also recommended avoidance of any raw and unwashed food products, and general avoidance of food items that have not been prepared by themselves. The patient has been asked to call us immediately with any symptom developments, issues, questions or other general concerns.       I spent over 25 mins of time with the patient. Reviewed results of the recently ordered labs, tests and studies; made directives with regards to the results. Over half of this time was spent couseling and coordinating care.    I have explained all of the above in detail and the patient understands all of the current recommendation(s). I have answered all of their questions to the best of my ability and to their complete satisfaction.   The patient is to continue with the current management plan.            Electronically signed by Clemente Mi MD

## 2025-03-19 ENCOUNTER — OFFICE VISIT (OUTPATIENT)
Dept: FAMILY MEDICINE | Facility: CLINIC | Age: 75
End: 2025-03-19
Payer: MEDICARE

## 2025-03-19 ENCOUNTER — OFFICE VISIT (OUTPATIENT)
Facility: CLINIC | Age: 75
End: 2025-03-19
Payer: MEDICARE

## 2025-03-19 ENCOUNTER — HOSPITAL ENCOUNTER (OUTPATIENT)
Dept: RADIOLOGY | Facility: HOSPITAL | Age: 75
Discharge: HOME | End: 2025-03-19
Attending: INTERNAL MEDICINE
Payer: MEDICARE

## 2025-03-19 ENCOUNTER — RESULTS FOLLOW-UP (OUTPATIENT)
Dept: FAMILY MEDICINE | Facility: CLINIC | Age: 75
End: 2025-03-19

## 2025-03-19 ENCOUNTER — PATIENT MESSAGE (OUTPATIENT)
Dept: FAMILY MEDICINE | Facility: CLINIC | Age: 75
End: 2025-03-19

## 2025-03-19 VITALS
HEART RATE: 68 BPM | WEIGHT: 131 LBS | TEMPERATURE: 97 F | OXYGEN SATURATION: 100 % | BODY MASS INDEX: 24.11 KG/M2 | SYSTOLIC BLOOD PRESSURE: 150 MMHG | HEIGHT: 62 IN | DIASTOLIC BLOOD PRESSURE: 79 MMHG | RESPIRATION RATE: 16 BRPM

## 2025-03-19 VITALS
SYSTOLIC BLOOD PRESSURE: 131 MMHG | HEIGHT: 62 IN | DIASTOLIC BLOOD PRESSURE: 76 MMHG | HEART RATE: 71 BPM | WEIGHT: 130.06 LBS | BODY MASS INDEX: 23.93 KG/M2

## 2025-03-19 DIAGNOSIS — M81.6 LOCALIZED OSTEOPOROSIS WITHOUT CURRENT PATHOLOGICAL FRACTURE: Chronic | ICD-10-CM

## 2025-03-19 DIAGNOSIS — Z85.038 H/O COLON CANCER, STAGE III: Primary | ICD-10-CM

## 2025-03-19 DIAGNOSIS — E53.8 VITAMIN B 12 DEFICIENCY: ICD-10-CM

## 2025-03-19 DIAGNOSIS — R53.83 FATIGUE, UNSPECIFIED TYPE: ICD-10-CM

## 2025-03-19 DIAGNOSIS — M54.2 NECK PAIN: ICD-10-CM

## 2025-03-19 DIAGNOSIS — E78.2 MIXED HYPERLIPIDEMIA: Chronic | ICD-10-CM

## 2025-03-19 DIAGNOSIS — D64.9 MILD ANEMIA: ICD-10-CM

## 2025-03-19 DIAGNOSIS — E11.9 TYPE 2 DIABETES MELLITUS WITHOUT COMPLICATION, WITHOUT LONG-TERM CURRENT USE OF INSULIN: ICD-10-CM

## 2025-03-19 DIAGNOSIS — Z75.8 LANGUAGE BARRIER: Chronic | ICD-10-CM

## 2025-03-19 DIAGNOSIS — K21.9 GASTROESOPHAGEAL REFLUX DISEASE WITHOUT ESOPHAGITIS: ICD-10-CM

## 2025-03-19 DIAGNOSIS — Z90.49 S/P LAPAROSCOPIC COLECTOMY: ICD-10-CM

## 2025-03-19 DIAGNOSIS — M54.2 NECK PAIN: Primary | Chronic | ICD-10-CM

## 2025-03-19 DIAGNOSIS — D64.9 NORMOCHROMIC NORMOCYTIC ANEMIA: ICD-10-CM

## 2025-03-19 DIAGNOSIS — I10 ESSENTIAL HYPERTENSION: Chronic | ICD-10-CM

## 2025-03-19 DIAGNOSIS — Z60.3 LANGUAGE BARRIER: Chronic | ICD-10-CM

## 2025-03-19 PROCEDURE — 72040 X-RAY EXAM NECK SPINE 2-3 VW: CPT | Mod: 26,,, | Performed by: RADIOLOGY

## 2025-03-19 PROCEDURE — 99999 PR PBB SHADOW E&M-EST. PATIENT-LVL III: CPT | Mod: PBBFAC,HCNC,, | Performed by: INTERNAL MEDICINE

## 2025-03-19 PROCEDURE — 72040 X-RAY EXAM NECK SPINE 2-3 VW: CPT | Mod: TC,PO

## 2025-03-19 PROCEDURE — 99999 PR PBB SHADOW E&M-EST. PATIENT-LVL IV: CPT | Mod: PBBFAC,HCNC,, | Performed by: INTERNAL MEDICINE

## 2025-03-19 RX ORDER — GLIMEPIRIDE 2 MG/1
2 TABLET ORAL
Qty: 90 TABLET | Refills: 3 | Status: SHIPPED | OUTPATIENT
Start: 2025-03-19

## 2025-03-19 RX ORDER — BUTALB/ACETAMINOPHEN/CAFFEINE 50-325-40
1 TABLET ORAL 2 TIMES DAILY
Qty: 60 TABLET | Refills: 11 | Status: SHIPPED | OUTPATIENT
Start: 2025-03-19 | End: 2026-03-19

## 2025-03-19 SDOH — SOCIAL DETERMINANTS OF HEALTH (SDOH): ACCULTURATION DIFFICULTY: Z60.3

## 2025-03-19 NOTE — PROGRESS NOTES
Patient ID: Preet West is a 75 y.o. female.    Chief Complaint: Hypertension, Hyperlipidemia, Labs Only, Dizziness, and Neck Pain    History of Present Illness    CHIEF COMPLAINT:  Preet, a 75-year-old   female, presents for follow-up on her multiple medical issues and complains of right-sided neck pain shooting to the right temple.    HPI:  Preet reports right-sided neck pain radiating to her right temple, without associated trauma or injury. I nature of persistence of pain has been about for a  year. The pain extends from one location to another, causing a constant tingling sensation. There is no history of trauma or injury associated with this pain. Rehan son reports that this has become a significant problem, and the patient consistently expressed discomfort during a recent trip to [place]. The pain appears to be severe enough that the patient is considering more aggressive interventions such as injections or surgery.      She recently returned from a trip to [facility], where she did not adhere well to her diet.     She has been having arthritic symptoms, prompting C-reactive protein and sedimentation rate tests.     She has limited insight and understanding of her medical issues due to her age, cultural and language barriers, and possible illiteracy.     She speaks only her native  language, requiring translation through her  and son, which may result in some loss of nuance in communication.     She follows a mostly vegetarian diet with no meats and engages in limited exercise.     Her recent bone density scan showed some improvement in the lumbar spine but a 9% decrease in bone strength in the hip region.     Her last set of labs on 10/24 revealed a hemoglobin A1c of 7.6, which has generally been above 7. Her total cholesterol has risen from 71 to 130.    She states that her blood sugars at home are pristine indicating her restrictive checking only in mornings when she is  fasting.  She has rarely checked her blood sugars in the afternoon or evening.  She has been advised to do so.  I am sure her son will remind her about this issue.    She denies any visual disturbances associated with her neck pain or any recent falls.    MEDICATIONS:  Preet is on Nebivolol 5 mg and Losartan 100 mg daily for hypertension. She is also on Isosorbide for hypertension and coronary artery disease, and Clopidogrel for coronary artery disease. For diabetes management, she takes Metformin 1000 mg twice daily and Glimepiride 1 mg. Preet's son has requested to increase Glimepiride to 2 mg. She is also on Alendronate for osteoporosis.    MEDICAL HISTORY:  Preet has a history of hypertension, coronary artery disease, diabetes, osteoporosis, and language communication impairment. Preet received an influenza vaccination on 09/10/2024. She has completed the shingles vaccination (2 doses) and her pneumonia vaccine is up to date.    FAMILY HISTORY:  Family history is significant for family members who lived up to their late 90s without any problems.    TEST RESULTS:  On 10/24, the patient's Hemoglobin A1c was 7.6%, which has generally been above 7. Her BUN was 15, and Creatinine was 1.0, with some creatinine numbers less than 60. The lipid panel showed an increase in total cholesterol from 71 to 130. C-reactive protein was less than 0.20, and the sedimentation rate was 7. A recent bone density scan showed some improvement in the lumbar spine but a 9% decrease in bone strength in the hip region.    IMAGING:  During the current visit, an X-ray of the cervical spine was performed. It revealed no acute fracture, with degenerative changes predominantly noted in the C1 and C2 region without any acute osteoseptomalities. Osteophytes were noted at the C3 and C4 level with calcification along the disc.    SOCIAL HISTORY:  Marital status:       ROS:  Neck: +neck pain, +shooting pain sensation         Past  "Medical History:   Diagnosis Date    Allergy     aspirin itching    Anemia     Cancer     Diabetes mellitus, type 2     H/O colon cancer, stage III 01/01/2013    Treated in [facility]    History of colonic polyps 09/30/2024    History of colonoscopy 06/04/2021    Colonoscopy done by Dr. Rayshawn Adkins MD on 06/04/21.non bleeding internal hemorrhoids.  4 mm polyp in the sigmoid colon removed with cold snare.  Patent end-to-end colocolonic anastomosis characterized by healthy-appearing mucosa.  Examination was otherwise normal.  Repeat colonoscopy in 3 years for surveillance.    Malignant neoplasm of transverse colon (2013) - Stage III 04/04/2019    Normochromic normocytic anemia 03/18/2025    Osteoporosis     S/P laparoscopic colectomy (3/2013) 04/05/2019    Seasonal allergies      Social History[1]  Past Surgical History:   Procedure Laterality Date    COLON SURGERY      JOINT REPLACEMENT       Family History   Problem Relation Name Age of Onset    Stroke Mother Anel Gonzales     Heart disease Mother Anle Gonzales     Cancer Father Kwesi Lira       Objective:      Physical Exam  Cardiovascular:      Pulses:           Dorsalis pedis pulses are 1+ on the right side.   Musculoskeletal:      Right foot: No deformity.      Left foot: Normal range of motion. No deformity.   Feet:      Right foot:      Protective Sensation: 5 sites tested.  5 sites sensed.      Skin integrity: No ulcer or blister.      Toenail Condition: Right toenails are normal.      Left foot:      Protective Sensation: 5 sites tested.        Skin integrity: No ulcer or blister.      Toenail Condition: Left toenails are normal.       Blood pressure 131/76, pulse 71, height 5' 2" (1.575 m), weight 59 kg (130 lb 1.1 oz). Body mass index is 23.79 kg/m².  Physical Exam    General: No acute distress. Well-developed.  Thin built  Eyes: EOMI. Sclerae anicteric.  HENT: Normocephalic. Atraumatic. . No temporal tenderness.    Cardiovascular: Regular " rate. Regular rhythm. No murmurs. No rubs. No gallops. Normal S1, S2.  Respiratory: Normal respiratory effort. Clear to auscultation bilaterally. No rales. No rhonchi. No wheezing.  Abdomen: Soft. Non-tender. Non-distended. Musculoskeletal: No  obvious deformity.  Extremities: No lower extremity edema.  Neurological: Alert. No slurred speech.  Cautious slow gait  Psychiatric:  Chronic mildly anxious and every little medical issue bothers her.  Skin: Warm. Dry. No rash.  Neck: Complete range of motion of neck. No specifically localizable tenderness in neck.              Assessment:       Lab Visit on 03/13/2025   Component Date Value Ref Range Status    Sed Rate 03/13/2025 7  0 - 20 mm/Hr Final    CRP 03/13/2025 0.20  <1.00 mg/dL Final     Component  Ref Range & Units (hover) 5 mo ago  (10/8/24) 8 mo ago  (7/8/24) 1 yr ago  (11/2/23) 1 yr ago  (6/22/23) 2 yr ago  (2/20/23) 2 yr ago  (10/20/22) 2 yr ago  (8/11/22)   Hemoglobin A1C 7.6 High  7.8 High  CM 7.6 High  CM 7.4 High  CM 7.7 High  CM 7.7 High  CM 7.9 High               Component  Ref Range & Units (hover) 5 mo ago 1 yr ago 2 yr ago 3 yr ago 4 yr ago 5 yr ago   Cholesterol 130 71 Low     Low   Low  CM   Comment: The National Cholesterol Education Program (NCEP) has set the  following guidelines (reference ranges) for Cholesterol:  Optimal.....................<200 mg/dL  Borderline High.............200-239 mg/dL  High........................> or = 240 mg/dL   Triglycerides 81 59 CM 75    CM   Comment: The National Cholesterol Education Program (NCEP) has set the  following guidelines (reference values) for triglycerides:  Normal......................<150 mg/dL  Borderline High.............150-199 mg/dL  High........................200-499 mg/dL   HDL 46 37 Low  CM 58 CM 48 CM 43 CM 45 CM   Comment: The National Cholesterol Education Program (NCEP) has set the  following guidelines (reference values) for HDL  Cholesterol:  Low...............<40 mg/dL  Optimal...........>60 mg/dL   LDL Cholesterol 67.8 22.2 Low  CM 75.0 CM 51.0 Low  CM 45.2 Low  CM 45.8 Low  CM   Comment: The National Cholesterol Education Program (NCEP) has set the  following guidelines (reference values) for LDL Cholesterol:  Optimal.......................<130 mg/dL  Borderline High...............130-159 mg/dL  High..........................160-189 mg/dL  Very High.....................>190 mg/dL   HDL/Cholesterol Ratio 35.4 52.1 High  39.2 40.0 38.7 40.2   Total Cholesterol/HDL Ratio 2.8 1.9 Low  2.6 2.5 2.6 2.5   Non-HDL Cholesterol 84 34 CM 90 CM 72 CM 68 CM 67 CM        Assessment & Plan    N18.31 Chronic kidney disease, stage 3a  M54.2 Cervicalgia  M47.812 Spondylosis without myelopathy or radiculopathy, cervical region  E11.9 Type 2 diabetes mellitus without complications  I10 Essential (primary) hypertension  I25.10 Atherosclerotic heart disease of native coronary artery without angina pectoris  M81.0 Age-related osteoporosis without current pathological fracture  Z60.3 Acculturation difficulty  Z79.84 Long term (current) use of oral hypoglycemic drugs    N18.31 CHRONIC KIDNEY DISEASE, STAGE 3A:  - Monitored recent lab results showing BUN of 15 and creatinine of 1.0, with some creatinine numbers less than 60, indicating potential kidney function issues.  - Assessed the patient's limited insight and understanding of medical issues due to age, cultural limitations, and possible illiteracy.  - Continued current medications including Nebivolol 5mg daily, losartan 100mg daily, and metformin 1000mg twice daily.  - Scheduled follow up in 4 to 6 months.    M54.2 CERVICALGIA:  - Ordered and reviewed XR Cervical Spine, revealing degenerative changes predominantly in C1 and C2 region, osteophytes at C3 and C4 level with disc calcification.  - Noted patient's complaint of right-sided neck pain shooting to the right temple, with no history of trauma or injury  and no visual disturbances.  - Physical exam showed complete range of motion of neck with no specifically localizable tenderness and no temporal tenderness.  - Referred the patient to physical therapy for neck pain.    M47.812 SPONDYLOSIS WITHOUT MYELOPATHY OR RADICULOPATHY, CERVICAL REGION:  - Referred the patient to physical therapy for cervical spondylosis.    E11.9 TYPE 2 DIABETES MELLITUS WITHOUT COMPLICATIONS:  - Assessed diabetes management with recent HbA1c of 7.6, consistently above target of 7.  - Increased glimepiride from 1mg to 2mg daily after explaining associated risks.  - Discussed dietary precautions following the patient's return from [facility].  - Considered guardianship options based on coverage and ability to pay for deductibles or co-payments.    I10 ESSENTIAL (PRIMARY) HYPERTENSION:  - Continued current medication regimen: Nebivolol 5mg daily, losartan 100mg daily, and isosorbide for hypertension.    I25.10 ATHEROSCLEROTIC HEART DISEASE OF NATIVE CORONARY ARTERY WITHOUT ANGINA PECTORIS:  - Noted that the patient follows up with cardiology intermittently for coronary artery disease.  - Continued current medication regimen: isosorbide, clopidogrel, and ???? ????? for coronary artery disease.    M81.0 AGE-RELATED OSTEOPOROSIS WITHOUT CURRENT PATHOLOGICAL FRACTURE:  - Considered treatment options for osteoporosis given recent bone density scan showing 9% decrease in hip region bone strength despite improvement in lumbar spine.  - Discussed Prolia treatment with the patient's son due to lack of improvement in bone density, especially in the hip region.  - Continued current medication: alendronate.  - Noted no reported falls.    Z60.3 ACCULTURATION DIFFICULTY:  - Evaluated the patient's language communication impairment, noting that she speaks only her native [place] language.  - Utilized translation through the patient's  and son, acknowledging some loss of nuance in understanding.  - Encouraged  family members to continue assisting the patient with communication and understanding of medical issues.         Plan:   Neck pain  Comments:  Pain and check cervical spine x-ray which shows some degenerative arthritis.  PT.  Orders:  -     X-Ray Cervical Spine AP And Lateral; Future; Expected date: 03/19/2025  -     Ambulatory referral/consult to Physical/Occupational Therapy; Future; Expected date: 04/02/2025    Type 2 diabetes mellitus without complication, without long-term current use of insulin  Comments:  Hemoglobin A1c is elevated at 7.6 and will increase glimepiride to 2 mg.  Orders:  -     Hemoglobin A1C; Future; Expected date: 03/20/2025  -     Microalbumin/Creatinine Ratio, Urine; Future; Expected date: 03/20/2025  -     glimepiride (AMARYL) 2 MG tablet; Take 1 tablet (2 mg total) by mouth daily with breakfast. TAKE 1 TABLET BY MOUTH BEFORE BREAKFAST (PLEASE  DISCONTINUE  JARDIANCE  BECAUSE  OF  NON  TOLERANCE.  TRIAL  OF  GLIMEPIRIDE  AT  1  MG)  Dispense: 90 tablet; Refill: 3    Gastroesophageal reflux disease without esophagitis  Comments:  Patient continues on pantoprazole and discussed about diet.    Essential hypertension  Comments:  Losartan 100 mg, nebivolol 5 mg and terazosin 1 mg at nighttime.  Avoid aggressive treatment  Orders:  -     Microalbumin/Creatinine Ratio, Urine; Future; Expected date: 03/20/2025    Mixed hyperlipidemia  Comments:  Tolerating rosuvastatin at 5 mg though she has been added complains which makes observation difficult  Orders:  -     Lipid Panel; Future; Expected date: 03/20/2025    Fatigue, unspecified type  Comments:  Nonspecific fatigue which fluctuates.  Or vitamin B12 supplementation.  Continue same.  Probably placebo effect.    Vitamin B 12 deficiency    Localized osteoporosis without current pathological fracture  Comments:  Patient's son would like her to have Prolia.  Order given.  Decrease in bone density in the hip region in spite of  bisphosphonates.  Orders:  -     calcium citrate-vitamin D3 315-200 mg (CALCIUM CITRATE + D) 315 mg-5 mcg (200 unit) per tablet; Take 1 tablet by mouth 2 (two) times daily.  Dispense: 60 tablet; Refill: 11    Language barrier  Comments:  Patient's son and  are moderate degree of translators with possibility of missing nuances of interaction and communication    Other orders  -     denosumab (PROLIA) injection 60 mg      Medical conditions of chronic nature noted.  Neck pain is the predominant symptoms the jour today and for which they seek definitive and positive and game change in treatment.  Make x-ray has been reviewed and does show some arthritic changes and will start with physical therapy and escalate to pain management/neurosurgical or spine evaluation when needed.  Patient's son requests definitive and aggressive treatment for osteoporosis at this point with Prolia and indeed the bone density has decreased in the hip region in spite of bisphosphonates for several years.  Other challenges of management including anxiety, communication barriers, lack of social interaction reviewed.  Appropriate immunizations discussed including RSV vaccination.  She is updated on other vaccinations.  Follow up in about 5 months (around 8/18/2025), or if symptoms worsen or fail to improve, for Diabetes/HTN/Lipids.    Current Medications[2]    This note was generated with the assistance of ambient listening technology. Verbal consent was obtained by the patient and accompanying visitor(s) for the recording of patient appointment to facilitate this note. I attest to having reviewed and edited the generated note for accuracy, though some syntax or spelling errors may persist. Please contact the author of this note for any clarification.      Pal Fong    Visit today included increased complexity associated with the care of the episodic problem Neck pain/osteoporosis addressed and managing the longitudinal care of the  patient due to the serious and/or complex managed problem(s) DM.HTN.Lipids.          [1]   Social History  Socioeconomic History    Marital status:      Spouse name: Luis West    Number of children: 3   Occupational History    Occupation:    Tobacco Use    Smoking status: Never    Smokeless tobacco: Never   Substance and Sexual Activity    Alcohol use: No    Drug use: No    Sexual activity: Not Currently     Partners: Male     Birth control/protection: Post-menopausal   Social History Narrative    Speaks G[place]phrathi only     Social Drivers of Health     Financial Resource Strain: Low Risk  (5/5/2022)    Overall Financial Resource Strain (CARDIA)     Difficulty of Paying Living Expenses: Not very hard   Food Insecurity: No Food Insecurity (5/5/2022)    Hunger Vital Sign     Worried About Running Out of Food in the Last Year: Never true     Ran Out of Food in the Last Year: Never true   Transportation Needs: No Transportation Needs (5/5/2022)    PRAPARE - Transportation     Lack of Transportation (Medical): No     Lack of Transportation (Non-Medical): No   Physical Activity: Insufficiently Active (5/5/2022)    Exercise Vital Sign     Days of Exercise per Week: 7 days     Minutes of Exercise per Session: 20 min   Stress: Stress Concern Present (5/5/2022)    [employer] - Occupational Stress Questionnaire     Feeling of Stress : To some extent   Housing Stability: Low Risk  (5/5/2022)    Housing Stability Vital Sign     Unable to Pay for Housing in the Last Year: No     Number of Places Lived in the Last Year: 1     Unstable Housing in the Last Year: No   [2]   Current Outpatient Medications:     alendronate (FOSAMAX) 70 MG tablet, Take 1 tablet (70 mg total) by mouth every 7 days. Take this medication once a week on an empty stomach with a cup of water and do not lie down for 30 minutes after taking med., Disp: 12 tablet, Rfl: 3    blood sugar diagnostic (TRUE METRIX  GLUCOSE TEST STRIP) Strp, Inject 1 strip into the skin once daily., Disp: 100 each, Rfl: 3    celecoxib (CELEBREX) 50 MG capsule, Take 1 capsule (50 mg total) by mouth 2 (two) times daily., Disp: 60 capsule, Rfl: 2    clopidogreL (PLAVIX) 75 mg tablet, Take 1 tablet (75 mg total) by mouth once daily., Disp: 30 tablet, Rfl: 11    cyanocobalamin 1,000 mcg/mL injection, Inject 1 mL (1,000 mcg total) into the muscle every 30 days., Disp: 1 mL, Rfl: 11    diclofenac sodium (VOLTAREN) 1 % Gel, Apply 2 g topically 2 (two) times daily. Apply to the knee joint left side., Disp: 100 g, Rfl: 2    fluticasone propionate (FLONASE) 50 mcg/actuation nasal spray, 1 spray (50 mcg total) by Each Nostril route once daily., Disp: 16 g, Rfl: 11    ibuprofen (ADVIL,MOTRIN) 400 MG tablet, Take 1 tablet (400 mg total) by mouth 3 (three) times daily as needed for Other (For right-sided headache). Take half to 1 tablet as needed, Disp: 20 tablet, Rfl: 1    isosorbide mononitrate (IMDUR) 30 MG 24 hr tablet, Take 1 tablet (30 mg total) by mouth once daily., Disp: 30 tablet, Rfl: 11    lancets (LANCETS,ULTRA THIN) 26 gauge Misc, 1 lancet  by Misc.(Non-Drug; Combo Route) route once daily., Disp: 100 each, Rfl: 0    losartan (COZAAR) 100 MG tablet, Take 1 tablet (100 mg total) by mouth once daily., Disp: 90 tablet, Rfl: 4    metFORMIN (GLUCOPHAGE) 1000 MG tablet, Take 1 tablet (1,000 mg total) by mouth 2 (two) times daily with meals., Disp: 180 tablet, Rfl: 3    nebivoloL (BYSTOLIC) 5 MG Tab, Take 1 tablet (5 mg total) by mouth once daily., Disp: 30 tablet, Rfl: 11    nystatin (MYCOSTATIN) cream, Apply topically 2 (two) times daily., Disp: 30 g, Rfl: 1    pantoprazole (PROTONIX) 40 MG tablet, Take 1 tablet by mouth once daily, Disp: 30 tablet, Rfl: 5    polyethylene glycol (GLYCOLAX) 17 gram/dose powder, DISSOLVE 17 GRAMS IN WATER AND DRINK ONCE DAILY, Disp: 510 g, Rfl: 5    rosuvastatin (CRESTOR) 5 MG tablet, Take 1 tablet (5 mg total) by mouth  every evening., Disp: 90 tablet, Rfl: 3    terazosin (HYTRIN) 1 MG capsule, Take 1 capsule (1 mg total) by mouth every evening., Disp: 90 capsule, Rfl: 3    triamcinolone acetonide 0.1% (KENALOG) 0.1 % cream, Apply topically 2 (two) times daily. Apply sparingly on areas of itching, Disp: 30 g, Rfl: 0    blood-glucose meter kit, One touch meter and matching lancets and strips., Disp: 1 each, Rfl: 0    calcium citrate-vitamin D3 315-200 mg (CALCIUM CITRATE + D) 315 mg-5 mcg (200 unit) per tablet, Take 1 tablet by mouth 2 (two) times daily., Disp: 60 tablet, Rfl: 11    glimepiride (AMARYL) 2 MG tablet, Take 1 tablet (2 mg total) by mouth daily with breakfast. TAKE 1 TABLET BY MOUTH BEFORE BREAKFAST (PLEASE  DISCONTINUE  JARDIANCE  BECAUSE  OF  NON  TOLERANCE.  TRIAL  OF  GLIMEPIRIDE  AT  1  MG), Disp: 90 tablet, Rfl: 3

## 2025-03-20 ENCOUNTER — LAB VISIT (OUTPATIENT)
Dept: LAB | Facility: HOSPITAL | Age: 75
End: 2025-03-20
Attending: INTERNAL MEDICINE
Payer: MEDICARE

## 2025-03-20 DIAGNOSIS — E11.9 TYPE 2 DIABETES MELLITUS WITHOUT COMPLICATION, WITHOUT LONG-TERM CURRENT USE OF INSULIN: ICD-10-CM

## 2025-03-20 DIAGNOSIS — I10 ESSENTIAL HYPERTENSION: Chronic | ICD-10-CM

## 2025-03-20 DIAGNOSIS — E78.2 MIXED HYPERLIPIDEMIA: Chronic | ICD-10-CM

## 2025-03-20 LAB
ALBUMIN/CREAT UR: NORMAL UG/MG (ref 0–30)
CHOLEST SERPL-MCNC: 122 MG/DL (ref 120–199)
CHOLEST/HDLC SERPL: 2 {RATIO} (ref 2–5)
CREAT UR-MCNC: 43.6 MG/DL (ref 15–325)
ESTIMATED AVG GLUCOSE: 154 MG/DL (ref 68–131)
HBA1C MFR BLD: 7 % (ref 4.5–6.2)
HDLC SERPL-MCNC: 60 MG/DL (ref 40–75)
HDLC SERPL: 49.2 % (ref 20–50)
LDLC SERPL CALC-MCNC: 48.2 MG/DL (ref 63–159)
MICROALBUMIN UR DL<=1MG/L-MCNC: <7 UG/ML
NONHDLC SERPL-MCNC: 62 MG/DL
TRIGL SERPL-MCNC: 69 MG/DL (ref 30–150)

## 2025-03-20 PROCEDURE — 80061 LIPID PANEL: CPT | Performed by: INTERNAL MEDICINE

## 2025-03-20 PROCEDURE — 82043 UR ALBUMIN QUANTITATIVE: CPT | Performed by: INTERNAL MEDICINE

## 2025-03-20 PROCEDURE — 83036 HEMOGLOBIN GLYCOSYLATED A1C: CPT | Performed by: INTERNAL MEDICINE

## 2025-03-24 ENCOUNTER — CLINICAL SUPPORT (OUTPATIENT)
Dept: REHABILITATION | Facility: HOSPITAL | Age: 75
End: 2025-03-24
Payer: MEDICARE

## 2025-03-24 DIAGNOSIS — R29.898 WEAKNESS OF BACK: ICD-10-CM

## 2025-03-24 DIAGNOSIS — M54.2 NECK PAIN: Chronic | ICD-10-CM

## 2025-03-24 DIAGNOSIS — M43.6 STIFFNESS OF NECK: ICD-10-CM

## 2025-03-24 DIAGNOSIS — Z73.89 PAIN SELF-MANAGEMENT DEFICIT: Primary | ICD-10-CM

## 2025-03-24 PROCEDURE — 97162 PT EVAL MOD COMPLEX 30 MIN: CPT | Mod: HCNC,PO

## 2025-03-24 PROCEDURE — 97530 THERAPEUTIC ACTIVITIES: CPT | Mod: HCNC,PO

## 2025-03-24 NOTE — PROGRESS NOTES
Outpatient Rehab    Physical Therapy Evaluation    Patient Name: Iesha West  MRN: [id number]  YOB: 1950  Encounter Date: 3/24/2025    Therapy Diagnosis:   Encounter Diagnoses   Name Primary?    Neck pain     Pain self-management deficit Yes    Stiffness of neck     Weakness of back      Physician: Pal Fong MD    Physician Orders: Eval and Treat  Medical Diagnosis: Neck pain    Visit # / Visits Authorized:  1 / 1  Insurance Authorization Period: 3/19/2025 to 3/19/2026  Date of Evaluation: 3/24/2025  Plan of Care Certification: 3/24/2025 to 06-     Time In: 1100   Time Out: 1200  Total Time: 60   Total Billable Time: 55    Intake Outcome Measure for FOTO Survey    Therapist reviewed FOTO scores for Iesha West on 3/24/2025.   FOTO report - see Media section or FOTO account episode details.     Intake Score: 44%      Precautions     Speaks Gujarati - use .   Standard and fall risk.      Subjective   History of Present Illness  Iesha is a 75 y.o. female who reports to physical therapy with a chief concern of r sided neck pain.     The patient reports a medical diagnosis of Neck pain   M54.2.  Patient reports a surgery of none.  Diagnostic tests related to this condition: X-ray.   X-Ray Details: Cervical spine, 03-: FINDINGS:  C1 through C7 are visualized on the lateral radiograph.  No acute displaced fracture is identified.  Vertebral body heights appear to be maintained.  There is somewhat diffusely decreased osseous mineralization which may indicate a component of osteopenia/osteoporosis.  The disc heights appear grossly within normal limits.  There appears to be arthropathy on the right at C1-C2, and degenerative change along the dens, partially obscured by obliquity of imaging.  Tiny anterior endplate osteophytes are present at C3-C4 with calcification along the disc.  The lateral masses are symmetric about the dens. The prevertebral soft tissues are normal and the  "lung apices are clear.    Dominant Hand: Right  History of Present Condition/Illness:  on Ipad used:  [place].  She was accompanied by her daughter.  Patient reports that she has had right sided neck pain, for ~1 year without precipitating event.  She indicates symptoms from behind her right ear to base of skull in distribution of the upper traps muscle.  She describes her symptoms as "pins of pain".  Her symptoms exacerbate with cervical rotation and/or flexion and decrease with topical rub such as Bengay and pressure from her pillow at night directly to the area. She states that her symptoms do not worsen with self care activities or IADLs, unless she has to look to her right or left.  She denies changes with her bowel or bladder associated with her neck pain.  She did not c/o interruption in her sleep.     Activities of Daily Living  Social history was obtained from Patient and Adult child.          Patient Responsibilities: Personal ADL    Previously independent with activities of daily living? Yes     Currently independent with activities of daily living? Yes              Pain     Patient reports a current pain level of 2/10. Pain at best is reported as 2/10. Pain at worst is reported as 6/10.   Location: base of skull behind her ear to midline and upper traps.  Intermittent c/o sxs to her R sh.  Clinical Progression (since onset): Worsening  Pain Qualities: Tightness, Needle-like  Pain-Relieving Factors: Heat, Ice, Medications - over-the-counter, Massage, Compression, Change in position  Pain-Aggravating Factors: Other (Comment)  Other Pain-Aggravating Factors: looking over her left or right shoulder         Treatment History  Treatments  Previously Received Treatments: No  Currently Receiving Treatments: No    Living Arrangements  Living Situation  Housing: Home independently  Living Arrangements: Family members  Support Systems: Family members    Home Setup  Type of Structure: House  Number of " Levels in Home: One level        Employment  Employment Status: Not employed          Past Medical History/Physical Systems Review:   Preet West  has a past medical history of Allergy, Anemia, Cancer, Diabetes mellitus, type 2, H/O colon cancer, stage III, History of colonic polyps, History of colonoscopy, Malignant neoplasm of transverse colon (2013) - Stage III, Normochromic normocytic anemia, Osteoporosis, S/P laparoscopic colectomy (3/2013), and Seasonal allergies.    Preet West  has a past surgical history that includes Colon surgery and Joint replacement.    Preet has a current medication list which includes the following prescription(s): alendronate, blood sugar diagnostic, blood-glucose meter, calcium citrate-vitamin d3 315-200 mg, celecoxib, clopidogrel, cyanocobalamin, diclofenac sodium, fluticasone propionate, glimepiride, ibuprofen, isosorbide mononitrate, lancets, losartan, metformin, nebivolol, nystatin, pantoprazole, polyethylene glycol, rosuvastatin, terazosin, and triamcinolone acetonide 0.1%.    Review of patient's allergies indicates:   Allergen Reactions    Tradjenta [linagliptin]      Itching and gastric discomfort > difficult to assess nature of side effect    Aspirin Itching    Opioids - morphine analogues Other (See Comments)     Confusion    Hydrochlorothiazide Other (See Comments)     Suspect Low sodium by hospital ist. Not confirmed    Jardiance [empagliflozin] Other (See Comments)     Caused burning sensation in the feet.        Objective   Posture                 Patient with resting head position of minimal right side bend, (+) right shoulder elevation.  Protracted and rounded shoulder bilateral. Increased thoracic kyphosis.    Cervical Thoracic Sensation            Patient denies paresthesias in her hands, h/o dropping items.        Spinal Muscle Palpation     (+) increased resting tone to right upper traps and lev. scapula.  She has decreased muscle bulk and  definition.       (+) increased resting tone to right upper traps and lev. scapula.  She has decreased muscle bulk and definition.        Subcranial Range of Motion   Active Restricted? Passive Restricted? Pain   Flexion         Protraction         Retraction Yes Yes       Cervical Range of Motion   Active (deg) Passive (deg) Pain   Flexion 55       Extension 47       Right Lateral Flexion 23       Right Rotation         Left Lateral Flexion 24       Left Rotation           Patient demonstrates < 50% anticipated rotation bilaterally    Shoulder Range of Motion     Shoulder, Elbow, or Forearm Range of Motion Details: Bilateral shoulder, elbow and wrist active range of motion is age appropriate and functional         Subcranial Strength   Strength Pain   Subcranial Flexion 5     Subcranial Retraction 3-         Cervical Strength   Strength Pain   Flexion (C1/C2) 4+     Extension 4+     Right Lateral Flexion (C3) 4 Yes   Left Lateral Flexion (C3) 4 Yes   Right Rotation 4 Yes   Left Rotation 4 Yes       Thoracic Trunk Strength  Scapular mm are fair with decreased stability with resisted shoulder ROM.  (+) medial winging, mild, bilat.    Shoulder Strength - Planes of Motion   Right Strength Right Pain Left Strength Left  Pain   Flexion 4   4     Extension 4   4     ABduction 4   4     ADduction 4   4     Horizontal ABduction           Horizontal ADduction           Internal Rotation 0° 4   4     Internal Rotation 90°           External Rotation 0° 4   4     External Rotation 90°               Elbow Strength   Right Strength Right Pain Left Strength Left  Pain   Flexion (C6) 5   5     Extension (C7) 4   4            Right  Strength  Right Hand Dynamometer Position: 2  Elbow Position Forearm Position Trial 1 (lbs) Trial 2  (lbs) Trial 3  (lbs) Average  (lbs) Pain   Flexed Neutral 30 22 20 24         Left  Strength  Left Hand Dynamometer Position: 2  Elbow Position Forearm Position Trial 1 (lbs) Trial 2 (lbs) Trial  3 (lbs) Average (lbs) Pain   Flexed Neutral 21 21 26 22.67                   Treatment:  Other Activities  Activity 1: Issued written HEP, with instruction, practice and review of cervical retraction, diagonals and scapular retraction/depression.  She was able to return demo with handout as guide and cueing from her daughter.    Time Entry(in minutes):  PT Evaluation (Moderate) Time Entry: 30  Therapeutic Activity Time Entry: 25    Assessment & Plan   Assessment  Iesha presents with a condition of Moderate complexity.   Presentation of Symptoms: Changing  Will Comorbidities Impact Care: Yes  NIDDM, osteoporosis, HTN, hyperlipidemia, h/o colon cancer, stage III,  GERD, h/o statin myopathy,     Functional Limitations: Activity tolerance, Completing self-care activities, Pain with ADLs/IADLs, Range of motion  Impairments: Abnormal or restricted range of motion, Activity intolerance, Impaired physical strength, Lack of appropriate home exercise program, Pain with functional activity  Personal Factors Affecting Prognosis: Pain, Language barrier    Patient Goal for Therapy (PT): Pain relief  Prognosis: Fair  Prognosis Details: Patient with a history of Osteoporosis and osteoarthritis.  Assessment Details: Patient presents with acute on chronic episode of right sided neck pain, increased right side resting muscle tone.  Her primary complaint of is increased pain with any activity that requires left and right rotation of her head.  She lacks effective home management.  She will benefit from skilled PT to establish home exercise program, local inflammation and symptoms management and strengthening and stability to cervical spine and scapular muscle.     Plan  From a physical therapy perspective, the patient would benefit from: Skilled Rehab Services    Planned therapy interventions include: Therapeutic exercise, Therapeutic activities, Neuromuscular re-education, Manual therapy, and ADLs/IADLs.    Planned modalities to  include: Cryotherapy (cold pack), Electrical stimulation - attended, Mechanical traction, and Ultrasound.        Visit Frequency: 2 times Per Week for 6 Weeks.       This plan was discussed with Patient, Family, and  present.   Discussion participants: Agreed Upon Plan of Care  Plan details: Next visit:  review home exercise program, initiate cervical active range of motion exercise, and scapular/shoulder strengthening exercise.  Modalities as indicated for local symptoms management.          Patient's spiritual, cultural, and educational needs considered and patient agreeable to plan of care and goals.           Goals:   Active       LTG x 12 visits       Patient to be independent with progressed, resistive home exercise program for long term self management of symptoms.        Start:  03/24/25    Expected End:  06/24/25            Patient to report decreased symptoms intensity, distribution and frequency of exacerbations         Start:  03/24/25    Expected End:  06/24/25            Patient to demonstrate improved scapular and cervical muscle  strength and stability by 1/3 MMT grades for improved local symptoms management         Start:  03/24/25    Expected End:  06/24/25            Patient to demonstrate functional improvement, evidenced by FOTO score of 56% or greater        Start:  03/24/25    Expected End:  06/24/25               STG x 6 visits       Patient will be independent with initial home exercise program to encourage independent management of their symptoms.         Start:  03/24/25    Expected End:  06/24/25            Patient will report a reduction in their worst pain score by 1-2 points for improved tolerance for self care activities that require cervical rotation        Start:  03/24/25    Expected End:  06/24/25            Patient to demonstrate improved postural awareness and self correction for self management of sxs        Start:  03/24/25    Expected End:  06/24/25             Patient to report increased ease of completion of basic IADLs 2' to utilizing self management techniques of postural correction and support.        Start:  03/24/25    Expected End:  06/24/25                Eileen Becker PT

## 2025-03-25 ENCOUNTER — TELEPHONE (OUTPATIENT)
Dept: FAMILY MEDICINE | Facility: CLINIC | Age: 75
End: 2025-03-25
Payer: MEDICARE

## 2025-03-25 ENCOUNTER — PATIENT MESSAGE (OUTPATIENT)
Dept: FAMILY MEDICINE | Facility: CLINIC | Age: 75
End: 2025-03-25
Payer: MEDICARE

## 2025-03-25 DIAGNOSIS — E11.9 TYPE 2 DIABETES MELLITUS WITHOUT COMPLICATION, WITHOUT LONG-TERM CURRENT USE OF INSULIN: Primary | ICD-10-CM

## 2025-03-25 DIAGNOSIS — M81.6 LOCALIZED OSTEOPOROSIS WITHOUT CURRENT PATHOLOGICAL FRACTURE: ICD-10-CM

## 2025-03-25 NOTE — TELEPHONE ENCOUNTER
----- Message from Kelechi sent at 3/21/2025  4:36 PM CDT -----  We received the Prolia scheduling referral, however there are no recent Calcium labs done within the last month.  Please sign a lab order and schedule patient for a lab appointment.   We will then schedule the Prolia injection after labs are completed. Thank you,Lamont ROMANO [facility]

## 2025-03-25 NOTE — TELEPHONE ENCOUNTER
Bmp pended  please sign     Type 2 diabetes mellitus without complication, without long-term current use of insulin  -     Basic Metabolic Panel; Future; Expected date: 03/25/2025    Localized osteoporosis without current pathological fracture  -     Basic Metabolic Panel; Future; Expected date: 03/25/2025

## 2025-03-26 ENCOUNTER — LAB VISIT (OUTPATIENT)
Dept: LAB | Facility: HOSPITAL | Age: 75
End: 2025-03-26
Attending: INTERNAL MEDICINE
Payer: MEDICARE

## 2025-03-26 DIAGNOSIS — M81.6 LOCALIZED OSTEOPOROSIS WITHOUT CURRENT PATHOLOGICAL FRACTURE: ICD-10-CM

## 2025-03-26 DIAGNOSIS — E11.9 TYPE 2 DIABETES MELLITUS WITHOUT COMPLICATION, WITHOUT LONG-TERM CURRENT USE OF INSULIN: ICD-10-CM

## 2025-03-26 LAB
ANION GAP (SMH): 6 MMOL/L (ref 8–16)
BUN SERPL-MCNC: 17 MG/DL (ref 8–23)
CALCIUM SERPL-MCNC: 8.8 MG/DL (ref 8.7–10.5)
CHLORIDE SERPL-SCNC: 105 MMOL/L (ref 95–110)
CO2 SERPL-SCNC: 24 MMOL/L (ref 23–29)
CREAT SERPL-MCNC: 0.9 MG/DL (ref 0.5–1.4)
GFR SERPLBLD CREATININE-BSD FMLA CKD-EPI: >60 ML/MIN/1.73/M2
GLUCOSE SERPL-MCNC: 113 MG/DL (ref 70–110)
POTASSIUM SERPL-SCNC: 4.4 MMOL/L (ref 3.5–5.1)
SODIUM SERPL-SCNC: 135 MMOL/L (ref 136–145)

## 2025-03-26 PROCEDURE — 36415 COLL VENOUS BLD VENIPUNCTURE: CPT | Mod: PO

## 2025-03-26 PROCEDURE — 82310 ASSAY OF CALCIUM: CPT

## 2025-03-28 DIAGNOSIS — I10 ESSENTIAL HYPERTENSION: Chronic | ICD-10-CM

## 2025-03-28 RX ORDER — LOSARTAN POTASSIUM 100 MG/1
100 TABLET ORAL
Qty: 90 TABLET | Refills: 3 | Status: SHIPPED | OUTPATIENT
Start: 2025-03-28

## 2025-03-28 NOTE — TELEPHONE ENCOUNTER
No care due was identified.  [facility] Embedded Care Due Messages. Reference number: [id number].   3/28/2025 8:48:29 AM CDT

## 2025-03-28 NOTE — TELEPHONE ENCOUNTER
Refill Decision Note   Preet West  is requesting a refill authorization.  Brief Assessment and Rationale for Refill:  Approve     Medication Therapy Plan:         Comments:     Note composed:12:08 PM 03/28/2025

## 2025-03-30 ENCOUNTER — RESULTS FOLLOW-UP (OUTPATIENT)
Dept: FAMILY MEDICINE | Facility: CLINIC | Age: 75
End: 2025-03-30

## 2025-03-30 DIAGNOSIS — E11.9 TYPE 2 DIABETES MELLITUS WITHOUT COMPLICATION, WITHOUT LONG-TERM CURRENT USE OF INSULIN: Chronic | ICD-10-CM

## 2025-03-30 NOTE — TELEPHONE ENCOUNTER
No care due was identified.  [facility] Embedded Care Due Messages. Reference number: [id number].   3/30/2025 12:14:37 PM CDT

## 2025-03-30 NOTE — TELEPHONE ENCOUNTER
No care due was identified.  [facility] Embedded Care Due Messages. Reference number: [id number].   3/30/2025 12:45:39 PM CDT

## 2025-03-31 ENCOUNTER — CLINICAL SUPPORT (OUTPATIENT)
Dept: REHABILITATION | Facility: HOSPITAL | Age: 75
End: 2025-03-31
Payer: MEDICARE

## 2025-03-31 DIAGNOSIS — M54.2 NECK PAIN: Primary | ICD-10-CM

## 2025-03-31 PROCEDURE — 97112 NEUROMUSCULAR REEDUCATION: CPT | Mod: HCNC,PO,CQ

## 2025-03-31 RX ORDER — METFORMIN HYDROCHLORIDE 1000 MG/1
1000 TABLET ORAL 2 TIMES DAILY WITH MEALS
Qty: 180 TABLET | Refills: 0 | OUTPATIENT
Start: 2025-03-31

## 2025-03-31 RX ORDER — METFORMIN HYDROCHLORIDE 1000 MG/1
1000 TABLET ORAL 2 TIMES DAILY WITH MEALS
Qty: 180 TABLET | Refills: 3 | Status: SHIPPED | OUTPATIENT
Start: 2025-03-31

## 2025-03-31 NOTE — TELEPHONE ENCOUNTER
Refill Decision Note   Preet West  is requesting a refill authorization.  Brief Assessment and Rationale for Refill:  Quick Discontinue     Medication Therapy Plan:  Receipt confirmed by pharmacy (3/31/2025  8:37 AM CDT)    Medication Reconciliation Completed: No   Comments:     No Care Gaps recommended.     Note composed:11:00 AM 03/31/2025

## 2025-03-31 NOTE — PROGRESS NOTES
Outpatient Rehab    Physical Therapy Visit    Patient Name: Iesha West  MRN: [id number]  YOB: 1950  Encounter Date: 3/31/2025    Therapy Diagnosis:   Encounter Diagnosis   Name Primary?    Neck pain Yes     Physician: Pal Fong MD    Physician Orders: Eval and Treat  Medical Diagnosis: Neck pain    Visit # / Visits Authorized:  1 / 20  Insurance Authorization Period: 3/20/2025 to 7/10/2025  Date of Evaluation: 3/24/25  Plan of Care Certification:  to      PT/PTA: PTA   Number of PTA visits since last PT visit:1  Time In: 1045   Time Out: 1130  Total Time: 45   Total Billable Time: 45    FOTO:  Intake Score:  %  Survey Score 1:  %  Survey Score 2:  %         Subjective   Pt reports pain in R upper trap region.  Pain reported as 6/10.      Objective            Treatment:  Balance/Neuromuscular Re-Education  NMR 1: cervical retraction ( pt had difficuty achieving this motion)  NMR 2: Cervical ext 20 x  NMR 3: scapular retractions 2 x 10  NMR 4: SNAGs bilaterally 5 sec hold 10 x  NMR 5: open books 5 sec hold 15 x each side  NMR 6: upper trap stretch R side only L side causes pain.    Time Entry(in minutes):  Neuromuscular Re-Education Time Entry: 45    Assessment & Plan   Assessment: Pt was able to complete all recommended TE but was limited by pain. She will continue to benefit from spinal mobility and postural correction exercises.       Patient will continue to benefit from skilled outpatient physical therapy to address the deficits listed in the problem list box on initial evaluation, provide pt/family education and to maximize pt's level of independence in the home and community environment.     Patient's spiritual, cultural, and educational needs considered and patient agreeable to plan of care and goals.           Plan: Progress as able    Goals:   Active       LTG x 12 visits       Patient to be independent with progressed, resistive home exercise program for long term self management of  symptoms.        Start:  03/24/25    Expected End:  06/24/25            Patient to report decreased symptoms intensity, distribution and frequency of exacerbations         Start:  03/24/25    Expected End:  06/24/25            Patient to demonstrate improved scapular and cervical muscle  strength and stability by 1/3 MMT grades for improved local symptoms management         Start:  03/24/25    Expected End:  06/24/25            Patient to demonstrate functional improvement, evidenced by FOTO score of 56% or greater        Start:  03/24/25    Expected End:  06/24/25               STG x 6 visits       Patient will be independent with initial home exercise program to encourage independent management of their symptoms.         Start:  03/24/25    Expected End:  06/24/25            Patient will report a reduction in their worst pain score by 1-2 points for improved tolerance for self care activities that require cervical rotation        Start:  03/24/25    Expected End:  06/24/25            Patient to demonstrate improved postural awareness and self correction for self management of sxs        Start:  03/24/25    Expected End:  06/24/25            Patient to report increased ease of completion of basic IADLs 2' to utilizing self management techniques of postural correction and support.        Start:  03/24/25    Expected End:  06/24/25                Kim Tobias, MACKENZIE

## 2025-04-02 ENCOUNTER — CLINICAL SUPPORT (OUTPATIENT)
Dept: REHABILITATION | Facility: HOSPITAL | Age: 75
End: 2025-04-02
Payer: MEDICARE

## 2025-04-02 DIAGNOSIS — R53.83 FATIGUE, UNSPECIFIED TYPE: ICD-10-CM

## 2025-04-02 DIAGNOSIS — M54.2 NECK PAIN: Primary | ICD-10-CM

## 2025-04-02 PROCEDURE — 97112 NEUROMUSCULAR REEDUCATION: CPT | Mod: HCNC,PO,CQ

## 2025-04-02 PROCEDURE — 97140 MANUAL THERAPY 1/> REGIONS: CPT | Mod: HCNC,PO,CQ

## 2025-04-02 NOTE — PROGRESS NOTES
Outpatient Rehab    Physical Therapy Visit    Patient Name: Iesha West  MRN: [id number]  YOB: 1950  Encounter Date: 4/2/2025    Therapy Diagnosis:   Encounter Diagnosis   Name Primary?    Neck pain Yes     Physician: Pal Fong MD    Physician Orders: Eval and Treat  Medical Diagnosis: Neck pain    Visit # / Visits Authorized:  2 / 20  Insurance Authorization Period: 3/20/2025 to 7/10/2025  Date of Evaluation: 3/24/25       PT/PTA: PTA   Number of PTA visits since last PT visit:2  Time In: 1000   Time Out: 1100  Total Time: 60   Total Billable Time: 60    FOTO:  Intake Score:  %  Survey Score 1:  %  Survey Score 2:  %         Subjective   Has a little pain now but tends to respond well to the exercises with decreased pain level.  Pain reported as 3/10.      Objective            Treatment:  Manual Therapy  MT 1: STM to cervical musculature including suboccitipal release  Balance/Neuromuscular Re-Education  NMR 1: cervical retraction ( pt had difficuty achieving this motion but slightly improved compared to previous visit)  NMR 2: Cervical ext 20 x  NMR 3: scapular retractions 2 x 10  NMR 4: SNAGs bilaterally 5 sec hold 10 x  NMR 5: open books 5 sec hold 15 x each side  NMR 6: upper trap stretch R side only L side causes pain.  NMR 7: Thread the needle 5 sec x 10  NMR 8: thoracic ext over back of chair 15 x  NMR 9: shrugs 15 x    Time Entry(in minutes):  Manual Therapy Time Entry: 15  Neuromuscular Re-Education Time Entry: 45    Assessment & Plan   Assessment: Pt was able to complete all recommended TE tolerating additional exercises for mobility and strength. She will continue to benefit from spinal mobility and postural correction exercises.       Patient will continue to benefit from skilled outpatient physical therapy to address the deficits listed in the problem list box on initial evaluation, provide pt/family education and to maximize pt's level of independence in the home and community  environment.     Patient's spiritual, cultural, and educational needs considered and patient agreeable to plan of care and goals.           Plan: Progress as able    Goals:   Active       LTG x 12 visits       Patient to be independent with progressed, resistive home exercise program for long term self management of symptoms.        Start:  03/24/25    Expected End:  06/24/25            Patient to report decreased symptoms intensity, distribution and frequency of exacerbations         Start:  03/24/25    Expected End:  06/24/25            Patient to demonstrate improved scapular and cervical muscle  strength and stability by 1/3 MMT grades for improved local symptoms management         Start:  03/24/25    Expected End:  06/24/25            Patient to demonstrate functional improvement, evidenced by FOTO score of 56% or greater        Start:  03/24/25    Expected End:  06/24/25               STG x 6 visits       Patient will be independent with initial home exercise program to encourage independent management of their symptoms.         Start:  03/24/25    Expected End:  06/24/25            Patient will report a reduction in their worst pain score by 1-2 points for improved tolerance for self care activities that require cervical rotation        Start:  03/24/25    Expected End:  06/24/25            Patient to demonstrate improved postural awareness and self correction for self management of sxs        Start:  03/24/25    Expected End:  06/24/25            Patient to report increased ease of completion of basic IADLs 2' to utilizing self management techniques of postural correction and support.        Start:  03/24/25    Expected End:  06/24/25                Kim Tobias, PTA

## 2025-04-03 RX ORDER — CYANOCOBALAMIN 1000 UG/ML
1000 INJECTION, SOLUTION INTRAMUSCULAR; SUBCUTANEOUS
Qty: 1 ML | Refills: 11 | Status: SHIPPED | OUTPATIENT
Start: 2025-04-03

## 2025-04-07 ENCOUNTER — CLINICAL SUPPORT (OUTPATIENT)
Dept: REHABILITATION | Facility: HOSPITAL | Age: 75
End: 2025-04-07
Payer: MEDICARE

## 2025-04-07 DIAGNOSIS — M43.6 STIFFNESS OF NECK: Chronic | ICD-10-CM

## 2025-04-07 DIAGNOSIS — R29.898 WEAKNESS OF BACK: ICD-10-CM

## 2025-04-07 DIAGNOSIS — Z73.89 PAIN SELF-MANAGEMENT DEFICIT: Primary | ICD-10-CM

## 2025-04-07 PROCEDURE — 97112 NEUROMUSCULAR REEDUCATION: CPT | Mod: HCNC,PO

## 2025-04-07 PROCEDURE — 97110 THERAPEUTIC EXERCISES: CPT | Mod: HCNC,PO

## 2025-04-07 NOTE — PROGRESS NOTES
Outpatient Rehab    Physical Therapy Visit    Patient Name: Iesha West  MRN: [id number]  YOB: 1950  Encounter Date: 4/7/2025    Therapy Diagnosis:   Encounter Diagnoses   Name Primary?    Pain self-management deficit Yes    Stiffness of neck     Weakness of back      Physician: Pal Fong MD    Physician Orders: Eval and Treat  Medical Diagnosis: Neck pain    Visit # / Visits Authorized:  3 / 20  Insurance Authorization Period: 3/20/2025 to 7/10/2025  Date of Evaluation: 3/24/2025  Plan of Care Certification: 3/24/2025 to 06-     PT/PTA:     Number of PTA visits since last PT visit:   Time In: 1100   Time Out: 1155  Total Time: 55   Total Billable Time:      FOTO:  Intake Score:  %  Survey Score 1:  %  Survey Score 2:  %         Subjective   Marco Antoniobarbra  utilized. Continues to have pain, indicating her right upper traps area.  She reports and increased pain level today, without precipitating event..  Family / care giver present for this visit:   Pain reported as 5/10. not qualified    Objective            Treatment:  Therapeutic Exercise  TE 1: UBEx 4 mins, CW/CCW  TE 2: seater bilat shoulder ER, lime tband, x 10  TE 3: Seated rows, lime tband, bilat x 10  Balance/Neuromuscular Re-Education  NMR 1: cervical retraction ( pt had difficuty achieving this motion but slightly improved compared to previous visit)  NMR 2: thoracic ext over back of chair 10 x  NMR 3: scapular retractions, 2 x 10.  Cueing for technique.  Initially she was completing PPT.  NMR 4: posterior shoulder rolling.  singnificant difficulty rolling her shoulders.  Substituted arching her back and slouching.  mc and vc.  NMR 5: seated open books, with visual tracking, left and right, 10 x each side  NMR 6: upper trap stretch R side, r hand holding edge of chair, l hand to provide overpressure.   3 x 30 sec.  Modalities  Moist Heat (min): x 5 minutes to bilat upper traps and sh after therex.    Time Entry(in  minutes):  Neuromuscular Re-Education Time Entry: 25  Therapeutic Exercise Time Entry: 20    Assessment & Plan   Assessment: Pt was able to complete all recommended TE for mobility and strength. She has considerable difficulty with bilat shoulder depression and retraction and she tends to initiate all upper body movements with bilat shoulder elevation. She will continue to benefit from spinal mobility and postural correction exercises.  Pain level had decreased after PT visit.  Evaluation/Treatment Tolerance: Patient tolerated treatment well    Patient will continue to benefit from skilled outpatient physical therapy to address the deficits listed in the problem list box on initial evaluation, provide pt/family education and to maximize pt's level of independence in the home and community environment.     Patient's spiritual, cultural, and educational needs considered and patient agreeable to plan of care and goals.           Plan: Progress as able.  Emphasize decreased shoulder elevation at rest and as substitute for other postural mm.    Goals:   Active       LTG x 12 visits       Patient to be independent with progressed, resistive home exercise program for long term self management of symptoms.  (Progressing)       Start:  03/24/25    Expected End:  06/24/25            Patient to report decreased symptoms intensity, distribution and frequency of exacerbations   (Progressing)       Start:  03/24/25    Expected End:  06/24/25            Patient to demonstrate improved scapular and cervical muscle  strength and stability by 1/3 MMT grades for improved local symptoms management   (Progressing)       Start:  03/24/25    Expected End:  06/24/25            Patient to demonstrate functional improvement, evidenced by FOTO score of 56% or greater  (Progressing)       Start:  03/24/25    Expected End:  06/24/25               STG x 6 visits       Patient will be independent with initial home exercise program to encourage  independent management of their symptoms.   (Met)       Start:  03/24/25    Expected End:  06/24/25    Resolved:  04/07/25         Patient will report a reduction in their worst pain score by 1-2 points for improved tolerance for self care activities that require cervical rotation  (Progressing)       Start:  03/24/25    Expected End:  06/24/25            Patient to demonstrate improved postural awareness and self correction for self management of sxs  (Progressing)       Start:  03/24/25    Expected End:  06/24/25            Patient to report increased ease of completion of basic IADLs 2' to utilizing self management techniques of postural correction and support.  (Progressing)       Start:  03/24/25    Expected End:  06/24/25                Eileen Becker PT CLT

## 2025-04-09 ENCOUNTER — CLINICAL SUPPORT (OUTPATIENT)
Dept: REHABILITATION | Facility: HOSPITAL | Age: 75
End: 2025-04-09
Payer: MEDICARE

## 2025-04-09 DIAGNOSIS — M43.6 STIFFNESS OF NECK: Primary | ICD-10-CM

## 2025-04-09 DIAGNOSIS — M54.2 NECK PAIN: ICD-10-CM

## 2025-04-09 PROCEDURE — 97110 THERAPEUTIC EXERCISES: CPT | Mod: HCNC,PO,CQ

## 2025-04-09 PROCEDURE — 97112 NEUROMUSCULAR REEDUCATION: CPT | Mod: HCNC,PO,CQ

## 2025-04-09 NOTE — PROGRESS NOTES
Outpatient Rehab    Physical Therapy Visit    Patient Name: Iesha West  MRN: [id number]  YOB: 1950  Encounter Date: 4/9/2025    Therapy Diagnosis:   Encounter Diagnoses   Name Primary?    Stiffness of neck Yes    Neck pain      Physician: Pal Fong MD    Physician Orders: Eval and Treat  Medical Diagnosis: Neck pain    Visit # / Visits Authorized:  4 / 20  Insurance Authorization Period: 3/20/2025 to 7/10/2025  Date of Evaluation: 3/24/2025  Plan of Care Certification: 3/24/2025 to 06-     PT/PTA: PTA   Number of PTA visits since last PT visit:1  Time In: 1130   Time Out: 1215  Total Time: 45   Total Billable Time: 45    FOTO:  Intake Score:  %  Survey Score 1:  %  Survey Score 2:  %         Subjective   Jalyn  utilized. Continues to have pain, indicating her right upper traps area.  She reports decreased pain with exercise.  Pain reported as 3/10.      Objective            Treatment:  Therapeutic Exercise  TE 2: seater bilat shoulder ER, lime tband, x 10  TE 3: Seated rows, lime tband, bilat x 10  Balance/Neuromuscular Re-Education  NMR 1: cervical retraction ( pt had difficuty achieving this motion but slightly improved compared to previous visit)  NMR 2: thoracic ext over back of chair 10 x  NMR 3: scapular retractions, 2 x 10.  Cueing for technique.  Initially she was completing PPT.  NMR 4: posterior shoulder rolling.  singnificant difficulty rolling her shoulders.  Substituted arching her back and slouching.  mc and vc.  NMR 5: seated open books, with visual tracking, left and right, 10 x each side  NMR 6: upper trap stretch R side, r hand holding edge of chair, l hand to provide overpressure.   3 x 30 sec.  NMR 7: Thread the needle 5 sec x 10    Time Entry(in minutes):  Neuromuscular Re-Education Time Entry: 35  Therapeutic Activity Time Entry: 8    Assessment & Plan   Assessment: Pt continues to perform exercises well and is clearly compliant with HEP. She reports  waking up with the pain so she was encouraged to try using a towel roll in her pillowcase for increased cervical support to see if this decreases pain upon waking up.       Patient will continue to benefit from skilled outpatient physical therapy to address the deficits listed in the problem list box on initial evaluation, provide pt/family education and to maximize pt's level of independence in the home and community environment.     Patient's spiritual, cultural, and educational needs considered and patient agreeable to plan of care and goals.           Plan: Progress as able.  Emphasize decreased shoulder elevation at rest and as substitute for other postural mm.    Goals:   Active       LTG x 12 visits       Patient to be independent with progressed, resistive home exercise program for long term self management of symptoms.  (Progressing)       Start:  03/24/25    Expected End:  06/24/25            Patient to report decreased symptoms intensity, distribution and frequency of exacerbations   (Progressing)       Start:  03/24/25    Expected End:  06/24/25            Patient to demonstrate improved scapular and cervical muscle  strength and stability by 1/3 MMT grades for improved local symptoms management   (Progressing)       Start:  03/24/25    Expected End:  06/24/25            Patient to demonstrate functional improvement, evidenced by FOTO score of 56% or greater  (Progressing)       Start:  03/24/25    Expected End:  06/24/25               STG x 6 visits       Patient will be independent with initial home exercise program to encourage independent management of their symptoms.   (Met)       Start:  03/24/25    Expected End:  06/24/25    Resolved:  04/07/25         Patient will report a reduction in their worst pain score by 1-2 points for improved tolerance for self care activities that require cervical rotation  (Progressing)       Start:  03/24/25    Expected End:  06/24/25            Patient to demonstrate  improved postural awareness and self correction for self management of sxs  (Progressing)       Start:  03/24/25    Expected End:  06/24/25            Patient to report increased ease of completion of basic IADLs 2' to utilizing self management techniques of postural correction and support.  (Progressing)       Start:  03/24/25    Expected End:  06/24/25                Kim Tobias, PTA

## 2025-04-10 ENCOUNTER — INFUSION (OUTPATIENT)
Dept: INFUSION THERAPY | Facility: HOSPITAL | Age: 75
End: 2025-04-10
Attending: INTERNAL MEDICINE
Payer: MEDICARE

## 2025-04-10 VITALS
HEIGHT: 62 IN | BODY MASS INDEX: 24.54 KG/M2 | RESPIRATION RATE: 17 BRPM | WEIGHT: 133.38 LBS | HEART RATE: 72 BPM | DIASTOLIC BLOOD PRESSURE: 80 MMHG | TEMPERATURE: 98 F | OXYGEN SATURATION: 100 % | SYSTOLIC BLOOD PRESSURE: 152 MMHG

## 2025-04-10 DIAGNOSIS — M81.0 AGE-RELATED OSTEOPOROSIS WITHOUT CURRENT PATHOLOGICAL FRACTURE: Primary | ICD-10-CM

## 2025-04-10 PROCEDURE — 63600175 PHARM REV CODE 636 W HCPCS: Mod: JZ,TB | Performed by: INTERNAL MEDICINE

## 2025-04-10 PROCEDURE — 96372 THER/PROPH/DIAG INJ SC/IM: CPT

## 2025-04-10 RX ADMIN — DENOSUMAB 60 MG: 60 INJECTION SUBCUTANEOUS at 03:04

## 2025-04-10 NOTE — PLAN OF CARE
Problem: Fatigue  Goal: Improved Activity Tolerance  Intervention: Promote Improved Energy  Flowsheets (Taken 4/10/2025 1541)  Activity Management: Ambulated -L4

## 2025-04-10 NOTE — PLAN OF CARE
Problem: Fatigue  Goal: Improved Activity Tolerance  Intervention: Promote Improved Energy  Flowsheets (Taken 4/10/2025 1544)  Activity Management: Ambulated -L4

## 2025-04-16 ENCOUNTER — CLINICAL SUPPORT (OUTPATIENT)
Dept: REHABILITATION | Facility: HOSPITAL | Age: 75
End: 2025-04-16
Payer: MEDICARE

## 2025-04-16 DIAGNOSIS — M43.6 STIFFNESS OF NECK: Chronic | ICD-10-CM

## 2025-04-16 DIAGNOSIS — R29.898 WEAKNESS OF BACK: ICD-10-CM

## 2025-04-16 DIAGNOSIS — Z73.89 PAIN SELF-MANAGEMENT DEFICIT: Primary | ICD-10-CM

## 2025-04-16 PROCEDURE — 97110 THERAPEUTIC EXERCISES: CPT | Mod: HCNC,PO

## 2025-04-16 PROCEDURE — 97112 NEUROMUSCULAR REEDUCATION: CPT | Mod: HCNC,PO

## 2025-04-16 NOTE — PROGRESS NOTES
Outpatient Rehab    Physical Therapy Visit    Patient Name: Iesha West  MRN: [id number]  YOB: 1950  Encounter Date: 4/16/2025    Therapy Diagnosis:   Encounter Diagnoses   Name Primary?    Pain self-management deficit Yes    Stiffness of neck     Weakness of back      Physician: Pal Fong MD    Physician Orders: Eval and Treat  Medical Diagnosis: Neck pain    Visit # / Visits Authorized:  5 / 20  Insurance Authorization Period: 3/20/2025 to 7/10/2025  Date of Evaluation: 3/24/2025  Plan of Care Certification: 3/24/2025 to 06-     PT/PTA:     Number of PTA visits since last PT visit:   Time In: 1000   Time Out: 1100  Total Time: 60   Total Billable Time:      FOTO:  Intake Score:  %  Survey Score 1:  %  Survey Score 2:  %    Precautions     Speaks Gujarati - use .   Standard and fall risk.      Subjective   Guranjati  utilized. Continues to have pain, indicating her right upper traps area.  She reports decreased pain with exercise..  Family / care giver present for this visit:   Pain reported as 3/10. not qualified    Objective            Treatment:  Therapeutic Exercise  TE 1: UBEx 4 mins, CW/CCW  TE 2: seated bilat shoulder ER, lime tband, x 10  TE 3: Seated rows, lime tband, bilat x 10  Balance/Neuromuscular Re-Education  NMR 1: cervical retraction ( pt had difficuty achieving this motion but slightly improved compared to previous visit)  multile reps, w mc and vc for technique.  she continues to have difficutly with isolating this movement, and completing wo shoulder elevation a/o cervical extension.  Pood NM control and coordination persists.  NMR 2: thoracic ext over back of chair 10 x  NMR 3: scapular retractions, 2 x 10.  Cueing for technique.  She continues to substitute PPT.  NMR 4: posterior shoulder rolling.  singnificant difficulty rolling her shoulders.  Substituted arching her back and slouching.  mc and vc.  NMR 5: seated open books, with visual  tracking, left and right, 10 x each side.  Ongoing cueing for proper technique.  NMR 6: upper trap stretch R side, r hand holding edge of chair, l hand to provide overpressure.   3 x 30 sec.  NMR 8: thoracic ext over back of chair 15 x    Time Entry(in minutes):  Neuromuscular Re-Education Time Entry: 40  Therapeutic Activity Time Entry: 10    Assessment & Plan   Assessment: She reports decreased morning pain when using towel roll at her neck.  Her husbnad is supportive and was able to assist patient with cueing to complete therex using appropriate technique.  She has poor postural habits that contribute to her pain and difficulty isolating movements, cervical and scapular retraction wo mm substitution to mechanically treat her sxs.  Evaluation/Treatment Tolerance: Patient tolerated treatment well    Patient will continue to benefit from skilled outpatient physical therapy to address the deficits listed in the problem list box on initial evaluation, provide pt/family education and to maximize pt's level of independence in the home and community environment.     Patient's spiritual, cultural, and educational needs considered and patient agreeable to plan of care and goals.           Plan: Progress as able.  Emphasize decreased shoulder elevation at rest and as substitute for other postural mm.    Goals:   Active       LTG x 12 visits       Patient to be independent with progressed, resistive home exercise program for long term self management of symptoms.  (Progressing)       Start:  03/24/25    Expected End:  06/24/25            Patient to report decreased symptoms intensity, distribution and frequency of exacerbations   (Progressing)       Start:  03/24/25    Expected End:  06/24/25            Patient to demonstrate improved scapular and cervical muscle  strength and stability by 1/3 MMT grades for improved local symptoms management   (Progressing)       Start:  03/24/25    Expected End:  06/24/25             Patient to demonstrate functional improvement, evidenced by FOTO score of 56% or greater  (Progressing)       Start:  03/24/25    Expected End:  06/24/25               STG x 6 visits       Patient will be independent with initial home exercise program to encourage independent management of their symptoms.   (Met)       Start:  03/24/25    Expected End:  06/24/25    Resolved:  04/07/25         Patient will report a reduction in their worst pain score by 1-2 points for improved tolerance for self care activities that require cervical rotation  (Progressing)       Start:  03/24/25    Expected End:  06/24/25            Patient to demonstrate improved postural awareness and self correction for self management of sxs  (Progressing)       Start:  03/24/25    Expected End:  06/24/25            Patient to report increased ease of completion of basic IADLs 2' to utilizing self management techniques of postural correction and support.  (Progressing)       Start:  03/24/25    Expected End:  06/24/25                Eileen Becker PT CLT

## 2025-04-21 PROBLEM — M25.562 CHRONIC PAIN OF LEFT KNEE: Status: RESOLVED | Noted: 2023-07-21 | Resolved: 2025-04-21

## 2025-04-21 PROBLEM — M62.81 MUSCLE WEAKNESS OF LOWER EXTREMITY: Status: RESOLVED | Noted: 2023-07-21 | Resolved: 2025-04-21

## 2025-04-21 PROBLEM — M54.9 ACUTE BILATERAL BACK PAIN: Status: RESOLVED | Noted: 2023-07-21 | Resolved: 2025-04-21

## 2025-04-21 PROBLEM — G89.29 CHRONIC PAIN OF LEFT KNEE: Status: RESOLVED | Noted: 2023-07-21 | Resolved: 2025-04-21

## 2025-04-22 ENCOUNTER — CLINICAL SUPPORT (OUTPATIENT)
Dept: REHABILITATION | Facility: HOSPITAL | Age: 75
End: 2025-04-22
Payer: MEDICARE

## 2025-04-22 DIAGNOSIS — M54.2 NECK PAIN: Primary | ICD-10-CM

## 2025-04-22 PROCEDURE — 97110 THERAPEUTIC EXERCISES: CPT | Mod: HCNC,PO,CQ

## 2025-04-22 PROCEDURE — 97112 NEUROMUSCULAR REEDUCATION: CPT | Mod: HCNC,PO,CQ

## 2025-04-22 NOTE — PROGRESS NOTES
Outpatient Rehab    Physical Therapy Visit    Patient Name: Iesha West  MRN: [id number]  YOB: 1950  Encounter Date: 4/22/2025    Therapy Diagnosis:   Encounter Diagnosis   Name Primary?    Neck pain Yes     Physician: Pal Fong MD    Physician Orders: Eval and Treat  Medical Diagnosis: Neck pain    Visit # / Visits Authorized:  6 / 20  Insurance Authorization Period: 3/20/2025 to 7/10/2025  Date of Evaluation: 3/24/2025  Plan of Care Certification: 3/24/2025 to 06-     PT/PTA: PTA   Number of PTA visits since last PT visit:1  Time In: 1100   Time Out: 1145  Total Time: 45   Total Billable Time: 45    FOTO:  Intake Score:  %  Survey Score 1:  %  Survey Score 2:  %         Subjective   [facility]  utilized. Continues to have pain, indicating her right upper traps area.  She reports decreased of pain intensity and frequency since starting PT. She reports pain used to reach to the top of her head now it reaches occiput..  Pain reported as 2/10.      Objective            Treatment:  Therapeutic Exercise  TE 1: UBEx 4 mins, CW/CCW  TE 2: seated bilat shoulder ER, lime tband, x 10  TE 3: standing rows performed at CC with 20#  Balance/Neuromuscular Re-Education  NMR 1: cervical retraction ( pt had difficuty achieving this motion but slightly improved compared to previous visit)  multile reps, w mc and vc for technique.  she continues to have difficutly with isolating this movement, and completing wo shoulder elevation a/o cervical extension.  Pood NM control and coordination persists but is improving  NMR 2: thoracic ext over back of chair 10 x  NMR 3: scapular retractions, 2 x 10.  Cueing for technique.  She continues to substitute PPT.  NMR 5: sidelying open books with red theraband and and visual tracking 15 x each side  NMR 8: thoracic ext over back of chair 15 x  NMR 9: shrugs 2 x 10 with 4# weitghts performed standing    Time Entry(in minutes):  Neuromuscular Re-Education Time  Entry: 30  Therapeutic Exercise Time Entry: 15    Assessment & Plan   Assessment: Pt is making progress in therapy with improving coordination and reducing pain levels. Pt is now able to achieve some cervical retraction with excellent cervical ext. She continues to present with poor posture and scapular coordination but even this is improving with appropriate cuing. Pt is expected to continue making strength and coordination gains with skilled intervention.       Patient will continue to benefit from skilled outpatient physical therapy to address the deficits listed in the problem list box on initial evaluation, provide pt/family education and to maximize pt's level of independence in the home and community environment.     Patient's spiritual, cultural, and educational needs considered and patient agreeable to plan of care and goals.           Plan: Continue POC with focus on postural corrections.    Goals:   Active       LTG x 12 visits       Patient to be independent with progressed, resistive home exercise program for long term self management of symptoms.  (Progressing)       Start:  03/24/25    Expected End:  06/24/25            Patient to report decreased symptoms intensity, distribution and frequency of exacerbations   (Progressing)       Start:  03/24/25    Expected End:  06/24/25            Patient to demonstrate improved scapular and cervical muscle  strength and stability by 1/3 MMT grades for improved local symptoms management   (Progressing)       Start:  03/24/25    Expected End:  06/24/25            Patient to demonstrate functional improvement, evidenced by FOTO score of 56% or greater  (Progressing)       Start:  03/24/25    Expected End:  06/24/25               STG x 6 visits       Patient will be independent with initial home exercise program to encourage independent management of their symptoms.   (Met)       Start:  03/24/25    Expected End:  06/24/25    Resolved:  04/07/25         Patient will  report a reduction in their worst pain score by 1-2 points for improved tolerance for self care activities that require cervical rotation  (Progressing)       Start:  03/24/25    Expected End:  06/24/25            Patient to demonstrate improved postural awareness and self correction for self management of sxs  (Progressing)       Start:  03/24/25    Expected End:  06/24/25            Patient to report increased ease of completion of basic IADLs 2' to utilizing self management techniques of postural correction and support.  (Progressing)       Start:  03/24/25    Expected End:  06/24/25                Kim Tobias, PTA

## 2025-04-24 ENCOUNTER — CLINICAL SUPPORT (OUTPATIENT)
Dept: REHABILITATION | Facility: HOSPITAL | Age: 75
End: 2025-04-24
Payer: MEDICARE

## 2025-04-24 DIAGNOSIS — R29.898 WEAKNESS OF BACK: ICD-10-CM

## 2025-04-24 DIAGNOSIS — M54.2 NECK PAIN: Primary | ICD-10-CM

## 2025-04-24 DIAGNOSIS — M43.6 STIFFNESS OF NECK: ICD-10-CM

## 2025-04-24 PROCEDURE — 97112 NEUROMUSCULAR REEDUCATION: CPT | Mod: HCNC,PO,CQ

## 2025-04-24 PROCEDURE — 97110 THERAPEUTIC EXERCISES: CPT | Mod: HCNC,PO,CQ

## 2025-04-24 NOTE — PROGRESS NOTES
Outpatient Rehab    Physical Therapy Visit    Patient Name: Iesha West  MRN: [id number]  YOB: 1950  Encounter Date: 4/24/2025    Therapy Diagnosis:   Encounter Diagnoses   Name Primary?    Neck pain Yes    Stiffness of neck     Weakness of back      Physician: Pal Fong MD    Physician Orders: Eval and Treat  Medical Diagnosis: Neck pain    Visit # / Visits Authorized:  7 / 20  Insurance Authorization Period: 3/20/2025 to 7/10/2025  Date of Evaluation: 3/24/2025  Plan of Care Certification: 3/24/2025 to 06-     PT/PTA: PTA   Number of PTA visits since last PT visit:2  Time In: 1040   Time Out: 1135  Total Time: 55   Total Billable Time: 53    FOTO:  Intake Score:  %  Survey Score 1:  %  Survey Score 2:  %         Subjective   Marco Antoniojati  utilized. Reports consistent reduction of pain..         Objective            Treatment:  Therapeutic Exercise  TE 1: UBEx 4 mins, CW/CCW (not today)  TE 2: seated bilat shoulder ER, lime tband, x 10  TE 3: standing rows performed at  with 20#  Balance/Neuromuscular Re-Education  NMR 1: cervical retraction with ext 20 x  NMR 2: thoracic ext over back of chair 10 x  NMR 3: scapular retractions, 2 x 10.  Cueing for technique.  She continues to substitute PPT.  NMR 5: sidelying open books with red theraband and and visual tracking 15 x each side  NMR 6: aldo wings stading at wall cues to maintain contact with wall throughout motion  NMR 7: B+ shoulder scation tapping wall at end range. Cued to maintain elbow ext 20 x OTB added to last 10 reps but not issued with resistance for HEP update  NMR 8: Horizontal B+ shoulder abd OTB 2 x 10  NMR 9: shrugs 2 x 10 with 4# weitghts performed standing    Time Entry(in minutes):  Neuromuscular Re-Education Time Entry: 40  Therapeutic Exercise Time Entry: 13    Assessment & Plan   Assessment: Today's treatment focused on strengthenig posterior shoulder and periscapular musculature for improved postural  endurance. Pt was able to return good demonstration of all recommended TE so HEP was updated.  Evaluation/Treatment Tolerance: Patient tolerated treatment well    Patient will continue to benefit from skilled outpatient physical therapy to address the deficits listed in the problem list box on initial evaluation, provide pt/family education and to maximize pt's level of independence in the home and community environment.     Patient's spiritual, cultural, and educational needs considered and patient agreeable to plan of care and goals.           Plan: Continue POC with focus on postural corrections.    Goals:   Active       LTG x 12 visits       Patient to be independent with progressed, resistive home exercise program for long term self management of symptoms.  (Progressing)       Start:  03/24/25    Expected End:  06/24/25            Patient to report decreased symptoms intensity, distribution and frequency of exacerbations   (Progressing)       Start:  03/24/25    Expected End:  06/24/25            Patient to demonstrate improved scapular and cervical muscle  strength and stability by 1/3 MMT grades for improved local symptoms management   (Progressing)       Start:  03/24/25    Expected End:  06/24/25            Patient to demonstrate functional improvement, evidenced by FOTO score of 56% or greater  (Progressing)       Start:  03/24/25    Expected End:  06/24/25               STG x 6 visits       Patient will be independent with initial home exercise program to encourage independent management of their symptoms.   (Met)       Start:  03/24/25    Expected End:  06/24/25    Resolved:  04/07/25         Patient will report a reduction in their worst pain score by 1-2 points for improved tolerance for self care activities that require cervical rotation  (Progressing)       Start:  03/24/25    Expected End:  06/24/25            Patient to demonstrate improved postural awareness and self correction for self management  of sxs  (Progressing)       Start:  03/24/25    Expected End:  06/24/25            Patient to report increased ease of completion of basic IADLs 2' to utilizing self management techniques of postural correction and support.  (Progressing)       Start:  03/24/25    Expected End:  06/24/25                Kim Tobias, PTA

## 2025-04-28 ENCOUNTER — CLINICAL SUPPORT (OUTPATIENT)
Dept: REHABILITATION | Facility: HOSPITAL | Age: 75
End: 2025-04-28
Payer: MEDICARE

## 2025-04-28 DIAGNOSIS — R29.898 WEAKNESS OF BACK: ICD-10-CM

## 2025-04-28 DIAGNOSIS — Z73.89 PAIN SELF-MANAGEMENT DEFICIT: Primary | ICD-10-CM

## 2025-04-28 DIAGNOSIS — M43.6 STIFFNESS OF NECK: Chronic | ICD-10-CM

## 2025-04-28 PROCEDURE — 97110 THERAPEUTIC EXERCISES: CPT | Mod: HCNC,PO

## 2025-04-28 PROCEDURE — 97112 NEUROMUSCULAR REEDUCATION: CPT | Mod: HCNC,PO

## 2025-04-28 NOTE — PROGRESS NOTES
Outpatient Rehab    Physical Therapy Progress Note    Patient Name: Iesha West  MRN: [id number]  YOB: 1950  Encounter Date: 4/28/2025    Therapy Diagnosis:   Encounter Diagnoses   Name Primary?    Pain self-management deficit Yes    Stiffness of neck     Weakness of back      Physician: Pal Fong MD    Physician Orders: Eval and Treat  Medical Diagnosis: Neck pain    Visit # / Visits Authorized:  8 / 20  Insurance Authorization Period: 3/20/2025 to 7/10/2025  Date of Evaluation: 3/24/2025  Reassessment 04-  Plan of Care Certification: 3/24/2025 to 06-     PT/PTA:     Number of PTA visits since last PT visit:   Time In: 1100   Time Out: 1155  Total Time: 55   Total Billable Time:      FOTO:  Intake Score:  %  Survey Score 1:  %  Survey Score 2:  %    Precautions     Speaks Gujarati - use .   Standard and fall risk.      Subjective   Guranjati  utilized. She states that pain has improved.  Pain is intermittent and occurs with sleeping without cervical support.  She continues to have cervical pain with end range rotation, is intermittent.  Her max pain averages 3/10, is less frequent and she is better able to manage her pain with techniques learned in PT..  Pain reported as 2/10. not qualified    Objective           Treatment:  Therapeutic Exercise  TE 1: UBE x 4 mins ea, CW/CCW  TE 2: seated bilat shoulder ER, lime tband, x 15 x 2  TE 3: seated rows, bilat with lime tband, x 15, x 2  TE 4: seated scaption, left and right, lime tband x 15 ea  Balance/Neuromuscular Re-Education  NMR 1: cervical retraction with ext 20 x  NMR 2: thoracic ext over back of chair 10 x  NMR 3: scapular retractions, 2 x 10.  Cueing for technique.  She continues to substitute PPT.  NMR 4: posterior shoulder rolling w significant difficulty rolling her shoulders.  Substituted arching her back and slouching.  mc and vc.  NMR 5: standing open books with visual tracking 15 x each side  NMR 6:  wall pushups x 15    Time Entry(in minutes):  Neuromuscular Re-Education Time Entry: 40  Therapeutic Exercise Time Entry: 15    Assessment & Plan   Assessment: Today's treatment again  focused on strengthening posterior shoulder and periscapular musculature for improved postural endurance. Pt demonstrated improved postural control and less substitution of shoulder lelvation for initiation of UE movements.   She has met STG's and LTGs and progressing towards remaining goals attainment.  Evaluation/Treatment Tolerance: Patient tolerated treatment well    Patient will continue to benefit from skilled outpatient physical therapy to address the deficits listed in the problem list box on initial evaluation, provide pt/family education and to maximize pt's level of independence in the home and community environment.     Patient's spiritual, cultural, and educational needs considered and patient agreeable to plan of care and goals.           Plan: Continue POC with focus on postural corrections.    Goals:   Active       LTG x 12 visits       Patient to be independent with progressed, resistive home exercise program for long term self management of symptoms.  (Progressing)       Start:  03/24/25    Expected End:  06/24/25            Patient to report decreased symptoms intensity, distribution and frequency of exacerbations   (Met)       Start:  03/24/25    Expected End:  06/24/25    Resolved:  04/28/25         Patient to demonstrate improved scapular and cervical muscle  strength and stability by 1/3 MMT grades for improved local symptoms management   (Progressing)       Start:  03/24/25    Expected End:  06/24/25            Patient to demonstrate functional improvement, evidenced by FOTO score of 56% or greater  (Progressing)       Start:  03/24/25    Expected End:  06/24/25               STG x 6 visits       Patient will be independent with initial home exercise program to encourage independent management of their  symptoms.   (Met)       Start:  03/24/25    Expected End:  06/24/25    Resolved:  04/07/25         Patient will report a reduction in their worst pain score by 1-2 points for improved tolerance for self care activities that require cervical rotation  (Met)       Start:  03/24/25    Expected End:  06/24/25    Resolved:  04/28/25         Patient to demonstrate improved postural awareness and self correction for self management of sxs  (Met)       Start:  03/24/25    Expected End:  06/24/25    Resolved:  04/28/25         Patient to report increased ease of completion of basic IADLs 2' to utilizing self management techniques of postural correction and support.  (Progressing)       Start:  03/24/25    Expected End:  06/24/25                Eileen Becker PT CLT

## 2025-04-29 DIAGNOSIS — E78.2 MIXED HYPERLIPIDEMIA: Chronic | ICD-10-CM

## 2025-04-29 RX ORDER — ROSUVASTATIN CALCIUM 5 MG/1
5 TABLET, COATED ORAL NIGHTLY
Qty: 90 TABLET | Refills: 1 | Status: SHIPPED | OUTPATIENT
Start: 2025-04-29

## 2025-04-29 NOTE — TELEPHONE ENCOUNTER
No care due was identified.  [facility] Embedded Care Due Messages. Reference number: [id number].   4/29/2025 10:20:08 AM CDT

## 2025-04-29 NOTE — TELEPHONE ENCOUNTER
Refill Routing Note   Medication(s) are not appropriate for processing by [facility] for the following reason(s):        Drug-disease interaction    [facility] action(s):  Defer      Medication Therapy Plan: Drug-Disease: rosuvastatin and Statin myopathy    Pharmacist review requested: Yes     Appointments  past 12m or future 3m with PCP    Date Provider   Last Visit   3/19/2025 Pal Fong MD   Next Visit   8/20/2025 Pal Fong MD   ED visits in past 90 days: 0        Note composed:10:20 AM 04/29/2025

## 2025-04-29 NOTE — TELEPHONE ENCOUNTER
Preet West  is requesting a refill authorization.  Brief Assessment and Rationale for Refill:  Approve     Medication Therapy Plan:         Pharmacist review requested: Yes   Extended chart review required: Yes   Comments:     Note composed:10:57 AM 04/29/2025

## 2025-04-30 ENCOUNTER — CLINICAL SUPPORT (OUTPATIENT)
Dept: REHABILITATION | Facility: HOSPITAL | Age: 75
End: 2025-04-30
Payer: MEDICARE

## 2025-04-30 DIAGNOSIS — Z73.89 PAIN SELF-MANAGEMENT DEFICIT: Primary | ICD-10-CM

## 2025-04-30 DIAGNOSIS — M43.6 STIFFNESS OF NECK: Chronic | ICD-10-CM

## 2025-04-30 DIAGNOSIS — R29.898 WEAKNESS OF BACK: ICD-10-CM

## 2025-04-30 PROCEDURE — 97110 THERAPEUTIC EXERCISES: CPT | Mod: HCNC,PO

## 2025-04-30 PROCEDURE — 97112 NEUROMUSCULAR REEDUCATION: CPT | Mod: HCNC,PO

## 2025-04-30 NOTE — PROGRESS NOTES
Outpatient Rehab    Physical Therapy Visit    Patient Name: Iesha West  MRN: [id number]  YOB: 1950  Encounter Date: 4/30/2025    Therapy Diagnosis:   Encounter Diagnoses   Name Primary?    Pain self-management deficit Yes    Stiffness of neck     Weakness of back      Physician: Pal Fong MD    Physician Orders: Eval and Treat  Medical Diagnosis: Neck pain    Visit # / Visits Authorized:  9 / 20  Insurance Authorization Period: 3/20/2025 to 7/10/2025  Date of Evaluation: 3/24/2025  Reassessment completed 04-  Plan of Care Certification: 3/24/2025 to 06-     PT/PTA:     Number of PTA visits since last PT visit:   Time In: 1100   Time Out: 1150  Total Time: 50   Total Billable Time:      FOTO:  Intake Score:  %  Survey Score 1:  %  Survey Score 2:  %         Subjective   States that she is feeling much better.  She continues to have pain at right of center in her neck with EROM r cervical rotation..  Family / care giver present for this visit:   Pain reported as 0/10. ar rest.    Objective            Treatment:  Therapeutic Exercise  TE 1: Nu step, arms only x 3 minutes, level 4  TE 2: seated bilat shoulder ER, lime tband, x 15  TE 3: seated rows, bilat with lime tband, x 15, x 2  TE 4: seated scaption, left and right, lime tband x 15 ea  TE 5: forward and OH presses, sitting, bilat x 10 reps  Balance/Neuromuscular Re-Education  NMR 1: cervical retraction with ext 20 x, gentle overpressure for stretch and proprioceptive retraining  NMR 2: thoracic ext over back of chair 10 x  NMR 3: scapular retractions, 2 x 10.  Cueing for technique.  She continues to substitute PPT.  NMR 4: posterior shoulder rolling w significant difficulty rolling her shoulders.  Substituted arching her back and slouching.  mc and vc.  NMR 5: standing open books with visual tracking 15 x each side  NMR 6: wall pushups x 15  NMR 7: B+ shoulder scation tapping wall at end range. Cued to maintain elbow ext 20  x    Time Entry(in minutes):  Neuromuscular Re-Education Time Entry: 25  Therapeutic Exercise Time Entry: 25    Assessment & Plan   Assessment: Patient recognizes progress with PT for local symptom management and strengthening  Today's treatment again  focused on strengthening posterior shoulder and periscapular musculature for improved postural endurance. Pt demonstrated improved postural control and less substitution of shoulder lelvation for initiation of UE movements.   She has met STG's and LTGs and progressing towards remaining goals attainment.  Evaluation/Treatment Tolerance: Patient tolerated treatment well    Patient will continue to benefit from skilled outpatient physical therapy to address the deficits listed in the problem list box on initial evaluation, provide pt/family education and to maximize pt's level of independence in the home and community environment.     Patient's spiritual, cultural, and educational needs considered and patient agreeable to plan of care and goals.           Plan: Continue POC with focus on postural corrections.    Goals:   Active       LTG x 12 visits       Patient to be independent with progressed, resistive home exercise program for long term self management of symptoms.  (Progressing)       Start:  03/24/25    Expected End:  06/24/25            Patient to report decreased symptoms intensity, distribution and frequency of exacerbations   (Met)       Start:  03/24/25    Expected End:  06/24/25    Resolved:  04/28/25         Patient to demonstrate improved scapular and cervical muscle  strength and stability by 1/3 MMT grades for improved local symptoms management   (Progressing)       Start:  03/24/25    Expected End:  06/24/25            Patient to demonstrate functional improvement, evidenced by FOTO score of 56% or greater  (Progressing)       Start:  03/24/25    Expected End:  06/24/25               STG x 6 visits       Patient will be independent with initial home  exercise program to encourage independent management of their symptoms.   (Met)       Start:  03/24/25    Expected End:  06/24/25    Resolved:  04/07/25         Patient will report a reduction in their worst pain score by 1-2 points for improved tolerance for self care activities that require cervical rotation  (Met)       Start:  03/24/25    Expected End:  06/24/25    Resolved:  04/28/25         Patient to demonstrate improved postural awareness and self correction for self management of sxs  (Met)       Start:  03/24/25    Expected End:  06/24/25    Resolved:  04/28/25         Patient to report increased ease of completion of basic IADLs 2' to utilizing self management techniques of postural correction and support.  (Progressing)       Start:  03/24/25    Expected End:  06/24/25                Eileen Becker PT CLT

## 2025-05-05 ENCOUNTER — CLINICAL SUPPORT (OUTPATIENT)
Dept: REHABILITATION | Facility: HOSPITAL | Age: 75
End: 2025-05-05
Payer: MEDICARE

## 2025-05-05 DIAGNOSIS — R29.898 WEAKNESS OF BACK: ICD-10-CM

## 2025-05-05 DIAGNOSIS — M43.6 STIFFNESS OF NECK: Primary | ICD-10-CM

## 2025-05-05 PROCEDURE — 97110 THERAPEUTIC EXERCISES: CPT | Mod: HCNC,PO,CQ

## 2025-05-05 PROCEDURE — 97112 NEUROMUSCULAR REEDUCATION: CPT | Mod: HCNC,PO,CQ

## 2025-05-06 NOTE — PROGRESS NOTES
Outpatient Rehab    Physical Therapy Visit    Patient Name: Iesha West  MRN: [id number]  YOB: 1950  Encounter Date: 5/5/2025    Therapy Diagnosis:   Encounter Diagnoses   Name Primary?    Stiffness of neck Yes    Weakness of back      Physician: Pal Fong MD    Physician Orders: Eval and Treat  Medical Diagnosis: Neck pain    Visit # / Visits Authorized:  10 / 20  Insurance Authorization Period: 3/20/2025 to 7/10/2025  Date of Evaluation: 3/24/2025  Reassessment completed 04-  Plan of Care Certification: 3/24/2025 to 06-     PT/PTA: PTA   Number of PTA visits since last PT visit:1  Time In: 1000   Time Out: 1100  Total Time (in minutes): 60   Total Billable Time (in minutes): 60    FOTO:  Intake Score:  %  Survey Score 2:  %  Survey Score 3:  %         Subjective   Pt reports significant improvement overall but will still get pain in R upper trap..  Pain reported as 2/10.      Objective            Treatment:  Therapeutic Exercise  TE 1: UBE 5/5  TE 2: seated bilat shoulder ER, lime tband, x 15  TE 3: seated rows, bilat with lime tband, x 15, x 2  TE 4: seated scaption, left and right, lime tband x 15 ea  TE 5: forward and OH presses, sitting, bilat x 10 reps  Balance/Neuromuscular Re-Education  NMR 1: cervical retraction with ext 20 x,  NMR 2: thoracic ext over back of chair 10 x  NMR 3: scapular retractions, 2 x 10.  Cueing for technique.  She continues to substitute PPT.  NMR 4: posterior shoulder rolling w significant difficulty rolling her shoulders.  Substituted arching her back and slouching.  mc and vc.  NMR 5: sidelying open books with visual tracking 15 x each side OTB  NMR 6: wall pushups x 15  NMR 7: B+ shoulder scation tapping wall at end range. Cued to maintain elbow ext 20 x  NMR 8: Horizontal B+ shoulder abd OTB 2 x 10  NMR 9: shrugs 2 x 10 with 4# weitghts performed standing    Time Entry(in minutes):  Neuromuscular Re-Education Time Entry: 35  Therapeutic  Exercise Time Entry: 25    Assessment & Plan   Assessment: Pt is able to perform all posterior shoulder and periscapular strengthening exericses without increase of pain. She is demonstrating improved coordination and control. It is hopeful that this will translate to functional activities being performed wtih improved body mechanics and decreased pain.       Patient will continue to benefit from skilled outpatient physical therapy to address the deficits listed in the problem list box on initial evaluation, provide pt/family education and to maximize pt's level of independence in the home and community environment.     Patient's spiritual, cultural, and educational needs considered and patient agreeable to plan of care and goals.           Plan: Address functional activities that pt is performing as part of ADLs and ensure improved body mechanics    Goals:   Active       LTG x 12 visits       Patient to be independent with progressed, resistive home exercise program for long term self management of symptoms.  (Progressing)       Start:  03/24/25    Expected End:  06/24/25            Patient to report decreased symptoms intensity, distribution and frequency of exacerbations   (Met)       Start:  03/24/25    Expected End:  06/24/25    Resolved:  04/28/25         Patient to demonstrate improved scapular and cervical muscle  strength and stability by 1/3 MMT grades for improved local symptoms management   (Progressing)       Start:  03/24/25    Expected End:  06/24/25            Patient to demonstrate functional improvement, evidenced by FOTO score of 56% or greater  (Progressing)       Start:  03/24/25    Expected End:  06/24/25               STG x 6 visits       Patient will be independent with initial home exercise program to encourage independent management of their symptoms.   (Met)       Start:  03/24/25    Expected End:  06/24/25    Resolved:  04/07/25         Patient will report a reduction in their worst pain  score by 1-2 points for improved tolerance for self care activities that require cervical rotation  (Met)       Start:  03/24/25    Expected End:  06/24/25    Resolved:  04/28/25         Patient to demonstrate improved postural awareness and self correction for self management of sxs  (Met)       Start:  03/24/25    Expected End:  06/24/25    Resolved:  04/28/25         Patient to report increased ease of completion of basic IADLs 2' to utilizing self management techniques of postural correction and support.  (Progressing)       Start:  03/24/25    Expected End:  06/24/25                Kim Tobias, PTA

## 2025-05-07 ENCOUNTER — CLINICAL SUPPORT (OUTPATIENT)
Dept: REHABILITATION | Facility: HOSPITAL | Age: 75
End: 2025-05-07
Payer: MEDICARE

## 2025-05-07 DIAGNOSIS — R29.898 WEAKNESS OF BACK: ICD-10-CM

## 2025-05-07 DIAGNOSIS — M43.6 STIFFNESS OF NECK: Primary | ICD-10-CM

## 2025-05-07 PROCEDURE — 97112 NEUROMUSCULAR REEDUCATION: CPT | Mod: HCNC,PO,CQ

## 2025-05-07 PROCEDURE — 97530 THERAPEUTIC ACTIVITIES: CPT | Mod: HCNC,PO,CQ

## 2025-05-07 PROCEDURE — 97110 THERAPEUTIC EXERCISES: CPT | Mod: HCNC,PO,CQ

## 2025-05-07 NOTE — PROGRESS NOTES
Outpatient Rehab    Physical Therapy Visit    Patient Name: Iesha West  MRN: [id number]  YOB: 1950  Encounter Date: 5/7/2025    Therapy Diagnosis:   Encounter Diagnoses   Name Primary?    Stiffness of neck Yes    Weakness of back      Physician: Pal Fong MD    Physician Orders: Eval and Treat  Medical Diagnosis: Neck pain    Visit # / Visits Authorized:  11 / 20  Insurance Authorization Period: 3/20/2025 to 7/10/2025  Date of Evaluation: 3/24/2025  Reassessment completed 04-  Plan of Care Certification: 3/24/2025 to 06-     PT/PTA: PTA   Number of PTA visits since last PT visit:2  Time In: 1000   Time Out: 1100  Total Time (in minutes): 60   Total Billable Time (in minutes): 60    FOTO:  Intake Score:  %  Survey Score 2:  %  Survey Score 3:  %         Subjective   Pt stated she feels much better today..         Objective            Treatment:  Therapeutic Exercise  TE 1: UBE 5/5  TE 2: seated bilat shoulder ER, lime tband, x 15  TE 3: rows performed at [facility] today 25 # 3 x 10  Balance/Neuromuscular Re-Education  NMR 1: cervical retraction with ext 20 x,  NMR 2: thoracic ext over back of chair 10 x  NMR 3: scapular retractions, 2 x 10.  Cueing for technique.  She continues to substitute PPT.  NMR 5: sidelying open books with visual tracking 15 x each side OTB  NMR 7: B+ shoulder scation tapping wall at end range. Cued to maintain elbow ext 20 x  NMR 8: Horizontal B+ shoulder abd OTB 2 x 10  NMR 9: shrugs 2 x 10 with 4# weitghts performed standing  Therapeutic Activity  TA 1: Simulated ADLs today such as cooking and chopping with focus on maintaining good posture and prevent rounding of shoulders.  TA 2: Education on performing cervical and thoracic ext periodically throughout the day if she is having a bad day with pain.  TA 3: Review of previously issued HEP in preparation of upcoming discharge from therapy.    Time Entry(in minutes):  Neuromuscular Re-Education Time  Entry: 30  Therapeutic Activity Time Entry: 15  Therapeutic Exercise Time Entry: 15    Assessment & Plan   Assessment: Pt has done very well with therapy demonstrating improved posture and functional ROM in cervical and thoracic spine. She has clearly been compiant with HEP and is expected to be able to independently manage symptoms following upcoming discharge.       Patient will continue to benefit from skilled outpatient physical therapy to address the deficits listed in the problem list box on initial evaluation, provide pt/family education and to maximize pt's level of independence in the home and community environment.     Patient's spiritual, cultural, and educational needs considered and patient agreeable to plan of care and goals.           Plan: d/c after next visit.    Goals:   Active       LTG x 12 visits       Patient to be independent with progressed, resistive home exercise program for long term self management of symptoms.  (Progressing)       Start:  03/24/25    Expected End:  06/24/25            Patient to report decreased symptoms intensity, distribution and frequency of exacerbations   (Met)       Start:  03/24/25    Expected End:  06/24/25    Resolved:  04/28/25         Patient to demonstrate improved scapular and cervical muscle  strength and stability by 1/3 MMT grades for improved local symptoms management   (Progressing)       Start:  03/24/25    Expected End:  06/24/25            Patient to demonstrate functional improvement, evidenced by FOTO score of 56% or greater  (Progressing)       Start:  03/24/25    Expected End:  06/24/25               STG x 6 visits       Patient will be independent with initial home exercise program to encourage independent management of their symptoms.   (Met)       Start:  03/24/25    Expected End:  06/24/25    Resolved:  04/07/25         Patient will report a reduction in their worst pain score by 1-2 points for improved tolerance for self care activities that  require cervical rotation  (Met)       Start:  03/24/25    Expected End:  06/24/25    Resolved:  04/28/25         Patient to demonstrate improved postural awareness and self correction for self management of sxs  (Met)       Start:  03/24/25    Expected End:  06/24/25    Resolved:  04/28/25         Patient to report increased ease of completion of basic IADLs 2' to utilizing self management techniques of postural correction and support.  (Progressing)       Start:  03/24/25    Expected End:  06/24/25                Kim Tobias, PTA

## 2025-05-12 ENCOUNTER — CLINICAL SUPPORT (OUTPATIENT)
Dept: REHABILITATION | Facility: HOSPITAL | Age: 75
End: 2025-05-12
Payer: MEDICARE

## 2025-05-12 DIAGNOSIS — R29.898 WEAKNESS OF BACK: ICD-10-CM

## 2025-05-12 DIAGNOSIS — M43.6 STIFFNESS OF NECK: Chronic | ICD-10-CM

## 2025-05-12 DIAGNOSIS — Z73.89 PAIN SELF-MANAGEMENT DEFICIT: Primary | ICD-10-CM

## 2025-05-12 PROCEDURE — 97110 THERAPEUTIC EXERCISES: CPT | Mod: HCNC,PO

## 2025-05-12 PROCEDURE — 97164 PT RE-EVAL EST PLAN CARE: CPT | Mod: HCNC,PO

## 2025-05-12 PROCEDURE — 97112 NEUROMUSCULAR REEDUCATION: CPT | Mod: HCNC,PO

## 2025-05-12 PROCEDURE — 97530 THERAPEUTIC ACTIVITIES: CPT | Mod: HCNC,PO

## 2025-05-12 NOTE — PROGRESS NOTES
Outpatient Rehab    Physical Therapy Discharge    Patient Name: Iesha West  MRN: [id number]  YOB: 1950  Encounter Date: 5/12/2025    Therapy Diagnosis:   Encounter Diagnoses   Name Primary?    Pain self-management deficit Yes    Stiffness of neck     Weakness of back      Physician: Pal Fong MD    Physician Orders: Eval and Treat  Medical Diagnosis: Neck pain    Visit # / Visits Authorized:  12 / 20  Insurance Authorization Period: 3/20/2025 to 7/10/2025  Date of Evaluation: 3/19/2025  3/24/2025  Plan of Care Certification: 3/24/2025 to 6/24/2025      PT/PTA:     Number of PTA visits since last PT visit:   Time In: 1100   Time Out: 1150  Total Time (in minutes): 50   Total Billable Time (in minutes):      FOTO:  Intake Score:  %  Survey Score 2: 69%  Survey Score 3:  %      Precautions:       Subjective   Patient states that she has made much progress since IE.   Her pain in minimal and intermittent, sleep is improved and she is happy with the results.  She completes her HEP as recommended, her  is very supportive..  Pain reported as 0/10. at rest.    Objective            Treatment:  Therapeutic Exercise  TE 1: UBE 5/5  TE 2: seated bilat shoulder ER, lime tband, x 15  TE 3: seated bilat shoulder HAB, lime tband, x 15  TE 4: seated scaption, left and right, lime tband x 15 ea  Balance/Neuromuscular Re-Education  NMR 1: cervical retraction with ext 20 x,  NMR 2: thoracic ext over back of chair 10 x  NMR 3: scapular retractions, 2 x 10.  Cueing for technique.  She continues to substitute PPT.  Other Activities  Activity 1: Reassessment completed.   Patient and  issued updated, resistive HEP w tbands for completion.  She was able to return demo w handout and  assist.    Time Entry(in minutes):  PT Re-Evaluation Time Entry: 20  Neuromuscular Re-Education Time Entry: 15  Therapeutic Activity Time Entry: 15  Therapeutic Exercise Time Entry: 15    Assessment & Plan   Assessment:  Pt has done very well with therapy demonstrating improved posture and functional ROM in cervical and thoracic spine. All goals have been met and she is anticipated to continue to demonstrate progress with posture and ROM after DC.  She has clearly been compliant with HEP and is expected to be able to independently manage symptoms following  discharge.       Patient's spiritual, cultural, and educational needs considered and patient agreeable to plan of care and goals.           Plan: DC to HEP    Goals:   Active       LTG x 12 visits       Patient to be independent with progressed, resistive home exercise program for long term self management of symptoms.  (Progressing)       Start:  03/24/25    Expected End:  06/24/25            Patient to report decreased symptoms intensity, distribution and frequency of exacerbations   (Met)       Start:  03/24/25    Expected End:  06/24/25    Resolved:  04/28/25         Patient to demonstrate improved scapular and cervical muscle  strength and stability by 1/3 MMT grades for improved local symptoms management   (Progressing)       Start:  03/24/25    Expected End:  06/24/25            Patient to demonstrate functional improvement, evidenced by FOTO score of 56% or greater  (Progressing)       Start:  03/24/25    Expected End:  06/24/25               STG x 6 visits       Patient will be independent with initial home exercise program to encourage independent management of their symptoms.   (Met)       Start:  03/24/25    Expected End:  06/24/25    Resolved:  04/07/25         Patient will report a reduction in their worst pain score by 1-2 points for improved tolerance for self care activities that require cervical rotation  (Met)       Start:  03/24/25    Expected End:  06/24/25    Resolved:  04/28/25         Patient to demonstrate improved postural awareness and self correction for self management of sxs  (Met)       Start:  03/24/25    Expected End:  06/24/25    Resolved:   04/28/25         Patient to report increased ease of completion of basic IADLs 2' to utilizing self management techniques of postural correction and support.  (Progressing)       Start:  03/24/25    Expected End:  06/24/25                Eileen Becker PT CLT

## 2025-05-13 PROBLEM — Z73.89 PAIN SELF-MANAGEMENT DEFICIT: Status: RESOLVED | Noted: 2025-03-24 | Resolved: 2025-05-13

## 2025-05-13 PROBLEM — R29.898 WEAKNESS OF BACK: Status: RESOLVED | Noted: 2025-03-24 | Resolved: 2025-05-13

## 2025-05-13 PROBLEM — M43.6 STIFFNESS OF NECK: Chronic | Status: RESOLVED | Noted: 2025-03-24 | Resolved: 2025-05-13

## 2025-06-12 DIAGNOSIS — R09.81 CONGESTION OF NASAL SINUS: ICD-10-CM

## 2025-06-12 RX ORDER — FLUTICASONE PROPIONATE 50 MCG
1 SPRAY, SUSPENSION (ML) NASAL
Qty: 48 G | Refills: 3 | Status: SHIPPED | OUTPATIENT
Start: 2025-06-12

## 2025-06-12 NOTE — TELEPHONE ENCOUNTER
Care Due:                  Date            Visit Type   Department     Provider  --------------------------------------------------------------------------------                                EP - [facility]      [address]  Last Visit: 03-      CARE ([facility])   [facility] Jose Harpera EP - [facility] [address]  Next Visit: 08-      [facility] ([facility])   [facility] Jose  [facility]                                                            Last  Test          Frequency    Reason                     Performed    Due Date  --------------------------------------------------------------------------------    CBC.........  12 months..  celecoxib, diclofenac,     02- 02-                             ibuprofen................    Mg Level....  12 months..  alendronate..............  Not Found    Overdue    Phosphate...  12 months..  alendronate..............  Not Found    Overdue    [employer] Embedded Care Due Messages. Reference number: [id number].   6/12/2025 10:26:31 AM CDT

## 2025-06-12 NOTE — TELEPHONE ENCOUNTER
Provider Staff:  Action required for this patient    Requires labs      Please see care gap opportunities below in Care Due Message.    Thanks!  [facility]     Appointments      Date Provider   Last Visit   3/19/2025 Pal Fong MD   Next Visit   8/20/2025 Pal Fong MD     Refill Decision Note   Preet West  is requesting a refill authorization.  Brief Assessment and Rationale for Refill:  Approve     Medication Therapy Plan:  FOVS      Comments:     Note composed:12:23 PM 06/12/2025

## 2025-07-08 DIAGNOSIS — E11.9 TYPE 2 DIABETES MELLITUS WITHOUT COMPLICATION, WITHOUT LONG-TERM CURRENT USE OF INSULIN: Chronic | ICD-10-CM

## 2025-07-08 NOTE — TELEPHONE ENCOUNTER
Care Due:                  Date            Visit Type   Department     Provider  --------------------------------------------------------------------------------                                EP - [facility]      [address]  Last Visit: 03-      CARE ([facility])   FAMILY Jose Harpera EP - [facility] [address]  Next Visit: 08-      [facility] ([facility])   [facility] Jose  [facility]                                                            Last  Test          Frequency    Reason                     Performed    Due Date  --------------------------------------------------------------------------------    CMP.........  12 months..  alendronate, celecoxib,    10-   10-                             rosuvastatin.............    HBA1C.......  6 months...  glimepiride, metFORMIN...  03- 09-    [facility] Embedded Care Due Messages. Reference number: [id number].   7/08/2025 11:07:04 AM CDT

## 2025-08-11 ENCOUNTER — LAB VISIT (OUTPATIENT)
Dept: LAB | Facility: HOSPITAL | Age: 75
End: 2025-08-11
Attending: INTERNAL MEDICINE
Payer: MEDICARE

## 2025-08-11 DIAGNOSIS — D64.9 MILD ANEMIA: ICD-10-CM

## 2025-08-11 DIAGNOSIS — E53.8 VITAMIN B 12 DEFICIENCY: ICD-10-CM

## 2025-08-11 DIAGNOSIS — Z85.038 H/O COLON CANCER, STAGE III: ICD-10-CM

## 2025-08-11 LAB
ABSOLUTE EOSINOPHIL (SMH): 0.45 K/UL
ABSOLUTE MONOCYTE (SMH): 0.56 K/UL (ref 0.3–1)
ABSOLUTE NEUTROPHIL COUNT (SMH): 3.3 K/UL (ref 1.8–7.7)
ALBUMIN SERPL-MCNC: 4 G/DL (ref 3.5–5.2)
ALP SERPL-CCNC: 44 UNIT/L (ref 55–135)
ALT SERPL-CCNC: 11 UNIT/L (ref 10–44)
ANION GAP (SMH): 7 MMOL/L (ref 8–16)
AST SERPL-CCNC: 14 UNIT/L (ref 10–40)
BASOPHILS # BLD AUTO: 0.05 K/UL
BASOPHILS NFR BLD AUTO: 0.8 %
BILIRUB SERPL-MCNC: 0.3 MG/DL (ref 0.1–1)
BUN SERPL-MCNC: 19 MG/DL (ref 8–23)
CALCIUM SERPL-MCNC: 9 MG/DL (ref 8.7–10.5)
CARCINOEMBRYONIC ANTIGEN (SMH): 2.5 NG/ML
CHLORIDE SERPL-SCNC: 108 MMOL/L (ref 95–110)
CO2 SERPL-SCNC: 25 MMOL/L (ref 23–29)
CREAT SERPL-MCNC: 0.9 MG/DL (ref 0.5–1.4)
ERYTHROCYTE [DISTWIDTH] IN BLOOD BY AUTOMATED COUNT: 15 % (ref 11.5–14.5)
FERRITIN SERPL-MCNC: 8 NG/ML (ref 20–300)
GFR SERPLBLD CREATININE-BSD FMLA CKD-EPI: >60 ML/MIN/1.73/M2
GLUCOSE SERPL-MCNC: 82 MG/DL (ref 70–110)
HCT VFR BLD AUTO: 31.7 % (ref 37–48.5)
HGB BLD-MCNC: 9.5 GM/DL (ref 12–16)
IMM GRANULOCYTES # BLD AUTO: 0.04 K/UL (ref 0–0.04)
IMM GRANULOCYTES NFR BLD AUTO: 0.6 % (ref 0–0.5)
IRON SATN MFR SERPL: <10 % (ref 20–50)
IRON SERPL-MCNC: 29 UG/DL (ref 30–160)
LYMPHOCYTES # BLD AUTO: 2.01 K/UL (ref 1–4.8)
MCH RBC QN AUTO: 26.2 PG (ref 27–31)
MCHC RBC AUTO-ENTMCNC: 30 G/DL (ref 32–36)
MCV RBC AUTO: 87 FL (ref 82–98)
NUCLEATED RBC (/100WBC) (SMH): 0 /100 WBC
PLATELET # BLD AUTO: 226 K/UL (ref 150–450)
PMV BLD AUTO: 10.6 FL (ref 9.2–12.9)
POTASSIUM SERPL-SCNC: 4.6 MMOL/L (ref 3.5–5.1)
PROT SERPL-MCNC: 7 GM/DL (ref 6–8.4)
RBC # BLD AUTO: 3.63 M/UL (ref 4–5.4)
RELATIVE EOSINOPHIL (SMH): 7 % (ref 0–8)
RELATIVE LYMPHOCYTE (SMH): 31.3 % (ref 18–48)
RELATIVE MONOCYTE (SMH): 8.7 % (ref 4–15)
RELATIVE NEUTROPHIL (SMH): 51.6 % (ref 38–73)
SODIUM SERPL-SCNC: 140 MMOL/L (ref 136–145)
TIBC SERPL-MCNC: 384 UG/DL (ref 250–450)
TRANSFERRIN SERPL-MCNC: 274 MG/DL (ref 200–375)
WBC # BLD AUTO: 6.43 K/UL (ref 3.9–12.7)

## 2025-08-11 PROCEDURE — 82378 CARCINOEMBRYONIC ANTIGEN: CPT

## 2025-08-11 PROCEDURE — 82040 ASSAY OF SERUM ALBUMIN: CPT

## 2025-08-11 PROCEDURE — 36415 COLL VENOUS BLD VENIPUNCTURE: CPT | Mod: PO

## 2025-08-11 PROCEDURE — 84466 ASSAY OF TRANSFERRIN: CPT

## 2025-08-11 PROCEDURE — 85025 COMPLETE CBC W/AUTO DIFF WBC: CPT

## 2025-08-11 PROCEDURE — 82728 ASSAY OF FERRITIN: CPT

## 2025-08-12 ENCOUNTER — TELEPHONE (OUTPATIENT)
Facility: CLINIC | Age: 75
End: 2025-08-12
Payer: MEDICARE

## 2025-08-15 ENCOUNTER — TELEPHONE (OUTPATIENT)
Facility: CLINIC | Age: 75
End: 2025-08-15
Payer: MEDICARE

## 2025-08-15 RX ORDER — ACETAMINOPHEN 325 MG/1
650 TABLET ORAL
OUTPATIENT
Start: 2025-08-15 | End: 2025-08-15

## 2025-08-15 RX ORDER — ACETAMINOPHEN 325 MG/1
650 TABLET ORAL
Status: CANCELLED | OUTPATIENT
Start: 2025-08-15 | End: 2025-08-15

## 2025-08-15 RX ORDER — SODIUM CHLORIDE 0.9 % (FLUSH) 0.9 %
10 SYRINGE (ML) INJECTION
OUTPATIENT
Start: 2025-08-15

## 2025-08-15 RX ORDER — SODIUM CHLORIDE 0.9 % (FLUSH) 0.9 %
10 SYRINGE (ML) INJECTION
Status: CANCELLED | OUTPATIENT
Start: 2025-08-15

## 2025-08-15 RX ORDER — FAMOTIDINE 10 MG/ML
20 INJECTION, SOLUTION INTRAVENOUS
Status: CANCELLED | OUTPATIENT
Start: 2025-08-15 | End: 2025-08-15

## 2025-08-15 RX ORDER — EPINEPHRINE 0.3 MG/.3ML
0.3 INJECTION SUBCUTANEOUS ONCE AS NEEDED
OUTPATIENT
Start: 2025-08-15

## 2025-08-15 RX ORDER — FAMOTIDINE 10 MG/ML
20 INJECTION, SOLUTION INTRAVENOUS
OUTPATIENT
Start: 2025-08-15 | End: 2025-08-15

## 2025-08-15 RX ORDER — EPINEPHRINE 0.3 MG/.3ML
0.3 INJECTION SUBCUTANEOUS ONCE AS NEEDED
Status: CANCELLED | OUTPATIENT
Start: 2025-08-15

## 2025-08-15 RX ORDER — HEPARIN 100 UNIT/ML
500 SYRINGE INTRAVENOUS
OUTPATIENT
Start: 2025-08-15

## 2025-08-15 RX ORDER — HEPARIN 100 UNIT/ML
500 SYRINGE INTRAVENOUS
Status: CANCELLED | OUTPATIENT
Start: 2025-08-15

## 2025-08-19 ENCOUNTER — TELEPHONE (OUTPATIENT)
Facility: CLINIC | Age: 75
End: 2025-08-19
Payer: MEDICARE

## 2025-08-20 ENCOUNTER — TELEPHONE (OUTPATIENT)
Facility: CLINIC | Age: 75
End: 2025-08-20
Payer: MEDICARE

## 2025-08-20 ENCOUNTER — OFFICE VISIT (OUTPATIENT)
Dept: FAMILY MEDICINE | Facility: CLINIC | Age: 75
End: 2025-08-20
Payer: MEDICARE

## 2025-08-20 ENCOUNTER — TELEPHONE (OUTPATIENT)
Dept: FAMILY MEDICINE | Facility: CLINIC | Age: 75
End: 2025-08-20

## 2025-08-20 ENCOUNTER — LAB VISIT (OUTPATIENT)
Dept: LAB | Facility: HOSPITAL | Age: 75
End: 2025-08-20
Attending: INTERNAL MEDICINE
Payer: MEDICARE

## 2025-08-20 VITALS
BODY MASS INDEX: 24.34 KG/M2 | WEIGHT: 132.25 LBS | HEIGHT: 62 IN | DIASTOLIC BLOOD PRESSURE: 73 MMHG | SYSTOLIC BLOOD PRESSURE: 139 MMHG | HEART RATE: 75 BPM

## 2025-08-20 DIAGNOSIS — M81.6 LOCALIZED OSTEOPOROSIS WITHOUT CURRENT PATHOLOGICAL FRACTURE: ICD-10-CM

## 2025-08-20 DIAGNOSIS — I10 ESSENTIAL HYPERTENSION: Chronic | ICD-10-CM

## 2025-08-20 DIAGNOSIS — R07.89 CHEST DISCOMFORT: ICD-10-CM

## 2025-08-20 DIAGNOSIS — Z12.31 ENCOUNTER FOR SCREENING MAMMOGRAM FOR BREAST CANCER: ICD-10-CM

## 2025-08-20 DIAGNOSIS — R53.83 FATIGUE, UNSPECIFIED TYPE: Primary | ICD-10-CM

## 2025-08-20 DIAGNOSIS — Z55.0 LOW-LEVEL OF LITERACY: ICD-10-CM

## 2025-08-20 DIAGNOSIS — K21.9 GASTROESOPHAGEAL REFLUX DISEASE WITHOUT ESOPHAGITIS: ICD-10-CM

## 2025-08-20 DIAGNOSIS — E11.9 TYPE 2 DIABETES MELLITUS WITHOUT COMPLICATION, WITHOUT LONG-TERM CURRENT USE OF INSULIN: ICD-10-CM

## 2025-08-20 DIAGNOSIS — Z60.3 LANGUAGE BARRIER: ICD-10-CM

## 2025-08-20 DIAGNOSIS — Z78.9 STRICT VEGETARIAN DIET: ICD-10-CM

## 2025-08-20 DIAGNOSIS — Z75.8 LANGUAGE BARRIER: ICD-10-CM

## 2025-08-20 DIAGNOSIS — I10 ESSENTIAL HYPERTENSION: ICD-10-CM

## 2025-08-20 LAB
EAG (OHS): 154 MG/DL (ref 68–131)
HBA1C MFR BLD: 7 % (ref 4–5.6)

## 2025-08-20 PROCEDURE — 1126F AMNT PAIN NOTED NONE PRSNT: CPT | Mod: CPTII,HCNC,S$GLB, | Performed by: INTERNAL MEDICINE

## 2025-08-20 PROCEDURE — 3061F NEG MICROALBUMINURIA REV: CPT | Mod: CPTII,HCNC,S$GLB, | Performed by: INTERNAL MEDICINE

## 2025-08-20 PROCEDURE — 99999 PR PBB SHADOW E&M-EST. PATIENT-LVL III: CPT | Mod: PBBFAC,HCNC,, | Performed by: INTERNAL MEDICINE

## 2025-08-20 PROCEDURE — 3075F SYST BP GE 130 - 139MM HG: CPT | Mod: CPTII,HCNC,S$GLB, | Performed by: INTERNAL MEDICINE

## 2025-08-20 PROCEDURE — G2211 COMPLEX E/M VISIT ADD ON: HCPCS | Mod: HCNC,S$GLB,, | Performed by: INTERNAL MEDICINE

## 2025-08-20 PROCEDURE — 99214 OFFICE O/P EST MOD 30 MIN: CPT | Mod: HCNC,S$GLB,, | Performed by: INTERNAL MEDICINE

## 2025-08-20 PROCEDURE — 1160F RVW MEDS BY RX/DR IN RCRD: CPT | Mod: CPTII,HCNC,S$GLB, | Performed by: INTERNAL MEDICINE

## 2025-08-20 PROCEDURE — 3288F FALL RISK ASSESSMENT DOCD: CPT | Mod: CPTII,HCNC,S$GLB, | Performed by: INTERNAL MEDICINE

## 2025-08-20 PROCEDURE — 3078F DIAST BP <80 MM HG: CPT | Mod: CPTII,HCNC,S$GLB, | Performed by: INTERNAL MEDICINE

## 2025-08-20 PROCEDURE — 3008F BODY MASS INDEX DOCD: CPT | Mod: CPTII,HCNC,S$GLB, | Performed by: INTERNAL MEDICINE

## 2025-08-20 PROCEDURE — 3066F NEPHROPATHY DOC TX: CPT | Mod: CPTII,HCNC,S$GLB, | Performed by: INTERNAL MEDICINE

## 2025-08-20 PROCEDURE — 1159F MED LIST DOCD IN RCRD: CPT | Mod: CPTII,HCNC,S$GLB, | Performed by: INTERNAL MEDICINE

## 2025-08-20 PROCEDURE — 4010F ACE/ARB THERAPY RXD/TAKEN: CPT | Mod: CPTII,HCNC,S$GLB, | Performed by: INTERNAL MEDICINE

## 2025-08-20 PROCEDURE — 1101F PT FALLS ASSESS-DOCD LE1/YR: CPT | Mod: CPTII,HCNC,S$GLB, | Performed by: INTERNAL MEDICINE

## 2025-08-20 PROCEDURE — 3051F HG A1C>EQUAL 7.0%<8.0%: CPT | Mod: CPTII,HCNC,S$GLB, | Performed by: INTERNAL MEDICINE

## 2025-08-20 PROCEDURE — 83036 HEMOGLOBIN GLYCOSYLATED A1C: CPT | Mod: HCNC

## 2025-08-20 PROCEDURE — 36415 COLL VENOUS BLD VENIPUNCTURE: CPT | Mod: HCNC,PN

## 2025-08-20 RX ORDER — GLIMEPIRIDE 2 MG/1
2 TABLET ORAL
Qty: 90 TABLET | Refills: 3 | Status: SHIPPED | OUTPATIENT
Start: 2025-08-20 | End: 2025-08-21 | Stop reason: SDUPTHER

## 2025-08-20 RX ORDER — LOSARTAN POTASSIUM 100 MG/1
100 TABLET ORAL DAILY
Qty: 90 TABLET | Refills: 3 | Status: SHIPPED | OUTPATIENT
Start: 2025-08-20

## 2025-08-20 RX ORDER — TERAZOSIN 1 MG/1
1 CAPSULE ORAL NIGHTLY
Qty: 90 CAPSULE | Refills: 3 | Status: SHIPPED | OUTPATIENT
Start: 2025-08-20 | End: 2026-08-20

## 2025-08-20 SDOH — SOCIAL DETERMINANTS OF HEALTH (SDOH): ILITERACY AND LOW LEVEL LITERACY: Z55.0

## 2025-08-20 SDOH — SOCIAL DETERMINANTS OF HEALTH (SDOH): ACCULTURATION DIFFICULTY: Z60.3

## 2025-08-21 DIAGNOSIS — E11.9 TYPE 2 DIABETES MELLITUS WITHOUT COMPLICATION, WITHOUT LONG-TERM CURRENT USE OF INSULIN: ICD-10-CM

## 2025-08-21 RX ORDER — GLIMEPIRIDE 2 MG/1
2 TABLET ORAL
Qty: 90 TABLET | Refills: 3 | Status: SHIPPED | OUTPATIENT
Start: 2025-08-21

## 2025-08-22 ENCOUNTER — HOSPITAL ENCOUNTER (OUTPATIENT)
Dept: RADIOLOGY | Facility: HOSPITAL | Age: 75
Discharge: HOME | End: 2025-08-22
Attending: INTERNAL MEDICINE
Payer: MEDICARE

## 2025-08-22 ENCOUNTER — TELEPHONE (OUTPATIENT)
Dept: FAMILY MEDICINE | Facility: CLINIC | Age: 75
End: 2025-08-22
Payer: MEDICARE

## 2025-08-22 DIAGNOSIS — Z12.31 ENCOUNTER FOR SCREENING MAMMOGRAM FOR BREAST CANCER: ICD-10-CM

## 2025-08-22 PROCEDURE — 77063 BREAST TOMOSYNTHESIS BI: CPT | Mod: TC,PO

## 2025-08-22 PROCEDURE — 77063 BREAST TOMOSYNTHESIS BI: CPT | Mod: 26,,, | Performed by: RADIOLOGY

## 2025-08-22 PROCEDURE — 77067 SCR MAMMO BI INCL CAD: CPT | Mod: 26,,, | Performed by: RADIOLOGY

## 2025-08-25 ENCOUNTER — INFUSION (OUTPATIENT)
Dept: INFUSION THERAPY | Facility: HOSPITAL | Age: 75
End: 2025-08-25
Attending: INTERNAL MEDICINE
Payer: MEDICARE

## 2025-08-25 VITALS
BODY MASS INDEX: 24.51 KG/M2 | RESPIRATION RATE: 18 BRPM | WEIGHT: 134 LBS | OXYGEN SATURATION: 99 % | SYSTOLIC BLOOD PRESSURE: 142 MMHG | HEART RATE: 69 BPM | DIASTOLIC BLOOD PRESSURE: 78 MMHG | TEMPERATURE: 98 F

## 2025-08-25 DIAGNOSIS — D64.9 NORMOCHROMIC NORMOCYTIC ANEMIA: Primary | ICD-10-CM

## 2025-08-25 PROCEDURE — 25000003 PHARM REV CODE 250: Performed by: INTERNAL MEDICINE

## 2025-08-25 PROCEDURE — 63600175 PHARM REV CODE 636 W HCPCS: Performed by: INTERNAL MEDICINE

## 2025-08-25 PROCEDURE — 96365 THER/PROPH/DIAG IV INF INIT: CPT

## 2025-08-25 RX ORDER — SODIUM CHLORIDE 0.9 % (FLUSH) 0.9 %
10 SYRINGE (ML) INJECTION
Status: DISCONTINUED | OUTPATIENT
Start: 2025-08-25 | End: 2025-08-25 | Stop reason: HOSPADM

## 2025-08-25 RX ORDER — METHYLPREDNISOLONE SOD SUCC 125 MG
40 VIAL (EA) INJECTION
Status: DISCONTINUED | OUTPATIENT
Start: 2025-08-25 | End: 2025-08-25 | Stop reason: HOSPADM

## 2025-08-25 RX ORDER — METHYLPREDNISOLONE SOD SUCC 125 MG
40 VIAL (EA) INJECTION
OUTPATIENT
Start: 2025-08-25 | End: 2025-08-25

## 2025-08-25 RX ORDER — HEPARIN 100 UNIT/ML
500 SYRINGE INTRAVENOUS
Status: DISCONTINUED | OUTPATIENT
Start: 2025-08-25 | End: 2025-08-25 | Stop reason: HOSPADM

## 2025-08-25 RX ORDER — HEPARIN 100 UNIT/ML
500 SYRINGE INTRAVENOUS
OUTPATIENT
Start: 2025-08-25

## 2025-08-25 RX ORDER — SODIUM CHLORIDE 0.9 % (FLUSH) 0.9 %
10 SYRINGE (ML) INJECTION
OUTPATIENT
Start: 2025-08-25

## 2025-08-25 RX ORDER — EPINEPHRINE 0.3 MG/.3ML
0.3 INJECTION SUBCUTANEOUS ONCE AS NEEDED
OUTPATIENT
Start: 2025-08-25 | End: 2037-01-21

## 2025-08-25 RX ORDER — ACETAMINOPHEN 325 MG/1
650 TABLET ORAL
OUTPATIENT
Start: 2025-08-25 | End: 2025-08-25

## 2025-08-25 RX ORDER — ACETAMINOPHEN 325 MG/1
650 TABLET ORAL
Status: DISCONTINUED | OUTPATIENT
Start: 2025-08-25 | End: 2025-08-25 | Stop reason: HOSPADM

## 2025-08-25 RX ORDER — FAMOTIDINE 10 MG/ML
20 INJECTION, SOLUTION INTRAVENOUS
OUTPATIENT
Start: 2025-08-25 | End: 2025-08-25

## 2025-08-25 RX ORDER — FAMOTIDINE 10 MG/ML
20 INJECTION, SOLUTION INTRAVENOUS
Status: DISCONTINUED | OUTPATIENT
Start: 2025-08-25 | End: 2025-08-25 | Stop reason: HOSPADM

## 2025-08-25 RX ADMIN — IRON SUCROSE 200 MG: 20 INJECTION, SOLUTION INTRAVENOUS at 03:08

## 2025-09-03 ENCOUNTER — INFUSION (OUTPATIENT)
Dept: INFUSION THERAPY | Facility: HOSPITAL | Age: 75
End: 2025-09-03
Attending: INTERNAL MEDICINE
Payer: MEDICARE

## 2025-09-03 VITALS
HEART RATE: 63 BPM | RESPIRATION RATE: 18 BRPM | DIASTOLIC BLOOD PRESSURE: 68 MMHG | SYSTOLIC BLOOD PRESSURE: 140 MMHG | OXYGEN SATURATION: 98 % | TEMPERATURE: 98 F

## 2025-09-03 DIAGNOSIS — D64.9 NORMOCHROMIC NORMOCYTIC ANEMIA: Primary | ICD-10-CM

## 2025-09-03 PROCEDURE — 63600175 PHARM REV CODE 636 W HCPCS: Performed by: INTERNAL MEDICINE

## 2025-09-03 PROCEDURE — 96365 THER/PROPH/DIAG IV INF INIT: CPT

## 2025-09-03 PROCEDURE — 25000003 PHARM REV CODE 250: Performed by: INTERNAL MEDICINE

## 2025-09-03 PROCEDURE — A4216 STERILE WATER/SALINE, 10 ML: HCPCS | Performed by: INTERNAL MEDICINE

## 2025-09-03 RX ORDER — SODIUM CHLORIDE 0.9 % (FLUSH) 0.9 %
10 SYRINGE (ML) INJECTION
Status: DISCONTINUED | OUTPATIENT
Start: 2025-09-03 | End: 2025-09-03 | Stop reason: HOSPADM

## 2025-09-03 RX ORDER — SODIUM CHLORIDE 0.9 % (FLUSH) 0.9 %
10 SYRINGE (ML) INJECTION
OUTPATIENT
Start: 2025-09-03

## 2025-09-03 RX ORDER — EPINEPHRINE 0.3 MG/.3ML
0.3 INJECTION SUBCUTANEOUS ONCE AS NEEDED
OUTPATIENT
Start: 2025-09-03 | End: 2037-01-30

## 2025-09-03 RX ORDER — HEPARIN 100 UNIT/ML
500 SYRINGE INTRAVENOUS
Status: DISCONTINUED | OUTPATIENT
Start: 2025-09-03 | End: 2025-09-03 | Stop reason: HOSPADM

## 2025-09-03 RX ORDER — FAMOTIDINE 10 MG/ML
20 INJECTION, SOLUTION INTRAVENOUS
OUTPATIENT
Start: 2025-09-03 | End: 2025-09-03

## 2025-09-03 RX ORDER — METHYLPREDNISOLONE SOD SUCC 125 MG
40 VIAL (EA) INJECTION
Status: DISCONTINUED | OUTPATIENT
Start: 2025-09-03 | End: 2025-09-03 | Stop reason: HOSPADM

## 2025-09-03 RX ORDER — ACETAMINOPHEN 325 MG/1
650 TABLET ORAL
Status: DISCONTINUED | OUTPATIENT
Start: 2025-09-03 | End: 2025-09-03 | Stop reason: HOSPADM

## 2025-09-03 RX ORDER — HEPARIN 100 UNIT/ML
500 SYRINGE INTRAVENOUS
OUTPATIENT
Start: 2025-09-03

## 2025-09-03 RX ORDER — FAMOTIDINE 10 MG/ML
20 INJECTION, SOLUTION INTRAVENOUS
Status: DISCONTINUED | OUTPATIENT
Start: 2025-09-03 | End: 2025-09-03 | Stop reason: HOSPADM

## 2025-09-03 RX ORDER — METHYLPREDNISOLONE SOD SUCC 125 MG
40 VIAL (EA) INJECTION
OUTPATIENT
Start: 2025-09-03 | End: 2025-09-03

## 2025-09-03 RX ORDER — ACETAMINOPHEN 325 MG/1
650 TABLET ORAL
OUTPATIENT
Start: 2025-09-03 | End: 2025-09-03

## 2025-09-03 RX ADMIN — IRON SUCROSE 200 MG: 20 INJECTION, SOLUTION INTRAVENOUS at 10:09

## 2025-09-03 RX ADMIN — Medication 10 ML: at 10:09
